# Patient Record
Sex: FEMALE | Race: WHITE | NOT HISPANIC OR LATINO | Employment: OTHER | URBAN - METROPOLITAN AREA
[De-identification: names, ages, dates, MRNs, and addresses within clinical notes are randomized per-mention and may not be internally consistent; named-entity substitution may affect disease eponyms.]

---

## 2017-05-05 ENCOUNTER — OFFICE VISIT (OUTPATIENT)
Dept: INTERNAL MEDICINE | Facility: CLINIC | Age: 82
End: 2017-05-05

## 2017-05-05 VITALS
DIASTOLIC BLOOD PRESSURE: 60 MMHG | HEART RATE: 67 BPM | OXYGEN SATURATION: 96 % | SYSTOLIC BLOOD PRESSURE: 90 MMHG | TEMPERATURE: 98 F | RESPIRATION RATE: 16 BRPM | WEIGHT: 124 LBS | BODY MASS INDEX: 22.82 KG/M2 | HEIGHT: 62 IN

## 2017-05-05 DIAGNOSIS — M85.80 OSTEOPENIA: ICD-10-CM

## 2017-05-05 DIAGNOSIS — F34.1 DYSTHYMIA: ICD-10-CM

## 2017-05-05 DIAGNOSIS — M89.9 DISORDER OF BONE: ICD-10-CM

## 2017-05-05 DIAGNOSIS — R73.02 IMPAIRED GLUCOSE TOLERANCE: ICD-10-CM

## 2017-05-05 DIAGNOSIS — I25.10 ATHEROSCLEROSIS OF NATIVE CORONARY ARTERY OF NATIVE HEART WITHOUT ANGINA PECTORIS: Primary | ICD-10-CM

## 2017-05-05 DIAGNOSIS — H35.30 MACULAR DEGENERATION: ICD-10-CM

## 2017-05-05 DIAGNOSIS — N39.41 URGE INCONTINENCE OF URINE: ICD-10-CM

## 2017-05-05 DIAGNOSIS — I10 ESSENTIAL HYPERTENSION: ICD-10-CM

## 2017-05-05 DIAGNOSIS — K21.9 GASTROESOPHAGEAL REFLUX DISEASE WITHOUT ESOPHAGITIS: ICD-10-CM

## 2017-05-05 DIAGNOSIS — K57.30 DIVERTICULOSIS OF LARGE INTESTINE WITHOUT HEMORRHAGE: ICD-10-CM

## 2017-05-05 DIAGNOSIS — E78.5 HYPERLIPIDEMIA, UNSPECIFIED HYPERLIPIDEMIA TYPE: ICD-10-CM

## 2017-05-05 PROBLEM — R73.9 HYPERGLYCEMIA: Status: ACTIVE | Noted: 2017-05-05

## 2017-05-05 PROBLEM — K57.90 DIVERTICULOSIS OF INTESTINE: Status: ACTIVE | Noted: 2017-05-05

## 2017-05-05 PROBLEM — F32.A DEPRESSION: Status: ACTIVE | Noted: 2017-05-05

## 2017-05-05 PROBLEM — J30.9 ATOPIC RHINITIS: Status: ACTIVE | Noted: 2017-05-05

## 2017-05-05 PROBLEM — R73.9 HYPERGLYCEMIA: Status: RESOLVED | Noted: 2017-05-05 | Resolved: 2017-05-05

## 2017-05-05 PROBLEM — G62.9 PERIPHERAL NERVE DISEASE: Status: ACTIVE | Noted: 2017-05-05

## 2017-05-05 PROCEDURE — 99204 OFFICE O/P NEW MOD 45 MIN: CPT | Performed by: INTERNAL MEDICINE

## 2017-05-05 RX ORDER — RANITIDINE 150 MG/1
150 TABLET ORAL 2 TIMES DAILY
COMMUNITY
End: 2017-05-05 | Stop reason: SDUPTHER

## 2017-05-05 RX ORDER — ACETAMINOPHEN 500 MG
500-1000 TABLET ORAL EVERY EVENING
COMMUNITY
End: 2019-10-02

## 2017-05-05 RX ORDER — RANITIDINE 150 MG/1
150 TABLET ORAL 2 TIMES DAILY
Qty: 180 TABLET | Refills: 1 | Status: SHIPPED | OUTPATIENT
Start: 2017-05-05 | End: 2017-11-09 | Stop reason: SDUPTHER

## 2017-05-06 LAB
25(OH)D3+25(OH)D2 SERPL-MCNC: 92.8 NG/ML (ref 30–100)
ALBUMIN SERPL-MCNC: 4 G/DL (ref 3.5–5.2)
ALBUMIN/GLOB SERPL: 1.4 G/DL
ALP SERPL-CCNC: 55 U/L (ref 39–117)
ALT SERPL-CCNC: 15 U/L (ref 1–33)
APPEARANCE UR: CLEAR
AST SERPL-CCNC: 18 U/L (ref 1–32)
BACTERIA #/AREA URNS HPF: NORMAL /HPF
BASOPHILS # BLD AUTO: 0.06 10*3/MM3 (ref 0–0.2)
BASOPHILS NFR BLD AUTO: 0.8 % (ref 0–1.5)
BILIRUB SERPL-MCNC: 0.3 MG/DL (ref 0.1–1.2)
BILIRUB UR QL STRIP: NEGATIVE
BUN SERPL-MCNC: 20 MG/DL (ref 8–23)
BUN/CREAT SERPL: 28.6 (ref 7–25)
CALCIUM SERPL-MCNC: 10 MG/DL (ref 8.2–9.6)
CASTS URNS MICRO: NORMAL
CHLORIDE SERPL-SCNC: 100 MMOL/L (ref 98–107)
CHOLEST SERPL-MCNC: 118 MG/DL (ref 0–200)
CO2 SERPL-SCNC: 32.3 MMOL/L (ref 22–29)
COLOR UR: YELLOW
CREAT SERPL-MCNC: 0.7 MG/DL (ref 0.57–1)
EOSINOPHIL # BLD AUTO: 0.2 10*3/MM3 (ref 0–0.7)
EOSINOPHIL NFR BLD AUTO: 2.6 % (ref 0.3–6.2)
EPI CELLS #/AREA URNS HPF: NORMAL /HPF
ERYTHROCYTE [DISTWIDTH] IN BLOOD BY AUTOMATED COUNT: 14.1 % (ref 11.7–13)
GLOBULIN SER CALC-MCNC: 2.8 GM/DL
GLUCOSE SERPL-MCNC: 110 MG/DL (ref 65–99)
GLUCOSE UR QL: NEGATIVE
HBA1C MFR BLD: 5.2 % (ref 4.8–5.6)
HCT VFR BLD AUTO: 35.9 % (ref 35.6–45.5)
HDLC SERPL-MCNC: 55 MG/DL (ref 40–60)
HGB BLD-MCNC: 11.7 G/DL (ref 11.9–15.5)
HGB UR QL STRIP: NEGATIVE
IMM GRANULOCYTES # BLD: 0 10*3/MM3 (ref 0–0.03)
IMM GRANULOCYTES NFR BLD: 0 % (ref 0–0.5)
KETONES UR QL STRIP: NEGATIVE
LDLC SERPL CALC-MCNC: 45 MG/DL (ref 0–100)
LEUKOCYTE ESTERASE UR QL STRIP: NEGATIVE
LYMPHOCYTES # BLD AUTO: 2.38 10*3/MM3 (ref 0.9–4.8)
LYMPHOCYTES NFR BLD AUTO: 30.4 % (ref 19.6–45.3)
MCH RBC QN AUTO: 34.2 PG (ref 26.9–32)
MCHC RBC AUTO-ENTMCNC: 32.6 G/DL (ref 32.4–36.3)
MCV RBC AUTO: 105 FL (ref 80.5–98.2)
MONOCYTES # BLD AUTO: 0.6 10*3/MM3 (ref 0.2–1.2)
MONOCYTES NFR BLD AUTO: 7.7 % (ref 5–12)
NEUTROPHILS # BLD AUTO: 4.59 10*3/MM3 (ref 1.9–8.1)
NEUTROPHILS NFR BLD AUTO: 58.5 % (ref 42.7–76)
NITRITE UR QL STRIP: NEGATIVE
NRBC BLD AUTO-RTO: 0 /100 WBC (ref 0–0)
PH UR STRIP: 7.5 [PH] (ref 5–8)
PLATELET # BLD AUTO: 196 10*3/MM3 (ref 140–500)
POTASSIUM SERPL-SCNC: 4.3 MMOL/L (ref 3.5–5.2)
PROT SERPL-MCNC: 6.8 G/DL (ref 6–8.5)
PROT UR QL STRIP: NEGATIVE
RBC # BLD AUTO: 3.42 10*6/MM3 (ref 3.9–5.2)
RBC #/AREA URNS HPF: NORMAL /HPF
SODIUM SERPL-SCNC: 143 MMOL/L (ref 136–145)
SP GR UR: 1.01 (ref 1–1.03)
T4 FREE SERPL-MCNC: 1.01 NG/DL (ref 0.93–1.7)
TRIGL SERPL-MCNC: 92 MG/DL (ref 0–150)
TSH SERPL DL<=0.005 MIU/L-ACNC: 0.88 MIU/ML (ref 0.27–4.2)
UROBILINOGEN UR STRIP-MCNC: (no result) MG/DL
VLDLC SERPL CALC-MCNC: 18.4 MG/DL (ref 5–40)
WBC # BLD AUTO: 7.83 10*3/MM3 (ref 4.5–10.7)
WBC #/AREA URNS HPF: NORMAL /HPF

## 2017-05-25 ENCOUNTER — OFFICE VISIT (OUTPATIENT)
Dept: CARDIOLOGY | Facility: CLINIC | Age: 82
End: 2017-05-25

## 2017-05-25 VITALS
OXYGEN SATURATION: 99 % | DIASTOLIC BLOOD PRESSURE: 56 MMHG | WEIGHT: 116 LBS | SYSTOLIC BLOOD PRESSURE: 120 MMHG | BODY MASS INDEX: 21.35 KG/M2 | HEIGHT: 62 IN | HEART RATE: 65 BPM

## 2017-05-25 DIAGNOSIS — Z95.5 HISTORY OF CORONARY ARTERY STENT PLACEMENT: ICD-10-CM

## 2017-05-25 DIAGNOSIS — I25.10 ATHEROSCLEROSIS OF NATIVE CORONARY ARTERY OF NATIVE HEART WITHOUT ANGINA PECTORIS: Primary | ICD-10-CM

## 2017-05-25 PROCEDURE — 99213 OFFICE O/P EST LOW 20 MIN: CPT | Performed by: PHYSICIAN ASSISTANT

## 2017-05-25 PROCEDURE — 93000 ELECTROCARDIOGRAM COMPLETE: CPT | Performed by: PHYSICIAN ASSISTANT

## 2017-05-31 ENCOUNTER — APPOINTMENT (OUTPATIENT)
Dept: BONE DENSITY | Facility: HOSPITAL | Age: 82
End: 2017-05-31
Attending: INTERNAL MEDICINE

## 2017-06-14 DIAGNOSIS — R71.8 ABNORMAL RED BLOOD CELLS: Primary | ICD-10-CM

## 2017-06-19 ENCOUNTER — RESULTS ENCOUNTER (OUTPATIENT)
Dept: INTERNAL MEDICINE | Facility: CLINIC | Age: 82
End: 2017-06-19

## 2017-06-19 DIAGNOSIS — R71.8 ABNORMAL RED BLOOD CELLS: ICD-10-CM

## 2017-06-19 LAB — VIT B12 SERPL-MCNC: 1430 PG/ML (ref 211–946)

## 2017-06-23 ENCOUNTER — HOSPITAL ENCOUNTER (OUTPATIENT)
Dept: BONE DENSITY | Facility: HOSPITAL | Age: 82
Discharge: HOME | End: 2017-06-23
Attending: INTERNAL MEDICINE | Admitting: INTERNAL MEDICINE

## 2017-06-23 DIAGNOSIS — M89.9 DISORDER OF BONE: ICD-10-CM

## 2017-06-23 DIAGNOSIS — M85.80 OSTEOPENIA: ICD-10-CM

## 2017-06-23 PROCEDURE — 77080 DXA BONE DENSITY AXIAL: CPT

## 2017-06-26 DIAGNOSIS — M54.9 BACK PAIN, UNSPECIFIED BACK LOCATION, UNSPECIFIED BACK PAIN LATERALITY, UNSPECIFIED CHRONICITY: Primary | ICD-10-CM

## 2017-06-26 DIAGNOSIS — M54.9 CHRONIC BACK PAIN, UNSPECIFIED BACK LOCATION, UNSPECIFIED BACK PAIN LATERALITY: ICD-10-CM

## 2017-06-26 DIAGNOSIS — G89.29 CHRONIC BACK PAIN, UNSPECIFIED BACK LOCATION, UNSPECIFIED BACK PAIN LATERALITY: ICD-10-CM

## 2017-07-05 ENCOUNTER — TELEPHONE (OUTPATIENT)
Dept: INTERNAL MEDICINE | Facility: CLINIC | Age: 82
End: 2017-07-05

## 2017-07-05 DIAGNOSIS — Z85.828 HISTORY OF SKIN CANCER: Primary | ICD-10-CM

## 2017-07-21 ENCOUNTER — OFFICE VISIT (OUTPATIENT)
Dept: INTERNAL MEDICINE | Facility: CLINIC | Age: 82
End: 2017-07-21

## 2017-07-21 VITALS
WEIGHT: 123 LBS | HEIGHT: 62 IN | RESPIRATION RATE: 16 BRPM | SYSTOLIC BLOOD PRESSURE: 150 MMHG | DIASTOLIC BLOOD PRESSURE: 80 MMHG | BODY MASS INDEX: 22.63 KG/M2 | OXYGEN SATURATION: 98 % | TEMPERATURE: 98.2 F | HEART RATE: 65 BPM

## 2017-07-21 DIAGNOSIS — M54.5 CHRONIC BILATERAL LOW BACK PAIN, WITH SCIATICA PRESENCE UNSPECIFIED: Primary | ICD-10-CM

## 2017-07-21 DIAGNOSIS — G89.29 CHRONIC BILATERAL LOW BACK PAIN, WITH SCIATICA PRESENCE UNSPECIFIED: Primary | ICD-10-CM

## 2017-07-21 PROBLEM — M54.50 CHRONIC BILATERAL LOW BACK PAIN: Status: ACTIVE | Noted: 2017-07-21

## 2017-07-21 PROCEDURE — 99213 OFFICE O/P EST LOW 20 MIN: CPT | Performed by: INTERNAL MEDICINE

## 2017-07-21 RX ORDER — PREDNISONE 10 MG/1
TABLET ORAL
Qty: 1 EACH | Refills: 0 | Status: SHIPPED | OUTPATIENT
Start: 2017-07-21 | End: 2017-08-09 | Stop reason: SINTOL

## 2017-07-21 NOTE — PROGRESS NOTES
Subjective   Alexa Peace is a 89 y.o. female.   5/5/2017  Wt Readings from Last 1 Encounters:   07/21/17 123 lb (55.8 kg)   She was seen in May 2017 for initial visit.    She returns for acute care visit because of back and leg pain.    She describes fairly long-standing back and leg pain.  No trauma described.  She had a bone density done in May which showed osteopenia.  She says that her back does not hurt if she lays down but anytime she is up she has problems with both back and leg pain.  She has not had any falls    History of Present Illness     The following portions of the patient's history were reviewed and updated as appropriate: allergies, current medications, past family history, past medical history, past social history, past surgical history and problem list.    Review of Systems   Constitutional: Negative.    HENT: Negative.    Eyes: Negative.    Respiratory: Negative.    Cardiovascular: Negative.    Endocrine: Negative.    Genitourinary: Negative.    Musculoskeletal: Positive for back pain.   Skin: Negative.    Neurological: Negative.    Hematological: Negative.    Psychiatric/Behavioral: Negative.        Objective   Physical Exam   Constitutional: She appears well-developed and well-nourished.   HENT:   Head: Normocephalic and atraumatic.   Cardiovascular: Normal rate and regular rhythm.  Exam reveals no gallop and no friction rub.    No murmur heard.  Pulmonary/Chest: Effort normal and breath sounds normal. No respiratory distress. She has no wheezes. She has no rales.   Abdominal: Soft. Bowel sounds are normal. She exhibits no distension and no mass. There is no tenderness.   Musculoskeletal:   Lumbosacral spine flexion about 70° anteriorly, 5° extension, 10° toward either side.  All motions are problematic But particularly flexion toward the left.  Straight leg raising is not definitely positive   Neurological: She is alert.   Gait appear stable   Skin: Skin is warm.   Psychiatric: Her behavior  is normal.   Vitals reviewed.      Assessment/Plan   Ada was seen today for back pain and leg pain.    Diagnoses and all orders for this visit:    Chronic bilateral low back pain, with sciatica presence unspecified  Comments:  Low back and bilateral leg pain, suspect lumbar spinal stenosis.  We'll schedule MRI scan and refer for orthopedic consultation,  will treat with a prednisone Dosepak to see if she is responsive to this  Orders:  -     MRI Lumbar Spine With & Without Contrast; Future  -     Ambulatory Referral to Orthopedic Surgery          Keep September follow-up as scheduled                     EMR Dragon/Transcription disclaimer:      Much of this encounter note is an electronic transcription/translation of spoken language to printed text. The electronic translation of spoken language may permit erroneous, or at times, nonsensical words or phrases to be inadvertently transcribed; Although I have reviewed the note for such errors, some may still exist.

## 2017-07-27 ENCOUNTER — TELEPHONE (OUTPATIENT)
Dept: INTERNAL MEDICINE | Facility: CLINIC | Age: 82
End: 2017-07-27

## 2017-07-27 RX ORDER — GABAPENTIN 100 MG/1
100 CAPSULE ORAL 3 TIMES DAILY
Qty: 90 CAPSULE | Refills: 3 | Status: SHIPPED | OUTPATIENT
Start: 2017-07-27 | End: 2018-01-31 | Stop reason: SDUPTHER

## 2017-07-27 NOTE — TELEPHONE ENCOUNTER
Pt is taking prednisone 10 mg for 10 days due to back pain, Pt states that she has had elevated blood pressure for a few days now. This morning around 9:30 am it was was 145/63, at 11:15 it was 172/66, and at 12:15 it was 164/60. Pt would like to know if she should discontinue the prednisone. Please advise.

## 2017-07-31 ENCOUNTER — HOSPITAL ENCOUNTER (OUTPATIENT)
Dept: MRI IMAGING | Facility: HOSPITAL | Age: 82
Discharge: HOME | End: 2017-07-31
Attending: INTERNAL MEDICINE | Admitting: INTERNAL MEDICINE

## 2017-07-31 DIAGNOSIS — M54.5 CHRONIC BILATERAL LOW BACK PAIN, WITH SCIATICA PRESENCE UNSPECIFIED: ICD-10-CM

## 2017-07-31 DIAGNOSIS — G89.29 CHRONIC BILATERAL LOW BACK PAIN, WITH SCIATICA PRESENCE UNSPECIFIED: ICD-10-CM

## 2017-07-31 PROCEDURE — 72148 MRI LUMBAR SPINE W/O DYE: CPT

## 2017-08-01 ENCOUNTER — APPOINTMENT (OUTPATIENT)
Dept: MRI IMAGING | Facility: HOSPITAL | Age: 82
End: 2017-08-01
Attending: INTERNAL MEDICINE

## 2017-08-09 ENCOUNTER — OFFICE VISIT (OUTPATIENT)
Dept: ORTHOPEDIC SURGERY | Facility: CLINIC | Age: 82
End: 2017-08-09

## 2017-08-09 VITALS — TEMPERATURE: 97.6 F | BODY MASS INDEX: 22.63 KG/M2 | WEIGHT: 123 LBS | HEIGHT: 62 IN

## 2017-08-09 DIAGNOSIS — M41.9 ACQUIRED SCOLIOSIS: ICD-10-CM

## 2017-08-09 DIAGNOSIS — G89.29 CHRONIC BILATERAL LOW BACK PAIN, WITH SCIATICA PRESENCE UNSPECIFIED: ICD-10-CM

## 2017-08-09 DIAGNOSIS — M54.50 LUMBAR SPINE PAIN: Primary | ICD-10-CM

## 2017-08-09 DIAGNOSIS — M54.5 CHRONIC BILATERAL LOW BACK PAIN, WITH SCIATICA PRESENCE UNSPECIFIED: ICD-10-CM

## 2017-08-09 DIAGNOSIS — M48.061 SPINAL STENOSIS OF LUMBAR REGION: ICD-10-CM

## 2017-08-09 PROCEDURE — 72100 X-RAY EXAM L-S SPINE 2/3 VWS: CPT | Performed by: ORTHOPAEDIC SURGERY

## 2017-08-09 PROCEDURE — 99204 OFFICE O/P NEW MOD 45 MIN: CPT | Performed by: ORTHOPAEDIC SURGERY

## 2017-08-09 NOTE — PROGRESS NOTES
New patient or new problem visit    Chief Complaint   Patient presents with   • Lumbar Spine - Establish Care       HPI: She complains of low back pain radiating to both lower extremities ongoing for years.  It severe intermittent crushing pain relieved with rest.  She's undergone chiropractic which used to help Medrol pack which helped somewhat.    PFSH: See chart- reviewed    Review of Systems   Constitutional: Negative for activity change, appetite change, chills, diaphoresis, fatigue, fever and unexpected weight change.   HENT: Positive for hearing loss and sore throat. Negative for congestion, nosebleeds, postnasal drip, tinnitus and trouble swallowing.         Loss of hair   Eyes: Positive for visual disturbance. Negative for pain.   Respiratory: Negative for apnea, cough, chest tightness, shortness of breath and wheezing.    Cardiovascular: Negative for chest pain and palpitations.   Gastrointestinal: Negative for abdominal pain, blood in stool, diarrhea, nausea and vomiting.   Genitourinary: Negative for difficulty urinating and dysuria.   Musculoskeletal: Positive for arthralgias, back pain and myalgias. Negative for joint swelling.        Stiffness   Skin: Negative for color change and rash.        Changes in moles or new growths   Neurological: Positive for dizziness. Negative for light-headedness, numbness and headaches.   Hematological: Bruises/bleeds easily.   Psychiatric/Behavioral: Negative for agitation and sleep disturbance. The patient is nervous/anxious.        PE: Constitutional: Vital signs above-noted.  Awake, alert and oriented    Psychiatric: Affect and insight do not appear grossly disturbed.    Pulmonary: Breathing is unlabored, color is good.    Skin: Warm, dry and normal turgor    Cardiac:  pedal pulses intact.  No edema.    Eyesight and hearing appear adequate for examination purposes      Musculoskeletal:  There is mild tenderness to percussion and palpation of the spine. Motion  appears undisturbed.  Posture is unremarkable to coronal and sagittal inspection.    The skin about the area is intact.  There is no palpable or visible deformity.  There is no local spasm.       Neurologic:   Reflexes are 2+ and symmetrical in the patellae and absent in the Achilles.   Motor function is undisturbed in quadriceps, EHL, and gastrocnemius      Sensation appears symmetrically intact to light touch   .  In the bilateral lower extremities there is no evidence of atrophy.   Clonus is absent..  Gait appears undisturbed.  SLR test negative      MEDICAL DECISION MAKING    XRAY: Plain film x-rays of the lumbar spine from [facility] demonstrate scoliosis with the well-balanced the L2-3 curve apex the.  No comparison views are available.  Lumbar MRI demonstrates stenosis at 3 for and 45 primarily somewhat less at to 3.  I agree with the radiologist report.    Other: n/a    Impression: Lumbar scoliosis, lumbar spinal stenosis    Plan: L epidural injections and aqua therapy.  Less hope this works.  If not I would envision an L3-4, L4-5 laminectomy and perhaps L2 to 5 fusion of the concavity with local bone graft and possible instrumentation on the concavity.

## 2017-08-14 ENCOUNTER — TELEPHONE (OUTPATIENT)
Dept: CARDIOLOGY | Facility: CLINIC | Age: 82
End: 2017-08-14

## 2017-08-17 NOTE — TELEPHONE ENCOUNTER
Per verbal from Dr. Kim, patient should hold plavix & aspirin for 7 days prior.    Patient informed.

## 2017-08-23 ENCOUNTER — ANESTHESIA EVENT (OUTPATIENT)
Dept: PAIN MEDICINE | Facility: HOSPITAL | Age: 82
End: 2017-08-23

## 2017-08-23 ENCOUNTER — ANESTHESIA (OUTPATIENT)
Dept: PAIN MEDICINE | Facility: HOSPITAL | Age: 82
End: 2017-08-23

## 2017-08-23 ENCOUNTER — HOSPITAL ENCOUNTER (OUTPATIENT)
Dept: GENERAL RADIOLOGY | Facility: HOSPITAL | Age: 82
Discharge: HOME | End: 2017-08-23

## 2017-08-23 ENCOUNTER — HOSPITAL ENCOUNTER (OUTPATIENT)
Dept: PAIN MEDICINE | Facility: HOSPITAL | Age: 82
Discharge: HOME | End: 2017-08-23
Attending: ORTHOPAEDIC SURGERY | Admitting: ORTHOPAEDIC SURGERY

## 2017-08-23 VITALS
WEIGHT: 125 LBS | HEIGHT: 62 IN | DIASTOLIC BLOOD PRESSURE: 73 MMHG | BODY MASS INDEX: 23 KG/M2 | SYSTOLIC BLOOD PRESSURE: 169 MMHG | OXYGEN SATURATION: 96 % | HEART RATE: 75 BPM | RESPIRATION RATE: 16 BRPM | TEMPERATURE: 97.8 F

## 2017-08-23 DIAGNOSIS — M54.5 CHRONIC BILATERAL LOW BACK PAIN, WITH SCIATICA PRESENCE UNSPECIFIED: ICD-10-CM

## 2017-08-23 DIAGNOSIS — R52 PAIN: ICD-10-CM

## 2017-08-23 DIAGNOSIS — M54.50 LUMBAR SPINE PAIN: ICD-10-CM

## 2017-08-23 DIAGNOSIS — G89.29 CHRONIC BILATERAL LOW BACK PAIN, WITH SCIATICA PRESENCE UNSPECIFIED: ICD-10-CM

## 2017-08-23 LAB — GLUCOSE BLDC GLUCOMTR-MCNC: 100 MG/DL (ref 70–130)

## 2017-08-23 PROCEDURE — 0 IOPAMIDOL 41 % SOLUTION: Performed by: ANESTHESIOLOGY

## 2017-08-23 PROCEDURE — 77003 FLUOROGUIDE FOR SPINE INJECT: CPT

## 2017-08-23 PROCEDURE — C1755 CATHETER, INTRASPINAL: HCPCS

## 2017-08-23 PROCEDURE — 25010000002 MIDAZOLAM PER 1 MG: Performed by: ANESTHESIOLOGY

## 2017-08-23 PROCEDURE — 25010000002 METHYLPREDNISOLONE PER 80 MG: Performed by: ANESTHESIOLOGY

## 2017-08-23 PROCEDURE — 82962 GLUCOSE BLOOD TEST: CPT

## 2017-08-23 RX ORDER — LIDOCAINE HYDROCHLORIDE 10 MG/ML
1 INJECTION, SOLUTION INFILTRATION; PERINEURAL ONCE
Status: DISCONTINUED | OUTPATIENT
Start: 2017-08-23 | End: 2017-08-24 | Stop reason: HOSPADM

## 2017-08-23 RX ORDER — METHYLPREDNISOLONE ACETATE 80 MG/ML
80 INJECTION, SUSPENSION INTRA-ARTICULAR; INTRALESIONAL; INTRAMUSCULAR; SOFT TISSUE ONCE
Status: COMPLETED | OUTPATIENT
Start: 2017-08-23 | End: 2017-08-23

## 2017-08-23 RX ORDER — SODIUM CHLORIDE 0.9 % (FLUSH) 0.9 %
1-10 SYRINGE (ML) INJECTION AS NEEDED
Status: DISCONTINUED | OUTPATIENT
Start: 2017-08-23 | End: 2017-08-24 | Stop reason: HOSPADM

## 2017-08-23 RX ORDER — MIDAZOLAM HYDROCHLORIDE 1 MG/ML
1 INJECTION INTRAMUSCULAR; INTRAVENOUS
Status: DISCONTINUED | OUTPATIENT
Start: 2017-08-23 | End: 2017-08-24 | Stop reason: HOSPADM

## 2017-08-23 RX ORDER — LIDOCAINE HYDROCHLORIDE 10 MG/ML
0.5 INJECTION, SOLUTION INFILTRATION; PERINEURAL ONCE AS NEEDED
Status: DISCONTINUED | OUTPATIENT
Start: 2017-08-23 | End: 2017-08-24 | Stop reason: HOSPADM

## 2017-08-23 RX ORDER — FENTANYL CITRATE 50 UG/ML
50 INJECTION, SOLUTION INTRAMUSCULAR; INTRAVENOUS
Status: DISCONTINUED | OUTPATIENT
Start: 2017-08-23 | End: 2017-08-24 | Stop reason: HOSPADM

## 2017-08-23 RX ADMIN — MIDAZOLAM 0.5 MG: 1 INJECTION INTRAMUSCULAR; INTRAVENOUS at 13:55

## 2017-08-23 RX ADMIN — IOPAMIDOL 1 ML: 408 INJECTION, SOLUTION INTRATHECAL at 13:58

## 2017-08-23 RX ADMIN — METHYLPREDNISOLONE ACETATE 80 MG: 80 INJECTION, SUSPENSION INTRA-ARTICULAR; INTRALESIONAL; INTRAMUSCULAR; SOFT TISSUE at 13:58

## 2017-08-23 NOTE — H&P
[facility]    History and Physical    Patient Name: Alexa Peace  :  1927  MRN:  [id number]  Date of Admission: 2017    Subjective     Patient is a 89 y.o. female presents with chief complaint of chronic, moderate low back and leg: bilateral pain.  Onset of symptoms was gradual starting unknown years ago.  Symptoms are associated/aggravated by activity. Symptoms improve with nothing.  Presents for [facility].      The following portions of the patients history were reviewed and updated as appropriate: current medications, allergies, past medical history, past surgical history, past family history, past social history and problem list                Objective     Past Medical History:   Past Medical History:   Diagnosis Date   • Arthritis    • Atrial premature complex    • CAD (coronary artery disease)    • Cancer     skin   • Colon polyp    • Depression    • GERD (gastroesophageal reflux disease)    • Heart attack    • Hyperlipidemia    • Hypertension      Past Surgical History:   Past Surgical History:   Procedure Laterality Date   • NO PAST SURGERIES       Family History:   Family History   Problem Relation Age of Onset   • Diabetes Mother    • Depression Mother    • Anxiety disorder Mother    • Heart disease Father    • Hypertension Father    • Diabetes Father    • Diabetes Brother    • No Known Problems Maternal Grandmother    • No Known Problems Maternal Grandfather    • No Known Problems Paternal Grandmother    • No Known Problems Paternal Grandfather      Social History:   Social History   Substance Use Topics   • Smoking status: Never Smoker   • Smokeless tobacco: Not on file   • Alcohol use No       Vital Signs Range for the last 24 hours  Temperature:     Temp Source:     BP: BP: ()/()    Pulse:     Respirations:     SPO2:     O2 Amount (l/min):     O2 Devices     Weight:           --------------------------------------------------------------------------------    Current Outpatient  Prescriptions   Medication Sig Dispense Refill   • acetaminophen (TYLENOL) 500 MG tablet Take 500 mg by mouth Every 6 (Six) Hours As Needed for Mild Pain (1-3).     • aspirin 81 MG chewable tablet Chew 81 mg daily.     • Calcium Carbonate-Vitamin D (CALCIUM 600+D) 600-200 MG-UNIT tablet Take 1 tablet by mouth Daily.     • clopidogrel (PLAVIX) 75 MG tablet Take 75 mg by mouth daily.     • Coenzyme Q10 (CO Q 10 PO) Take  by mouth.     • Flaxseed, Linseed, (FLAXSEED OIL) 1200 MG capsule Take 1,200 mg by mouth.     • gabapentin (NEURONTIN) 100 MG capsule Take 1 capsule by mouth 3 (Three) Times a Day. 90 capsule 3   • Menthol, Topical Analgesic, (BIOFREEZE EX) Apply  topically As Needed.     • Multiple Vitamins-Minerals (CENTRUM SILVER PO) Take 1 tablet by mouth Daily.     • pravastatin (PRAVACHOL) 40 MG tablet Take 40 mg by mouth daily.     • Probiotic Product (PROBIOTIC PO) Take 1 capsule by mouth as needed.     • raNITIdine (ZANTAC) 150 MG tablet Take 1 tablet by mouth 2 (Two) Times a Day. 180 tablet 1   • sertraline (ZOLOFT) 50 MG tablet Take 1 tablet by mouth Daily. 90 tablet 1   • valsartan (DIOVAN) 40 MG tablet Take 20 mg by mouth daily.       No current facility-administered medications for this encounter.        --------------------------------------------------------------------------------  Assessment/Plan      Plan:  1. Lumbar Epidural with fluoroscopy +/- epidurogram (if needed)  2. Physical therapy exercises at home as prescribed by physical therapy or from the pain clinic handout (given to the patient). Continuation of these exercises every day, or multiple times per week, even when the patient has good pain relief, was stressed to the patient as a preventative measure to decrease the frequency and severity of future pain episodes.  3. Continue pain medicines as already prescribed. If patient not currently taking any, it is recommended to begin Acetaminophen 1000 mg po q 8 hours. If other medicines  containing Acetaminophen are currently prescribed, maintain daily dose at 3000mg.   4. If they can tolerate NSAIDS, it is recommended to take Ibuprofen 600 mg po q 6 hours for 7 days during pain exacerbations. Alternatively, they may substitute an NSAID of their choice (e.g. Aleve)  5. Heat and ice to the affected area as tolerated for pain control. It was discussed that heating pads can cause burns.  6. Low impact exercise such as walking or water exercise was recommended to maintain overall health and aid in weight control.   7. Follow up as needed for subsequent injections.  8. Patient was counseled to abstain from tobacco products.       Anesthesia Evaluation     Patient summary reviewed and Nursing notes reviewed          Airway   Mallampati: II  TM distance: >3 FB  Dental - normal exam     Pulmonary - negative pulmonary ROS and normal exam   Cardiovascular - normal exam    (+) hypertension, past MI , CAD, hyperlipidemia    ROS comment: Off plavix x 7 days    Neuro/Psych- neuro exam normal  (+) numbness, psychiatric history,    GI/Hepatic/Renal/Endo    (+)  GERD,     Musculoskeletal (-) normal exam    (+) back pain, chronic pain,   Abdominal  - normal exam   Substance History - negative use     OB/GYN negative ob/gyn ROS         Other   (+) arthritis   history of cancer                               Diagnosis and Plan    Treatment Plan  ASA 3      Procedures: Lumbar Epidural Steroid Injection(LESI), With fluoroscopy,       Anesthetic plan and risks discussed with patient.          Diagnosis     * Lumbar spinal stenosis [M48.06]

## 2017-08-23 NOTE — ANESTHESIA PROCEDURE NOTES
PAIN Epidural block    Patient location during procedure: pain clinic  Start Time: 8/23/2017 1:50 PM  Stop Time: 8/23/2017 1:57 PM  Indication:procedure for pain  Performed By  Anesthesiologist: KENIA LEYVA  Preanesthetic Checklist  Completed: patient identified, site marked, surgical consent, pre-op evaluation, timeout performed, IV checked, risks and benefits discussed and monitors and equipment checked  Additional Notes  Lumbar stenosis  Fluoro used  Prep:  Pt Position:prone  Sterile Tech:cap, gloves, mask and sterile barrier  Prep:chlorhexidine gluconate and isopropyl alcohol  Monitoring:blood pressure monitoring, continuous pulse oximetry and EKG  Procedure:  Approach:left paramedian  Guidance: fluoroscopy  Location:lumbar  Level:4-5  Needle Type:Tuohy  Needle Gauge:20  Aspiration:negative  Medications:  Depomedrol:80  Preservative Free Saline:3mL  Isovue:3mL  Comments:B/l spread  Post Assessment:  Dressing:occlusive dressing applied  Pt Tolerance:patient tolerated the procedure well with no apparent complications  Complications:no

## 2017-08-23 NOTE — PLAN OF CARE
Problem: Pain, Chronic (Adult)  Goal: Identify Related Risk Factors and Signs and Symptoms  Outcome: Unable to achieve outcome(s) by discharge Date Met:  08/23/17

## 2017-08-23 NOTE — PLAN OF CARE
Problem: Pain, Chronic (Adult)  Goal: Acceptable Pain Control/Comfort Level  Outcome: Unable to achieve outcome(s) by discharge Date Met:  08/23/17

## 2017-08-28 ENCOUNTER — TRANSCRIBE ORDERS (OUTPATIENT)
Dept: PAIN MEDICINE | Facility: HOSPITAL | Age: 82
End: 2017-08-28

## 2017-08-28 DIAGNOSIS — M54.5 CHRONIC BILATERAL LOW BACK PAIN, WITH SCIATICA PRESENCE UNSPECIFIED: ICD-10-CM

## 2017-08-28 DIAGNOSIS — G89.29 CHRONIC BILATERAL LOW BACK PAIN, WITH SCIATICA PRESENCE UNSPECIFIED: ICD-10-CM

## 2017-08-28 DIAGNOSIS — M54.50 LUMBAR SPINE PAIN: Primary | ICD-10-CM

## 2017-09-15 ENCOUNTER — OFFICE VISIT (OUTPATIENT)
Dept: INTERNAL MEDICINE | Facility: CLINIC | Age: 82
End: 2017-09-15

## 2017-09-15 VITALS
SYSTOLIC BLOOD PRESSURE: 136 MMHG | BODY MASS INDEX: 22.63 KG/M2 | DIASTOLIC BLOOD PRESSURE: 68 MMHG | HEIGHT: 62 IN | HEART RATE: 67 BPM | TEMPERATURE: 97.8 F | WEIGHT: 123 LBS | RESPIRATION RATE: 16 BRPM | OXYGEN SATURATION: 98 %

## 2017-09-15 DIAGNOSIS — R73.02 IMPAIRED GLUCOSE TOLERANCE: ICD-10-CM

## 2017-09-15 DIAGNOSIS — N39.41 URGE INCONTINENCE OF URINE: ICD-10-CM

## 2017-09-15 DIAGNOSIS — I10 ESSENTIAL HYPERTENSION: Primary | ICD-10-CM

## 2017-09-15 DIAGNOSIS — E78.5 HYPERLIPIDEMIA, UNSPECIFIED HYPERLIPIDEMIA TYPE: ICD-10-CM

## 2017-09-15 DIAGNOSIS — G62.9 PERIPHERAL NERVE DISEASE: ICD-10-CM

## 2017-09-15 DIAGNOSIS — M54.5 CHRONIC BILATERAL LOW BACK PAIN, WITH SCIATICA PRESENCE UNSPECIFIED: ICD-10-CM

## 2017-09-15 DIAGNOSIS — I25.10 ATHEROSCLEROSIS OF NATIVE CORONARY ARTERY OF NATIVE HEART WITHOUT ANGINA PECTORIS: ICD-10-CM

## 2017-09-15 DIAGNOSIS — G89.29 CHRONIC BILATERAL LOW BACK PAIN, WITH SCIATICA PRESENCE UNSPECIFIED: ICD-10-CM

## 2017-09-15 PROCEDURE — 99214 OFFICE O/P EST MOD 30 MIN: CPT | Performed by: INTERNAL MEDICINE

## 2017-09-15 NOTE — PROGRESS NOTES
Subjective   Alexa Peace is a 89 y.o. female.   7/21/2017  Wt Readings from Last 1 Encounters:   09/15/17 123 lb (55.8 kg)   She was last seen in July for acute care visit because of back and leg pain.  She returns for follow-up of coronary artery disease, hypertension, hyperlipidemia, lumbar spinal stenosis, bladder problems  Her weight is unchanged at 123  History of Present Illness   She had MRIs scan done.Multilevel degenerative disease involving the lumbar spine  as described in detail above including dextroscoliosis, multilevel loss  of disc height, disc desiccation, broad-based disc osteophyte complexes  and posterior element of degenerative disease. There is multilevel  spinal stenosis most severe of which is at L3-L4 and then L4-L5 and then  L2-L3. There is multilevel neural foraminal compromise and lateral  recess narrowing   She is had one epidural thus far as per noted Friday.    She complains of problems with her bladder, lack of control.  This could represent a neurogenic bladder.  If it does not improve then urology evaluation should be considered, likely involving urodynamic studies.    She has coronary artery disease and is treated with aspirin and Plavix.  No recent chest pain.  I    She has hyperlipidemia treated with pravastatin 40 mg each evening.  No medication problems described.    She has depression treated with sertraline 50 mg once daily.    She has peripheral neuropathy and takes gabapentin 100 mg.    She has esophageal reflux treated with ranitidine 150 mg daily.    Review of Systems   Constitutional: Negative.    HENT: Negative.    Eyes: Negative.    Respiratory: Negative.    Cardiovascular: Negative.    Endocrine: Negative.    Genitourinary: Positive for frequency and urgency.   Musculoskeletal: Positive for back pain.   Skin: Negative.    Neurological: Negative.    Hematological: Negative.    Psychiatric/Behavioral: Negative.        Objective   Physical Exam   Constitutional: She  appears well-developed and well-nourished.   HENT:   Head: Normocephalic and atraumatic.   Cardiovascular: Normal rate and regular rhythm.  Exam reveals no gallop and no friction rub.    No murmur heard.  Pulmonary/Chest: Effort normal and breath sounds normal. No respiratory distress. She has no wheezes. She has no rales.   Abdominal: Soft. Bowel sounds are normal. She exhibits no distension and no mass. There is no tenderness.   Neurological: She is alert.   Skin: Skin is warm.   Psychiatric: Her behavior is normal.   Vitals reviewed.      Assessment/Plan   Ada was seen today for hypertension, hyperlipidemia, coronary artery disease and back pain.    Diagnoses and all orders for this visit:    Essential hypertension  Comments:  Blood pressure 136/60, satisfactory  Orders:  -     Comprehensive Metabolic Panel  -     Urinalysis With Microscopic    Atherosclerosis of native coronary artery of native heart without angina pectoris  Comments:  Continue aspirin 81 mg daily and Plavix 75 mg daily  Orders:  -     Lipid Panel    Impaired glucose tolerance    Chronic bilateral low back pain, with sciatica presence unspecified  Comments:  To have second epidural injection next week for lumbar spinal stenosis    Peripheral nerve disease  Comments:  Continue gabapentin 100 mg 3 times a day    Hyperlipidemia, unspecified hyperlipidemia type  Comments:  Continue pravastatin 40 mg daily  Orders:  -     Lipid Panel    Urge incontinence of urine  Comments:  We will check urinalysis and urine culture  Orders:  -     Urine Culture  -     Urinalysis With Microscopic        Schedule office follow-up about 6 months, return sooner if needed                       EMR Dragon/Transcription disclaimer:      Much of this encounter note is an electronic transcription/translation of spoken language to printed text. The electronic translation of spoken language may permit erroneous, or at times, nonsensical words or phrases to be inadvertently  transcribed; Although I have reviewed the note for such errors, some may still exist.

## 2017-09-17 LAB
ALBUMIN SERPL-MCNC: 3.9 G/DL (ref 3.5–5.2)
ALBUMIN/GLOB SERPL: 1.6 G/DL
ALP SERPL-CCNC: 67 U/L (ref 39–117)
ALT SERPL-CCNC: 18 U/L (ref 1–33)
APPEARANCE UR: CLEAR
AST SERPL-CCNC: 18 U/L (ref 1–32)
BACTERIA #/AREA URNS HPF: NORMAL /HPF
BACTERIA UR CULT: NORMAL
BACTERIA UR CULT: NORMAL
BILIRUB SERPL-MCNC: 0.2 MG/DL (ref 0.1–1.2)
BILIRUB UR QL STRIP: NEGATIVE
BUN SERPL-MCNC: 20 MG/DL (ref 8–23)
BUN/CREAT SERPL: 27.4 (ref 7–25)
CALCIUM SERPL-MCNC: 9.9 MG/DL (ref 8.2–9.6)
CASTS URNS MICRO: NORMAL
CHLORIDE SERPL-SCNC: 101 MMOL/L (ref 98–107)
CHOLEST SERPL-MCNC: 110 MG/DL (ref 0–200)
CO2 SERPL-SCNC: 29.6 MMOL/L (ref 22–29)
COLOR UR: YELLOW
CREAT SERPL-MCNC: 0.73 MG/DL (ref 0.57–1)
EPI CELLS #/AREA URNS HPF: NORMAL /HPF
GLOBULIN SER CALC-MCNC: 2.5 GM/DL
GLUCOSE SERPL-MCNC: 126 MG/DL (ref 65–99)
GLUCOSE UR QL: NEGATIVE
HDLC SERPL-MCNC: 39 MG/DL (ref 40–60)
HGB UR QL STRIP: NEGATIVE
KETONES UR QL STRIP: NEGATIVE
LDLC SERPL CALC-MCNC: 48 MG/DL (ref 0–100)
LEUKOCYTE ESTERASE UR QL STRIP: NEGATIVE
NITRITE UR QL STRIP: NEGATIVE
PH UR STRIP: 6 [PH] (ref 5–8)
POTASSIUM SERPL-SCNC: 4.5 MMOL/L (ref 3.5–5.2)
PROT SERPL-MCNC: 6.4 G/DL (ref 6–8.5)
PROT UR QL STRIP: NEGATIVE
RBC #/AREA URNS HPF: NORMAL /HPF
SODIUM SERPL-SCNC: 143 MMOL/L (ref 136–145)
SP GR UR: 1.01 (ref 1–1.03)
TRIGL SERPL-MCNC: 113 MG/DL (ref 0–150)
UROBILINOGEN UR STRIP-MCNC: (no result) MG/DL
VLDLC SERPL CALC-MCNC: 22.6 MG/DL (ref 5–40)
WBC #/AREA URNS HPF: NORMAL /HPF

## 2017-09-22 ENCOUNTER — APPOINTMENT (OUTPATIENT)
Dept: PAIN MEDICINE | Facility: HOSPITAL | Age: 82
End: 2017-09-22
Attending: ORTHOPAEDIC SURGERY

## 2017-09-25 ENCOUNTER — HOSPITAL ENCOUNTER (OUTPATIENT)
Dept: PAIN MEDICINE | Facility: HOSPITAL | Age: 82
Discharge: HOME | End: 2017-09-25
Attending: ORTHOPAEDIC SURGERY | Admitting: ORTHOPAEDIC SURGERY

## 2017-09-25 ENCOUNTER — TRANSCRIBE ORDERS (OUTPATIENT)
Dept: PAIN MEDICINE | Facility: HOSPITAL | Age: 82
End: 2017-09-25

## 2017-09-25 ENCOUNTER — ANESTHESIA (OUTPATIENT)
Dept: PAIN MEDICINE | Facility: HOSPITAL | Age: 82
End: 2017-09-25

## 2017-09-25 ENCOUNTER — ANESTHESIA EVENT (OUTPATIENT)
Dept: PAIN MEDICINE | Facility: HOSPITAL | Age: 82
End: 2017-09-25

## 2017-09-25 ENCOUNTER — HOSPITAL ENCOUNTER (OUTPATIENT)
Dept: GENERAL RADIOLOGY | Facility: HOSPITAL | Age: 82
Discharge: HOME | End: 2017-09-25

## 2017-09-25 VITALS
HEART RATE: 64 BPM | RESPIRATION RATE: 16 BRPM | TEMPERATURE: 97.8 F | WEIGHT: 125 LBS | OXYGEN SATURATION: 98 % | DIASTOLIC BLOOD PRESSURE: 55 MMHG | BODY MASS INDEX: 23 KG/M2 | HEIGHT: 62 IN | SYSTOLIC BLOOD PRESSURE: 148 MMHG

## 2017-09-25 DIAGNOSIS — G89.29 CHRONIC BILATERAL LOW BACK PAIN, WITH SCIATICA PRESENCE UNSPECIFIED: ICD-10-CM

## 2017-09-25 DIAGNOSIS — M54.5 CHRONIC BILATERAL LOW BACK PAIN, WITH SCIATICA PRESENCE UNSPECIFIED: ICD-10-CM

## 2017-09-25 DIAGNOSIS — R52 PAIN: ICD-10-CM

## 2017-09-25 DIAGNOSIS — M54.50 LUMBAR SPINE PAIN: Primary | ICD-10-CM

## 2017-09-25 LAB — GLUCOSE BLDC GLUCOMTR-MCNC: 89 MG/DL (ref 70–130)

## 2017-09-25 PROCEDURE — 0 IOPAMIDOL 41 % SOLUTION: Performed by: ANESTHESIOLOGY

## 2017-09-25 PROCEDURE — 25010000002 METHYLPREDNISOLONE PER 80 MG: Performed by: ANESTHESIOLOGY

## 2017-09-25 PROCEDURE — 25010000002 MIDAZOLAM PER 1 MG: Performed by: ANESTHESIOLOGY

## 2017-09-25 PROCEDURE — 82962 GLUCOSE BLOOD TEST: CPT

## 2017-09-25 PROCEDURE — C1755 CATHETER, INTRASPINAL: HCPCS

## 2017-09-25 PROCEDURE — 77003 FLUOROGUIDE FOR SPINE INJECT: CPT

## 2017-09-25 RX ORDER — LIDOCAINE HYDROCHLORIDE 10 MG/ML
1 INJECTION, SOLUTION INFILTRATION; PERINEURAL ONCE AS NEEDED
Status: DISCONTINUED | OUTPATIENT
Start: 2017-09-25 | End: 2017-09-26 | Stop reason: HOSPADM

## 2017-09-25 RX ORDER — SODIUM CHLORIDE 0.9 % (FLUSH) 0.9 %
1-10 SYRINGE (ML) INJECTION AS NEEDED
Status: DISCONTINUED | OUTPATIENT
Start: 2017-09-25 | End: 2017-09-26 | Stop reason: HOSPADM

## 2017-09-25 RX ORDER — METHYLPREDNISOLONE ACETATE 80 MG/ML
80 INJECTION, SUSPENSION INTRA-ARTICULAR; INTRALESIONAL; INTRAMUSCULAR; SOFT TISSUE ONCE
Status: COMPLETED | OUTPATIENT
Start: 2017-09-25 | End: 2017-09-25

## 2017-09-25 RX ORDER — LIDOCAINE HYDROCHLORIDE 10 MG/ML
0.5 INJECTION, SOLUTION INFILTRATION; PERINEURAL ONCE AS NEEDED
Status: CANCELLED | OUTPATIENT
Start: 2017-09-25

## 2017-09-25 RX ORDER — MIDAZOLAM HYDROCHLORIDE 1 MG/ML
1 INJECTION INTRAMUSCULAR; INTRAVENOUS AS NEEDED
Status: DISCONTINUED | OUTPATIENT
Start: 2017-09-25 | End: 2017-09-26 | Stop reason: HOSPADM

## 2017-09-25 RX ORDER — FENTANYL CITRATE 50 UG/ML
50 INJECTION, SOLUTION INTRAMUSCULAR; INTRAVENOUS AS NEEDED
Status: DISCONTINUED | OUTPATIENT
Start: 2017-09-25 | End: 2017-09-26 | Stop reason: HOSPADM

## 2017-09-25 RX ADMIN — METHYLPREDNISOLONE ACETATE 80 MG: 80 INJECTION, SUSPENSION INTRA-ARTICULAR; INTRALESIONAL; INTRAMUSCULAR; SOFT TISSUE at 09:20

## 2017-09-25 RX ADMIN — IOPAMIDOL 10 ML: 408 INJECTION, SOLUTION INTRATHECAL at 09:20

## 2017-09-25 RX ADMIN — MIDAZOLAM 1 MG: 1 INJECTION INTRAMUSCULAR; INTRAVENOUS at 09:12

## 2017-09-25 NOTE — DISCHARGE INSTRUCTIONS
Lumbar Epidural Steroid Injection Instructions  Plan includes:  1.  Lumbar epidural steroid injections, up to 3, spaced 1-2 weeks apart.  If pain control is acceptable after 1 or 2 injections, it was discussed with the patient that they may return for the subsequent injections if and when their pain returns.  The risks were discussed with the patient including failure of relief, worsening pain, Headache (post dural puncture headache), bleeding (epidural hematoma) and infection (epidural abscess or skin infection).  2.  Physical therapy exercises at home as prescribed by physical therapy or from the pain clinic handout (given to the patient).  Continuation of these exercises every day, or multiple times per week, even when the patient has good pain relief, was stressed to the patient as a preventative measure to decrease the frequency and severity of future pain episodes.  3.  Continue pain medicines as already prescribed.  If patient not currently taking any, it is recommended to begin Acetaminophen 1000 mg po q 8 hours.  If other medicines containing Acetaminophen are currently prescribed, maintain daily dose at 3000 mg.    4.  If they can tolerate NSAIDS, it is recommended to take Ibuprofen 600 mg po q 6 hours for 7 days during pain exacerbations.  Alternatively, they may substitute an NSAID of their choice (e.g. Aleve).  This may be taken at the same time as Acetaminophen.  5.  Heat and ice to the affected area as tolerated for pain control.  It was discussed that heating pads can cause burns.  6.  Daily low impact exercise such as walking or water exercise was recommended to maintain overall health and aid in weight control.   7.  Follow up as needed for subsequent injections.  8.  Patient was counseled to abstain from tobacco products.  Lumbar Epidural Steroid Injection Instructions  Plan includes:  1.  Lumbar epidural steroid injections, up to 3, spaced 1-2 weeks apart.  If pain control is acceptable after 1  or 2 injections, it was discussed with the patient that they may return for the subsequent injections if and when their pain returns.  The risks were discussed with the patient including failure of relief, worsening pain, Headache (post dural puncture headache), bleeding (epidural hematoma) and infection (epidural abscess or skin infection).  2.  Physical therapy exercises at home as prescribed by physical therapy or from the pain clinic handout (given to the patient).  Continuation of these exercises every day, or multiple times per week, even when the patient has good pain relief, was stressed to the patient as a preventative measure to decrease the frequency and severity of future pain episodes.  3.  Continue pain medicines as already prescribed.  If patient not currently taking any, it is recommended to begin Acetaminophen 1000 mg po q 8 hours.  If other medicines containing Acetaminophen are currently prescribed, maintain daily dose at 3000 mg.    4.  If they can tolerate NSAIDS, it is recommended to take Ibuprofen 600 mg po q 6 hours for 7 days during pain exacerbations.  Alternatively, they may substitute an NSAID of their choice (e.g. Aleve).  This may be taken at the same time as Acetaminophen.  5.  Heat and ice to the affected area as tolerated for pain control.  It was discussed that heating pads can cause burns.  6.  Daily low impact exercise such as walking or water exercise was recommended to maintain overall health and aid in weight control.   7.  Follow up as needed for subsequent injections.  8.  Patient was counseled to abstain from tobacco products.

## 2017-09-25 NOTE — ANESTHESIA PROCEDURE NOTES
PAIN Epidural block    Patient location during procedure: pain clinic  Start Time: 9/25/2017 9:09 AM  Stop Time: 9/25/2017 9:22 AM  Indication:procedure for pain  Performed By  Anesthesiologist: TINY SALVADOR  Preanesthetic Checklist  Completed: patient identified, site marked, surgical consent, pre-op evaluation, timeout performed, risks and benefits discussed and monitors and equipment checked  Additional Notes  Interlaminar epidural was performed under fluoroscopic guidance.    Diagnosis     * Spinal stenosis, lumbar region, without neurogenic claudication (M48.06)     * Degeneration of lumbar intervertebral disc (M51.36)  Prep:  Pt Position:prone  Sterile Tech:cap, gloves, mask and sterile barrier  Prep:chlorhexidine gluconate and isopropyl alcohol  Monitoring:blood pressure monitoring, continuous pulse oximetry and EKG  Procedure:  Approach:right paramedian  Guidance: fluoroscopy  Location:lumbar  Level:4-5  Needle Type:Tuohy  Needle Gauge:20  Aspiration:negative  Medications:  Depomedrol:80 mg  Preservative Free Saline:3mL  Isovue:2mL  Comments:Epidural spread of contrast.  Post Assessment:  Dressing:occlusive dressing applied  Pt Tolerance:patient tolerated the procedure well with no apparent complications  Complications:no

## 2017-09-25 NOTE — H&P
[facility]    History and Physical    Patient Name: Alexa Peace  :  1927  MRN:  [id number]  Date of Admission: 2017    Subjective     Patient is a 89 y.o. female presents with chief complaint of chronic, moderate low back and bilateral lower extremity pain.  Onset of symptoms was gradual starting several years ago.  Symptoms are associated/aggravated by activity. Symptoms improve with injection.  She reports approximate 25% relief following her last lumbar epidural steroid injection.  Her pain level today is 4/10.    The following portions of the patients history were reviewed and updated as appropriate: current medications, allergies, past medical history, past surgical history, past family history, past social history and problem list                Objective     Past Medical History:   Past Medical History:   Diagnosis Date   • Arthritis    • Atrial premature complex    • CAD (coronary artery disease)    • Cancer     skin   • Colon polyp    • Depression    • GERD (gastroesophageal reflux disease)    • Heart attack    • Hyperlipidemia    • Hypertension      Past Surgical History:   Past Surgical History:   Procedure Laterality Date   • EPIDURAL BLOCK     • TONSILECTOMY, ADENOIDECTOMY, BILATERAL MYRINGOTOMY AND TUBES       Family History:   Family History   Problem Relation Age of Onset   • Diabetes Mother    • Depression Mother    • Anxiety disorder Mother    • Heart disease Father    • Hypertension Father    • Diabetes Father    • Diabetes Brother    • No Known Problems Maternal Grandmother    • No Known Problems Maternal Grandfather    • No Known Problems Paternal Grandmother    • No Known Problems Paternal Grandfather      Social History:   Social History   Substance Use Topics   • Smoking status: Never Smoker   • Smokeless tobacco: None   • Alcohol use No       Vital Signs Range for the last 24 hours  Temperature: Temp:  [36.6 °C (97.8 °F)] 36.6 °C (97.8 °F)   Temp Source: Temp src: Oral  "  BP: BP: (173)/(65) 173/65   Pulse: Heart Rate:  [63] 63   Respirations: Resp:  [16] 16   SPO2: SpO2:  [97 %] 97 %   O2 Amount (l/min):     O2 Devices O2 Device: room air   Weight: Weight:  [125 lb (56.7 kg)] 125 lb (56.7 kg)     Flowsheet Rows         First Filed Value    Admission Height  62\" (157.5 cm) Documented at 09/25/2017 0831    Admission Weight  125 lb (56.7 kg) Documented at 09/25/2017 0831          --------------------------------------------------------------------------------    Current Outpatient Prescriptions   Medication Sig Dispense Refill   • acetaminophen (TYLENOL) 500 MG tablet Take 500 mg by mouth Every 6 (Six) Hours As Needed for Mild Pain (1-3).     • Calcium Carbonate-Vitamin D (CALCIUM 600+D) 600-200 MG-UNIT tablet Take 1 tablet by mouth Daily.     • Coenzyme Q10 (CO Q 10 PO) Take  by mouth.     • Flaxseed, Linseed, (FLAXSEED OIL) 1200 MG capsule Take 1,200 mg by mouth.     • gabapentin (NEURONTIN) 100 MG capsule Take 1 capsule by mouth 3 (Three) Times a Day. 90 capsule 3   • Menthol, Topical Analgesic, (BIOFREEZE EX) Apply  topically As Needed.     • Multiple Vitamins-Minerals (CENTRUM SILVER PO) Take 1 tablet by mouth Daily.     • pravastatin (PRAVACHOL) 40 MG tablet Take 40 mg by mouth daily.     • Probiotic Product (PROBIOTIC PO) Take 1 capsule by mouth as needed.     • raNITIdine (ZANTAC) 150 MG tablet Take 1 tablet by mouth 2 (Two) Times a Day. 180 tablet 1   • sertraline (ZOLOFT) 50 MG tablet Take 1 tablet by mouth Daily. 90 tablet 1   • aspirin 81 MG chewable tablet Chew 81 mg daily.     • clopidogrel (PLAVIX) 75 MG tablet Take 75 mg by mouth daily.       Current Facility-Administered Medications   Medication Dose Route Frequency Provider Last Rate Last Dose   • sodium chloride 0.9 % flush 1-10 mL  1-10 mL Intravenous ZAC Carver MD           --------------------------------------------------------------------------------  Assessment/Plan      Anesthesia Evaluation     " Patient summary reviewed and Nursing notes reviewed   NPO Solid Status: > 8 hours       Airway   Mallampati: II  TM distance: >3 FB  Neck ROM: full  no difficulty expected  Dental - normal exam     Pulmonary - negative pulmonary ROS and normal exam    breath sounds clear to auscultation  Cardiovascular - normal exam    Rhythm: regular  Rate: normal    (+) hypertension, past MI , CAD, hyperlipidemia  (-) angina, orthopnea, PND, HOPKINS      Neuro/Psych  (+) numbness, psychiatric history Depression,    GI/Hepatic/Renal/Endo    (+)  GERD,     Musculoskeletal     Abdominal     Abdomen: soft.  Bowel sounds: normal.   Substance History - negative use     OB/GYN negative ob/gyn ROS         Other   (+) arthritis   history of cancer                             Diagnosis and Plan    Treatment Plan  ASA 3   Patient has had previous injection/procedure with 25-50% improvement.   Procedures: Lumbar Epidural Steroid Injection(LESI), With fluoroscopy,       Anesthetic plan and risks discussed with patient.          Diagnosis     * Spinal stenosis, lumbar region, without neurogenic claudication [M48.06]     * Degeneration of lumbar intervertebral disc [M51.36]

## 2017-09-28 RX ORDER — PRAVASTATIN SODIUM 40 MG
40 TABLET ORAL DAILY
Qty: 90 TABLET | Refills: 1 | Status: SHIPPED | OUTPATIENT
Start: 2017-09-28 | End: 2018-04-15 | Stop reason: SDUPTHER

## 2017-10-19 ENCOUNTER — TELEPHONE (OUTPATIENT)
Dept: ORTHOPEDIC SURGERY | Facility: CLINIC | Age: 82
End: 2017-10-19

## 2017-10-26 DIAGNOSIS — M54.5 CHRONIC BILATERAL LOW BACK PAIN, WITH SCIATICA PRESENCE UNSPECIFIED: ICD-10-CM

## 2017-10-26 DIAGNOSIS — G89.29 CHRONIC BILATERAL LOW BACK PAIN, WITH SCIATICA PRESENCE UNSPECIFIED: ICD-10-CM

## 2017-10-26 DIAGNOSIS — G62.9 PERIPHERAL NERVE DISEASE: Primary | ICD-10-CM

## 2017-10-30 ENCOUNTER — TELEPHONE (OUTPATIENT)
Dept: INTERNAL MEDICINE | Facility: CLINIC | Age: 82
End: 2017-10-30

## 2017-10-30 NOTE — TELEPHONE ENCOUNTER
Dr. Childers Can you please place another referral to pain management. The patient woulkd like to be referred to [facility] at [address] [facility]

## 2017-11-08 ENCOUNTER — OFFICE VISIT (OUTPATIENT)
Dept: INTERNAL MEDICINE | Facility: CLINIC | Age: 82
End: 2017-11-08

## 2017-11-08 VITALS
HEIGHT: 62 IN | SYSTOLIC BLOOD PRESSURE: 138 MMHG | OXYGEN SATURATION: 92 % | HEART RATE: 83 BPM | WEIGHT: 122 LBS | BODY MASS INDEX: 22.45 KG/M2 | RESPIRATION RATE: 16 BRPM | TEMPERATURE: 99 F | DIASTOLIC BLOOD PRESSURE: 88 MMHG

## 2017-11-08 DIAGNOSIS — M54.41 CHRONIC BILATERAL LOW BACK PAIN WITH BILATERAL SCIATICA: Primary | ICD-10-CM

## 2017-11-08 DIAGNOSIS — R73.02 IMPAIRED GLUCOSE TOLERANCE: ICD-10-CM

## 2017-11-08 DIAGNOSIS — F34.1 DYSTHYMIA: ICD-10-CM

## 2017-11-08 DIAGNOSIS — G62.9 PERIPHERAL NERVE DISEASE: ICD-10-CM

## 2017-11-08 DIAGNOSIS — I10 ESSENTIAL HYPERTENSION: ICD-10-CM

## 2017-11-08 DIAGNOSIS — E78.5 HYPERLIPIDEMIA, UNSPECIFIED HYPERLIPIDEMIA TYPE: ICD-10-CM

## 2017-11-08 DIAGNOSIS — G89.29 CHRONIC BILATERAL LOW BACK PAIN WITH BILATERAL SCIATICA: Primary | ICD-10-CM

## 2017-11-08 DIAGNOSIS — M54.42 CHRONIC BILATERAL LOW BACK PAIN WITH BILATERAL SCIATICA: Primary | ICD-10-CM

## 2017-11-08 DIAGNOSIS — M85.80 OSTEOPENIA, UNSPECIFIED LOCATION: ICD-10-CM

## 2017-11-08 DIAGNOSIS — I25.10 ATHEROSCLEROSIS OF NATIVE CORONARY ARTERY OF NATIVE HEART WITHOUT ANGINA PECTORIS: ICD-10-CM

## 2017-11-08 PROCEDURE — 99213 OFFICE O/P EST LOW 20 MIN: CPT | Performed by: INTERNAL MEDICINE

## 2017-11-08 NOTE — PROGRESS NOTES
Subjective   Alexa Peace is a 89 y.o. female.   10/30/2017  Wt Readings from Last 1 Encounters:   11/08/17 122 lb (55.3 kg)   She was last seen in July 2017, weight 123 then, 122 now.    She returns for follow-up of chronic back pain, coronary artery disease, hypertension, hyperlipidemia    History of Present Illness   She continues to have a great deal difficulty with her back.  MRI scan showed multilevel degenerative disease with DEXA scoliosis, disc problems, spinal stenosis.  She had 2 epidural injections but did not have any benefit from those.  She says that insurance will only cover 2, not 3 injections.    She has atherosclerotic heart disease treated with aspirin 81 mg daily and Plavix 75 mg daily.  No recent chest pain.    She has hypercholesterolemia treated with pravastatin 40 mg each evening.  No problems with the medication.    She has depression treated with sertraline 50 mg daily.  Her back pain is certainly playing a role with depression.  She has bilateral lower extremity radiculopathy related to her back.    She has esophageal reflux treated with ranitidine 150 mg daily with satisfactory results.    She had laboratory studies checked in July.  The following portions of the patient's history were reviewed and updated as appropriate: allergies, current medications, past family history, past medical history, past social history, past surgical history and problem list.    Review of Systems   Constitutional: Negative.    HENT: Negative.    Eyes: Negative.    Respiratory: Negative.    Cardiovascular: Negative.    Endocrine: Negative.    Genitourinary: Negative.    Skin: Negative.    Neurological: Negative.    Hematological: Negative.    Psychiatric/Behavioral: Negative.        Objective   Physical Exam   Constitutional: She appears well-developed and well-nourished.   HENT:   Head: Normocephalic and atraumatic.   Cardiovascular: Normal rate and regular rhythm.  Exam reveals no gallop and no friction  rub.    No murmur heard.  Pulmonary/Chest: Effort normal and breath sounds normal. No respiratory distress. She has no wheezes. She has no rales.   Abdominal: Soft. Bowel sounds are normal. She exhibits no distension and no mass. There is no tenderness.   Neurological: She is alert.   Skin: Skin is warm.   Psychiatric: Her behavior is normal.   Vitals reviewed.      Assessment/Plan   Ada was seen today for back pain, coronary artery disease, hypertension, hyperlipidemia and heartburn.    Diagnoses and all orders for this visit:    Chronic bilateral low back pain with bilateral sciatica  Comments:  I recommend that she see Dr. Andre Layne again for consultation regarding possible operative intervention    Atherosclerosis of native coronary artery of native heart without angina pectoris    Essential hypertension    Impaired glucose tolerance    Peripheral nerve disease    Osteopenia, unspecified location  Comments:  Continue Caltrate D twice a day    Dysthymia  Comments:  Continue sertraline 50 mg daily    Hyperlipidemia, unspecified hyperlipidemia type  Comments:  Continue pravastatin 40 mg each evening        Keep March follow-up as scheduled                       EMR Dragon/Transcription disclaimer:      Much of this encounter note is an electronic transcription/translation of spoken language to printed text. The electronic translation of spoken language may permit erroneous, or at times, nonsensical words or phrases to be inadvertently transcribed; Although I have reviewed the note for such errors, some may still exist.

## 2017-11-10 RX ORDER — RANITIDINE 150 MG/1
TABLET ORAL
Qty: 180 TABLET | Refills: 0 | Status: SHIPPED | OUTPATIENT
Start: 2017-11-10 | End: 2017-11-29

## 2017-11-13 ENCOUNTER — TELEPHONE (OUTPATIENT)
Dept: INTERNAL MEDICINE | Facility: CLINIC | Age: 82
End: 2017-11-13

## 2017-11-13 NOTE — TELEPHONE ENCOUNTER
Patient called office stating she is still having leg pain and would like something to be called in to help with pain.

## 2017-11-14 RX ORDER — TRAMADOL HYDROCHLORIDE 50 MG/1
50 TABLET ORAL EVERY 6 HOURS PRN
Qty: 60 TABLET | Refills: 0 | Status: SHIPPED | OUTPATIENT
Start: 2017-11-14 | End: 2017-11-29

## 2017-11-21 ENCOUNTER — OFFICE VISIT (OUTPATIENT)
Dept: ORTHOPEDIC SURGERY | Facility: CLINIC | Age: 82
End: 2017-11-21

## 2017-11-21 VITALS — WEIGHT: 125 LBS | BODY MASS INDEX: 23 KG/M2 | TEMPERATURE: 98 F | HEIGHT: 62 IN

## 2017-11-21 DIAGNOSIS — M48.062 SPINAL STENOSIS OF LUMBAR REGION WITH NEUROGENIC CLAUDICATION: Primary | ICD-10-CM

## 2017-11-21 DIAGNOSIS — M41.9 ACQUIRED SCOLIOSIS: ICD-10-CM

## 2017-11-21 PROCEDURE — 99214 OFFICE O/P EST MOD 30 MIN: CPT | Performed by: ORTHOPAEDIC SURGERY

## 2017-11-21 RX ORDER — SODIUM CHLORIDE, SODIUM LACTATE, POTASSIUM CHLORIDE, CALCIUM CHLORIDE 600; 310; 30; 20 MG/100ML; MG/100ML; MG/100ML; MG/100ML
100 INJECTION, SOLUTION INTRAVENOUS CONTINUOUS
Status: CANCELLED | OUTPATIENT
Start: 2017-12-07

## 2017-11-21 RX ORDER — CEFAZOLIN SODIUM 2 G/100ML
2 INJECTION, SOLUTION INTRAVENOUS ONCE
Status: CANCELLED | OUTPATIENT
Start: 2017-12-07 | End: 2017-11-21

## 2017-11-21 NOTE — PROGRESS NOTES
She fails to improve mostly from bilateral leg pain in a clotted caving in nature.  She also has back pain.  Epidurals did not help.  She is exhausted conservative care and given this plenty of time.  She has moderate to severe stenosis at the 3 for and moderate stenosis at 45.  She has foraminal stenosis at L2-3 which is the apex of the scoliosis.  She has decent strength in the all tested groups and lower extremity bilaterally and intact sensation.  She has neck pain about which she complains some but the quick exam of the upper extremity shows no negative Idalia test and she has no clonus I don't think this is contributing to her lower extremity dilemma.  She has some urinary incontinence which may be primary but could be helped by decompression.  At this point I recommend L2-3, L3-4 and L4-5 laminectomy and fusion with local bone perhaps just along the concavity and perhaps we'll extend this up to L1.  I don't want to do a full fledged instrumentation as I think the risk would exceed the benefit.  I discussed the risks and benefits of posterior spinal fusion, including where applicable, laminectomy.  Risks include adverse anesthetic events such as death, stroke and myocardial infarction.  More specific surgical risks include infection, nonunion, hardware failure, spinal fluid leakage, nerve root injury or paralysis, visceral or vascular injury, persistent pain, deep venous thrombosis, and pulmonary embolism among others. There is a 70 to 90 % chance of success.   Alternatives have been discussed.  After careful consideration the patient wishes to proceed with surgery.  She's going to do some preoperative physical therapy and the will let Dr. Childers no what's planned to see if we need to do anything to better ready her medically.  I answered many questions in addition to the above and 25 minutes was spent in face-to-face consultation regarding the surgery and its risk.

## 2017-11-22 ENCOUNTER — APPOINTMENT (OUTPATIENT)
Dept: GENERAL RADIOLOGY | Facility: HOSPITAL | Age: 82
End: 2017-11-22

## 2017-11-22 ENCOUNTER — HOSPITAL ENCOUNTER (EMERGENCY)
Facility: HOSPITAL | Age: 82
Discharge: HOME | End: 2017-11-22
Attending: EMERGENCY MEDICINE | Admitting: EMERGENCY MEDICINE

## 2017-11-22 VITALS
HEIGHT: 62 IN | HEART RATE: 86 BPM | RESPIRATION RATE: 20 BRPM | DIASTOLIC BLOOD PRESSURE: 63 MMHG | WEIGHT: 125 LBS | TEMPERATURE: 100.1 F | OXYGEN SATURATION: 96 % | BODY MASS INDEX: 23 KG/M2 | SYSTOLIC BLOOD PRESSURE: 158 MMHG

## 2017-11-22 DIAGNOSIS — M25.512 ACUTE PAIN OF LEFT SHOULDER: Primary | ICD-10-CM

## 2017-11-22 LAB
ALBUMIN SERPL-MCNC: 3.9 G/DL (ref 3.5–5.2)
ALBUMIN/GLOB SERPL: 1.3 G/DL
ALP SERPL-CCNC: 60 U/L (ref 39–117)
ALT SERPL W P-5'-P-CCNC: 16 U/L (ref 1–33)
ANION GAP SERPL CALCULATED.3IONS-SCNC: 11.4 MMOL/L
AST SERPL-CCNC: 18 U/L (ref 1–32)
BASOPHILS # BLD AUTO: 0.04 10*3/MM3 (ref 0–0.2)
BASOPHILS NFR BLD AUTO: 0.4 % (ref 0–1.5)
BILIRUB SERPL-MCNC: 0.5 MG/DL (ref 0.1–1.2)
BUN BLD-MCNC: 18 MG/DL (ref 8–23)
BUN/CREAT SERPL: 32.1 (ref 7–25)
CALCIUM SPEC-SCNC: 9.4 MG/DL (ref 8.2–9.6)
CHLORIDE SERPL-SCNC: 99 MMOL/L (ref 98–107)
CO2 SERPL-SCNC: 28.6 MMOL/L (ref 22–29)
CREAT BLD-MCNC: 0.56 MG/DL (ref 0.57–1)
DEPRECATED RDW RBC AUTO: 51.3 FL (ref 37–54)
EOSINOPHIL # BLD AUTO: 0.04 10*3/MM3 (ref 0–0.7)
EOSINOPHIL NFR BLD AUTO: 0.4 % (ref 0.3–6.2)
ERYTHROCYTE [DISTWIDTH] IN BLOOD BY AUTOMATED COUNT: 13 % (ref 11.7–13)
GFR SERPL CREATININE-BSD FRML MDRD: 102 ML/MIN/1.73
GLOBULIN UR ELPH-MCNC: 3.1 GM/DL
GLUCOSE BLD-MCNC: 140 MG/DL (ref 65–99)
HCT VFR BLD AUTO: 37.8 % (ref 35.6–45.5)
HGB BLD-MCNC: 12.1 G/DL (ref 11.9–15.5)
IMM GRANULOCYTES # BLD: 0.03 10*3/MM3 (ref 0–0.03)
IMM GRANULOCYTES NFR BLD: 0.3 % (ref 0–0.5)
LYMPHOCYTES # BLD AUTO: 0.82 10*3/MM3 (ref 0.9–4.8)
LYMPHOCYTES NFR BLD AUTO: 8.1 % (ref 19.6–45.3)
MCH RBC QN AUTO: 34.8 PG (ref 26.9–32)
MCHC RBC AUTO-ENTMCNC: 32 G/DL (ref 32.4–36.3)
MCV RBC AUTO: 108.6 FL (ref 80.5–98.2)
MONOCYTES # BLD AUTO: 0.82 10*3/MM3 (ref 0.2–1.2)
MONOCYTES NFR BLD AUTO: 8.1 % (ref 5–12)
NEUTROPHILS # BLD AUTO: 8.38 10*3/MM3 (ref 1.9–8.1)
NEUTROPHILS NFR BLD AUTO: 82.7 % (ref 42.7–76)
NT-PROBNP SERPL-MCNC: 226.9 PG/ML (ref 0–1800)
PLAT MORPH BLD: NORMAL
PLATELET # BLD AUTO: 215 10*3/MM3 (ref 140–500)
PMV BLD AUTO: 10.6 FL (ref 6–12)
POTASSIUM BLD-SCNC: 3.9 MMOL/L (ref 3.5–5.2)
PROT SERPL-MCNC: 7 G/DL (ref 6–8.5)
RBC # BLD AUTO: 3.48 10*6/MM3 (ref 3.9–5.2)
RBC MORPH BLD: NORMAL
SODIUM BLD-SCNC: 139 MMOL/L (ref 136–145)
TROPONIN T SERPL-MCNC: <0.01 NG/ML (ref 0–0.03)
TROPONIN T SERPL-MCNC: <0.01 NG/ML (ref 0–0.03)
WBC MORPH BLD: NORMAL
WBC NRBC COR # BLD: 10.13 10*3/MM3 (ref 4.5–10.7)

## 2017-11-22 PROCEDURE — 36415 COLL VENOUS BLD VENIPUNCTURE: CPT

## 2017-11-22 PROCEDURE — 85007 BL SMEAR W/DIFF WBC COUNT: CPT | Performed by: EMERGENCY MEDICINE

## 2017-11-22 PROCEDURE — 80053 COMPREHEN METABOLIC PANEL: CPT | Performed by: EMERGENCY MEDICINE

## 2017-11-22 PROCEDURE — 83880 ASSAY OF NATRIURETIC PEPTIDE: CPT | Performed by: EMERGENCY MEDICINE

## 2017-11-22 PROCEDURE — 93010 ELECTROCARDIOGRAM REPORT: CPT | Performed by: INTERNAL MEDICINE

## 2017-11-22 PROCEDURE — 93005 ELECTROCARDIOGRAM TRACING: CPT

## 2017-11-22 PROCEDURE — 85025 COMPLETE CBC W/AUTO DIFF WBC: CPT | Performed by: EMERGENCY MEDICINE

## 2017-11-22 PROCEDURE — 71020 HC CHEST PA AND LATERAL: CPT

## 2017-11-22 PROCEDURE — 84484 ASSAY OF TROPONIN QUANT: CPT | Performed by: EMERGENCY MEDICINE

## 2017-11-22 PROCEDURE — 99285 EMERGENCY DEPT VISIT HI MDM: CPT

## 2017-11-22 RX ORDER — SODIUM CHLORIDE 0.9 % (FLUSH) 0.9 %
10 SYRINGE (ML) INJECTION AS NEEDED
Status: DISCONTINUED | OUTPATIENT
Start: 2017-11-22 | End: 2017-11-22 | Stop reason: HOSPADM

## 2017-11-22 NOTE — ED NOTES
Pt states she felt mod pain in the left arm and should this morning. Pt currently resting comfortably. No reports of pain at this time.      Mary Beth Horton RN  11/22/17 [id number]

## 2017-11-22 NOTE — ED PROVIDER NOTES
" EMERGENCY DEPARTMENT ENCOUNTER    CHIEF COMPLAINT  Chief Complaint: Shoulder Pain  History given by: Patient, Daughter  History limited by: Patient is a vague historian   Room Number: 14/14  PMD: Lizandro Childers MD      HPI:  Pt has h/o CAD for which pt has had stent placement performed in the past. Pt is currently on Plavix. Pt presents to the ED c/o left shoulder pain radiating to the left arm onset at about 08:00 AM today. Pt states that her episode of shoulder pain lasted about 1 hour and has now resolved. Pt is unsure if movement worsened her shoulder pain during onset. Pt denies dyspnea, chest pain, palpitations, diaphoresis, N/V/D, and abdominal pain. Pt states that her episode of shoulder pain today was dissimilar to her prior cardiac pain. There are no other complaints at this time.    Dr. Kim - cardiologist         Pain Location: Left shoulder  Radiation: Left arm  Quality: \"aching\"  Intensity/Severity: Moderate  Duration: Onset at about 08:00 AM today  Onset quality: Gradual  Timing: Constant   Progression: Resolved  Aggravating Factors: Unknown  Alleviating Factors: Unknown  Previous Episodes: None  Treatment before arrival: None  Associated Symptoms: None      PAST MEDICAL HISTORY  Active Ambulatory Problems     Diagnosis Date Noted   • Bad memory 04/18/2016   • Depression 05/05/2017   • Diverticulosis of intestine 05/05/2017   • Hyperlipidemia 05/05/2017   • Macular degeneration 05/05/2017   • Peripheral nerve disease 05/05/2017   • Osteopenia 05/05/2017   • Atopic rhinitis 05/05/2017   • Urge incontinence of urine 05/05/2017   • Atherosclerosis of native coronary artery 05/05/2017   • Essential hypertension 05/05/2017   • Gastroesophageal reflux disease without esophagitis 05/05/2017   • Impaired glucose tolerance 05/05/2017   • History of coronary artery stent placement 05/25/2017   • Chronic bilateral low back pain 07/21/2017   • Acquired scoliosis 11/21/2017   • Spinal stenosis of lumbar region " with neurogenic claudication 11/21/2017     Resolved Ambulatory Problems     Diagnosis Date Noted   • Foot drop 08/04/2016   • Hyperglycemia 05/05/2017     Past Medical History:   Diagnosis Date   • Arthritis    • Atrial premature complex    • CAD (coronary artery disease)    • Cancer    • Colon polyp    • Depression    • GERD (gastroesophageal reflux disease)    • Heart attack    • Hyperlipidemia    • Hypertension          PAST SURGICAL HISTORY  Past Surgical History:   Procedure Laterality Date   • EPIDURAL BLOCK     • TONSILECTOMY, ADENOIDECTOMY, BILATERAL MYRINGOTOMY AND TUBES           FAMILY HISTORY  Family History   Problem Relation Age of Onset   • Diabetes Mother    • Depression Mother    • Anxiety disorder Mother    • Heart disease Father    • Hypertension Father    • Diabetes Father    • Diabetes Brother    • No Known Problems Maternal Grandmother    • No Known Problems Maternal Grandfather    • No Known Problems Paternal Grandmother    • No Known Problems Paternal Grandfather          SOCIAL HISTORY  Social History     Social History   • Marital status:      Spouse name: N/A   • Number of children: N/A   • Years of education: N/A     Occupational History   • Not on file.     Social History Main Topics   • Smoking status: Never Smoker   • Smokeless tobacco: Never Used   • Alcohol use No   • Drug use: No   • Sexual activity: No     Other Topics Concern   • Not on file     Social History Narrative         ALLERGIES  Ciprofloxacin; Metronidazole; and Prednisone        REVIEW OF SYSTEMS  Review of Systems   Unable to perform ROS: Other (pt is a vague historian )   Constitutional: Negative for diaphoresis.   Respiratory: Negative for shortness of breath.    Cardiovascular: Negative for chest pain and palpitations.   Gastrointestinal: Negative for abdominal pain, diarrhea, nausea and vomiting.   Musculoskeletal:        Left shoulder pain radiating to the left arm - now resolved            PHYSICAL  EXAM  ED Triage Vitals   Temp Heart Rate Resp BP SpO2   11/22/17 0833 11/22/17 0833 11/22/17 0833 11/22/17 0853 11/22/17 0833   99.4 °F (37.4 °C) 110 18 140/67 99 % WNL      Temp src Heart Rate Source Patient Position BP Location FiO2 (%)   11/22/17 0833 11/22/17 0833 11/22/17 0853 11/22/17 0853 --   Tympanic Monitor Lying Left arm        Physical Exam   Constitutional: She is oriented to person, place, and time. No distress.   HENT:   Head: Normocephalic.   Mouth/Throat: Mucous membranes are normal.   Eyes: EOM are normal. Pupils are equal, round, and reactive to light.   Neck: Normal range of motion. Neck supple.   Cardiovascular: Normal rate, regular rhythm and normal heart sounds.    Pulmonary/Chest: Effort normal and breath sounds normal. No respiratory distress. She has no decreased breath sounds. She has no wheezes. She has no rhonchi. She has no rales.   Abdominal: Soft. There is no tenderness. There is no rebound and no guarding.   Musculoskeletal: Normal range of motion. She exhibits edema (minimal swelling to the left ankle).   Neurological: She is alert and oriented to person, place, and time. She has normal sensation.   Skin: Skin is warm and dry.   Psychiatric: Mood and affect normal.   Nursing note and vitals reviewed.          LAB RESULTS  Recent Results (from the past 24 hour(s))   Comprehensive Metabolic Panel    Collection Time: 11/22/17  9:53 AM   Result Value Ref Range    Glucose 140 (H) 65 - 99 mg/dL    BUN 18 8 - 23 mg/dL    Creatinine 0.56 (L) 0.57 - 1.00 mg/dL    Sodium 139 136 - 145 mmol/L    Potassium 3.9 3.5 - 5.2 mmol/L    Chloride 99 98 - 107 mmol/L    CO2 28.6 22.0 - 29.0 mmol/L    Calcium 9.4 8.2 - 9.6 mg/dL    Total Protein 7.0 6.0 - 8.5 g/dL    Albumin 3.90 3.50 - 5.20 g/dL    ALT (SGPT) 16 1 - 33 U/L    AST (SGOT) 18 1 - 32 U/L    Alkaline Phosphatase 60 39 - 117 U/L    Total Bilirubin 0.5 0.1 - 1.2 mg/dL    eGFR Non African Amer 102 >60 mL/min/1.73    Globulin 3.1 gm/dL    A/G  Ratio 1.3 g/dL    BUN/Creatinine Ratio 32.1 (H) 7.0 - 25.0    Anion Gap 11.4 mmol/L   Troponin    Collection Time: 11/22/17  9:53 AM   Result Value Ref Range    Troponin T <0.010 0.000 - 0.030 ng/mL   BNP    Collection Time: 11/22/17  9:53 AM   Result Value Ref Range    proBNP 226.9 0.0 - 1800.0 pg/mL   CBC Auto Differential    Collection Time: 11/22/17  9:53 AM   Result Value Ref Range    WBC 10.13 4.50 - 10.70 10*3/mm3    RBC 3.48 (L) 3.90 - 5.20 10*6/mm3    Hemoglobin 12.1 11.9 - 15.5 g/dL    Hematocrit 37.8 35.6 - 45.5 %    .6 (H) 80.5 - 98.2 fL    MCH 34.8 (H) 26.9 - 32.0 pg    MCHC 32.0 (L) 32.4 - 36.3 g/dL    RDW 13.0 11.7 - 13.0 %    RDW-SD 51.3 37.0 - 54.0 fl    MPV 10.6 6.0 - 12.0 fL    Platelets 215 140 - 500 10*3/mm3    Neutrophil % 82.7 (H) 42.7 - 76.0 %    Lymphocyte % 8.1 (L) 19.6 - 45.3 %    Monocyte % 8.1 5.0 - 12.0 %    Eosinophil % 0.4 0.3 - 6.2 %    Basophil % 0.4 0.0 - 1.5 %    Immature Grans % 0.3 0.0 - 0.5 %    Neutrophils, Absolute 8.38 (H) 1.90 - 8.10 10*3/mm3    Lymphocytes, Absolute 0.82 (L) 0.90 - 4.80 10*3/mm3    Monocytes, Absolute 0.82 0.20 - 1.20 10*3/mm3    Eosinophils, Absolute 0.04 0.00 - 0.70 10*3/mm3    Basophils, Absolute 0.04 0.00 - 0.20 10*3/mm3    Immature Grans, Absolute 0.03 0.00 - 0.03 10*3/mm3   Scan Slide    Collection Time: 11/22/17  9:53 AM   Result Value Ref Range    RBC Morphology Normal Normal    WBC Morphology Normal Normal    Platelet Morphology Normal Normal   Troponin    Collection Time: 11/22/17 12:17 PM   Result Value Ref Range    Troponin T <0.010 0.000 - 0.030 ng/mL       Ordered the above labs and reviewed the results.        RADIOLOGY  XR Chest 2 View   Preliminary Result   Borderline cardiomegaly.    Heart size is at the upper limits of normal. Lungs appear free  of acute infiltrates. There is some aortic calcification. Bones and soft  tissues are otherwise unremarkable.    Interpreted by radiologist. Independently viewed by me.                     Ordered the above noted radiological studies. Reviewed by me in PACS.       PROCEDURES  Procedures        EKG:           EKG time: 08:40 AM  Rhythm/Rate: NSR rate 96  P waves and ME: 1st degree AV block  QRS, axis: LVH   ST and T waves: Normal ST-T waves     Interpreted Contemporaneously by me, independently viewed  Unchanged compared to prior 07/15/16          PROGRESS AND CONSULTS  ED Course   Comment By Time   1:02 PM  Patient with left shoulder pain.  No chest pain.  States this pain is different than prior heart pain.  Has serial troponins that are normal.  Discussed with Dr. Kim that saw patient in the ED and we will send home.  Follow up with him.  She understands to return for worsening symptoms. Rome Mott MD 11/22 2474     9:22 AM:  Ordered blood work and CXR for further evaluation.    10:45 AM:   Placed call to the cardiologist to discuss further course of care.     10:48 AM:  Rechecked pt. Pt is resting comfortably and appears in no acute distress. Informed pt that her initial troponin is negative. Will repeat troponin and discuss further course of care with the cardiologist. Pt agrees with plan.     10:53 AM:  Repeat troponin ordered.     10:57 AM:  Discussed the case with Dr. Kim, cardiologist. He is in the ER to evaluate pt. He agrees with plan for repeating pt's troponin. He states that if pt's serial troponins are negative, pt can f/u in their office.     1:01 PM:  Rechecked pt. Informed pt and family that pt's serial troponins are negative. Pt was advised to f/u with the cardiologist in the office. RTER warnings given. Pt agrees with plan for discharge.            MEDICAL DECISION MAKING      MDM  Number of Diagnoses or Management Options     Amount and/or Complexity of Data Reviewed  Clinical lab tests: ordered and reviewed (Serial troponins are negative.)  Tests in the radiology section of CPT®: reviewed and ordered (CXR: Heart size is at the upper limits of normal. Lungs appear free  of acute infiltrates. There is some aortic calcification. Bones and soft tissues are otherwise unremarkable.)  Tests in the medicine section of CPT®: ordered and reviewed (EKG interpreted.   )  Decide to obtain previous medical records or to obtain history from someone other than the patient: yes  Review and summarize past medical records: yes (Pt has h/o coronary artery disease for which pt is followed by Dr. Kim, cardiologist, and has had cardiac stent placement.  )  Discuss the patient with other providers: yes (Case d/w Dr. Kim, cardiologist. )    Patient Progress  Patient progress: stable             DIAGNOSIS  Final diagnoses:   Acute pain of left shoulder         DISPOSITION  Pt discharged.      DISCHARGE    Patient discharged in stable condition.    Reviewed implications of results, diagnosis, meds, responsibility to follow up, warning signs and symptoms of possible worsening, potential complications and reasons to return to ER.    Patient/Family voiced understanding of above instructions.    Discussed plan for discharge, as there is no emergent indication for admission.  Pt/family is agreeable and understands need for follow up and repeat testing.  Pt is aware that discharge does not mean that nothing is wrong but it indicates no emergency is present that requires admission and they must continue care with follow-up as given below or physician of their choice.     FOLLOW-UP  Go Kim MD  [address] [address]  [phone]    Schedule an appointment as soon as possible for a visit           Medication List      Stop          acetaminophen 500 MG tablet   Commonly known as:  TYLENOL       CO Q 10 PO                       Latest Documented Vital Signs:  As of 1:13 PM  BP- 144/61 HR- 84 Temp- 99.4 °F (37.4 °C) (Tympanic) O2 sat- 95%      --  Documentation assistance provided by trace Smith for Dr. Pantera MD.  Information recorded by the trace was done at my direction and  has been verified and validated by me.                Miranda Smith  11/22/17 1319       Rome Mtot MD  11/22/17 7640

## 2017-11-29 ENCOUNTER — APPOINTMENT (OUTPATIENT)
Dept: PREADMISSION TESTING | Facility: HOSPITAL | Age: 82
End: 2017-11-29

## 2017-11-29 VITALS
HEART RATE: 69 BPM | SYSTOLIC BLOOD PRESSURE: 164 MMHG | HEIGHT: 63 IN | RESPIRATION RATE: 16 BRPM | DIASTOLIC BLOOD PRESSURE: 71 MMHG | WEIGHT: 122 LBS | TEMPERATURE: 98.7 F | BODY MASS INDEX: 21.62 KG/M2 | OXYGEN SATURATION: 99 %

## 2017-11-29 DIAGNOSIS — M41.9 ACQUIRED SCOLIOSIS: ICD-10-CM

## 2017-11-29 DIAGNOSIS — M48.062 SPINAL STENOSIS OF LUMBAR REGION WITH NEUROGENIC CLAUDICATION: ICD-10-CM

## 2017-11-29 LAB
ABO GROUP BLD: NORMAL
ANION GAP SERPL CALCULATED.3IONS-SCNC: 9 MMOL/L
BLD GP AB SCN SERPL QL: NEGATIVE
BUN BLD-MCNC: 23 MG/DL (ref 8–23)
BUN/CREAT SERPL: 35.9 (ref 7–25)
CALCIUM SPEC-SCNC: 9.3 MG/DL (ref 8.2–9.6)
CHLORIDE SERPL-SCNC: 104 MMOL/L (ref 98–107)
CO2 SERPL-SCNC: 28 MMOL/L (ref 22–29)
CREAT BLD-MCNC: 0.64 MG/DL (ref 0.57–1)
DEPRECATED RDW RBC AUTO: 51.5 FL (ref 37–54)
ERYTHROCYTE [DISTWIDTH] IN BLOOD BY AUTOMATED COUNT: 13 % (ref 11.7–13)
GFR SERPL CREATININE-BSD FRML MDRD: 87 ML/MIN/1.73
GLUCOSE BLD-MCNC: 106 MG/DL (ref 65–99)
HCT VFR BLD AUTO: 34.6 % (ref 35.6–45.5)
HGB BLD-MCNC: 10.9 G/DL (ref 11.9–15.5)
MCH RBC QN AUTO: 34.5 PG (ref 26.9–32)
MCHC RBC AUTO-ENTMCNC: 31.5 G/DL (ref 32.4–36.3)
MCV RBC AUTO: 109.5 FL (ref 80.5–98.2)
PLATELET # BLD AUTO: 238 10*3/MM3 (ref 140–500)
PMV BLD AUTO: 10.2 FL (ref 6–12)
POTASSIUM BLD-SCNC: 4.1 MMOL/L (ref 3.5–5.2)
RBC # BLD AUTO: 3.16 10*6/MM3 (ref 3.9–5.2)
RH BLD: POSITIVE
SODIUM BLD-SCNC: 141 MMOL/L (ref 136–145)
WBC NRBC COR # BLD: 5.08 10*3/MM3 (ref 4.5–10.7)

## 2017-11-29 PROCEDURE — 86900 BLOOD TYPING SEROLOGIC ABO: CPT | Performed by: ORTHOPAEDIC SURGERY

## 2017-11-29 PROCEDURE — 85027 COMPLETE CBC AUTOMATED: CPT | Performed by: ORTHOPAEDIC SURGERY

## 2017-11-29 PROCEDURE — 86901 BLOOD TYPING SEROLOGIC RH(D): CPT | Performed by: ORTHOPAEDIC SURGERY

## 2017-11-29 PROCEDURE — 86850 RBC ANTIBODY SCREEN: CPT | Performed by: ORTHOPAEDIC SURGERY

## 2017-11-29 PROCEDURE — 80048 BASIC METABOLIC PNL TOTAL CA: CPT | Performed by: ORTHOPAEDIC SURGERY

## 2017-11-29 PROCEDURE — 36415 COLL VENOUS BLD VENIPUNCTURE: CPT

## 2017-11-29 RX ORDER — RANITIDINE 150 MG/1
150 TABLET ORAL 2 TIMES DAILY
COMMUNITY
End: 2018-03-19 | Stop reason: SDUPTHER

## 2017-12-04 ENCOUNTER — TELEPHONE (OUTPATIENT)
Dept: ORTHOPEDIC SURGERY | Facility: CLINIC | Age: 82
End: 2017-12-04
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
## 2017-12-05 ENCOUNTER — TELEPHONE (OUTPATIENT)
Dept: CARDIOLOGY | Facility: CLINIC | Age: 82
End: 2017-12-05

## 2017-12-05 NOTE — TELEPHONE ENCOUNTER
Patient is scheduled for spinal fusion surgery on Thursday with Dr. Andre Layne and is needing clearance.  She is on Aspirin & Plavix.    Please advise.  Thanks

## 2017-12-06 ENCOUNTER — OFFICE VISIT (OUTPATIENT)
Dept: INTERNAL MEDICINE | Facility: CLINIC | Age: 82
End: 2017-12-06

## 2017-12-06 VITALS
HEIGHT: 63 IN | DIASTOLIC BLOOD PRESSURE: 78 MMHG | BODY MASS INDEX: 21.09 KG/M2 | TEMPERATURE: 98.2 F | RESPIRATION RATE: 16 BRPM | OXYGEN SATURATION: 95 % | WEIGHT: 119 LBS | SYSTOLIC BLOOD PRESSURE: 110 MMHG | HEART RATE: 67 BPM

## 2017-12-06 DIAGNOSIS — M48.062 SPINAL STENOSIS OF LUMBAR REGION WITH NEUROGENIC CLAUDICATION: ICD-10-CM

## 2017-12-06 DIAGNOSIS — I25.10 ATHEROSCLEROSIS OF NATIVE CORONARY ARTERY OF NATIVE HEART WITHOUT ANGINA PECTORIS: Primary | ICD-10-CM

## 2017-12-06 DIAGNOSIS — K21.9 GASTROESOPHAGEAL REFLUX DISEASE WITHOUT ESOPHAGITIS: ICD-10-CM

## 2017-12-06 PROCEDURE — 99213 OFFICE O/P EST LOW 20 MIN: CPT | Performed by: INTERNAL MEDICINE

## 2017-12-06 NOTE — PROGRESS NOTES
Subjective   Alexa Peace is a 89 y.o. female.   11/9/2017  Wt Readings from Last 1 Encounters:   12/06/17 54 kg (119 lb)    She was last seen November 8, weight 122 then, 119 now.    She returns for a preoperative examination before back surgery    History of Present Illness   She has been followed by Dr. Andre Ndiaye because of back problems.  She has multilevel degenerative disease with dextroscoliosis, disc problems in lumbar spinal stenosis.  She did not benefit from epidural injections.  She is going to have surgery on the lumbar spinal stenosis tomorrow.    She has history of atherosclerotic heart disease treated with aspirin 81 mg daily and Plavix 75 mg daily.  She is not had any chest pain for a long time.    She has hyperlipidemia treated with pravastatin 40 mg each evening.  No problems with medication are described.    She has chronic depression treated with sertraline 50 mg daily.    She has esophageal reflux treated with ranitidine 150 mg daily, working well for her.    She had preoperative labs done last week.  Her hemoglobin was 10.9 g.  BMP was normal, blood type O positive,  The following portions of the patient's history were reviewed and updated as appropriate: allergies, current medications, past family history, past medical history, past social history, past surgical history and problem list.    Review of Systems   Constitutional: Negative.    HENT: Negative.    Eyes: Negative.    Respiratory: Negative.    Cardiovascular: Negative.    Endocrine: Negative.    Genitourinary: Negative.    Musculoskeletal: Positive for back pain.   Skin: Negative.    Neurological: Negative.    Hematological: Negative.    Psychiatric/Behavioral: Negative.        Objective   Physical Exam   Constitutional: She appears well-developed and well-nourished.   HENT:   Head: Normocephalic and atraumatic.   Cardiovascular: Normal rate and regular rhythm.  Exam reveals no gallop and no friction rub.    No murmur  heard.  Pulmonary/Chest: Effort normal and breath sounds normal. No respiratory distress. She has no wheezes. She has no rales.   Abdominal: Soft. Bowel sounds are normal. She exhibits no distension and no mass. There is no tenderness.   Neurological: She is alert.   Skin: Skin is warm.   Psychiatric: Her behavior is normal.   Vitals reviewed.      Assessment/Plan   Ada was seen today for back pain, coronary artery disease, hyperlipidemia and heartburn.    Diagnoses and all orders for this visit:    Atherosclerosis of native coronary artery of native heart without angina pectoris  Comments:  Clinically stable, no recent chest pain, holding aspirin and Plavix in anticipation of surgery, resume after surgery    Gastroesophageal reflux disease without esophagitis  Comments:  Continue ranitidine 150 mg each morning    Spinal stenosis of lumbar region with neurogenic claudication  Comments:  Patient appears to be an acceptable risk for lumbar spinal stenosis surgery      Keep March follow-up appointment, return sooner if needed                         EMR Dragon/Transcription disclaimer:      Much of this encounter note is an electronic transcription/translation of spoken language to printed text. The electronic translation of spoken language may permit erroneous, or at times, nonsensical words or phrases to be inadvertently transcribed; Although I have reviewed the note for such errors, some may still exist.

## 2017-12-07 ENCOUNTER — ANESTHESIA EVENT (OUTPATIENT)
Dept: PERIOP | Facility: HOSPITAL | Age: 82
End: 2017-12-07

## 2017-12-07 ENCOUNTER — ANESTHESIA (OUTPATIENT)
Dept: PERIOP | Facility: HOSPITAL | Age: 82
End: 2017-12-07

## 2017-12-07 ENCOUNTER — HOSPITAL ENCOUNTER (INPATIENT)
Facility: HOSPITAL | Age: 82
LOS: 4 days | Discharge: NURSING, REHAB OR LONG-TERM CARE | End: 2017-12-11
Attending: ORTHOPAEDIC SURGERY | Admitting: ORTHOPAEDIC SURGERY

## 2017-12-07 ENCOUNTER — APPOINTMENT (OUTPATIENT)
Dept: GENERAL RADIOLOGY | Facility: HOSPITAL | Age: 82
End: 2017-12-07

## 2017-12-07 DIAGNOSIS — M41.9 ACQUIRED SCOLIOSIS: ICD-10-CM

## 2017-12-07 DIAGNOSIS — Z74.09 IMPAIRED FUNCTIONAL MOBILITY AND ACTIVITY TOLERANCE: Primary | ICD-10-CM

## 2017-12-07 DIAGNOSIS — M48.062 SPINAL STENOSIS OF LUMBAR REGION WITH NEUROGENIC CLAUDICATION: ICD-10-CM

## 2017-12-07 LAB
HCT VFR BLD AUTO: 32.3 % (ref 35.6–45.5)
HGB BLD-MCNC: 10.2 G/DL (ref 11.9–15.5)

## 2017-12-07 PROCEDURE — 25010000002 NEOSTIGMINE PER 0.5 MG: Performed by: NURSE ANESTHETIST, CERTIFIED REGISTERED

## 2017-12-07 PROCEDURE — 01NB0ZZ RELEASE LUMBAR NERVE, OPEN APPROACH: ICD-10-PCS | Performed by: ORTHOPAEDIC SURGERY

## 2017-12-07 PROCEDURE — 85018 HEMOGLOBIN: CPT | Performed by: ORTHOPAEDIC SURGERY

## 2017-12-07 PROCEDURE — 63047 LAM FACETEC & FORAMOT LUMBAR: CPT | Performed by: ORTHOPAEDIC SURGERY

## 2017-12-07 PROCEDURE — 22612 ARTHRD PST TQ 1NTRSPC LUMBAR: CPT | Performed by: ORTHOPAEDIC SURGERY

## 2017-12-07 PROCEDURE — 25010000002 FENTANYL CITRATE (PF) 100 MCG/2ML SOLUTION: Performed by: NURSE ANESTHETIST, CERTIFIED REGISTERED

## 2017-12-07 PROCEDURE — 07DR3ZZ EXTRACTION OF ILIAC BONE MARROW, PERCUTANEOUS APPROACH: ICD-10-PCS | Performed by: ORTHOPAEDIC SURGERY

## 2017-12-07 PROCEDURE — 22614 ARTHRD PST TQ 1NTRSPC EA ADD: CPT | Performed by: ORTHOPAEDIC SURGERY

## 2017-12-07 PROCEDURE — 38220 DX BONE MARROW ASPIRATIONS: CPT | Performed by: ORTHOPAEDIC SURGERY

## 2017-12-07 PROCEDURE — 25010000003 CEFAZOLIN IN DEXTROSE 2-4 GM/100ML-% SOLUTION: Performed by: ORTHOPAEDIC SURGERY

## 2017-12-07 PROCEDURE — 25010000002 ONDANSETRON PER 1 MG: Performed by: NURSE ANESTHETIST, CERTIFIED REGISTERED

## 2017-12-07 PROCEDURE — 63048 LAM FACETEC &FORAMOT EA ADDL: CPT | Performed by: ORTHOPAEDIC SURGERY

## 2017-12-07 PROCEDURE — C1713 ANCHOR/SCREW BN/BN,TIS/BN: HCPCS | Performed by: ORTHOPAEDIC SURGERY

## 2017-12-07 PROCEDURE — 25010000002 PHENYLEPHRINE PER 1 ML: Performed by: NURSE ANESTHETIST, CERTIFIED REGISTERED

## 2017-12-07 PROCEDURE — 25010000002 MIDAZOLAM PER 1 MG: Performed by: ANESTHESIOLOGY

## 2017-12-07 PROCEDURE — 25010000003 CEFAZOLIN PER 500 MG: Performed by: ORTHOPAEDIC SURGERY

## 2017-12-07 PROCEDURE — 85014 HEMATOCRIT: CPT | Performed by: ORTHOPAEDIC SURGERY

## 2017-12-07 PROCEDURE — 25010000002 PROPOFOL 10 MG/ML EMULSION: Performed by: NURSE ANESTHETIST, CERTIFIED REGISTERED

## 2017-12-07 PROCEDURE — 25810000003 POTASSIUM CHLORIDE PER 2 MEQ: Performed by: ORTHOPAEDIC SURGERY

## 2017-12-07 PROCEDURE — 76000 FLUOROSCOPY <1 HR PHYS/QHP: CPT

## 2017-12-07 PROCEDURE — S0260 H&P FOR SURGERY: HCPCS | Performed by: ORTHOPAEDIC SURGERY

## 2017-12-07 PROCEDURE — 25010000002 HEPARIN (PORCINE) PER 1000 UNITS: Performed by: ORTHOPAEDIC SURGERY

## 2017-12-07 PROCEDURE — 0SG1071 FUSION OF 2 OR MORE LUMBAR VERTEBRAL JOINTS WITH AUTOLOGOUS TISSUE SUBSTITUTE, POSTERIOR APPROACH, POSTERIOR COLUMN, OPEN APPROACH: ICD-10-PCS | Performed by: ORTHOPAEDIC SURGERY

## 2017-12-07 PROCEDURE — 72020 X-RAY EXAM OF SPINE 1 VIEW: CPT

## 2017-12-07 DEVICE — STRIP 7800310 MASTERGRAFT STRIP 10CM
Type: IMPLANTABLE DEVICE | Status: FUNCTIONAL
Brand: MASTERGRAFT® STRIP

## 2017-12-07 DEVICE — SEALANT FIBRIN TISSEEL FZ 4ML: Type: IMPLANTABLE DEVICE | Site: SPINE LUMBAR | Status: FUNCTIONAL

## 2017-12-07 RX ORDER — ONDANSETRON 2 MG/ML
4 INJECTION INTRAMUSCULAR; INTRAVENOUS EVERY 6 HOURS PRN
Status: DISCONTINUED | OUTPATIENT
Start: 2017-12-07 | End: 2017-12-11 | Stop reason: HOSPADM

## 2017-12-07 RX ORDER — ASPIRIN 81 MG/1
85 TABLET, CHEWABLE ORAL DAILY
Status: DISCONTINUED | OUTPATIENT
Start: 2017-12-07 | End: 2017-12-11 | Stop reason: HOSPADM

## 2017-12-07 RX ORDER — HYDROMORPHONE HCL IN 0.9% NACL 10 MG/50ML
PATIENT CONTROLLED ANALGESIA SYRINGE INTRAVENOUS CONTINUOUS
Status: DISCONTINUED | OUTPATIENT
Start: 2017-12-07 | End: 2017-12-11

## 2017-12-07 RX ORDER — HYDROCODONE BITARTRATE AND ACETAMINOPHEN 7.5; 325 MG/1; MG/1
1 TABLET ORAL ONCE AS NEEDED
Status: DISCONTINUED | OUTPATIENT
Start: 2017-12-07 | End: 2017-12-07 | Stop reason: HOSPADM

## 2017-12-07 RX ORDER — TEMAZEPAM 15 MG/1
15 CAPSULE ORAL NIGHTLY PRN
Status: DISCONTINUED | OUTPATIENT
Start: 2017-12-07 | End: 2017-12-11 | Stop reason: HOSPADM

## 2017-12-07 RX ORDER — ROCURONIUM BROMIDE 10 MG/ML
INJECTION, SOLUTION INTRAVENOUS AS NEEDED
Status: DISCONTINUED | OUTPATIENT
Start: 2017-12-07 | End: 2017-12-07 | Stop reason: SURG

## 2017-12-07 RX ORDER — SODIUM CHLORIDE, SODIUM LACTATE, POTASSIUM CHLORIDE, CALCIUM CHLORIDE 600; 310; 30; 20 MG/100ML; MG/100ML; MG/100ML; MG/100ML
100 INJECTION, SOLUTION INTRAVENOUS CONTINUOUS
Status: DISCONTINUED | OUTPATIENT
Start: 2017-12-07 | End: 2017-12-11

## 2017-12-07 RX ORDER — HYDRALAZINE HYDROCHLORIDE 20 MG/ML
5 INJECTION INTRAMUSCULAR; INTRAVENOUS
Status: DISCONTINUED | OUTPATIENT
Start: 2017-12-07 | End: 2017-12-07 | Stop reason: HOSPADM

## 2017-12-07 RX ORDER — SODIUM CHLORIDE, SODIUM LACTATE, POTASSIUM CHLORIDE, CALCIUM CHLORIDE 600; 310; 30; 20 MG/100ML; MG/100ML; MG/100ML; MG/100ML
9 INJECTION, SOLUTION INTRAVENOUS CONTINUOUS
Status: DISCONTINUED | OUTPATIENT
Start: 2017-12-07 | End: 2017-12-11

## 2017-12-07 RX ORDER — ONDANSETRON 4 MG/1
4 TABLET, ORALLY DISINTEGRATING ORAL EVERY 6 HOURS PRN
Status: DISCONTINUED | OUTPATIENT
Start: 2017-12-07 | End: 2017-12-11 | Stop reason: HOSPADM

## 2017-12-07 RX ORDER — FAMOTIDINE 20 MG/1
20 TABLET, FILM COATED ORAL DAILY
Status: DISCONTINUED | OUTPATIENT
Start: 2017-12-07 | End: 2017-12-11 | Stop reason: HOSPADM

## 2017-12-07 RX ORDER — EPHEDRINE SULFATE 50 MG/ML
5 INJECTION, SOLUTION INTRAVENOUS ONCE AS NEEDED
Status: DISCONTINUED | OUTPATIENT
Start: 2017-12-07 | End: 2017-12-07 | Stop reason: HOSPADM

## 2017-12-07 RX ORDER — CEFAZOLIN SODIUM 2 G/100ML
2 INJECTION, SOLUTION INTRAVENOUS EVERY 8 HOURS
Status: COMPLETED | OUTPATIENT
Start: 2017-12-07 | End: 2017-12-08

## 2017-12-07 RX ORDER — FAMOTIDINE 10 MG/ML
20 INJECTION, SOLUTION INTRAVENOUS ONCE
Status: DISCONTINUED | OUTPATIENT
Start: 2017-12-07 | End: 2017-12-07 | Stop reason: HOSPADM

## 2017-12-07 RX ORDER — NALOXONE HCL 0.4 MG/ML
0.2 VIAL (ML) INJECTION AS NEEDED
Status: DISCONTINUED | OUTPATIENT
Start: 2017-12-07 | End: 2017-12-07 | Stop reason: HOSPADM

## 2017-12-07 RX ORDER — CEFAZOLIN SODIUM 2 G/100ML
2 INJECTION, SOLUTION INTRAVENOUS ONCE
Status: DISCONTINUED | OUTPATIENT
Start: 2017-12-07 | End: 2017-12-07 | Stop reason: HOSPADM

## 2017-12-07 RX ORDER — FENTANYL CITRATE 50 UG/ML
50 INJECTION, SOLUTION INTRAMUSCULAR; INTRAVENOUS
Status: DISCONTINUED | OUTPATIENT
Start: 2017-12-07 | End: 2017-12-07 | Stop reason: HOSPADM

## 2017-12-07 RX ORDER — MIDAZOLAM HYDROCHLORIDE 1 MG/ML
2 INJECTION INTRAMUSCULAR; INTRAVENOUS
Status: DISCONTINUED | OUTPATIENT
Start: 2017-12-07 | End: 2017-12-07 | Stop reason: HOSPADM

## 2017-12-07 RX ORDER — LABETALOL HYDROCHLORIDE 5 MG/ML
5 INJECTION, SOLUTION INTRAVENOUS
Status: DISCONTINUED | OUTPATIENT
Start: 2017-12-07 | End: 2017-12-07 | Stop reason: HOSPADM

## 2017-12-07 RX ORDER — SODIUM CHLORIDE 0.9 % (FLUSH) 0.9 %
1-10 SYRINGE (ML) INJECTION AS NEEDED
Status: DISCONTINUED | OUTPATIENT
Start: 2017-12-07 | End: 2017-12-07 | Stop reason: HOSPADM

## 2017-12-07 RX ORDER — OXYCODONE HYDROCHLORIDE AND ACETAMINOPHEN 5; 325 MG/1; MG/1
2 TABLET ORAL EVERY 4 HOURS PRN
Status: DISCONTINUED | OUTPATIENT
Start: 2017-12-07 | End: 2017-12-08

## 2017-12-07 RX ORDER — FLUMAZENIL 0.1 MG/ML
0.2 INJECTION INTRAVENOUS AS NEEDED
Status: DISCONTINUED | OUTPATIENT
Start: 2017-12-07 | End: 2017-12-07 | Stop reason: HOSPADM

## 2017-12-07 RX ORDER — CYCLOBENZAPRINE HCL 10 MG
10 TABLET ORAL 3 TIMES DAILY PRN
Status: DISCONTINUED | OUTPATIENT
Start: 2017-12-07 | End: 2017-12-11 | Stop reason: HOSPADM

## 2017-12-07 RX ORDER — GABAPENTIN 100 MG/1
100 CAPSULE ORAL 3 TIMES DAILY
Status: DISCONTINUED | OUTPATIENT
Start: 2017-12-07 | End: 2017-12-11 | Stop reason: HOSPADM

## 2017-12-07 RX ORDER — FENTANYL CITRATE 50 UG/ML
INJECTION, SOLUTION INTRAMUSCULAR; INTRAVENOUS AS NEEDED
Status: DISCONTINUED | OUTPATIENT
Start: 2017-12-07 | End: 2017-12-07 | Stop reason: SURG

## 2017-12-07 RX ORDER — ONDANSETRON 2 MG/ML
4 INJECTION INTRAMUSCULAR; INTRAVENOUS ONCE AS NEEDED
Status: DISCONTINUED | OUTPATIENT
Start: 2017-12-07 | End: 2017-12-07 | Stop reason: HOSPADM

## 2017-12-07 RX ORDER — SODIUM CHLORIDE 0.9 % (FLUSH) 0.9 %
1-10 SYRINGE (ML) INJECTION AS NEEDED
Status: DISCONTINUED | OUTPATIENT
Start: 2017-12-07 | End: 2017-12-11 | Stop reason: HOSPADM

## 2017-12-07 RX ORDER — SENNA AND DOCUSATE SODIUM 50; 8.6 MG/1; MG/1
1 TABLET, FILM COATED ORAL 2 TIMES DAILY
Status: DISCONTINUED | OUTPATIENT
Start: 2017-12-07 | End: 2017-12-11

## 2017-12-07 RX ORDER — PROMETHAZINE HYDROCHLORIDE 25 MG/1
25 TABLET ORAL ONCE AS NEEDED
Status: DISCONTINUED | OUTPATIENT
Start: 2017-12-07 | End: 2017-12-07 | Stop reason: HOSPADM

## 2017-12-07 RX ORDER — GLYCOPYRROLATE 0.2 MG/ML
INJECTION INTRAMUSCULAR; INTRAVENOUS AS NEEDED
Status: DISCONTINUED | OUTPATIENT
Start: 2017-12-07 | End: 2017-12-07 | Stop reason: SURG

## 2017-12-07 RX ORDER — SODIUM CHLORIDE AND POTASSIUM CHLORIDE 150; 450 MG/100ML; MG/100ML
100 INJECTION, SOLUTION INTRAVENOUS CONTINUOUS
Status: DISCONTINUED | OUTPATIENT
Start: 2017-12-07 | End: 2017-12-08

## 2017-12-07 RX ORDER — PROMETHAZINE HYDROCHLORIDE 25 MG/ML
12.5 INJECTION, SOLUTION INTRAMUSCULAR; INTRAVENOUS ONCE AS NEEDED
Status: DISCONTINUED | OUTPATIENT
Start: 2017-12-07 | End: 2017-12-07 | Stop reason: HOSPADM

## 2017-12-07 RX ORDER — PROMETHAZINE HYDROCHLORIDE 25 MG/1
12.5 TABLET ORAL ONCE AS NEEDED
Status: DISCONTINUED | OUTPATIENT
Start: 2017-12-07 | End: 2017-12-07 | Stop reason: HOSPADM

## 2017-12-07 RX ORDER — DIPHENHYDRAMINE HYDROCHLORIDE 50 MG/ML
12.5 INJECTION INTRAMUSCULAR; INTRAVENOUS
Status: DISCONTINUED | OUTPATIENT
Start: 2017-12-07 | End: 2017-12-07 | Stop reason: HOSPADM

## 2017-12-07 RX ORDER — HYDROMORPHONE HYDROCHLORIDE 1 MG/ML
0.5 INJECTION, SOLUTION INTRAMUSCULAR; INTRAVENOUS; SUBCUTANEOUS
Status: DISCONTINUED | OUTPATIENT
Start: 2017-12-07 | End: 2017-12-07 | Stop reason: HOSPADM

## 2017-12-07 RX ORDER — PROPOFOL 10 MG/ML
VIAL (ML) INTRAVENOUS AS NEEDED
Status: DISCONTINUED | OUTPATIENT
Start: 2017-12-07 | End: 2017-12-07 | Stop reason: SURG

## 2017-12-07 RX ORDER — ONDANSETRON 2 MG/ML
INJECTION INTRAMUSCULAR; INTRAVENOUS AS NEEDED
Status: DISCONTINUED | OUTPATIENT
Start: 2017-12-07 | End: 2017-12-07 | Stop reason: SURG

## 2017-12-07 RX ORDER — EPHEDRINE SULFATE 50 MG/ML
INJECTION, SOLUTION INTRAVENOUS AS NEEDED
Status: DISCONTINUED | OUTPATIENT
Start: 2017-12-07 | End: 2017-12-07 | Stop reason: SURG

## 2017-12-07 RX ORDER — MIDAZOLAM HYDROCHLORIDE 1 MG/ML
1 INJECTION INTRAMUSCULAR; INTRAVENOUS
Status: DISCONTINUED | OUTPATIENT
Start: 2017-12-07 | End: 2017-12-07 | Stop reason: HOSPADM

## 2017-12-07 RX ORDER — HYDROMORPHONE HCL IN 0.9% NACL 10 MG/50ML
PATIENT CONTROLLED ANALGESIA SYRINGE INTRAVENOUS
Status: COMPLETED
Start: 2017-12-07 | End: 2017-12-07

## 2017-12-07 RX ORDER — NALOXONE HCL 0.4 MG/ML
0.1 VIAL (ML) INJECTION
Status: DISCONTINUED | OUTPATIENT
Start: 2017-12-07 | End: 2017-12-11 | Stop reason: HOSPADM

## 2017-12-07 RX ORDER — LIDOCAINE HYDROCHLORIDE 20 MG/ML
INJECTION, SOLUTION INFILTRATION; PERINEURAL AS NEEDED
Status: DISCONTINUED | OUTPATIENT
Start: 2017-12-07 | End: 2017-12-07 | Stop reason: SURG

## 2017-12-07 RX ORDER — OXYCODONE AND ACETAMINOPHEN 7.5; 325 MG/1; MG/1
1 TABLET ORAL ONCE AS NEEDED
Status: DISCONTINUED | OUTPATIENT
Start: 2017-12-07 | End: 2017-12-07 | Stop reason: HOSPADM

## 2017-12-07 RX ORDER — ONDANSETRON 4 MG/1
4 TABLET, FILM COATED ORAL EVERY 6 HOURS PRN
Status: DISCONTINUED | OUTPATIENT
Start: 2017-12-07 | End: 2017-12-11 | Stop reason: HOSPADM

## 2017-12-07 RX ORDER — PROMETHAZINE HYDROCHLORIDE 25 MG/1
25 SUPPOSITORY RECTAL ONCE AS NEEDED
Status: DISCONTINUED | OUTPATIENT
Start: 2017-12-07 | End: 2017-12-07 | Stop reason: HOSPADM

## 2017-12-07 RX ORDER — ATORVASTATIN CALCIUM 10 MG/1
10 TABLET, FILM COATED ORAL DAILY
Status: DISCONTINUED | OUTPATIENT
Start: 2017-12-07 | End: 2017-12-11 | Stop reason: HOSPADM

## 2017-12-07 RX ORDER — BISACODYL 10 MG
10 SUPPOSITORY, RECTAL RECTAL DAILY PRN
Status: DISCONTINUED | OUTPATIENT
Start: 2017-12-07 | End: 2017-12-11 | Stop reason: HOSPADM

## 2017-12-07 RX ADMIN — ROCURONIUM BROMIDE 40 MG: 10 INJECTION INTRAVENOUS at 14:21

## 2017-12-07 RX ADMIN — FENTANYL CITRATE 50 MCG: 50 INJECTION INTRAMUSCULAR; INTRAVENOUS at 14:21

## 2017-12-07 RX ADMIN — FENTANYL CITRATE 50 MCG: 50 INJECTION INTRAMUSCULAR; INTRAVENOUS at 14:46

## 2017-12-07 RX ADMIN — ONDANSETRON 4 MG: 2 INJECTION INTRAMUSCULAR; INTRAVENOUS at 15:45

## 2017-12-07 RX ADMIN — FENTANYL CITRATE 50 MCG: 50 INJECTION INTRAMUSCULAR; INTRAVENOUS at 16:49

## 2017-12-07 RX ADMIN — NEOSTIGMINE METHYLSULFATE 2 MG: 1 INJECTION INTRAMUSCULAR; INTRAVENOUS; SUBCUTANEOUS at 15:45

## 2017-12-07 RX ADMIN — DOCUSATE SODIUM -SENNOSIDES 1 TABLET: 50; 8.6 TABLET, COATED ORAL at 20:31

## 2017-12-07 RX ADMIN — GLYCOPYRROLATE 0.4 MG: 0.2 INJECTION INTRAMUSCULAR; INTRAVENOUS at 15:45

## 2017-12-07 RX ADMIN — Medication: at 16:34

## 2017-12-07 RX ADMIN — ATORVASTATIN CALCIUM 10 MG: 10 TABLET, FILM COATED ORAL at 20:29

## 2017-12-07 RX ADMIN — PHENYLEPHRINE HYDROCHLORIDE 100 MCG: 10 INJECTION INTRAVENOUS at 15:15

## 2017-12-07 RX ADMIN — SODIUM CHLORIDE, POTASSIUM CHLORIDE, SODIUM LACTATE AND CALCIUM CHLORIDE: 600; 310; 30; 20 INJECTION, SOLUTION INTRAVENOUS at 15:30

## 2017-12-07 RX ADMIN — SERTRALINE 50 MG: 50 TABLET, FILM COATED ORAL at 20:30

## 2017-12-07 RX ADMIN — FENTANYL CITRATE 25 MCG: 50 INJECTION INTRAMUSCULAR; INTRAVENOUS at 16:29

## 2017-12-07 RX ADMIN — CEFAZOLIN SODIUM 2 G: 2 INJECTION, SOLUTION INTRAVENOUS at 22:15

## 2017-12-07 RX ADMIN — FENTANYL CITRATE 50 MCG: 50 INJECTION INTRAMUSCULAR; INTRAVENOUS at 18:10

## 2017-12-07 RX ADMIN — HYDROMORPHONE HYDROCHLORIDE 0.5 MG: 10 INJECTION INTRAMUSCULAR; INTRAVENOUS; SUBCUTANEOUS at 17:58

## 2017-12-07 RX ADMIN — LIDOCAINE HYDROCHLORIDE 60 MG: 20 INJECTION, SOLUTION INFILTRATION; PERINEURAL at 14:21

## 2017-12-07 RX ADMIN — EPHEDRINE SULFATE 10 MG: 50 INJECTION INTRAMUSCULAR; INTRAVENOUS; SUBCUTANEOUS at 14:58

## 2017-12-07 RX ADMIN — PROPOFOL 120 MG: 10 INJECTION, EMULSION INTRAVENOUS at 14:21

## 2017-12-07 RX ADMIN — FENTANYL CITRATE 25 MCG: 50 INJECTION INTRAMUSCULAR; INTRAVENOUS at 16:39

## 2017-12-07 RX ADMIN — EPHEDRINE SULFATE 10 MG: 50 INJECTION INTRAMUSCULAR; INTRAVENOUS; SUBCUTANEOUS at 15:30

## 2017-12-07 RX ADMIN — PHENYLEPHRINE HYDROCHLORIDE 100 MCG: 10 INJECTION INTRAVENOUS at 15:30

## 2017-12-07 RX ADMIN — FENTANYL CITRATE 50 MCG: 50 INJECTION INTRAMUSCULAR; INTRAVENOUS at 18:20

## 2017-12-07 RX ADMIN — HYDROMORPHONE HYDROCHLORIDE 0.5 MG: 10 INJECTION INTRAMUSCULAR; INTRAVENOUS; SUBCUTANEOUS at 16:55

## 2017-12-07 RX ADMIN — GABAPENTIN 100 MG: 100 CAPSULE ORAL at 22:16

## 2017-12-07 RX ADMIN — SODIUM CHLORIDE, POTASSIUM CHLORIDE, SODIUM LACTATE AND CALCIUM CHLORIDE 9 ML/HR: 600; 310; 30; 20 INJECTION, SOLUTION INTRAVENOUS at 10:54

## 2017-12-07 RX ADMIN — Medication 1 MG: at 10:54

## 2017-12-07 RX ADMIN — POTASSIUM CHLORIDE AND SODIUM CHLORIDE 100 ML/HR: 450; 150 INJECTION, SOLUTION INTRAVENOUS at 22:15

## 2017-12-07 RX ADMIN — FAMOTIDINE 20 MG: 20 TABLET, FILM COATED ORAL at 20:31

## 2017-12-07 RX ADMIN — EPHEDRINE SULFATE 10 MG: 50 INJECTION INTRAMUSCULAR; INTRAVENOUS; SUBCUTANEOUS at 15:15

## 2017-12-07 NOTE — H&P
Chief Complaint   Patient presents with   • Lumbar Spine - Establish Care         HPI: She complains of low back pain radiating to both lower extremities ongoing for years.  It severe intermittent crushing pain relieved with rest.  She's undergone chiropractic which used to help Medrol pack which helped somewhat.     PFSH: See chart- reviewed     Review of Systems   Constitutional: Negative for activity change, appetite change, chills, diaphoresis, fatigue, fever and unexpected weight change.   HENT: Positive for hearing loss and sore throat. Negative for congestion, nosebleeds, postnasal drip, tinnitus and trouble swallowing.         Loss of hair   Eyes: Positive for visual disturbance. Negative for pain.   Respiratory: Negative for apnea, cough, chest tightness, shortness of breath and wheezing.    Cardiovascular: Negative for chest pain and palpitations.   Gastrointestinal: Negative for abdominal pain, blood in stool, diarrhea, nausea and vomiting.   Genitourinary: Negative for difficulty urinating and dysuria.   Musculoskeletal: Positive for arthralgias, back pain and myalgias. Negative for joint swelling.        Stiffness   Skin: Negative for color change and rash.        Changes in moles or new growths   Neurological: Positive for dizziness. Negative for light-headedness, numbness and headaches.   Hematological: Bruises/bleeds easily.   Psychiatric/Behavioral: Negative for agitation and sleep disturbance. The patient is nervous/anxious.          PE: Constitutional: Vital signs above-noted.  Awake, alert and oriented     Psychiatric: Affect and insight do not appear grossly disturbed.     Pulmonary: Breathing is unlabored, color is good.     Skin: Warm, dry and normal turgor     Cardiac:  pedal pulses intact.  No edema.     Eyesight and hearing appear adequate for examination purposes        Musculoskeletal:  There is mild tenderness to percussion and palpation of the spine. Motion appears undisturbed.   Posture is unremarkable to coronal and sagittal inspection.    The skin about the area is intact.  There is no palpable or visible deformity.  There is no local spasm.       Neurologic:   Reflexes are 2+ and symmetrical in the patellae and absent in the Achilles.   Motor function is undisturbed in quadriceps, EHL, and gastrocnemius      Sensation appears symmetrically intact to light touch   .  In the bilateral lower extremities there is no evidence of atrophy.   Clonus is absent..  Gait appears undisturbed.  SLR test negative        MEDICAL DECISION MAKING     XRAY: Plain film x-rays of the lumbar spine from [facility] demonstrate scoliosis with the well-balanced the L2-3 curve apex the.  No comparison views are available.  Lumbar MRI demonstrates stenosis at 3 for and 45 primarily somewhat less at to 3.  I agree with the radiologist report.     Other: n/a     Impression: Lumbar scoliosis, lumbar spinal stenosis     Plan:  She fails to improve mostly from bilateral leg pain in a clotted caving in nature.  She also has back pain.  Epidurals did not help.  She is exhausted conservative care and given this plenty of time.  She has moderate to severe stenosis at the 3 for and moderate stenosis at 45.  She has foraminal stenosis at L2-3 which is the apex of the scoliosis.  She has decent strength in the all tested groups and lower extremity bilaterally and intact sensation.  She has neck pain about which she complains some but the quick exam of the upper extremity shows no negative Idalia test and she has no clonus I don't think this is contributing to her lower extremity dilemma.  She has some urinary incontinence which may be primary but could be helped by decompression.  At this point I recommend L2-3, L3-4 and L4-5 laminectomy and fusion with local bone perhaps just along the concavity and perhaps we'll extend this up to L1.  I don't want to do a full fledged instrumentation as I think the risk would exceed the  benefit.  I discussed the risks and benefits of posterior spinal fusion, including where applicable, laminectomy.  Risks include adverse anesthetic events such as death, stroke and myocardial infarction.  More specific surgical risks include infection, nonunion, hardware failure, spinal fluid leakage, nerve root injury or paralysis, visceral or vascular injury, persistent pain, deep venous thrombosis, and pulmonary embolism among others. There is a 70 to 90 % chance of success.   Alternatives have been discussed.  After careful consideration the patient wishes to proceed with surgery.

## 2017-12-07 NOTE — PLAN OF CARE
Problem: Patient Care Overview (Adult)  Goal: Plan of Care Review  Outcome: Ongoing (interventions implemented as appropriate)    12/07/17 2467   Coping/Psychosocial Response Interventions   Plan Of Care Reviewed With patient   Patient Care Overview   Progress progress toward functional goals is gradual   Outcome Evaluation   Outcome Summary/Follow up Plan pt admitted from PACU. alert and oriented x4, forgetful at times. oral and PCA pain medication. condon cath. dressing c/d/it

## 2017-12-07 NOTE — ANESTHESIA PREPROCEDURE EVALUATION
Anesthesia Evaluation     Patient summary reviewed and Nursing notes reviewed   NPO Solid Status: > 8 hours  NPO Liquid Status: > 8 hours     Airway   Mallampati: II  Dental          Pulmonary - negative pulmonary ROS and normal exam    breath sounds clear to auscultation  Cardiovascular - normal exam    ECG reviewed    (+) hypertension, past MI  >12 months, CAD, cardiac stents more than 12 months ago       Neuro/Psych  (+) numbness, psychiatric history Depression,    GI/Hepatic/Renal/Endo    (+)  GERD,     Musculoskeletal (-) negative ROS    Abdominal    Substance History - negative use     OB/GYN          Other - negative ROS                                       Anesthesia Plan    ASA 3     general     intravenous induction   Anesthetic plan and risks discussed with patient.

## 2017-12-07 NOTE — PLAN OF CARE
Problem: Patient Care Overview (Adult)  Goal: Plan of Care Review  Outcome: Ongoing (interventions implemented as appropriate)    12/07/17 1027   Coping/Psychosocial Response Interventions   Plan Of Care Reviewed With patient   Patient Care Overview   Progress no change       Goal: Adult Individualization and Mutuality  Outcome: Ongoing (interventions implemented as appropriate)    12/07/17 1027   Individualization   Patient Specific Preferences Call pt Ada   Patient Specific Goals No infection after surgery.   Patient Specific Interventions Teach good hand hygiene.       Goal: Discharge Needs Assessment  Outcome: Ongoing (interventions implemented as appropriate)    12/07/17 1027   Discharge Needs Assessment   Concerns To Be Addressed denies needs/concerns at this time   Current Health   Anticipated Changes Related to Illness none   Self-Care   Equipment Currently Used at Home none         Problem: Perioperative Period (Adult)  Goal: Signs and Symptoms of Listed Potential Problems Will be Absent or Manageable (Perioperative Period)  Outcome: Ongoing (interventions implemented as appropriate)    12/07/17 1027   Perioperative Period   Problems Present (Perioperative Period) none

## 2017-12-07 NOTE — ANESTHESIA PROCEDURE NOTES
Airway  Urgency: elective    Date/Time: 12/7/2017 2:23 PM  Airway not difficult    General Information and Staff    Patient location during procedure: OR  Anesthesiologist: DONTRELL BULL  CRNA: ARCHIE WALTON    Indications and Patient Condition  Indications for airway management: airway protection    Preoxygenated: yes  MILS maintained throughout  Mask difficulty assessment: 1 - vent by mask    Final Airway Details  Final airway type: endotracheal airway      Successful airway: ETT and reinforced tube  Cuffed: yes   Successful intubation technique: direct laryngoscopy  Facilitating devices/methods: anterior pressure/BURP  Endotracheal tube insertion site: oral  Blade: J Carlos  Blade size: #3  ETT size: 7.0 mm  Cormack-Lehane Classification: grade IIb - view of arytenoids or posterior of glottis only  Placement verified by: chest auscultation and capnometry   Measured from: teeth  ETT to teeth (cm): 21  Number of attempts at approach: 1

## 2017-12-07 NOTE — OP NOTE
Operative note    Pre-op diagnosis: L2-3, L3-4, L4-5 spinal stenosis, L4-5 spondylolisthesis, lumbar scoliosis    Postoperative diagnosis: Same    Procedure: L2-3, L3-4, L4-5 laminectomy medial facetectomy foraminotomy and posterior lateral fusion with local bone graft with local bone graft, right iliac marrow aspiration, and Mastergraft bone graft substitute.    Surgeon: Andre Layne Jr, M.D.    Asst.: Paola Llanos    EBL:        Anesthetic: Gen.    Condition: Satisfactory      Description of procedure: Patient was anesthetized and positioned prone.  Bony prominences were well padded.  The back was prepped and draped in sterile fashion.  Incision was made down to lumbodorsal fascia.  The muscle was stripped subperiosteally to the tips of transverse processes on the left side from L2 to L5..  Curettes and rongeurs removed adherent soft tissue.  Packs were placed for hemostasis.  The graft was decorticated.  .  Biplanar image intensification showed appropriate anatomic level.  I performed a laminectomy for decompression.   The interspinous ligament was excised at L2-3, L3-4 and L4-5.  The upper lamina at each level was removed out to within 8-10 mm of the pars intra-articularis.  A small portion of the cephalad aspect of the caudal lamina was excised with a Kerrison rongeur.  Medial facetectomies were performed bilaterally using Kerrison rongeur and osteotomes.  A high-speed cullen was used as well.  Foraminotomies were accomplished with a foraminotomy rongeur.   The disc was determined to be not impinging and stable and was not excised.  This process was repeated at the adjacent levels in identical fashion including laminectomy from L2 to to L5.  Upon completion of the decompression I could pass a ball probe through the affected foramina, and easily retract  the nerve roots  toward the midline.  Bleeding was controlled with bipolar cautery,and Gelfoam with thrombin and/ or FloSeal hemostatic matrix.  On the right  side just above the takeoff of the fourth nerve root there was a small round dural tear, but not arachnoid which measured 2 mm in diameter and showed no evidence of the CSF leakage or significant  arachnoid bulging.  History remained hermetic throughout the procedure and the defect A underneath the right lateral edge of the lamina.  A complete L4 5 laminectomy on the right would've been required to expose and repair this, and as there was no apparent leakage I elected to leave this alone.  I used Tisseel to cover the defect and then placed a small fat graft.  Additional Tisseel layers ×2 were placed on top of this and that it appeared quite secure.  The wound was gently irrigated before wound closure.    Bone marrow was obtained from the right iliac crest.  The marrow was applied to the master graft sponges.  Laminectomy bone, and bone from decortication of the graft bed was cleaned at the back table throughout the case and available for bone graft.  The morcellized bone was placed in the decorticated lateral gutter bilaterally.  Master graft sponges were then placed.  Hemostasis was assured.  The wound was then closed with running and interrupted #1 Vicryl for the dorsal fascia, 2-0 Vicryl subcutaneously, then 4-0 Monocryl and Dermabond for the skin.  A sterile dressing was applied.  The patient is about to be recovered.

## 2017-12-07 NOTE — ANESTHESIA POSTPROCEDURE EVALUATION
Patient: Alexa BROWNING Traeger    Procedure Summary     Date Anesthesia Start Anesthesia Stop Room / Location    12/07/17 1417 1611  [facility] / [facility]       Procedure Diagnosis Surgeon Provider    L2-3, L3-4, L4-5 laminectomy and fusion with local bone graft (N/A Spine Lumbar) Acquired scoliosis; Spinal stenosis of lumbar region with neurogenic claudication  (Acquired scoliosis [M41.9]; Spinal stenosis of lumbar region with neurogenic claudication [M48.062]) MD Sanjay Stout MD          Anesthesia Type: general  Last vitals  BP   158/68 (12/07/17 1720)   Temp   36.8 °C (98.3 °F) (12/07/17 1609)   Pulse   95 (12/07/17 1720)   Resp   16 (12/07/17 1720)     SpO2   99 % (12/07/17 1720)     Post Anesthesia Care and Evaluation    Patient location during evaluation: PACU  Anesthetic complications: No anesthetic complications

## 2017-12-08 PROBLEM — M48.062 SPINAL STENOSIS OF LUMBAR REGION WITH NEUROGENIC CLAUDICATION: Status: ACTIVE | Noted: 2017-11-21

## 2017-12-08 PROBLEM — M41.9 ACQUIRED SCOLIOSIS: Status: ACTIVE | Noted: 2017-11-21

## 2017-12-08 LAB
HCT VFR BLD AUTO: 31.2 % (ref 35.6–45.5)
HGB BLD-MCNC: 9.8 G/DL (ref 11.9–15.5)

## 2017-12-08 PROCEDURE — 94799 UNLISTED PULMONARY SVC/PX: CPT

## 2017-12-08 PROCEDURE — 99024 POSTOP FOLLOW-UP VISIT: CPT | Performed by: ORTHOPAEDIC SURGERY

## 2017-12-08 PROCEDURE — 85014 HEMATOCRIT: CPT | Performed by: ORTHOPAEDIC SURGERY

## 2017-12-08 PROCEDURE — 25010000002 ONDANSETRON PER 1 MG: Performed by: ORTHOPAEDIC SURGERY

## 2017-12-08 PROCEDURE — 25010000003 CEFAZOLIN IN DEXTROSE 2-4 GM/100ML-% SOLUTION: Performed by: ORTHOPAEDIC SURGERY

## 2017-12-08 PROCEDURE — 97110 THERAPEUTIC EXERCISES: CPT

## 2017-12-08 PROCEDURE — 85018 HEMOGLOBIN: CPT | Performed by: ORTHOPAEDIC SURGERY

## 2017-12-08 PROCEDURE — 97162 PT EVAL MOD COMPLEX 30 MIN: CPT

## 2017-12-08 RX ORDER — OXYCODONE HYDROCHLORIDE AND ACETAMINOPHEN 5; 325 MG/1; MG/1
1 TABLET ORAL EVERY 4 HOURS PRN
Status: DISCONTINUED | OUTPATIENT
Start: 2017-12-08 | End: 2017-12-09

## 2017-12-08 RX ORDER — ACETAMINOPHEN 325 MG/1
650 TABLET ORAL EVERY 6 HOURS PRN
Status: DISCONTINUED | OUTPATIENT
Start: 2017-12-08 | End: 2017-12-11 | Stop reason: HOSPADM

## 2017-12-08 RX ORDER — ACETAMINOPHEN 325 MG/1
650 TABLET ORAL 3 TIMES DAILY
Status: DISCONTINUED | OUTPATIENT
Start: 2017-12-08 | End: 2017-12-11 | Stop reason: HOSPADM

## 2017-12-08 RX ADMIN — SERTRALINE 50 MG: 50 TABLET, FILM COATED ORAL at 10:18

## 2017-12-08 RX ADMIN — ACETAMINOPHEN 650 MG: 325 TABLET ORAL at 14:51

## 2017-12-08 RX ADMIN — GABAPENTIN 100 MG: 100 CAPSULE ORAL at 14:51

## 2017-12-08 RX ADMIN — DOCUSATE SODIUM -SENNOSIDES 1 TABLET: 50; 8.6 TABLET, COATED ORAL at 10:19

## 2017-12-08 RX ADMIN — CEFAZOLIN SODIUM 2 G: 2 INJECTION, SOLUTION INTRAVENOUS at 05:50

## 2017-12-08 RX ADMIN — GABAPENTIN 100 MG: 100 CAPSULE ORAL at 21:32

## 2017-12-08 RX ADMIN — OXYCODONE HYDROCHLORIDE AND ACETAMINOPHEN 1 TABLET: 5; 325 TABLET ORAL at 10:24

## 2017-12-08 RX ADMIN — ONDANSETRON 4 MG: 2 INJECTION INTRAMUSCULAR; INTRAVENOUS at 21:58

## 2017-12-08 RX ADMIN — FAMOTIDINE 20 MG: 20 TABLET, FILM COATED ORAL at 10:18

## 2017-12-08 RX ADMIN — ACETAMINOPHEN 650 MG: 325 TABLET ORAL at 21:33

## 2017-12-08 RX ADMIN — OXYCODONE HYDROCHLORIDE AND ACETAMINOPHEN 2 TABLET: 5; 325 TABLET ORAL at 02:54

## 2017-12-08 NOTE — PLAN OF CARE
Problem: Patient Care Overview (Adult)  Goal: Plan of Care Review  Outcome: Ongoing (interventions implemented as appropriate)    12/08/17 1553   Coping/Psychosocial Response Interventions   Plan Of Care Reviewed With patient   Patient Care Overview   Progress progress towards functional goals is fair   Outcome Evaluation   Outcome Summary/Follow up Plan Pt still confused at times, worsening after taking oral pain medication. Awaiting PT evaluation. Avoid giving pt 2 Percocets at the time as increases confusion.          Problem: Fall Risk (Adult)  Goal: Absence of Falls  Outcome: Ongoing (interventions implemented as appropriate)    12/08/17 1553   Fall Risk (Adult)   Absence of Falls making progress toward outcome. Awaiting therapy to work with PT. Continue falls precautions.        12/08/17 1553   Fall Risk (Adult)   Absence of Falls making progress toward outcome             Problem: Pain, Acute (Adult)  Goal: Acceptable Pain Control/Comfort Level  Outcome: Ongoing (interventions implemented as appropriate)    12/08/17 1553   Pain, Acute (Adult)   Acceptable Pain Control/Comfort Level making progress toward outcome. Pt continue using PCA some. Took 1 percocet around noon and was somehow confused afterwards. Trying tylenol and neurontin as schedule.        12/08/17 1553   Pain, Acute (Adult)   Acceptable Pain Control/Comfort Level making progress toward outcome

## 2017-12-08 NOTE — DISCHARGE PLACEMENT REQUEST
"Lilly Mane (89 y.o. Female)     Date of Birth Social Security Number Address Home Phone MRN    04/13/1927  [address] [id number] [phone] [id number]    Orthodox Marital Status          None        Admission Date Admission Type Admitting Provider Attending Provider Department, Room/Bed    12/7/17 Elective Andre Layne MD Werner, Joseph G, MD [address], P583/1    Discharge Date Discharge Disposition Discharge Destination                      Attending Provider: Andre Layne MD     Allergies:  Ciprofloxacin, Metronidazole, Prednisone    Isolation:  None   Infection:  None   Code Status:  FULL    Ht:  165.1 cm (65\")   Wt:  55.1 kg (121 lb 6.4 oz)    Admission Cmt:  None   Principal Problem:  None                Active Insurance as of 12/7/2017     Primary Coverage     Payor Plan Insurance Group Employer/Plan Group    HUMANA MEDICARE REPLACEMENT HUMANA MEDICARE REPL [id number]     Payor Plan Address Payor Plan Phone Number Effective From Effective To    [address] [address] [phone] 1/1/2017     [place] [address]       Subscriber Name Subscriber Birth Date Member ID       LILLY MANE 4/13/1927 [id number]                 Emergency Contacts      (Rel.) Home Phone Work Phone Mobile Phone    Sheridan Gregory (Daughter) [phone] -- [phone]    Corine Baird (Daughter) -- -- [phone]              "

## 2017-12-08 NOTE — PROGRESS NOTES
Discharge Planning Assessment  [facility]     Patient Name: Alexa Peace  MRN: [id number]  Today's Date: 12/8/2017    Admit Date: 12/7/2017          Discharge Needs Assessment       12/08/17 1027    Living Environment    Lives With alone    Living Arrangements house    Home Accessibility stairs to enter home;bed and bath on same level    Stair Railings at Home inside, present at both sides    Type of Financial/Environmental Concern none    Living Environment    Quality Of Family Relationships supportive            Discharge Plan       12/08/17 1031    Case Management/Social Work Plan    Plan [facility] pending insurance precert    Additional Comments Pt's IMM signed 12/7.  Spoke with pt and daughter Corine Baird [phone] conserning DCP/needs. Pt confirmed facessheet and pharmacy information as correct.    Pt's daughter stated that pt has pre registered at [facility].  Call placed to [facility] who confirmed pt has pre registered at [facility] and is approved pending insurance precert.  [place] referral placed in [facility].   Pt plans to transport by [facility] upon D/C.   Transfer packet started and placed in [facility] office.          Discharge Placement     Facility/Agency Request Status Selected? Address Phone Number Fax Number    [facility] - Request Sent     [address] [facility] [address] [phone]        Expected Discharge Date and Time     Expected Discharge Date Expected Discharge Time    Dec 11, 2017               Demographic Summary       12/08/17 1026    Referral Information    Admission Type inpatient    Arrived From home or self-care    Referral Source admission list;physician    Reason For Consult discharge planning            Functional Status       12/08/17 1026    Functional Status Current    Ambulation 2-->assistive person    Transferring 2-->assistive person    Toileting 2-->assistive person    Bathing  2-->assistive person    Dressing 2-->assistive person    Communication 0-->understands/communicates without difficulty    Swallowing (if score 2 or more for any item, consult Rehab Services) 0-->swallows foods/liquids without difficulty    Functional Status Prior    Ambulation 1-->assistive equipment    Transferring 0-->independent    Toileting 0-->independent    Bathing 0-->independent    Dressing 0-->independent    Eating 0-->independent    Communication 0-->understands/communicates without difficulty    Swallowing 0-->swallows foods/liquids without difficulty    IADL    Medications independent    Meal Preparation independent    Housekeeping independent    Laundry independent    Shopping independent    Oral Care independent            Psychosocial     None            Abuse/Neglect     None            Legal     None            Substance Abuse     None            Patient Forms     None          CARLOTA Salazar

## 2017-12-08 NOTE — PLAN OF CARE
Problem: Patient Care Overview (Adult)  Goal: Plan of Care Review  Outcome: Ongoing (interventions implemented as appropriate)    12/08/17 1644   Coping/Psychosocial Response Interventions   Plan Of Care Reviewed With patient   Outcome Evaluation   Outcome Summary/Follow up Plan pt having a difficult time today with pain, lethargy, confusion, dizziness, nausea and vomiting. She presents with impaired mobility after back surgery. She will benefit from PT to address, rec SNF at d/c         Problem: Inpatient Physical Therapy  Goal: Bed Mobility Goal LTG- PT  Outcome: Ongoing (interventions implemented as appropriate)    12/08/17 1644   Bed Mobility PT LTG   Bed Mobility PT LTG, Date Established 12/08/17   Bed Mobility PT LTG, Time to Achieve 1 wk   Bed Mobility PT LTG, Activity Type supine to sit/sit to supine   Bed Mobility PT LTG, [place] Level minimum assist (75% patient effort)       Goal: Transfer Training Goal 1 LTG- PT  Outcome: Ongoing (interventions implemented as appropriate)    12/08/17 1644   Transfer Training PT LTG   Transfer Training PT LTG, Date Established 12/08/17   Transfer Training PT LTG, Time to Achieve 1 wk   Transfer Training PT LTG, Activity Type sit to stand/stand to sit   Transfer Training PT LTG, [place] Level minimum assist (75% patient effort)   Transfer Training PT LTG, Assist Device walker, rolling       Goal: Gait Training Goal LTG- PT  Outcome: Ongoing (interventions implemented as appropriate)    12/08/17 1644   Gait Training PT LTG   Gait Training Goal PT LTG, Date Established 12/08/17   Gait Training Goal PT LTG, Time to Achieve 1 wk   Gait Training Goal PT LTG, [place] Level minimum assist (75% patient effort)   Gait Training Goal PT LTG, Assist Device walker, rolling   Gait Training Goal PT LTG, Distance to Achieve 40 ft

## 2017-12-08 NOTE — PLAN OF CARE
Problem: Patient Care Overview (Adult)  Goal: Plan of Care Review  Outcome: Ongoing (interventions implemented as appropriate)    12/07/17 1856 12/07/17 2000 12/08/17 0430   Coping/Psychosocial Response Interventions   Plan Of Care Reviewed With --  patient;family --    Patient Care Overview   Progress progress toward functional goals is gradual --  --    Outcome Evaluation   Outcome Summary/Follow up Plan --  --  Pt VSS, WNL, a and o x 4, periods of vague forgetfulness, easily rousable but sleepy. PCA providing excellent pain control. Family at bedside and is attentive to patient. condon patent,. will monitor throughout shift. LUDMILA, INEZN       Goal: Adult Individualization and Mutuality  Outcome: Ongoing (interventions implemented as appropriate)    12/07/17 1027   Individualization   Patient Specific Preferences Call pt Ada   Patient Specific Goals No infection after surgery.   Patient Specific Interventions Teach good hand hygiene.

## 2017-12-08 NOTE — PROGRESS NOTES
Discharge Planning Assessment  [facility]     Patient Name: Alexa Peace  MRN: [id number]  Today's Date: 12/8/2017    Admit Date: 12/7/2017          Discharge Needs Assessment       12/08/17 1027    Living Environment    Lives With alone    Living Arrangements house    Home Accessibility stairs to enter home;bed and bath on same level    Stair Railings at Home inside, present at both sides    Type of Financial/Environmental Concern none    Living Environment    Quality Of Family Relationships supportive            Discharge Plan       12/08/17 1031    Case Management/Social Work Plan    Plan [facility] pending insurance precert    Additional Comments Pt's IMM signed 12/7.  Spoke with pt and daughter Corine Baird [phone] conserning DCP/needs. Pt confirmed facessheet and pharmacy information as correct.    Pt's daughter stated that pt has pre registered at [facility].  Call placed to [facility] who confirmed pt has pre registered at [facility] and is approved pending insurance precert.  [facility] referral placed in [facility].   Pt plans to transport by [facility] upon D/C.   Transfer packet started and placed in [facility] office.          Discharge Placement     Facility/Agency Request Status Selected? Address Phone Number Fax Number    [facility] Pending - Request Sent     [address] [address] [phone]        Expected Discharge Date and Time     Expected Discharge Date Expected Discharge Time    Dec 11, 2017               Demographic Summary       12/08/17 1026    Referral Information    Admission Type inpatient    Arrived From home or self-care    Referral Source admission list;physician    Reason For Consult discharge planning            Functional Status       12/08/17 1026    Functional Status Current    Ambulation 2-->assistive person    Transferring 2-->assistive person    Toileting 2-->assistive person    Bathing  2-->assistive person    Dressing 2-->assistive person    Communication 0-->understands/communicates without difficulty    Swallowing (if score 2 or more for any item, consult Rehab Services) 0-->swallows foods/liquids without difficulty    Functional Status Prior    Ambulation 1-->assistive equipment    Transferring 0-->independent    Toileting 0-->independent    Bathing 0-->independent    Dressing 0-->independent    Eating 0-->independent    Communication 0-->understands/communicates without difficulty    Swallowing 0-->swallows foods/liquids without difficulty    IADL    Medications independent    Meal Preparation independent    Housekeeping independent    Laundry independent    Shopping independent    Oral Care independent            Psychosocial     None            Abuse/Neglect     None            Legal     None            Substance Abuse     None            Patient Forms     None          CARLOTA Salazar

## 2017-12-08 NOTE — CONSULTS
Inpatient Consult to Hospitalist  Consult performed by: KHAI XIE  Consult ordered by: FRIDA LAYNE  Reason for consult: HTN, CAD, GERD          Patient Care Team:  Lizandro Childers MD as PCP - General (Internal Medicine)    Chief complaint: back pain    Subjective     History of Present Illness    Pleasant 89 yo with history of HTN, CAD, GERD. She has history of very remote CAD s/p stenting in 2007. No real issues since. She has been on ASA and plavix since. No current chest pain nor any before admission.    She has had ongoing lower back pain for years. Multiple conservatives measures were tried without success. She underwent lumbar laminectomy and fusion with Dr. Layne yesterday. The procedure went well without complications. Post op pain has been moderate. The pain meds have made her a little confused per her family. This also been the case with pain medications in the past.    Review of Systems   Constitutional: Positive for appetite change. Negative for fever.   HENT: Negative.    Eyes: Negative.    Respiratory: Negative.    Cardiovascular: Negative for chest pain, palpitations and leg swelling.   Gastrointestinal: Negative.    Endocrine: Negative.    Genitourinary: Negative for dysuria and frequency.        Chronic urge incontinence   Musculoskeletal: Positive for arthralgias and back pain.   Skin: Negative.    Allergic/Immunologic: Negative.    Neurological: Negative.    Hematological: Negative for adenopathy. Bruises/bleeds easily.   Psychiatric/Behavioral: Positive for confusion. Negative for agitation.        Past Medical History:   Diagnosis Date   • Anticoagulated     PLAVIX   • Arthritis    • Atrial premature complex    • CAD (coronary artery disease)    • Cancer     skin   • Colon polyp    • Depression    • GERD (gastroesophageal reflux disease)    • Heart attack    • Hyperlipidemia    • Hypertension    • Macular degeneration    • Pre-diabetes    • Spinal stenosis    ,   Past Surgical  History:   Procedure Laterality Date   • CATARACT EXTRACTION     • CORONARY ANGIOPLASTY WITH STENT PLACEMENT     • EPIDURAL BLOCK     • TONSILECTOMY, ADENOIDECTOMY, BILATERAL MYRINGOTOMY AND TUBES     ,   Family History   Problem Relation Age of Onset   • Diabetes Mother    • Depression Mother    • Anxiety disorder Mother    • Heart disease Father    • Hypertension Father    • Diabetes Father    • Diabetes Brother    • No Known Problems Maternal Grandmother    • No Known Problems Maternal Grandfather    • No Known Problems Paternal Grandmother    • No Known Problems Paternal Grandfather    • Malig Hyperthermia Neg Hx    ,   Social History   Substance Use Topics   • Smoking status: Never Smoker   • Smokeless tobacco: Never Used   • Alcohol use No   ,   Prescriptions Prior to Admission   Medication Sig Dispense Refill Last Dose   • aspirin 81 MG chewable tablet Chew 85 mg Daily.   Past Week at Unknown time   • Calcium Carbonate-Vitamin D (CALCIUM 600+D) 600-200 MG-UNIT tablet Take 1 tablet by mouth Daily.   Past Week at Unknown time   • clopidogrel (PLAVIX) 75 MG tablet Take 75 mg by mouth daily.   Past Week at Unknown time   • Flaxseed, Linseed, (FLAXSEED OIL) 1200 MG capsule Take 1,200 mg by mouth 2 (Two) Times a Day.   12/7/2017 at 0715   • gabapentin (NEURONTIN) 100 MG capsule Take 1 capsule by mouth 3 (Three) Times a Day. 90 capsule 3 12/7/2017 at 0715   • Menthol, Topical Analgesic, (BIOFREEZE EX) Apply  topically As Needed.   12/7/2017 at 0700   • Multiple Vitamins-Minerals (CENTRUM SILVER PO) Take 1 tablet by mouth Daily.   Past Week at Unknown time   • pravastatin (PRAVACHOL) 40 MG tablet Take 1 tablet by mouth Daily. 90 tablet 1 12/6/2017 at 2100   • raNITIdine (ZANTAC) 150 MG tablet Take 150 mg by mouth 2 (Two) Times a Day.   12/7/2017 at 0715   • sertraline (ZOLOFT) 50 MG tablet Take 1 tablet by mouth Daily. 90 tablet 1 12/7/2017 at 0715   • acetaminophen (TYLENOL) 500 MG tablet Take 500 mg by mouth Every  6 (Six) Hours As Needed for Mild Pain (1-3).   Unknown at Unknown time   , Scheduled Meds:      acetaminophen 650 mg Oral TID   aspirin 81 mg Oral Daily   atorvastatin 10 mg Oral Daily   famotidine 20 mg Oral Daily   gabapentin 100 mg Oral TID   sennosides-docusate sodium 1 tablet Oral BID   sertraline 50 mg Oral Daily   , Continuous Infusions:      HYDROmorphone HCl-NaCl     lactated ringers 100 mL/hr    lactated ringers 9 mL/hr Last Rate: 9 mL/hr (12/07/17 1054)   sodium chloride 0.45 % with KCl 20 mEq 100 mL/hr Last Rate: 100 mL/hr (12/07/17 2215)   , PRN Meds:  acetaminophen  •  bisacodyl  •  cyclobenzaprine  •  naloxone  •  ondansetron **OR** ondansetron ODT **OR** ondansetron  •  oxyCODONE-acetaminophen  •  polyethylene glycol  •  sodium chloride  •  temazepam and Allergies:  Ciprofloxacin; Metronidazole; and Prednisone    Objective      Vital Signs  Temp:  [97.4 °F (36.3 °C)-98.4 °F (36.9 °C)] 98.4 °F (36.9 °C)  Heart Rate:  [] 86  Resp:  [14-18] 18  BP: (132-169)/(53-74) 152/56    Physical Exam   Constitutional: She appears well-developed and well-nourished. No distress.   elderly   HENT:   Head: Normocephalic and atraumatic.   Mouth/Throat: Oropharynx is clear and moist.   Eyes: EOM are normal. Pupils are equal, round, and reactive to light. No scleral icterus.   Neck: Normal range of motion. Neck supple.   Cardiovascular: Normal rate, regular rhythm and normal heart sounds.    No murmur heard.  Pulmonary/Chest: Effort normal and breath sounds normal.   Decreased bs at bases   Abdominal: Soft. Bowel sounds are normal. She exhibits no distension. There is no tenderness.   Genitourinary:   Genitourinary Comments: +condon catheter   Musculoskeletal: She exhibits edema.   Neurological: She is alert. No cranial nerve deficit.   A little drowisy and confused   Skin: Skin is warm and dry. No rash noted.   Psychiatric: She is slowed. She is inattentive.   Nursing note and vitals reviewed.      Results Review:     I reviewed the patient's new clinical results.     Hemoglobin & Hematocrit, Blood [[id number]] (Abnormal) Collected: 12/08/17 0452     Lab Status: Final result Specimen: Blood Updated: 12/08/17 0545      Hemoglobin 9.8 (L) g/dL       Hematocrit 31.2 (L) %      Hemoglobin & Hematocrit, Blood [[id number]] (Abnormal) Collected: 12/07/17 1741     Lab Status: Final result Specimen: Blood Updated: 12/07/17 1827      Hemoglobin 10.2 (L) g/dL       Hematocrit 32.3 (L) %      Basic Metabolic Panel [[id number]] (Abnormal) Collected: 11/29/17 1518     Lab Status: Final result Specimen: Blood Updated: 11/29/17 1636      Glucose 106 (H) mg/dL       BUN 23 mg/dL       Creatinine 0.64 mg/dL       Sodium 141 mmol/L       Potassium 4.1 mmol/L       Chloride 104 mmol/L       CO2 28.0 mmol/L       Calcium 9.3 mg/dL       eGFR Non African Amer 87 mL/min/1.73       BUN/Creatinine Ratio 35.9 (H)      Anion Gap 9.0 mmol/L        XR Spine Lumbar 1 View [[id number]] Not Reviewed       Order Status: Completed Collected: 12/07/17 1917      Updated: 12/07/17 1921     Narrative:       XR SPINE LUMBAR 1 VW-     INDICATIONS: Lumbar laminectomy     TECHNIQUE: FLUOROSCOPIC ASSISTANCE IN THE OPERATING ROOM.     FINDINGS:     1 intraoperative fluoroscopic spot view was obtained and recorded the  PACS for review, revealing marker at the level of L4/5. Please see  operative report for full details.     Fluoroscopy time: 1 second        Impression:          As described.     This report was finalized on 12/7/2017 7:18 PM by Dr. Darren Brar MD.        FL C Arm During Surgery [[id number]] Review Not Required     Order Status: Completed Resulted: 12/07/17 1626      Updated: 12/07/17 1626     Narrative:       This procedure was auto-finalized with no dictation required.     XR Chest 2 View [[id number]] Not Reviewed     Order Status: Completed Collected: 11/22/17 1017      Updated: 11/24/17 0858     Narrative:       PA AND LATERAL CHEST 11/22/2017       HISTORY: Chest pain.     FINDINGS: Heart size is at the upper limits of normal. Lungs appear free  of acute infiltrates. There is some aortic calcification. Bones and soft  tissues are otherwise unremarkable.        Impression:       Borderline cardiomegaly.           Assessment/Plan     Principal Problem:    Spinal stenosis of lumbar region with neurogenic claudication  Active Problems:    Atherosclerosis of native coronary artery    Essential hypertension    Gastroesophageal reflux disease without esophagitis    Acquired scoliosis    -Continue post op care. Pain conrol- will add scheduled tylenol, decrease oxycodone from 2 pills to 1 pill with some confusion. Incentive spirometry, PT.  -On asa and plavix as outpatient for CAD but these are held obviously for surgery. I would not think she would need to be on dual antiplatelets anyway with her stenting greater than 10 years ago. ASA restarted by Dr. Layne, I would say to hold plavix until follow up with Dr. Kim as outpatient.  -Continue pepcid  -Monitor BP without medications currently    Assessment & Plan    I discussed the patients findings and my recommendations with patient, family and nursing staff     Reviewed previous records    Thank you for the consult on this pleasant lady, will continue to follow her with you while she is here in the hospital.     Vimal Moe MD  12/08/17  11:48 AM

## 2017-12-08 NOTE — THERAPY EVALUATION
Acute Care - Physical Therapy Initial Evaluation  [facility]     Patient Name: lAexa Peace  : 1927  MRN: [id number]  Today's Date: 2017   Onset of Illness/Injury or Date of Surgery Date: 17  Date of Referral to PT: 17  Referring Physician: Roverto      Admit Date: 2017     Visit Dx:    ICD-10-CM ICD-9-CM   1. Impaired functional mobility and activity tolerance Z74.09 V49.89   2. Spinal stenosis of lumbar region with neurogenic claudication M48.062 724.03   3. Acquired scoliosis M41.9 737.30     Patient Active Problem List   Diagnosis   • Bad memory   • Depression   • Diverticulosis of intestine   • Hyperlipidemia   • Macular degeneration   • Peripheral nerve disease   • Osteopenia   • Atopic rhinitis   • Urge incontinence of urine   • Atherosclerosis of native coronary artery   • Essential hypertension   • Gastroesophageal reflux disease without esophagitis   • Impaired glucose tolerance   • History of coronary artery stent placement   • Chronic bilateral low back pain   • Acquired scoliosis   • Spinal stenosis of lumbar region with neurogenic claudication     Past Medical History:   Diagnosis Date   • Anticoagulated     PLAVIX   • Arthritis    • Atrial premature complex    • CAD (coronary artery disease)    • Cancer     skin   • Colon polyp    • Depression    • GERD (gastroesophageal reflux disease)    • Heart attack    • Hyperlipidemia    • Hypertension    • Macular degeneration    • Pre-diabetes    • Spinal stenosis      Past Surgical History:   Procedure Laterality Date   • CATARACT EXTRACTION     • CORONARY ANGIOPLASTY WITH STENT PLACEMENT     • EPIDURAL BLOCK     • LUMBAR DISCECTOMY FUSION INSTRUMENTATION N/A 2017    Procedure: L2-3, L3-4, L4-5 laminectomy and fusion with local bone graft;  Surgeon: Andre Layne MD;  Location: [facility] OR;  Service:    • TONSILECTOMY, ADENOIDECTOMY, BILATERAL MYRINGOTOMY AND TUBES            PT ASSESSMENT (last 72 hours)      PT  Evaluation       12/08/17 1626 12/08/17 1027    Rehab Evaluation    Document Type evaluation  -PC     Subjective Information complains of;weakness;fatigue;dizziness;nausea/vomiting  -PC     Patient Effort, Rehab Treatment fair  -PC     Patient Effort, Rehab Treatment Comment pt is lethargic, difficult to keep eyes open, even when sitting edge of bed, slow to follow commands  -PC     Symptoms Noted During/After Treatment dizziness   N/V  -PC     Symptoms Noted Comment after pt positioned in bed, she vomited  -PC     General Information    Patient Profile Review yes  -PC     Onset of Illness/Injury or Date of Surgery Date 12/07/17  -PC     Referring Physician Roverto  -PC     General Observations pt is in bed, very sleepy, mildly confused  -PC     Pertinent History Of Current Problem L2-5 laminectomy/fusion  -PC     Precautions/Limitations brace on when up;fall precautions;spinal precautions  -PC     Prior Level of Function independent:;all household mobility  -PC     Equipment Currently Used at Home walker, rolling   needed rwx the past few weeks due to back pain  -PC     Plans/Goals Discussed With patient  -PC     Benefits Reviewed patient:;family:  -PC     Barriers to Rehab cognitive status  -PC     Living Environment    Lives With  alone  -MS    Living Arrangements  house  -MS    Home Accessibility  stairs to enter home;bed and bath on same level  -MS    Stair Railings at Home  inside, present at both sides  -MS    Type of Financial/Environmental Concern  none  -MS    Clinical Impression    Date of Referral to PT 12/08/17  -PC     Criteria for Skilled Therapeutic Interventions Met yes;treatment indicated  -PC     Impairments Found (describe specific impairments) gait, locomotion, and balance  -PC     Rehab Potential fair, will monitor progress closely  -PC     Pain Assessment    Pain Assessment 0-10  -PC     Pain Score 7  -PC     Pain Location Back  -PC     Pain Frequency Intermittent  -PC     Pain Intervention(s)  Medication (See MAR);Repositioned  -PC     Cognitive Assessment/Intervention    Current Cognitive/Communication Assessment impaired  -PC     Orientation Status oriented to;person;place  -PC     Follows Commands/Answers Questions able to follow single-step instructions;needs cueing;needs increased time;needs repetition;50% of the time  -PC     Personal Safety moderate impairment;decreased insight to deficits  -PC     Personal Safety Interventions fall prevention program maintained;gait belt  -PC     ROM (Range of Motion)    General ROM no range of motion deficits identified  -PC     MMT (Manual Muscle Testing)    General MMT Assessment Detail grossly assessed 3+/5  -PC     Bed Mobility, Assessment/Treatment    Bed Mobility, Roll Left, [place] moderate assist (50% patient effort);2 person assist required  -PC     Bed Mobility, Roll Right, [place] moderate assist (50% patient effort);2 person assist required  -PC     Bed Mob, Sidelying to Sit, [place] moderate assist (50% patient effort);2 person assist required  -PC     Bed Mob, Sit to Sidelying, [place] moderate assist (50% patient effort);2 person assist required  -PC     Transfer Assessment/Treatment    Transfers, Sit-Stand [place] moderate assist (50% patient effort);2 person assist required  -PC     Transfers, Stand-Sit [place] moderate assist (50% patient effort);2 person assist required  -PC     Gait Assessment/Treatment    Gait, [place] Level moderate assist (50% patient effort);2 person assist required  -PC     Gait, Assistive Device rolling walker  -PC     Gait, Distance (Feet) 2  -PC     Gait, Comment shuffling steps forward/backward, upon return to supine, head of bed elevated and pt began to vomit  -PC     Positioning and Restraints    Pre-Treatment Position in bed  -PC     Post Treatment Position bed  -PC     In Bed supine;call light within reach;encouraged to call for assist;exit alarm on;notified nsg  -PC        12/08/17 0845 12/07/17 2000    Muscle Tone Assessment    Muscle Tone Assessment Bilateral Lower Extremities  -AK Bilateral Lower Extremities  -LUDMILA    Bilateral Lower Extremities Muscle Tone Assessment mildly decreased tone  -AK mildly decreased tone  -LUDMILA      12/07/17 1031 12/07/17 1027    General Information    Equipment Currently Used at Home none  -MP none  -MP    Living Environment    Lives With alone  -MP     Living Arrangements house  -MP     Home Accessibility stairs to enter home;bed and bath on same level  -MP     Number of Stairs to Enter Home 3  -MP     Stair Railings at Home inside, present at both sides  -MP     Type of Financial/Environmental Concern none  -MP     Transportation Available car  -MP       User Key  (r) = Recorded By, (t) = Taken By, (c) = Cosigned By    Initials Name Provider Type    EKTA Powell, PT Physical Therapist    JHOANA Dubose, RN Registered Nurse    HEMA Harrington, RN Registered Nurse    MS Mima Golden, MSW     LUDMILA Mast, RN Registered Nurse          Physical Therapy Education     Title: PT OT SLP Therapies (Done)     Topic: Physical Therapy (Done)     Point: Mobility training (Done)    Learning Progress Summary    Learner Readiness Method Response Comment Documented by Status   Patient Acceptance IMANI HERRERA DU, NR   12/08/17 1643 Done               Point: Home exercise program (Done)    Learning Progress Summary    Learner Readiness Method Response Comment Documented by Status   Patient Acceptance IMANI HERRERA DU, NR   12/08/17 1643 Done               Point: Body mechanics (Done)    Learning Progress Summary    Learner Readiness Method Response Comment Documented by Status   Patient Acceptance IMANI HERRERA DU, NR   12/08/17 1643 Done               Point: Precautions (Done)    Learning Progress Summary    Learner Readiness Method Response Comment Documented by Status   Patient Acceptance IMANI HERRERA DUNR   12/08/17 1643 Done                       User Key     Initials Effective Dates Name Provider Type Discipline    PC 12/01/15 -  Holly Powell, PT Physical Therapist PT                PT Recommendation and Plan  Anticipated Equipment Needs At Discharge: front wheeled walker  Anticipated Discharge Disposition: skilled nursing facility  Planned Therapy Interventions: gait training, bed mobility training, strengthening, transfer training  PT Frequency: 2 times/day  Plan of Care Review  Plan Of Care Reviewed With: patient  Outcome Summary/Follow up Plan: pt having a difficult time today with pain, lethargy, confusion, dizziness, nausea and vomiting.  She presents with impaired mobility after back surgery.  She will benefit from PT to address, rec SNF at d/c          IP PT Goals       12/08/17 1644          Bed Mobility PT LTG    Bed Mobility PT LTG, Date Established 12/08/17  -PC      Bed Mobility PT LTG, Time to Achieve 1 wk  -PC      Bed Mobility PT LTG, Activity Type supine to sit/sit to supine  -PC      Bed Mobility PT LTG, Ashley Level minimum assist (75% patient effort)  -PC      Transfer Training PT LTG    Transfer Training PT LTG, Date Established 12/08/17  -PC      Transfer Training PT LTG, Time to Achieve 1 wk  -PC      Transfer Training PT LTG, Activity Type sit to stand/stand to sit  -PC      Transfer Training PT LTG, Ashley Level minimum assist (75% patient effort)  -PC      Transfer Training PT LTG, Assist Device walker, rolling  -PC      Gait Training PT LTG    Gait Training Goal PT LTG, Date Established 12/08/17  -PC      Gait Training Goal PT LTG, Time to Achieve 1 wk  -PC      Gait Training Goal PT LTG, Ashley Level minimum assist (75% patient effort)  -PC      Gait Training Goal PT LTG, Assist Device walker, rolling  -PC      Gait Training Goal PT LTG, Distance to Achieve 40 ft  -PC        User Key  (r) = Recorded By, (t) = Taken By, (c) = Cosigned By    Initials Name Provider Type    PC Holly Powell, PT Physical Therapist                 Outcome Measures       12/08/17 1600          How much help from another person do you currently need...    Turning from your back to your side while in flat bed without using bedrails? 2  -PC      Moving from lying on back to sitting on the side of a flat bed without bedrails? 2  -PC      Moving to and from a bed to a chair (including a wheelchair)? 2  -PC      Standing up from a chair using your arms (e.g., wheelchair, bedside chair)? 2  -PC      Climbing 3-5 steps with a railing? 1  -PC      To walk in hospital room? 1  -PC      AM-PAC 6 Clicks Score 10  -PC      Functional Assessment    Outcome Measure Options AM-PAC 6 Clicks Basic Mobility (PT)  -PC        User Key  (r) = Recorded By, (t) = Taken By, (c) = Cosigned By    Initials Name Provider Type    PC Holly Powell PT Physical Therapist           Time Calculation:         PT Charges       12/08/17 1646          Time Calculation    Start Time 1600  -PC      Stop Time 1625  -PC      Time Calculation (min) 25 min  -PC      PT Received On 12/08/17  -PC      PT - Next Appointment 12/09/17  -PC      PT Goal Re-Cert Due Date 12/15/17  -PC        User Key  (r) = Recorded By, (t) = Taken By, (c) = Cosigned By    Initials Name Provider Type    PC Holly Powell PT Physical Therapist          Therapy Charges for Today     Code Description Service Date Service Provider Modifiers Qty    50659145989 HC PT EVAL MOD COMPLEXITY 2 12/8/2017 Holly Powell, PT GP 1    76062921659 HC PT THER PROC EA 15 MIN 12/8/2017 Holly Powell PT GP 2    77549305840 HC PT THER SUPP EA 15 MIN 12/8/2017 Holly Powell, PT GP 1          PT G-Codes  Outcome Measure Options: AM-PAC 6 Clicks Basic Mobility (PT)      Holly Powell PT  12/8/2017

## 2017-12-08 NOTE — PROGRESS NOTES
Postop day 1: AVSS awake alert oriented.  Sleepy no headache expected back pain and leg pain better.  Moves the legs.  Hemoglobin 9.1.  I'll be better able to assess her when she is more awake.  She'll be here for the weekend and expect rehabilitation.

## 2017-12-09 LAB
HCT VFR BLD AUTO: 30.8 % (ref 35.6–45.5)
HGB BLD-MCNC: 9.7 G/DL (ref 11.9–15.5)

## 2017-12-09 PROCEDURE — 97110 THERAPEUTIC EXERCISES: CPT

## 2017-12-09 PROCEDURE — 85014 HEMATOCRIT: CPT | Performed by: ORTHOPAEDIC SURGERY

## 2017-12-09 PROCEDURE — 85018 HEMOGLOBIN: CPT | Performed by: ORTHOPAEDIC SURGERY

## 2017-12-09 PROCEDURE — 99024 POSTOP FOLLOW-UP VISIT: CPT | Performed by: ORTHOPAEDIC SURGERY

## 2017-12-09 RX ORDER — HYDROCODONE BITARTRATE AND ACETAMINOPHEN 5; 325 MG/1; MG/1
1 TABLET ORAL EVERY 4 HOURS PRN
Status: DISCONTINUED | OUTPATIENT
Start: 2017-12-09 | End: 2017-12-11 | Stop reason: HOSPADM

## 2017-12-09 RX ADMIN — SODIUM CHLORIDE, POTASSIUM CHLORIDE, SODIUM LACTATE AND CALCIUM CHLORIDE 100 ML/HR: 600; 310; 30; 20 INJECTION, SOLUTION INTRAVENOUS at 16:48

## 2017-12-09 RX ADMIN — DOCUSATE SODIUM -SENNOSIDES 1 TABLET: 50; 8.6 TABLET, COATED ORAL at 08:26

## 2017-12-09 RX ADMIN — HYDROCODONE BITARTRATE AND ACETAMINOPHEN 1 TABLET: 5; 325 TABLET ORAL at 21:20

## 2017-12-09 RX ADMIN — GABAPENTIN 100 MG: 100 CAPSULE ORAL at 16:43

## 2017-12-09 RX ADMIN — ACETAMINOPHEN 650 MG: 325 TABLET ORAL at 16:43

## 2017-12-09 RX ADMIN — SODIUM CHLORIDE, POTASSIUM CHLORIDE, SODIUM LACTATE AND CALCIUM CHLORIDE 100 ML/HR: 600; 310; 30; 20 INJECTION, SOLUTION INTRAVENOUS at 03:44

## 2017-12-09 RX ADMIN — FAMOTIDINE 20 MG: 20 TABLET, FILM COATED ORAL at 08:26

## 2017-12-09 RX ADMIN — SERTRALINE 50 MG: 50 TABLET, FILM COATED ORAL at 08:26

## 2017-12-09 RX ADMIN — ATORVASTATIN CALCIUM 10 MG: 10 TABLET, FILM COATED ORAL at 08:26

## 2017-12-09 RX ADMIN — ACETAMINOPHEN 650 MG: 325 TABLET ORAL at 03:44

## 2017-12-09 RX ADMIN — HYDROCODONE BITARTRATE AND ACETAMINOPHEN 0.5 TABLET: 5; 325 TABLET ORAL at 12:27

## 2017-12-09 RX ADMIN — HYDROCODONE BITARTRATE AND ACETAMINOPHEN 1 TABLET: 5; 325 TABLET ORAL at 04:00

## 2017-12-09 RX ADMIN — GABAPENTIN 100 MG: 100 CAPSULE ORAL at 08:26

## 2017-12-09 RX ADMIN — ASPIRIN 81 MG: 81 TABLET, CHEWABLE ORAL at 08:26

## 2017-12-09 RX ADMIN — GABAPENTIN 100 MG: 100 CAPSULE ORAL at 21:20

## 2017-12-09 RX ADMIN — ACETAMINOPHEN 650 MG: 325 TABLET ORAL at 08:26

## 2017-12-09 RX ADMIN — DOCUSATE SODIUM -SENNOSIDES 1 TABLET: 50; 8.6 TABLET, COATED ORAL at 16:43

## 2017-12-09 NOTE — PROGRESS NOTES
Postop day 2:101.4 yesterday afebrile today VSS.  No new complaints but did require replacement of Figueroa catheter.  Moves legs well preop leg pain is better.  Plan is rehabilitation tomorrow.  We will leave catheter a couple of days.

## 2017-12-09 NOTE — PLAN OF CARE
Problem: Patient Care Overview (Adult)  Goal: Plan of Care Review    12/09/17 0541   Coping/Psychosocial Response Interventions   Plan Of Care Reviewed With patient;family   Patient Care Overview   Progress progress towards functional goals is fair   Outcome Evaluation   Outcome Summary/Follow up Plan still confused. family requesting no narcs. tyl q6. temp elevated         Problem: Pain, Acute (Adult)  Goal: Identify Related Risk Factors and Signs and Symptoms  Outcome: Ongoing (interventions implemented as appropriate)

## 2017-12-09 NOTE — PLAN OF CARE
Problem: Patient Care Overview (Adult)  Goal: Plan of Care Review  Outcome: Ongoing (interventions implemented as appropriate)    12/09/17 1433   Coping/Psychosocial Response Interventions   Plan Of Care Reviewed With patient;family   Patient Care Overview   Progress improving   Outcome Evaluation   Outcome Summary/Follow up Plan Pt progressing well with therapy, appears more alert and oriented at this time. Pt able tolerate sitting EOB, standing and taking several steps to BSC and then to chair. Pt requires frequent cues for upight posture and to manage walker. Pt still with a lot of weakness, but able to take several more steps today. Will progress as tolerated.

## 2017-12-09 NOTE — PROGRESS NOTES
Name: Alexa Peace ADMIT: 2017   : 1927  PCP: Lizandro Childers MD    MRN: [id number] LOS: 2 days   AGE/SEX: 89 y.o. female  ROOM: [facility]   Subjective   Subjective  Cc- back pain  Back pain fair  Better than yesterday  Improved with tylenol  Less confusion  Did have urinary retention yesterday  Improved today  Fever yesterday  No cough, no dysuria, no soa    ROS  +f/c  No n/v  No cp/palp  No soa/cough  Objective   Vital Signs  Temp:  [99.6 °F (37.6 °C)-101.4 °F (38.6 °C)] 99.8 °F (37.7 °C)  Heart Rate:  [] 96  Resp:  [16-20] 20  BP: (121-164)/(45-66) 121/45  SpO2:  [96 %-100 %] 97 %  on  Flow (L/min):  [3] 3;   O2 Device: room air  Body mass index is 20.2 kg/(m^2).    Physical Exam   Constitutional: She appears well-developed and well-nourished.   elderly   HENT:   Head: Normocephalic and atraumatic.   Eyes: Conjunctivae are normal. No scleral icterus.   Neck: Neck supple. No tracheal deviation present.   Cardiovascular: Normal rate and regular rhythm.    No murmur heard.  Pulmonary/Chest: Effort normal and breath sounds normal.   Decreased bs at bases   Abdominal: Soft. There is no tenderness. There is no guarding.   Genitourinary:   Genitourinary Comments: deferred   Neurological: She is alert. She exhibits normal muscle tone.   Skin: Skin is warm and dry.   Psychiatric: She has a normal mood and affect. Her behavior is normal.       Results Review:       I reviewed the patient's new clinical results.    XR Spine Lumbar 1 View [[id number]] Not Reviewed        Order Status: Completed Collected: 17        Updated: 17       Narrative:         XR SPINE LUMBAR 1 VW-     INDICATIONS: Lumbar laminectomy     TECHNIQUE: FLUOROSCOPIC ASSISTANCE IN THE OPERATING ROOM.     FINDINGS:     1 intraoperative fluoroscopic spot view was obtained and recorded the  PACS for review, revealing marker at the level of L4/5. Please see  operative report for full details.     Fluoroscopy time: 1  second          Impression:            As described.     This report was finalized on 12/7/2017 7:18 PM by Dr. Darren Brar MD.          FL C Arm During Surgery [[id number]] Review Not Required       Order Status: Completed Resulted: 12/07/17 1626        Updated: 12/07/17 1626       Narrative:         This procedure was auto-finalized with no dictation required.       XR Chest 2 View [[id number]] Not Reviewed       Order Status: Completed Collected: 11/22/17 1017        Updated: 11/24/17 0858       Narrative:         PA AND LATERAL CHEST 11/22/2017      HISTORY: Chest pain.     FINDINGS: Heart size is at the upper limits of normal. Lungs appear free  of acute infiltrates. There is some aortic calcification. Bones and soft  tissues are otherwise unremarkable.          Impression:         Borderline cardiomegaly.            Results from last 7 days  Lab Units 12/09/17  0522 12/08/17  0452 12/07/17  1741   HEMOGLOBIN g/dL 9.7* 9.8* 10.2*     Estimated Creatinine Clearance: 40.7 mL/min (by C-G formula based on Cr of 0.64).        acetaminophen 650 mg Oral TID   aspirin 81 mg Oral Daily   atorvastatin 10 mg Oral Daily   famotidine 20 mg Oral Daily   gabapentin 100 mg Oral TID   sennosides-docusate sodium 1 tablet Oral BID   sertraline 50 mg Oral Daily       HYDROmorphone HCl-NaCl     lactated ringers 100 mL/hr Last Rate: 100 mL/hr (12/09/17 1648)   lactated ringers 9 mL/hr Last Rate: Stopped (12/09/17 1646)   Diet Regular      Assessment/Plan   Active Hospital Problems (** Indicates Principal Problem)    Diagnosis Date Noted   • **Spinal stenosis of lumbar region with neurogenic claudication [M48.062] 11/21/2017   • Acquired scoliosis [M41.9] 11/21/2017   • Atherosclerosis of native coronary artery [I25.10] 05/05/2017   • Essential hypertension [I10] 05/05/2017   • Gastroesophageal reflux disease without esophagitis [K21.9] 05/05/2017      Resolved Hospital Problems    Diagnosis Date Noted Date Resolved   No  resolved problems to display.       -Continue post op care. Pain conrol-  scheduled tylenol as patient would like to avoid narcotics. Incentive spirometry, PT.    -Fever- yesterday. Denies dysuria, no cough or soa. Suspect atelectasis, continue IS.    -Urinary retention- needed catheter. Thought plan was to keep it but daughter said taken out this afternoon. Monitor PVR. Check BMP in AM    -On asa and plavix as outpatient for CAD but these are held obviously for surgery. I would not think she would need to be on dual antiplatelets anyway with her stenting greater than 10 years ago. ASA restarted by Dr. Layne, I would say to hold plavix until follow up with Dr. Kim as outpatient.    -Continue pepcid    -Monitor BP without medications currently    D/W family  Reviewed previous records    Vimal Moe MD  [facility]  12/09/17  6:01 PM

## 2017-12-09 NOTE — THERAPY TREATMENT NOTE
Acute Care - Physical Therapy Treatment Note  [facility]     Patient Name: Alexa Peace  : 1927  MRN: [id number]  Today's Date: 2017  Onset of Illness/Injury or Date of Surgery Date: 17  Date of Referral to PT: 17  Referring Physician: Roverto    Admit Date: 2017    Visit Dx:    ICD-10-CM ICD-9-CM   1. Impaired functional mobility and activity tolerance Z74.09 V49.89   2. Spinal stenosis of lumbar region with neurogenic claudication M48.062 724.03   3. Acquired scoliosis M41.9 737.30     Patient Active Problem List   Diagnosis   • Bad memory   • Depression   • Diverticulosis of intestine   • Hyperlipidemia   • Macular degeneration   • Peripheral nerve disease   • Osteopenia   • Atopic rhinitis   • Urge incontinence of urine   • Atherosclerosis of native coronary artery   • Essential hypertension   • Gastroesophageal reflux disease without esophagitis   • Impaired glucose tolerance   • History of coronary artery stent placement   • Chronic bilateral low back pain   • Acquired scoliosis   • Spinal stenosis of lumbar region with neurogenic claudication               Adult Rehabilitation Note       17 1400          Rehab Assessment/Intervention    Discipline physical therapist  -EJ      Document Type therapy note (daily note)  -EJ      Subjective Information agree to therapy;complains of;weakness;fatigue  -EJ      Patient Effort, Rehab Treatment adequate  -EJ      Precautions/Limitations brace on when up;fall precautions  -EJ      Recorded by [EJ] Dinora Rhodes, PT      Pain Assessment    Pain Assessment 0-10  -EJ      Pain Score 4  -EJ      Pain Location Back  -EJ      Recorded by [EJ] Dinora Rhodes, PT      Cognitive Assessment/Intervention    Current Cognitive/Communication Assessment functional  -EJ      Recorded by [EJ] Dinora Rhodes, PT      Bed Mobility, Assessment/Treatment    Bed Mob, Supine to Sit, [place] verbal cues required;nonverbal cues required  (demo/gesture);moderate assist (50% patient effort)  -EJ      Bed Mob, Sit to Supine, [place] not tested  -EJ      Recorded by [EJ] Dinora Rhodes PT      Transfer Assessment/Treatment    Transfers, Sit-Stand [place] verbal cues required;nonverbal cues required (demo/gesture);moderate assist (50% patient effort);2 person assist required  -EJ      Transfers, Stand-Sit [place] verbal cues required;nonverbal cues required (demo/gesture);moderate assist (50% patient effort);2 person assist required  -EJ      Transfers, Sit-Stand-Sit, Assist Device rolling walker  -EJ      Transfer, Safety Issues step length decreased;sequencing ability decreased;balance decreased during turns  -EJ      Transfer, Impairments strength decreased;impaired balance  -EJ      Recorded by [EJ] Dinora Rhodes PT      Gait Assessment/Treatment    Gait, [place] Level verbal cues required;nonverbal cues required (demo/gesture);moderate assist (50% patient effort);2 person assist required  -EJ      Gait, Assistive Device rolling walker  -EJ      Gait, Distance (Feet) 6  -EJ      Gait, Gait Deviations fito decreased;forward flexed posture;step length decreased;leans to the right  -EJ      Gait, Safety Issues step length decreased;sequencing ability decreased;balance decreased during turns;weight-shifting ability decreased  -EJ      Gait, Impairments impaired balance;strength decreased  -EJ      Gait, Comment assist for walker management  -EJ      Recorded by [EJ] Dinora Rhodes PT      Positioning and Restraints    Pre-Treatment Position in bed  -EJ      Post Treatment Position chair  -EJ      In Chair notified nsg;sitting;call light within reach;encouraged to call for assist;with family/caregiver  -EJ      Recorded by [EJ] Dinora Rhodes PT        User Key  (r) = Recorded By, (t) = Taken By, (c) = Cosigned By    Initials Name Effective Dates    EJ Dinora Rhodes PT 04/21/17 -                 IP PT Goals        12/08/17 1644          Bed Mobility PT LTG    Bed Mobility PT LTG, Date Established 12/08/17  -PC      Bed Mobility PT LTG, Time to Achieve 1 wk  -PC      Bed Mobility PT LTG, Activity Type supine to sit/sit to supine  -PC      Bed Mobility PT LTG, [place] Level minimum assist (75% patient effort)  -PC      Transfer Training PT LTG    Transfer Training PT LTG, Date Established 12/08/17  -PC      Transfer Training PT LTG, Time to Achieve 1 wk  -PC      Transfer Training PT LTG, Activity Type sit to stand/stand to sit  -PC      Transfer Training PT LTG, [place] Level minimum assist (75% patient effort)  -PC      Transfer Training PT LTG, Assist Device walker, rolling  -PC      Gait Training PT LTG    Gait Training Goal PT LTG, Date Established 12/08/17  -PC      Gait Training Goal PT LTG, Time to Achieve 1 wk  -PC      Gait Training Goal PT LTG, [place] Level minimum assist (75% patient effort)  -PC      Gait Training Goal PT LTG, Assist Device walker, rolling  -PC      Gait Training Goal PT LTG, Distance to Achieve 40 ft  -PC        User Key  (r) = Recorded By, (t) = Taken By, (c) = Cosigned By    Initials Name Provider Type    EKTA Powell, PT Physical Therapist          Physical Therapy Education     Title: PT OT SLP Therapies (Done)     Topic: Physical Therapy (Done)     Point: Mobility training (Done)    Learning Progress Summary    Learner Readiness Method Response Comment Documented by Status   Patient Acceptance IMANI HERRERA,NR  EJ 12/09/17 1433 Done    Acceptance IMANI HERRERA DU,NR   12/08/17 1643 Done   Family Acceptance IMANI HERRERA,NR  EJ 12/09/17 1433 Done               Point: Home exercise program (Done)    Learning Progress Summary    Learner Readiness Method Response Comment Documented by Status   Patient Acceptance IMANI HERRERA,NR  EJ 12/09/17 1433 Done    Acceptance IMANI HERRERA DU,NR  PC 12/08/17 1643 Done   Family Acceptance IMANI HERRERA,NR  EJ 12/09/17 1433 Done               Point: Body mechanics (Done)     Learning Progress Summary    Learner Readiness Method Response Comment Documented by Status   Patient Acceptance E,D VU,NR  EJ 12/09/17 1433 Done    Acceptance E,D VU,DU,NR   12/08/17 1643 Done   Family Acceptance E,D VU,NR  EJ 12/09/17 1433 Done               Point: Precautions (Done)    Learning Progress Summary    Learner Readiness Method Response Comment Documented by Status   Patient Acceptance E,D VU,NR  EJ 12/09/17 1433 Done    Acceptance E,D VU,DU,NR   12/08/17 1643 Done   Family Acceptance E,D VU,NR  EJ 12/09/17 1433 Done                      User Key     Initials Effective Dates Name Provider Type Discipline     12/01/15 -  Holly Powell, PT Physical Therapist PT     04/21/17 -  Dinora Rhodes, PT Physical Therapist PT                    PT Recommendation and Plan  Anticipated Equipment Needs At Discharge: front wheeled walker  Anticipated Discharge Disposition: skilled nursing facility  Planned Therapy Interventions: gait training, bed mobility training, strengthening, transfer training  PT Frequency: 2 times/day  Plan of Care Review  Plan Of Care Reviewed With: patient, family  Progress: improving  Outcome Summary/Follow up Plan: Pt progressing well with therapy, appears more alert and oriented at this time. Pt able tolerate sitting EOB, standing and taking several steps to BSC and then to chair.  Pt requires frequent cues for upight posture and to manage walker.  Pt still with a lot of weakness, but able to take several more steps today. Will progress as tolerated.          Outcome Measures       12/09/17 1400 12/08/17 1600       How much help from another person do you currently need...    Turning from your back to your side while in flat bed without using bedrails? 2  -EJ 2  -PC     Moving from lying on back to sitting on the side of a flat bed without bedrails? 2  -EJ 2  -PC     Moving to and from a bed to a chair (including a wheelchair)? 2  -EJ 2  -PC     Standing up from a chair using  your arms (e.g., wheelchair, bedside chair)? 2  -EJ 2  -PC     Climbing 3-5 steps with a railing? 1  -EJ 1  -PC     To walk in hospital room? 2  -EJ 1  -PC     AM-PAC 6 Clicks Score 11  -EJ 10  -PC     Functional Assessment    Outcome Measure Options AM-PAC 6 Clicks Basic Mobility (PT)  -EJ AM-PAC 6 Clicks Basic Mobility (PT)  -PC       User Key  (r) = Recorded By, (t) = Taken By, (c) = Cosigned By    Initials Name Provider Type    PC Holly Powell, PT Physical Therapist    EJ Dinora Rhodes, PT Physical Therapist           Time Calculation:         PT Charges       12/09/17 1435          Time Calculation    Start Time 1400  -EJ      Stop Time 1425  -EJ      Time Calculation (min) 25 min  -EJ      PT Received On 12/09/17  -EJ      PT - Next Appointment 12/10/17  -EJ        User Key  (r) = Recorded By, (t) = Taken By, (c) = Cosigned By    Initials Name Provider Type     Dinora Rhodes, PT Physical Therapist          Therapy Charges for Today     Code Description Service Date Service Provider Modifiers Qty    48055879052 HC PT THER PROC EA 15 MIN 12/9/2017 Dinora Rhodes, PT GP 2    78802719760 HC PT THER SUPP EA 15 MIN 12/9/2017 Dinora Rhodes, PT GP 1          PT G-Codes  Outcome Measure Options: AM-PAC 6 Clicks Basic Mobility (PT)    Dinora Rhodes, PT  12/9/2017

## 2017-12-10 LAB
ANION GAP SERPL CALCULATED.3IONS-SCNC: 8.8 MMOL/L
BUN BLD-MCNC: 9 MG/DL (ref 8–23)
BUN/CREAT SERPL: 20 (ref 7–25)
CALCIUM SPEC-SCNC: 8.6 MG/DL (ref 8.2–9.6)
CHLORIDE SERPL-SCNC: 104 MMOL/L (ref 98–107)
CO2 SERPL-SCNC: 28.2 MMOL/L (ref 22–29)
CREAT BLD-MCNC: 0.45 MG/DL (ref 0.57–1)
GFR SERPL CREATININE-BSD FRML MDRD: 131 ML/MIN/1.73
GLUCOSE BLD-MCNC: 112 MG/DL (ref 65–99)
POTASSIUM BLD-SCNC: 3.8 MMOL/L (ref 3.5–5.2)
SODIUM BLD-SCNC: 141 MMOL/L (ref 136–145)

## 2017-12-10 PROCEDURE — 80048 BASIC METABOLIC PNL TOTAL CA: CPT | Performed by: INTERNAL MEDICINE

## 2017-12-10 PROCEDURE — 94799 UNLISTED PULMONARY SVC/PX: CPT

## 2017-12-10 PROCEDURE — 99024 POSTOP FOLLOW-UP VISIT: CPT | Performed by: ORTHOPAEDIC SURGERY

## 2017-12-10 RX ORDER — HYDROCODONE BITARTRATE AND ACETAMINOPHEN 5; 325 MG/1; MG/1
1 TABLET ORAL EVERY 4 HOURS PRN
Qty: 45 TABLET | Refills: 0 | Status: SHIPPED | OUTPATIENT
Start: 2017-12-10 | End: 2018-05-30

## 2017-12-10 RX ADMIN — HYDROCODONE BITARTRATE AND ACETAMINOPHEN 1 TABLET: 5; 325 TABLET ORAL at 14:28

## 2017-12-10 RX ADMIN — DOCUSATE SODIUM -SENNOSIDES 1 TABLET: 50; 8.6 TABLET, COATED ORAL at 08:45

## 2017-12-10 RX ADMIN — ACETAMINOPHEN 650 MG: 325 TABLET ORAL at 21:09

## 2017-12-10 RX ADMIN — DOCUSATE SODIUM -SENNOSIDES 1 TABLET: 50; 8.6 TABLET, COATED ORAL at 18:21

## 2017-12-10 RX ADMIN — HYDROCODONE BITARTRATE AND ACETAMINOPHEN 1 TABLET: 5; 325 TABLET ORAL at 01:11

## 2017-12-10 RX ADMIN — ACETAMINOPHEN 650 MG: 325 TABLET ORAL at 01:11

## 2017-12-10 RX ADMIN — GABAPENTIN 100 MG: 100 CAPSULE ORAL at 08:45

## 2017-12-10 RX ADMIN — ACETAMINOPHEN 650 MG: 325 TABLET ORAL at 08:45

## 2017-12-10 RX ADMIN — HYDROCODONE BITARTRATE AND ACETAMINOPHEN 0.5 TABLET: 5; 325 TABLET ORAL at 10:11

## 2017-12-10 RX ADMIN — HYDROCODONE BITARTRATE AND ACETAMINOPHEN 0.25 TABLET: 5; 325 TABLET ORAL at 06:10

## 2017-12-10 RX ADMIN — ATORVASTATIN CALCIUM 10 MG: 10 TABLET, FILM COATED ORAL at 08:45

## 2017-12-10 RX ADMIN — GABAPENTIN 100 MG: 100 CAPSULE ORAL at 18:21

## 2017-12-10 RX ADMIN — HYDROCODONE BITARTRATE AND ACETAMINOPHEN 0.5 TABLET: 5; 325 TABLET ORAL at 18:21

## 2017-12-10 RX ADMIN — ASPIRIN 81 MG: 81 TABLET, CHEWABLE ORAL at 08:45

## 2017-12-10 RX ADMIN — GABAPENTIN 100 MG: 100 CAPSULE ORAL at 21:09

## 2017-12-10 RX ADMIN — FAMOTIDINE 20 MG: 20 TABLET, FILM COATED ORAL at 08:45

## 2017-12-10 RX ADMIN — SERTRALINE 50 MG: 50 TABLET, FILM COATED ORAL at 08:45

## 2017-12-10 NOTE — PLAN OF CARE
Problem: Patient Care Overview (Adult)  Goal: Plan of Care Review  Outcome: Ongoing (interventions implemented as appropriate)    12/10/17 0677   Coping/Psychosocial Response Interventions   Plan Of Care Reviewed With patient;family   Patient Care Overview   Progress improving   Outcome Evaluation   Outcome Summary/Follow up Plan pain controlled. repositioned frequently. A&O         Problem: Pain, Acute (Adult)  Goal: Identify Related Risk Factors and Signs and Symptoms  Outcome: Ongoing (interventions implemented as appropriate)

## 2017-12-10 NOTE — PROGRESS NOTES
Postop day 3: AVSS awake alert oriented leg pain markedly better much less confusion today.  The wound is doing well.  She is ready for rehabilitation and may be transferred today.

## 2017-12-10 NOTE — DISCHARGE SUMMARY
Orthopedic Discharge Summary      Patient: Alexa Peace  YOB: 1927  Medical Record Number: [id number]    Attending Physician: Andre Layne MD  Consulting Physician(s):     Date of Admission: 12/7/2017  9:01 AM  Date of Discharge:     Discharge Diagnosis: L2-3, L3-4, L4-5 laminectomy and fusion with local bone graft,   Acute Blood Loss Anemia      Presenting Problem/History of Present Illness: Acquired scoliosis [M41.9]  Spinal stenosis of lumbar region with neurogenic claudication [M48.062]  Spinal stenosis of lumbar region with neurogenic claudication [M48.062]        Allergies:   Allergies   Allergen Reactions   • Ciprofloxacin      UNKNOWN   • Metronidazole      UNKWOWN   • Prednisone Other (See Comments)     Elevated blood pressure       Discharge Medications   Alexa Peace   Home Medication Instructions KIAN:[id number]    Printed on:12/10/17 0915   Medication Information                      acetaminophen (TYLENOL) 500 MG tablet  Take 500 mg by mouth Every 6 (Six) Hours As Needed for Mild Pain (1-3).             aspirin 81 MG chewable tablet  Chew 85 mg Daily.             Calcium Carbonate-Vitamin D (CALCIUM 600+D) 600-200 MG-UNIT tablet  Take 1 tablet by mouth Daily.             clopidogrel (PLAVIX) 75 MG tablet  Take 75 mg by mouth daily.             Flaxseed, Linseed, (FLAXSEED OIL) 1200 MG capsule  Take 1,200 mg by mouth 2 (Two) Times a Day.             gabapentin (NEURONTIN) 100 MG capsule  Take 1 capsule by mouth 3 (Three) Times a Day.             HYDROcodone-acetaminophen (NORCO) 5-325 MG per tablet  Take 1 tablet by mouth Every 4 (Four) Hours As Needed for Moderate Pain  (She may take 1/2-1 tablet every 4 hours as needed for pain).             Menthol, Topical Analgesic, (BIOFREEZE EX)  Apply  topically As Needed.             Multiple Vitamins-Minerals (CENTRUM SILVER PO)  Take 1 tablet by mouth Daily.             pravastatin (PRAVACHOL) 40 MG tablet  Take 1 tablet by mouth  Daily.             raNITIdine (ZANTAC) 150 MG tablet  Take 150 mg by mouth 2 (Two) Times a Day.             sertraline (ZOLOFT) 50 MG tablet  Take 1 tablet by mouth Daily.                   Past Medical History:   Diagnosis Date   • Anticoagulated     PLAVIX   • Arthritis    • Atrial premature complex    • CAD (coronary artery disease)    • Cancer     skin   • Colon polyp    • Depression    • GERD (gastroesophageal reflux disease)    • Heart attack    • Hyperlipidemia    • Hypertension    • Macular degeneration    • Pre-diabetes    • Spinal stenosis         Past Surgical History:   Procedure Laterality Date   • CATARACT EXTRACTION     • CORONARY ANGIOPLASTY WITH STENT PLACEMENT     • EPIDURAL BLOCK     • LUMBAR DISCECTOMY FUSION INSTRUMENTATION N/A 12/7/2017    Procedure: L2-3, L3-4, L4-5 laminectomy and fusion with local bone graft;  Surgeon: Andre Layne MD;  Location: [facility];  Service:    • TONSILECTOMY, ADENOIDECTOMY, BILATERAL MYRINGOTOMY AND TUBES          Social History     Occupational History   • Not on file.     Social History Main Topics   • Smoking status: Never Smoker   • Smokeless tobacco: Never Used   • Alcohol use No   • Drug use: No   • Sexual activity: Defer    Social History     Social History Narrative        Family History   Problem Relation Age of Onset   • Diabetes Mother    • Depression Mother    • Anxiety disorder Mother    • Heart disease Father    • Hypertension Father    • Diabetes Father    • Diabetes Brother    • No Known Problems Maternal Grandmother    • No Known Problems Maternal Grandfather    • No Known Problems Paternal Grandmother    • No Known Problems Paternal Grandfather    • Malig Hyperthermia Neg Hx          Physical Exam: 89 y.o. female  General Appearance:    Alert, cooperative, in no acute distress                    Vitals:    12/09/17 1828 12/09/17 2034 12/10/17 0457 12/10/17 0845   BP: 130/52 151/53 171/70 148/50   BP Location: Right arm Right arm Left arm  Left arm   Patient Position: Lying Lying Lying Lying   Pulse: 94 90 56 93   Resp:   18 18   Temp: 99 °F (37.2 °C) 99.9 °F (37.7 °C) 99.1 °F (37.3 °C)    TempSrc: Oral Oral Oral Oral   SpO2: 96% 95% 99% 97%   Weight:       Height:            DIAGNOSTIC TESTS:   Admission on 12/07/2017   Component Date Value Ref Range Status   • Hemoglobin 12/07/2017 10.2* 11.9 - 15.5 g/dL Final   • Hematocrit 12/07/2017 32.3* 35.6 - 45.5 % Final   • Hemoglobin 12/08/2017 9.8* 11.9 - 15.5 g/dL Final   • Hematocrit 12/08/2017 31.2* 35.6 - 45.5 % Final   • Hemoglobin 12/09/2017 9.7* 11.9 - 15.5 g/dL Final   • Hematocrit 12/09/2017 30.8* 35.6 - 45.5 % Final   • Glucose 12/10/2017 112* 65 - 99 mg/dL Final   • BUN 12/10/2017 9  8 - 23 mg/dL Final   • Creatinine 12/10/2017 0.45* 0.57 - 1.00 mg/dL Final   • Sodium 12/10/2017 141  136 - 145 mmol/L Final   • Potassium 12/10/2017 3.8  3.5 - 5.2 mmol/L Final   • Chloride 12/10/2017 104  98 - 107 mmol/L Final   • CO2 12/10/2017 28.2  22.0 - 29.0 mmol/L Final   • Calcium 12/10/2017 8.6  8.2 - 9.6 mg/dL Final   • eGFR Non African Amer 12/10/2017 131  >60 mL/min/1.73 Final   • BUN/Creatinine Ratio 12/10/2017 20.0  7.0 - 25.0 Final   • Anion Gap 12/10/2017 8.8  mmol/L Final       Xr Chest 2 View    Result Date: 11/24/2017  Narrative: PA AND LATERAL CHEST 11/22/2017  HISTORY: Chest pain.  FINDINGS: Heart size is at the upper limits of normal. Lungs appear free of acute infiltrates. There is some aortic calcification. Bones and soft tissues are otherwise unremarkable.      Impression: Borderline cardiomegaly.  This report was finalized on 11/24/2017 8:54 AM by Dr. Jonas Jones MD.      Fl C Arm During Surgery    Result Date: 12/7/2017  Narrative: This procedure was auto-finalized with no dictation required.    Xr Spine Lumbar 1 View    Result Date: 12/7/2017  Narrative: XR SPINE LUMBAR 1 VW-  INDICATIONS: Lumbar laminectomy  TECHNIQUE: FLUOROSCOPIC ASSISTANCE IN THE OPERATING ROOM.  FINDINGS:  1  intraoperative fluoroscopic spot view was obtained and recorded the PACS for review, revealing marker at the level of L4/5. Please see operative report for full details.  Fluoroscopy time: 1 second      Impression:  As described.  This report was finalized on 12/7/2017 7:18 PM by Dr. Darren Brar MD.        Hospital Course:  89 y.o. female admitted to [facility] to services of Andre Layne MD with Acquired scoliosis [M41.9]  Spinal stenosis of lumbar region with neurogenic claudication [M48.062]  Spinal stenosis of lumbar region with neurogenic claudication [M48.062] on 12/7/2017 and underwent L2-3, L3-4, L4-5 laminectomy and fusion with local bone graft  Per Andre Layne MD. Antibiotic and VTE prophylaxis were per SCIP protocols.  The patient was admitted to the floor where IV and/or oral pain medications were administered for postoperative pain.  At discharge the incisional pain was tolerable and preop neurologic function was intact.  The dressing was dry and the wound was clean.    Condition on Discharge:  Stable    Discharge Instructions: . Patient may weight bear as tolerated unless otherwise specified. Continue GÓMEZ hose daily (for two weeks) and ice regularly. Patient also instructed on incentive spirometer during hospitalization and encouraged to continue to use at home regularly. Patient may shower on POD #3 if and when all wound drainage has stopped.  Where applicable, the brace should be worn when up and about.  It need not be worn to the bathroom and certainly not in the shower.  A detailed list of instructions specific to the operation was given to the patient at the time of discharge.  She should take either plain Tylenol or 1/2-1 hydrocodone with Tylenol every 4 hours as needed for pain.  She may resume her Plavix today.    Follow up Instructions:  Follow up in the office with Dr. Andre Layne Jr. in 2-3 weeks - patient to call the office at [phone] to schedule. Prescriptions  were given for pain medication.    Follow-up Appointments  Future Appointments  Date Time Provider Department Center   3/23/2018 2:40 PM MD BRITNEY Adam [id number] None   6/7/2018 2:00 PM MD BRITNEY Minaya [id number] None         Discharge Disposition Plan:today to rehab    Date: 12/10/2017    Andre Layne MD

## 2017-12-10 NOTE — PROGRESS NOTES
Name: Alexa Peace ADMIT: 2017   : 1927  PCP: Lizandro Childers MD    MRN: [id number] LOS: 3 days   AGE/SEX: 89 y.o. female  ROOM: [facility]   Subjective   Subjective  Cc- back pain    Back pain fair  Improving slowly since surgery  Improved with tylenol  Less confusion  Urinating well without dysuria  No additional fever  No cough, no dysuria, no soa    ROS  -f/c  No n/v  No cp/palp  No soa/cough  Objective   Vital Signs  Temp:  [99 °F (37.2 °C)-99.9 °F (37.7 °C)] 99.1 °F (37.3 °C)  Heart Rate:  [56-96] 93  Resp:  [18-20] 18  BP: (121-171)/(45-70) 148/50  SpO2:  [95 %-99 %] 97 %  on  Flow (L/min):  [2] 2;   O2 Device: room air  Body mass index is 20.2 kg/(m^2).    Physical Exam   Constitutional: She appears well-developed and well-nourished.   elderly   HENT:   Head: Normocephalic and atraumatic.   Eyes: Conjunctivae are normal. No scleral icterus.   Neck: Neck supple. No tracheal deviation present.   Cardiovascular: Normal rate and regular rhythm.    Murmur heard.  A couple extra beats occasionally likely PVCs   Pulmonary/Chest: Effort normal and breath sounds normal.   Decreased bs at bases   Abdominal: Soft. There is no tenderness. There is no guarding.   Genitourinary:   Genitourinary Comments: deferred   Neurological: She is alert. She exhibits normal muscle tone.   Skin: Skin is warm and dry.   Psychiatric: She has a normal mood and affect. Her behavior is normal.       Results Review:       I reviewed the patient's new clinical results.    XR Spine Lumbar 1 View [[id number]] Not Reviewed        Order Status: Completed Collected: 17        Updated: 17       Narrative:         XR SPINE LUMBAR 1 VW-     INDICATIONS: Lumbar laminectomy     TECHNIQUE: FLUOROSCOPIC ASSISTANCE IN THE OPERATING ROOM.     FINDINGS:     1 intraoperative fluoroscopic spot view was obtained and recorded the  PACS for review, revealing marker at the level of L4/5. Please see  operative report for full  details.     Fluoroscopy time: 1 second          Impression:            As described.     This report was finalized on 12/7/2017 7:18 PM by Dr. Darren Brar MD.          FL C Arm During Surgery [[id number]] Review Not Required       Order Status: Completed Resulted: 12/07/17 1626        Updated: 12/07/17 1626       Narrative:         This procedure was auto-finalized with no dictation required.       XR Chest 2 View [[id number]] Not Reviewed       Order Status: Completed Collected: 11/22/17 1017        Updated: 11/24/17 0858       Narrative:         PA AND LATERAL CHEST 11/22/2017      HISTORY: Chest pain.     FINDINGS: Heart size is at the upper limits of normal. Lungs appear free  of acute infiltrates. There is some aortic calcification. Bones and soft  tissues are otherwise unremarkable.          Impression:         Borderline cardiomegaly.            Results from last 7 days  Lab Units 12/09/17  0522 12/08/17  0452 12/07/17  1741   HEMOGLOBIN g/dL 9.7* 9.8* 10.2*       Results from last 7 days  Lab Units 12/10/17  0503   SODIUM mmol/L 141   POTASSIUM mmol/L 3.8   CHLORIDE mmol/L 104   CO2 mmol/L 28.2   BUN mg/dL 9   CREATININE mg/dL 0.45*   GLUCOSE mg/dL 112*   Estimated Creatinine Clearance: 40.7 mL/min (by C-G formula based on Cr of 0.45).    Results from last 7 days  Lab Units 12/10/17  0503   CALCIUM mg/dL 8.6         acetaminophen 650 mg Oral TID   aspirin 81 mg Oral Daily   atorvastatin 10 mg Oral Daily   famotidine 20 mg Oral Daily   gabapentin 100 mg Oral TID   sennosides-docusate sodium 1 tablet Oral BID   sertraline 50 mg Oral Daily       HYDROmorphone HCl-NaCl     lactated ringers 100 mL/hr Last Rate: 100 mL/hr (12/09/17 1648)   lactated ringers 9 mL/hr Last Rate: Stopped (12/09/17 1646)   Diet Regular      Assessment/Plan   Active Hospital Problems (** Indicates Principal Problem)    Diagnosis Date Noted   • **Spinal stenosis of lumbar region with neurogenic claudication [M48.062] 11/21/2017   •  Acquired scoliosis [M41.9] 11/21/2017   • Atherosclerosis of native coronary artery [I25.10] 05/05/2017   • Essential hypertension [I10] 05/05/2017   • Gastroesophageal reflux disease without esophagitis [K21.9] 05/05/2017      Resolved Hospital Problems    Diagnosis Date Noted Date Resolved   No resolved problems to display.       -Continue post op care. Pain conrol-  scheduled tylenol as patient would like to avoid narcotics. Incentive spirometry, PT.    -Fever- 2 days ago, no recurrence. Denies dysuria, no cough or soa. Suspect atelectasis, continue IS.    -Urinary retention- improved and voiding better now.    -On asa and plavix as outpatient for CAD but these are held obviously for surgery. I would not think she would need to be on dual antiplatelets anyway with her stenting greater than 10 years ago. ASA restarted by Dr. Layne, I would say to hold plavix until follow up with Dr. Kim as outpatient.    -Continue pepcid    -Monitor BP without medications currently    D/W family  Reviewed previous records    Medically stable for rehab. Sounds like dc today unless precert not obtained yet    Vimal Moe MD  [facility]  12/10/17  10:34 AM

## 2017-12-11 VITALS
HEIGHT: 65 IN | RESPIRATION RATE: 16 BRPM | HEART RATE: 92 BPM | OXYGEN SATURATION: 95 % | SYSTOLIC BLOOD PRESSURE: 156 MMHG | DIASTOLIC BLOOD PRESSURE: 55 MMHG | TEMPERATURE: 99.2 F | BODY MASS INDEX: 20.23 KG/M2 | WEIGHT: 121.4 LBS

## 2017-12-11 PROBLEM — K59.01 SLOW TRANSIT CONSTIPATION: Status: ACTIVE | Noted: 2017-12-11

## 2017-12-11 PROBLEM — I49.1 PAC (PREMATURE ATRIAL CONTRACTION): Status: ACTIVE | Noted: 2017-12-11

## 2017-12-11 PROCEDURE — 93005 ELECTROCARDIOGRAM TRACING: CPT | Performed by: INTERNAL MEDICINE

## 2017-12-11 PROCEDURE — 93010 ELECTROCARDIOGRAM REPORT: CPT | Performed by: INTERNAL MEDICINE

## 2017-12-11 PROCEDURE — 99024 POSTOP FOLLOW-UP VISIT: CPT | Performed by: ORTHOPAEDIC SURGERY

## 2017-12-11 PROCEDURE — 97110 THERAPEUTIC EXERCISES: CPT

## 2017-12-11 RX ORDER — SENNA AND DOCUSATE SODIUM 50; 8.6 MG/1; MG/1
2 TABLET, FILM COATED ORAL 2 TIMES DAILY
Status: DISCONTINUED | OUTPATIENT
Start: 2017-12-11 | End: 2017-12-11 | Stop reason: HOSPADM

## 2017-12-11 RX ORDER — BISACODYL 10 MG
10 SUPPOSITORY, RECTAL RECTAL ONCE
Status: COMPLETED | OUTPATIENT
Start: 2017-12-11 | End: 2017-12-11

## 2017-12-11 RX ORDER — SENNA AND DOCUSATE SODIUM 50; 8.6 MG/1; MG/1
2 TABLET, FILM COATED ORAL 2 TIMES DAILY
Start: 2017-12-11 | End: 2018-07-06

## 2017-12-11 RX ADMIN — HYDROCODONE BITARTRATE AND ACETAMINOPHEN 1 TABLET: 5; 325 TABLET ORAL at 06:47

## 2017-12-11 RX ADMIN — ATORVASTATIN CALCIUM 10 MG: 10 TABLET, FILM COATED ORAL at 09:33

## 2017-12-11 RX ADMIN — SERTRALINE 50 MG: 50 TABLET, FILM COATED ORAL at 09:32

## 2017-12-11 RX ADMIN — GABAPENTIN 100 MG: 100 CAPSULE ORAL at 09:38

## 2017-12-11 RX ADMIN — METOPROLOL TARTRATE 12.5 MG: 25 TABLET ORAL at 09:38

## 2017-12-11 RX ADMIN — ACETAMINOPHEN 650 MG: 325 TABLET ORAL at 09:38

## 2017-12-11 RX ADMIN — ASPIRIN 81 MG: 81 TABLET, CHEWABLE ORAL at 09:32

## 2017-12-11 RX ADMIN — DOCUSATE SODIUM -SENNOSIDES 2 TABLET: 50; 8.6 TABLET, COATED ORAL at 09:32

## 2017-12-11 RX ADMIN — POLYETHYLENE GLYCOL 3350 17 G: 17 POWDER, FOR SOLUTION ORAL at 09:32

## 2017-12-11 RX ADMIN — BISACODYL 10 MG: 10 SUPPOSITORY RECTAL at 09:32

## 2017-12-11 RX ADMIN — FAMOTIDINE 20 MG: 20 TABLET, FILM COATED ORAL at 09:32

## 2017-12-11 NOTE — PROGRESS NOTES
[facility]    Physicians Statement of Medical Necessity for Ambulance Transportation    It is medically necessary for:    Patient Name: Alexa Peace    Insurance Information:      To be transported by ambulance:    From (if nursing facility, specify level of care: skilled, long term, etc): [facility]    To (specify level of care if nursing facility): [facility]    Date of Service: 12/11/17    For dialysis patients state date dialysis began: n/a    Diagnosis: s/p L2-3, L3-4, L4-5 laminectomy and fusion with local bone graft, Acute Blood Loss Anemia    Past Medical/Surgical History:  Past Medical History:   Diagnosis Date   • Anticoagulated     PLAVIX   • Arthritis    • Atrial premature complex    • CAD (coronary artery disease)    • Cancer     skin   • Colon polyp    • Depression    • GERD (gastroesophageal reflux disease)    • Heart attack    • Hyperlipidemia    • Hypertension    • Macular degeneration    • Pre-diabetes    • Spinal stenosis       Past Surgical History:   Procedure Laterality Date   • CATARACT EXTRACTION     • CORONARY ANGIOPLASTY WITH STENT PLACEMENT     • EPIDURAL BLOCK     • LUMBAR DISCECTOMY FUSION INSTRUMENTATION N/A 12/7/2017    Procedure: L2-3, L3-4, L4-5 laminectomy and fusion with local bone graft;  Surgeon: Andre Layne MD;  Location: [facility];  Service:    • TONSILECTOMY, ADENOIDECTOMY, BILATERAL MYRINGOTOMY AND TUBES          Current Objective Medical Evidence(including physical exam finding to support reason for limitations):    Immobilization syndrome  Back brace    Other: brace on when up;fall precautions;spinal precautions.  Contact guard assist of 2    Physician Signature:           (RN,NP,PA,CAN, Discharge Planner) Date/Time: 12/11/17 @ 1602     Printed Name:    ______Tru Gonzalez RN/MALINA_______________________    [facility]   Phone: [phone] Phone: [phone] Phone: [phone]   Fax: [phone] Fax: [phone] Fax:  [phone]

## 2017-12-11 NOTE — THERAPY TREATMENT NOTE
Acute Care - Physical Therapy Treatment Note  [facility]     Patient Name: Alexa Peace  : 1927  MRN: [id number]  Today's Date: 2017  Onset of Illness/Injury or Date of Surgery Date: 17  Date of Referral to PT: 17  Referring Physician: Roverto    Admit Date: 2017    Visit Dx:    ICD-10-CM ICD-9-CM   1. Impaired functional mobility and activity tolerance Z74.09 V49.89   2. Spinal stenosis of lumbar region with neurogenic claudication M48.062 724.03   3. Acquired scoliosis M41.9 737.30     Patient Active Problem List   Diagnosis   • Bad memory   • Depression   • Diverticulosis of intestine   • Hyperlipidemia   • Macular degeneration   • Peripheral nerve disease   • Osteopenia   • Atopic rhinitis   • Urge incontinence of urine   • Atherosclerosis of native coronary artery   • Essential hypertension   • Gastroesophageal reflux disease without esophagitis   • Impaired glucose tolerance   • History of coronary artery stent placement   • Chronic bilateral low back pain   • Acquired scoliosis   • Spinal stenosis of lumbar region with neurogenic claudication   • PAC (premature atrial contraction)   • Slow transit constipation               Adult Rehabilitation Note       17 1000 17 1400       Rehab Assessment/Intervention    Discipline physical therapy assistant  -RW physical therapist  -EJ     Document Type therapy note (daily note)  -RW therapy note (daily note)  -EJ     Subjective Information agree to therapy  -RW agree to therapy;complains of;weakness;fatigue  -EJ     Patient Effort, Rehab Treatment good  -RW adequate  -EJ     Precautions/Limitations brace on when up;fall precautions;spinal precautions  -RW brace on when up;fall precautions  -EJ     Recorded by [RW] Xiomara Johnson PTA [EJ] Dinora Rhodes, PT     Pain Assessment    Pain Assessment 0-10  -RW 0-10  -EJ     Pain Score 5  -RW 4  -EJ     Pain Type Acute pain;Surgical pain  -RW      Pain Location Back  -RW Back   -EJ     Pain Intervention(s) Medication (See MAR);Repositioned;Ambulation/increased activity  -RW      Response to Interventions tolerated  -RW      Recorded by [RW] Xiomara Johnson PTA [EJ] Dinora Rhodes, PT     Cognitive Assessment/Intervention    Current Cognitive/Communication Assessment functional  -RW functional  -EJ     Orientation Status oriented x 4  -RW      Follows Commands/Answers Questions 100% of the time;needs cueing  -RW      Personal Safety mild impairment  -RW      Personal Safety Interventions fall prevention program maintained;gait belt;nonskid shoes/slippers when out of bed  -RW      Recorded by [RW] Xiomara Johnson PTA [EJ] Dinora Rhodes, PT     Bed Mobility, Assessment/Treatment    Bed Mobility, Assistive Device bed rails;head of bed elevated  -RW      Bed Mobility, Roll Right, [place] minimum assist (75% patient effort);verbal cues required;nonverbal cues required (demo/gesture)  -RW      Bed Mob, Supine to Sit, [place]  verbal cues required;nonverbal cues required (demo/gesture);moderate assist (50% patient effort)  -EJ     Bed Mob, Sit to Supine, [place]  not tested  -EJ     Bed Mob, Sidelying to Sit, [place] minimum assist (75% patient effort);moderate assist (50% patient effort);verbal cues required;nonverbal cues required (demo/gesture)  -RW      Bed Mob, Sit to Sidelying, [place] not tested   Pt up in chair  -RW      Bed Mobility, Impairments strength decreased;pain  -RW      Bed Mobility, Comment Cues for log roll  -RW      Recorded by [RW] Xiomara Johnson PTA [EJ] Dinora Rhodes, PT     Transfer Assessment/Treatment    Transfers, Sit-Stand [place] contact guard assist;minimum assist (75% patient effort);2 person assist required;verbal cues required;nonverbal cues required (demo/gesture)  -RW verbal cues required;nonverbal cues required (demo/gesture);moderate assist (50% patient effort);2 person assist required  -EJ     Transfers, Stand-Sit  [place] contact guard assist;2 person assist required;verbal cues required  -RW verbal cues required;nonverbal cues required (demo/gesture);moderate assist (50% patient effort);2 person assist required  -EJ     Transfers, Sit-Stand-Sit, Assist Device rolling walker  -RW rolling walker  -EJ     Transfer, Safety Issues  step length decreased;sequencing ability decreased;balance decreased during turns  -EJ     Transfer, Impairments strength decreased;impaired balance;pain  -RW strength decreased;impaired balance  -EJ     Recorded by [RW] Xiomara Johnson PTA [EJ] Dinora Rhodes, PT     Gait Assessment/Treatment    Gait, [place] Level contact guard assist;2 person assist required;verbal cues required  -RW verbal cues required;nonverbal cues required (demo/gesture);moderate assist (50% patient effort);2 person assist required  -EJ     Gait, Assistive Device rolling walker  -RW rolling walker  -EJ     Gait, Distance (Feet) 60  -RW 6  -EJ     Gait, Gait Pattern Analysis swing-through gait  -RW      Gait, Gait Deviations forward flexed posture;bilateral:;fito decreased;decreased heel strike;step length decreased;stride length decreased  -RW fito decreased;forward flexed posture;step length decreased;leans to the right  -EJ     Gait, Safety Issues step length decreased  -RW step length decreased;sequencing ability decreased;balance decreased during turns;weight-shifting ability decreased  -EJ     Gait, Impairments strength decreased;impaired balance;pain  -RW impaired balance;strength decreased  -EJ     Gait, Comment Cues required to correct forward flexed posture  -RW assist for walker management  -EJ     Recorded by [RW] Xiomara Johnson, DONIS [EJ] Dinora Rhodes, PT     Positioning and Restraints    Pre-Treatment Position in bed  -RW in bed  -EJ     Post Treatment Position chair  -RW chair  -EJ     In Chair sitting;call light within reach;encouraged to call for assist;exit alarm on  -RW notified  nsg;sitting;call light within reach;encouraged to call for assist;with family/caregiver  -EJ     Recorded by [RW] Xiomara Johnson, PTA [EJ] Dinora Rhodes, PT       User Key  (r) = Recorded By, (t) = Taken By, (c) = Cosigned By    Initials Name Effective Dates    EJ Dinora Rhodes, PT 04/21/17 -     RW Xiomara Johnson, PTA 04/06/16 -                 IP PT Goals       12/08/17 1644          Bed Mobility PT LTG    Bed Mobility PT LTG, Date Established 12/08/17  -PC      Bed Mobility PT LTG, Time to Achieve 1 wk  -PC      Bed Mobility PT LTG, Activity Type supine to sit/sit to supine  -PC      Bed Mobility PT LTG, [place] minimum assist (75% patient effort)  -PC      Transfer Training PT LTG    Transfer Training PT LTG, Date Established 12/08/17  -PC      Transfer Training PT LTG, Time to Achieve 1 wk  -PC      Transfer Training PT LTG, Activity Type sit to stand/stand to sit  -PC      Transfer Training PT LTG, [place] minimum assist (75% patient effort)  -PC      Transfer Training PT LTG, Assist Device walker, rolling  -PC      Gait Training PT LTG    Gait Training Goal PT LTG, Date Established 12/08/17  -PC      Gait Training Goal PT LTG, Time to Achieve 1 wk  -PC      Gait Training Goal PT LTG, [place] minimum assist (75% patient effort)  -PC      Gait Training Goal PT LTG, Assist Device walker, rolling  -PC      Gait Training Goal PT LTG, Distance to Achieve 40 ft  -PC        User Key  (r) = Recorded By, (t) = Taken By, (c) = Cosigned By    Initials Name Provider Type    PC Holly Powell PT Physical Therapist          Physical Therapy Education     Title: PT OT SLP Therapies (Done)     Topic: Physical Therapy (Done)     Point: Mobility training (Done)    Learning Progress Summary    Learner Readiness Method Response Comment Documented by Status   Patient Acceptance E,IMANI ZIMMER,NR  RW 12/11/17 1052 Done    Acceptance IMANI HERRERA NR EJ 12/09/17 1433 Done    Acceptance IMANI HERRERA DU,NR   PC 12/08/17 1643 Done   Family Acceptance E,D VU,NR  EJ 12/09/17 1433 Done               Point: Home exercise program (Done)    Learning Progress Summary    Learner Readiness Method Response Comment Documented by Status   Patient Acceptance E,D VU,NR  EJ 12/09/17 1433 Done    Acceptance E,D VU,DU,NR  PC 12/08/17 1643 Done   Family Acceptance E,D VU,NR  EJ 12/09/17 1433 Done               Point: Body mechanics (Done)    Learning Progress Summary    Learner Readiness Method Response Comment Documented by Status   Patient Acceptance E,TB,D VU,NR  RW 12/11/17 1052 Done    Acceptance E,D VU,NR  EJ 12/09/17 1433 Done    Acceptance E,D VU,DU,NR  PC 12/08/17 1643 Done   Family Acceptance E,D VU,NR  EJ 12/09/17 1433 Done               Point: Precautions (Done)    Learning Progress Summary    Learner Readiness Method Response Comment Documented by Status   Patient Acceptance E,TB,D VU,NR  RW 12/11/17 1052 Done    Acceptance E,D VU,NR  EJ 12/09/17 1433 Done    Acceptance E,D VU,DU,NR   12/08/17 1643 Done   Family Acceptance E,D VU,NR  EJ 12/09/17 1433 Done                      User Key     Initials Effective Dates Name Provider Type Discipline     12/01/15 -  Holly Powell, PT Physical Therapist PT     04/21/17 -  Dinora Rhodes, PT Physical Therapist PT     04/06/16 -  Xiomara Johnson, PTA Physical Therapy Assistant PT                    PT Recommendation and Plan  Anticipated Equipment Needs At Discharge: front wheeled walker  Anticipated Discharge Disposition: skilled nursing facility  Planned Therapy Interventions: gait training, bed mobility training, strengthening, transfer training  PT Frequency: daily  Plan of Care Review  Plan Of Care Reviewed With: patient  Outcome Summary/Follow up Plan: Pt required less assist for all mobility and was able to increase ambulation distance. Progressing well w/ PT. Possible d/c to rehab today          Outcome Measures       12/11/17 1000 12/09/17 1400 12/08/17 1600    How  much help from another person do you currently need...    Turning from your back to your side while in flat bed without using bedrails? 3  -RW 2  -EJ 2  -PC    Moving from lying on back to sitting on the side of a flat bed without bedrails? 3  -RW 2  -EJ 2  -PC    Moving to and from a bed to a chair (including a wheelchair)? 3  -RW 2  -EJ 2  -PC    Standing up from a chair using your arms (e.g., wheelchair, bedside chair)? 3  -RW 2  -EJ 2  -PC    Climbing 3-5 steps with a railing? 1  -RW 1  -EJ 1  -PC    To walk in hospital room? 3  -RW 2  -EJ 1  -PC    AM-PAC 6 Clicks Score 16  -RW 11  -EJ 10  -PC    Functional Assessment    Outcome Measure Options AM-PAC 6 Clicks Basic Mobility (PT)  -RW AM-PAC 6 Clicks Basic Mobility (PT)  -EJ AM-PAC 6 Clicks Basic Mobility (PT)  -PC      User Key  (r) = Recorded By, (t) = Taken By, (c) = Cosigned By    Initials Name Provider Type    PC Holly Powell, PT Physical Therapist    EJ Dinora Rhodes, PT Physical Therapist    RW Xiomara Johnson PTA Physical Therapy Assistant           Time Calculation:         PT Charges       12/11/17 1051          Time Calculation    Start Time 1015  -RW      Stop Time 1030  -RW      Time Calculation (min) 15 min  -RW      PT Received On 12/11/17  -      PT - Next Appointment 12/12/17  -        User Key  (r) = Recorded By, (t) = Taken By, (c) = Cosigned By    Initials Name Provider Type     Xiomara Johnson PTA Physical Therapy Assistant          Therapy Charges for Today     Code Description Service Date Service Provider Modifiers Qty    03419919731 HC PT THER PROC EA 15 MIN 12/11/2017 Xiomara Johnson PTA GP 1    34990562252 HC PT THER SUPP EA 15 MIN 12/11/2017 Xiomara Johnson PTA GP 1          PT G-Codes  Outcome Measure Options: AM-PAC 6 Clicks Basic Mobility (PT)    Xiomara Johnson PTA  12/11/2017

## 2017-12-11 NOTE — PROGRESS NOTES
Continued Stay Note  [facility]     Patient Name: Alexa Peace  MRN: [id number]  Today's Date: 12/11/2017    Admit Date: 12/7/2017          Discharge Plan       12/11/17 1051    Case Management/Social Work Plan    Plan [facility]    Patient/Family In Agreement With Plan yes    Additional Comments Per Gi, insurance precert was obtained but bed is not available at this time.  RN updated, patient will need BM prior to her d/c today.      Final Note    Final Note skilled bed at [facility]                Expected Discharge Date and Time     Expected Discharge Date Expected Discharge Time    Dec 11, 2017             Tru Gonzalez RN

## 2017-12-11 NOTE — PLAN OF CARE
Problem: Patient Care Overview (Adult)  Goal: Plan of Care Review  Outcome: Ongoing (interventions implemented as appropriate)    12/11/17 0545   Coping/Psychosocial Response Interventions   Plan Of Care Reviewed With patient   Patient Care Overview   Progress improving       Goal: Adult Individualization and Mutuality  Outcome: Ongoing (interventions implemented as appropriate)  Goal: Discharge Needs Assessment  Outcome: Ongoing (interventions implemented as appropriate)    Problem: Perioperative Period (Adult)  Goal: Signs and Symptoms of Listed Potential Problems Will be Absent or Manageable (Perioperative Period)  Outcome: Ongoing (interventions implemented as appropriate)    Problem: Fall Risk (Adult)  Goal: Identify Related Risk Factors and Signs and Symptoms  Outcome: Ongoing (interventions implemented as appropriate)  Goal: Absence of Falls  Outcome: Ongoing (interventions implemented as appropriate)    Problem: Pain, Acute (Adult)  Goal: Identify Related Risk Factors and Signs and Symptoms  Outcome: Ongoing (interventions implemented as appropriate)  Goal: Acceptable Pain Control/Comfort Level  Outcome: Ongoing (interventions implemented as appropriate)

## 2017-12-11 NOTE — PROGRESS NOTES
Case Management Discharge Note    [facility] Met w/ patient & daughter Corine Martinez in room.  Updated and dgt requested ambulance; disclaimer provided.  Arranged for ambulance at 2pm.    Discharge Placement     Facility/Agency Request Status Selected? Address Phone Number Fax Number    [facility]    [facility] [address] [facility] [address] [phone]        [facility]: [facility] ([facility])    Discharge Codes: 03  Discharged/transferred to skilled nursing facility (SNF) with Medicare certification in anticipation of skilled care

## 2017-12-11 NOTE — DISCHARGE SUMMARY
Orthopedic Discharge Summary  Addendum    Patient: Alexa Peace  YOB: 1927  Medical Record Number: [id number]    Attending Physician: Andre Layne MD  Consulting Physician(s):   Vimal ybarra MD    Date of Admission: 12/7/2017  9:01 AM  Date of Discharge: 12/11/17    Discharge Diagnosis: L2-3, L3-4, L4-5 laminectomy and fusion with local bone graft,   Acute Blood Loss Anemia      Presenting Problem/History of Present Illness: Acquired scoliosis [M41.9]  Spinal stenosis of lumbar region with neurogenic claudication [M48.062]  Spinal stenosis of lumbar region with neurogenic claudication [M48.062]        Allergies:   Allergies   Allergen Reactions   • Ciprofloxacin      UNKNOWN   • Metronidazole      UNKWOWN   • Prednisone Other (See Comments)     Elevated blood pressure       Discharge Medications   Alexa Peace   Home Medication Instructions KIAN:[id number]    Printed on:12/11/17 0849   Medication Information                      acetaminophen (TYLENOL) 500 MG tablet  Take 500 mg by mouth Every 6 (Six) Hours As Needed for Mild Pain (1-3).             aspirin 81 MG chewable tablet  Chew 85 mg Daily.             Calcium Carbonate-Vitamin D (CALCIUM 600+D) 600-200 MG-UNIT tablet  Take 1 tablet by mouth Daily.             Flaxseed, Linseed, (FLAXSEED OIL) 1200 MG capsule  Take 1,200 mg by mouth 2 (Two) Times a Day.             gabapentin (NEURONTIN) 100 MG capsule  Take 1 capsule by mouth 3 (Three) Times a Day.             HYDROcodone-acetaminophen (NORCO) 5-325 MG per tablet  Take 1 tablet by mouth Every 4 (Four) Hours As Needed for Moderate Pain  (She may take 1/2-1 tablet every 4 hours as needed for pain).             Menthol, Topical Analgesic, (BIOFREEZE EX)  Apply  topically As Needed.             metoprolol tartrate (LOPRESSOR) 25 MG tablet  Take 0.5 tablets by mouth Every 12 (Twelve) Hours.             Multiple Vitamins-Minerals (CENTRUM SILVER PO)  Take 1 tablet by mouth Daily.              polyethylene glycol (MIRALAX) pack packet  Take 17 g by mouth Daily.             pravastatin (PRAVACHOL) 40 MG tablet  Take 1 tablet by mouth Daily.             raNITIdine (ZANTAC) 150 MG tablet  Take 150 mg by mouth 2 (Two) Times a Day.             sennosides-docusate sodium (SENOKOT-S) 8.6-50 MG tablet  Take 2 tablets by mouth 2 (Two) Times a Day.             sertraline (ZOLOFT) 50 MG tablet  Take 1 tablet by mouth Daily.                   Past Medical History:   Diagnosis Date   • Anticoagulated     PLAVIX   • Arthritis    • Atrial premature complex    • CAD (coronary artery disease)    • Cancer     skin   • Colon polyp    • Depression    • GERD (gastroesophageal reflux disease)    • Heart attack    • Hyperlipidemia    • Hypertension    • Macular degeneration    • Pre-diabetes    • Spinal stenosis         Past Surgical History:   Procedure Laterality Date   • CATARACT EXTRACTION     • CORONARY ANGIOPLASTY WITH STENT PLACEMENT     • EPIDURAL BLOCK     • LUMBAR DISCECTOMY FUSION INSTRUMENTATION N/A 12/7/2017    Procedure: L2-3, L3-4, L4-5 laminectomy and fusion with local bone graft;  Surgeon: Andre Layne MD;  Location: [facility];  Service:    • TONSILECTOMY, ADENOIDECTOMY, BILATERAL MYRINGOTOMY AND TUBES          Social History     Occupational History   • Not on file.     Social History Main Topics   • Smoking status: Never Smoker   • Smokeless tobacco: Never Used   • Alcohol use No   • Drug use: No   • Sexual activity: Defer    Social History     Social History Narrative        Family History   Problem Relation Age of Onset   • Diabetes Mother    • Depression Mother    • Anxiety disorder Mother    • Heart disease Father    • Hypertension Father    • Diabetes Father    • Diabetes Brother    • No Known Problems Maternal Grandmother    • No Known Problems Maternal Grandfather    • No Known Problems Paternal Grandmother    • No Known Problems Paternal Grandfather    • Malig Hyperthermia Neg Hx           Physical Exam: 89 y.o. female  General Appearance:    Alert, cooperative, in no acute distress                    Vitals:    12/10/17 0845 12/10/17 1420 12/10/17 2107 12/11/17 0508   BP: 148/50 140/53 151/55 137/82   BP Location: Left arm Left arm Right arm Right arm   Patient Position: Lying Lying Lying Lying   Pulse: 93 89 90 97   Resp: 18 16 18 18   Temp:  98.8 °F (37.1 °C) 100.1 °F (37.8 °C)  Comment: Rena was told about it 98.1 °F (36.7 °C)   TempSrc: Oral Oral Oral Oral   SpO2: 97% 94% 97% 92%   Weight:       Height:            DIAGNOSTIC TESTS:   Admission on 12/07/2017   Component Date Value Ref Range Status   • Hemoglobin 12/07/2017 10.2* 11.9 - 15.5 g/dL Final   • Hematocrit 12/07/2017 32.3* 35.6 - 45.5 % Final   • Hemoglobin 12/08/2017 9.8* 11.9 - 15.5 g/dL Final   • Hematocrit 12/08/2017 31.2* 35.6 - 45.5 % Final   • Hemoglobin 12/09/2017 9.7* 11.9 - 15.5 g/dL Final   • Hematocrit 12/09/2017 30.8* 35.6 - 45.5 % Final   • Glucose 12/10/2017 112* 65 - 99 mg/dL Final   • BUN 12/10/2017 9  8 - 23 mg/dL Final   • Creatinine 12/10/2017 0.45* 0.57 - 1.00 mg/dL Final   • Sodium 12/10/2017 141  136 - 145 mmol/L Final   • Potassium 12/10/2017 3.8  3.5 - 5.2 mmol/L Final   • Chloride 12/10/2017 104  98 - 107 mmol/L Final   • CO2 12/10/2017 28.2  22.0 - 29.0 mmol/L Final   • Calcium 12/10/2017 8.6  8.2 - 9.6 mg/dL Final   • eGFR Non African Amer 12/10/2017 131  >60 mL/min/1.73 Final   • BUN/Creatinine Ratio 12/10/2017 20.0  7.0 - 25.0 Final   • Anion Gap 12/10/2017 8.8  mmol/L Final       Xr Chest 2 View    Result Date: 11/24/2017  Narrative: PA AND LATERAL CHEST 11/22/2017  HISTORY: Chest pain.  FINDINGS: Heart size is at the upper limits of normal. Lungs appear free of acute infiltrates. There is some aortic calcification. Bones and soft tissues are otherwise unremarkable.      Impression: Borderline cardiomegaly.  This report was finalized on 11/24/2017 8:54 AM by Dr. Jonas Jones MD.      Fl C Arm  During Surgery    Result Date: 12/7/2017  Narrative: This procedure was auto-finalized with no dictation required.    Xr Spine Lumbar 1 View    Result Date: 12/7/2017  Narrative: XR SPINE LUMBAR 1 VW-  INDICATIONS: Lumbar laminectomy  TECHNIQUE: FLUOROSCOPIC ASSISTANCE IN THE OPERATING ROOM.  FINDINGS:  1 intraoperative fluoroscopic spot view was obtained and recorded the PACS for review, revealing marker at the level of L4/5. Please see operative report for full details.  Fluoroscopy time: 1 second      Impression:  As described.  This report was finalized on 12/7/2017 7:18 PM by Dr. Darren Brar MD.        Hospital Course:  89 y.o. female admitted to [facility] to services of Andre Layne MD with Acquired scoliosis [M41.9]  Spinal stenosis of lumbar region with neurogenic claudication [M48.062]  Spinal stenosis of lumbar region with neurogenic claudication [M48.062] on 12/7/2017 and underwent L2-3, L3-4, L4-5 laminectomy and fusion with local bone graft  Per Andre Layne MD. Antibiotic and VTE prophylaxis were per SCIP protocols.  The patient was admitted to the floor where IV and/or oral pain medications were administered for postoperative pain.  At discharge the incisional pain was tolerable and preop neurologic function was intact.  The dressing was dry and the wound was clean.    Condition on Discharge:  Stable    Discharge Instructions: . Patient may weight bear as tolerated unless otherwise specified. Continue GÓMEZ hose daily (for two weeks) and ice regularly. Patient also instructed on incentive spirometer during hospitalization and encouraged to continue to use at home regularly. Patient may shower on POD #3 if and when all wound drainage has stopped.  Where applicable, the brace should be worn when up and about.  It need not be worn to the bathroom and certainly not in the shower.  A detailed list of instructions specific to the operation was given to the patient at the time of  discharge.    Follow up Instructions:  Follow up in the office with Dr. Andre Layne Jr. in 2-3 weeks - patient to call the office at [phone] to schedule. Prescriptions were given for pain medication.    Follow-up Appointments  Future Appointments  Date Time Provider Department Center   3/23/2018 2:40 PM MD BRITNEY [id number] None   6/7/2018 2:00 PM MD BRITNEY Minaya [id number] None     Additional Instructions for the Follow-ups that You Need to Schedule     Discharge Follow-up with Specialty: Cardiology; 3 Weeks    As directed    Specialty:  Cardiology   Follow Up:  3 Weeks                 Discharge Disposition Plan:today to rehab    Date: 12/11/2017    JUNI Garcia MD

## 2017-12-11 NOTE — PLAN OF CARE
Problem: Patient Care Overview (Adult)  Goal: Plan of Care Review  Outcome: Ongoing (interventions implemented as appropriate)    12/11/17 1052   Coping/Psychosocial Response Interventions   Plan Of Care Reviewed With patient   Outcome Evaluation   Outcome Summary/Follow up Plan Pt required less assist for all mobility and was able to increase ambulation distance. Progressing well w/ PT. Possible d/c to rehab today

## 2017-12-11 NOTE — PROGRESS NOTES
Name: Alexa Peace ADMIT: 2017   : 1927  PCP: Lizandro Childers MD    MRN: [id number] LOS: 4 days   AGE/SEX: 89 y.o. female  ROOM: [facility]   Subjective   Subjective  Cc- back pain    Back pain fair  Improving slowly since surgery    Less confusion  Urinating well without dysuria  No additional fever    +constipation    ROS  -f/c  No n/v  No cp/palp  No soa/cough  Objective   Vital Signs  Temp:  [98.1 °F (36.7 °C)-100.1 °F (37.8 °C)] 98.1 °F (36.7 °C)  Heart Rate:  [89-97] 97  Resp:  [16-18] 18  BP: (137-151)/(50-82) 137/82  SpO2:  [92 %-97 %] 92 %  on   ;   O2 Device: room air  Body mass index is 20.2 kg/(m^2).    Physical Exam   Constitutional: She appears well-developed and well-nourished.   elderly   HENT:   Head: Normocephalic and atraumatic.   Eyes: Conjunctivae are normal. No scleral icterus.   Neck: Neck supple. No tracheal deviation present.   Cardiovascular: Normal rate.    Murmur heard.  A couple extra beats occasionally    Pulmonary/Chest: Effort normal and breath sounds normal.   Decreased bs at bases   Abdominal: Soft. There is no tenderness. There is no guarding.   Genitourinary:   Genitourinary Comments: deferred   Neurological: She is alert. She exhibits normal muscle tone.   Skin: Skin is warm and dry.   Psychiatric: She has a normal mood and affect. Her behavior is normal.       Results Review:       I reviewed the patient's new clinical results.    XR Spine Lumbar 1 View [[id number]] Not Reviewed        Order Status: Completed Collected: 17        Updated: 17       Narrative:         XR SPINE LUMBAR 1 VW-     INDICATIONS: Lumbar laminectomy     TECHNIQUE: FLUOROSCOPIC ASSISTANCE IN THE OPERATING ROOM.     FINDINGS:     1 intraoperative fluoroscopic spot view was obtained and recorded the  PACS for review, revealing marker at the level of L4/5. Please see  operative report for full details.     Fluoroscopy time: 1 second          Impression:            As  described.     This report was finalized on 12/7/2017 7:18 PM by Dr. Darren Brar MD.          FL C Arm During Surgery [[id number]] Review Not Required       Order Status: Completed Resulted: 12/07/17 1626        Updated: 12/07/17 1626       Narrative:         This procedure was auto-finalized with no dictation required.       XR Chest 2 View [[id number]] Not Reviewed       Order Status: Completed Collected: 11/22/17 1017        Updated: 11/24/17 0858       Narrative:         PA AND LATERAL CHEST 11/22/2017      HISTORY: Chest pain.     FINDINGS: Heart size is at the upper limits of normal. Lungs appear free  of acute infiltrates. There is some aortic calcification. Bones and soft  tissues are otherwise unremarkable.          Impression:         Borderline cardiomegaly.            Results from last 7 days  Lab Units 12/09/17  0522 12/08/17  0452 12/07/17  1741   HEMOGLOBIN g/dL 9.7* 9.8* 10.2*       Results from last 7 days  Lab Units 12/10/17  0503   SODIUM mmol/L 141   POTASSIUM mmol/L 3.8   CHLORIDE mmol/L 104   CO2 mmol/L 28.2   BUN mg/dL 9   CREATININE mg/dL 0.45*   GLUCOSE mg/dL 112*   Estimated Creatinine Clearance: 40.7 mL/min (by C-G formula based on Cr of 0.45).    Results from last 7 days  Lab Units 12/10/17  0503   CALCIUM mg/dL 8.6         acetaminophen 650 mg Oral TID   aspirin 81 mg Oral Daily   atorvastatin 10 mg Oral Daily   bisacodyl 10 mg Rectal Once   famotidine 20 mg Oral Daily   gabapentin 100 mg Oral TID   metoprolol tartrate 12.5 mg Oral Q12H   polyethylene glycol 17 g Oral Daily   sennosides-docusate sodium 2 tablet Oral BID   sertraline 50 mg Oral Daily      Diet Regular      Assessment/Plan   Active Hospital Problems (** Indicates Principal Problem)    Diagnosis Date Noted   • **Spinal stenosis of lumbar region with neurogenic claudication [M48.062] 11/21/2017   • PAC (premature atrial contraction) [I49.1] 12/11/2017   • Slow transit constipation [K59.01] 12/11/2017   • Acquired  scoliosis [M41.9] 11/21/2017   • Atherosclerosis of native coronary artery [I25.10] 05/05/2017   • Essential hypertension [I10] 05/05/2017   • Gastroesophageal reflux disease without esophagitis [K21.9] 05/05/2017      Resolved Hospital Problems    Diagnosis Date Noted Date Resolved   No resolved problems to display.       -Continue post op care. Pain conrol-  scheduled tylenol as patient would like to avoid narcotics. Incentive spirometry, PT.    -Slightly irregular. Checked EKG which showed just PACs. Can start low dose BB as HR a little up. No evidence of Afib so no need to significantly change treatment plan, can follow up with Dr. Kim as outpatient    -Increase stool agents for constipation.    -Fever- 3 days ago, no recurrence. Denies dysuria, no cough or soa. Suspect atelectasis, continue IS.    -Urinary retention- improved and voiding better now.    -On asa and plavix as outpatient for CAD but these are held obviously for surgery. I would not think she would need to be on dual antiplatelets anyway with her stenting greater than 10 years ago. ASA restarted by Dr. Layne, I would say to hold plavix until follow up with Dr. Kim as outpatient as she has increased risk of bleeding post op on plavix    -Continue pepcid      D/W family  Reviewed previous records    Medically stable for rehab.    Vimal Moe MD  [facility]  12/11/17  8:38 AM

## 2017-12-22 ENCOUNTER — TELEPHONE (OUTPATIENT)
Dept: ORTHOPEDIC SURGERY | Facility: CLINIC | Age: 82
End: 2017-12-22

## 2017-12-22 ENCOUNTER — OFFICE VISIT (OUTPATIENT)
Dept: ORTHOPEDIC SURGERY | Facility: CLINIC | Age: 82
End: 2017-12-22

## 2017-12-22 VITALS — BODY MASS INDEX: 20.02 KG/M2 | TEMPERATURE: 97.8 F | WEIGHT: 113 LBS | HEIGHT: 63 IN

## 2017-12-22 DIAGNOSIS — Z98.1 S/P LUMBAR FUSION: Primary | ICD-10-CM

## 2017-12-22 PROCEDURE — 72100 X-RAY EXAM L-S SPINE 2/3 VWS: CPT | Performed by: ORTHOPAEDIC SURGERY

## 2017-12-22 PROCEDURE — 99024 POSTOP FOLLOW-UP VISIT: CPT | Performed by: ORTHOPAEDIC SURGERY

## 2017-12-22 NOTE — PROGRESS NOTES
Postoperatively the patient expresses normal incisional pain.  There is little or no radiating pain.  Good strength on exam.  The wound is intact.  2 view x-rays of the lumbar spine obtained to evaluate  fusion bone show good position thereof and are unchanged from intraoperative images.  Instructions given.  We'll need lumbar x-rays on return visit.

## 2017-12-28 ENCOUNTER — TELEPHONE (OUTPATIENT)
Dept: ORTHOPEDIC SURGERY | Facility: CLINIC | Age: 82
End: 2017-12-28

## 2018-01-03 NOTE — TELEPHONE ENCOUNTER
Call return to home health.  I left a message stating that the patient is to wear her back brace anytime she is out of bed except with bathroom privileges.  She is to avoid lifting anything heavier than 5 pounds, twisting at the waist, and any forward bending.  Of left it open for them to call if they have any other questions or concerns per VANIA SHORE

## 2018-01-03 NOTE — TELEPHONE ENCOUNTER
Have contacted the patient.  Explained to her that insurance will not allow for her to be fitted with the bone growth stimulator until she is out of rehabilitation.  Home from the rehabilitation Center for about a week however her policy also requires a referral from her PCP which the bone growth stimulator company has been trying to get obtained.  I have spoken with the wrap through [employer] and Will have them fit her with her stimulator as soon as they get the referral from her PCP

## 2018-01-05 ENCOUNTER — OFFICE VISIT (OUTPATIENT)
Dept: INTERNAL MEDICINE | Facility: CLINIC | Age: 83
End: 2018-01-05

## 2018-01-05 VITALS
DIASTOLIC BLOOD PRESSURE: 80 MMHG | SYSTOLIC BLOOD PRESSURE: 140 MMHG | RESPIRATION RATE: 16 BRPM | WEIGHT: 118 LBS | TEMPERATURE: 98.9 F | OXYGEN SATURATION: 97 % | BODY MASS INDEX: 20.91 KG/M2 | HEIGHT: 63 IN | HEART RATE: 92 BPM

## 2018-01-05 DIAGNOSIS — G89.29 CHRONIC BILATERAL LOW BACK PAIN WITH BILATERAL SCIATICA: Primary | ICD-10-CM

## 2018-01-05 DIAGNOSIS — M85.80 OSTEOPENIA, UNSPECIFIED LOCATION: ICD-10-CM

## 2018-01-05 DIAGNOSIS — M54.41 CHRONIC BILATERAL LOW BACK PAIN WITH BILATERAL SCIATICA: Primary | ICD-10-CM

## 2018-01-05 DIAGNOSIS — M54.42 CHRONIC BILATERAL LOW BACK PAIN WITH BILATERAL SCIATICA: Primary | ICD-10-CM

## 2018-01-05 DIAGNOSIS — I10 ESSENTIAL HYPERTENSION: ICD-10-CM

## 2018-01-05 DIAGNOSIS — E78.5 HYPERLIPIDEMIA, UNSPECIFIED HYPERLIPIDEMIA TYPE: ICD-10-CM

## 2018-01-05 DIAGNOSIS — I25.10 ATHEROSCLEROSIS OF NATIVE CORONARY ARTERY OF NATIVE HEART WITHOUT ANGINA PECTORIS: ICD-10-CM

## 2018-01-05 PROCEDURE — 99213 OFFICE O/P EST LOW 20 MIN: CPT | Performed by: INTERNAL MEDICINE

## 2018-01-05 NOTE — PROGRESS NOTES
Subjective   Alexa Peace is a 89 y.o. female.   12/6/2017  Wt Readings from Last 1 Encounters:   01/05/18 53.5 kg (118 lb)   She returns for follow-up after lumbar spine surgery and follow-up of atherosclerotic heart disease hypertension hyperlipidemia    History of Present Illness   She had surgery for lumbar spinal stenosis.  She had surgery on December 7 and was discharged on December 11 to go to [facility] for rehabilitation.  She had surgery by Dr. Ndiaye and is done well.  She has been back at home.  She has some postoperative pain, less radicular type pain than before.  She is been released to walk.  She says that she had some mental confusion with narcotic pain medicines and has just been taking Tylenol for pain relief.    She has history of atherosclerotic heart disease and continues on aspirin 81 mg daily and Plavix 75 mg daily.    She has hyper lipidemia treated with pravastatin 40 mg each evening.    She has soft reflux treated with ranitidine 150 mg daily.  The following portions of the patient's history were reviewed and updated as appropriate: allergies, current medications, past family history, past medical history, past social history, past surgical history and problem list.    Review of Systems   Constitutional: Negative.    HENT: Negative.    Eyes: Negative.    Respiratory: Negative.    Cardiovascular: Negative.    Endocrine: Negative.    Genitourinary: Negative.    Musculoskeletal: Positive for back pain.   Skin: Negative.    Neurological: Negative.    Hematological: Negative.    Psychiatric/Behavioral: Negative.        Objective   Physical Exam   Constitutional: She appears well-developed and well-nourished.   HENT:   Head: Normocephalic and atraumatic.   Cardiovascular: Normal rate and regular rhythm.  Exam reveals no gallop and no friction rub.    No murmur heard.  Pulmonary/Chest: Effort normal and breath sounds normal. No respiratory distress. She has no wheezes. She has no rales.    Abdominal: Soft. Bowel sounds are normal. She exhibits no distension and no mass. There is no tenderness.   Neurological: She is alert.   Skin: Skin is warm.   Psychiatric: Her behavior is normal.   Vitals reviewed.      Assessment/Plan   Ada was seen today for back pain, coronary artery disease and hypertension.    Diagnoses and all orders for this visit:    Chronic bilateral low back pain with bilateral sciatica  Comments:  She had surgery for lumbar spinal stenosis on December 7, and  continues to make good progress    Atherosclerosis of native coronary artery of native heart without angina pectoris  Comments:  Continue aspirin 81 mg daily    Essential hypertension  Comments:  Continue metoprolol tartrate 25 mg twice a day  Orders:  -     Urinalysis With Microscopic - Urine, Clean Catch    Hyperlipidemia, unspecified hyperlipidemia type  Comments:  Continue pravastatin 40 mg each evening    Osteopenia, unspecified location  Comments:  May resume calcium and vitamin D        Keep March 23 follow-up                       EMR Dragon/Transcription disclaimer:      Much of this encounter note is an electronic transcription/translation of spoken language to printed text. The electronic translation of spoken language may permit erroneous, or at times, nonsensical words or phrases to be inadvertently transcribed; Although I have reviewed the note for such errors, some may still exist.

## 2018-01-09 ENCOUNTER — TELEPHONE (OUTPATIENT)
Dept: INTERNAL MEDICINE | Facility: CLINIC | Age: 83
End: 2018-01-09

## 2018-01-11 ENCOUNTER — TELEPHONE (OUTPATIENT)
Dept: INTERNAL MEDICINE | Facility: CLINIC | Age: 83
End: 2018-01-11

## 2018-01-11 NOTE — TELEPHONE ENCOUNTER
Jeff at [facility] called me and said that the pt told him that we have not contacted her back after several messages regarding her bone stimulator. I spoke with the patient yesterday afternoon letting her know that she spoke to Pily and was given all the information about the insurance approval on this device. Pt is still saying we are not contacting her about this. Pt told me she would contact Dr Ndiaye and the company that is supposed to be issuing this device. I spoke with Jeff at [facility] and he said he would check with her again but he said that it is concerning that she doesn't think we are calling her back when we have spoken to her multiple times.

## 2018-01-11 NOTE — TELEPHONE ENCOUNTER
A bone stimulator is an item that needs to be handled by the prescribing physician, her orthopedist

## 2018-01-18 ENCOUNTER — TELEPHONE (OUTPATIENT)
Dept: ORTHOPEDIC SURGERY | Facility: CLINIC | Age: 83
End: 2018-01-18

## 2018-01-26 ENCOUNTER — TELEPHONE (OUTPATIENT)
Dept: ORTHOPEDIC SURGERY | Facility: CLINIC | Age: 83
End: 2018-01-26

## 2018-01-26 NOTE — TELEPHONE ENCOUNTER
Call returned to the patient.  Was unable to reach her and left a message on her answering machine to contact me at the office

## 2018-01-26 NOTE — TELEPHONE ENCOUNTER
Call return to the patient.  Been using her bone growth stimulator however she is now developing increasing back and right leg pain.  The pain is bad enough that it does her to have difficulty with walking.  He is scheduled to see VANIA SHORE on February 6 and will have the office move her appointment up sooner

## 2018-01-31 ENCOUNTER — DOCUMENTATION (OUTPATIENT)
Dept: ORTHOPEDIC SURGERY | Facility: CLINIC | Age: 83
End: 2018-01-31

## 2018-01-31 ENCOUNTER — OFFICE VISIT (OUTPATIENT)
Dept: ORTHOPEDIC SURGERY | Facility: CLINIC | Age: 83
End: 2018-01-31

## 2018-01-31 DIAGNOSIS — Z98.1 S/P LAMINECTOMY WITH SPINAL FUSION: Primary | ICD-10-CM

## 2018-01-31 PROCEDURE — 72100 X-RAY EXAM L-S SPINE 2/3 VWS: CPT | Performed by: ORTHOPAEDIC SURGERY

## 2018-01-31 PROCEDURE — 99213 OFFICE O/P EST LOW 20 MIN: CPT | Performed by: ORTHOPAEDIC SURGERY

## 2018-01-31 RX ORDER — TRAMADOL HYDROCHLORIDE 50 MG/1
TABLET ORAL
Refills: 0 | COMMUNITY
Start: 2018-01-19 | End: 2018-02-02 | Stop reason: SDUPTHER

## 2018-01-31 RX ORDER — GABAPENTIN 100 MG/1
CAPSULE ORAL
Qty: 140 CAPSULE | Refills: 0 | Status: SHIPPED | OUTPATIENT
Start: 2018-01-31 | End: 2018-03-07 | Stop reason: SDUPTHER

## 2018-01-31 RX ORDER — METHYLPREDNISOLONE 4 MG/1
TABLET ORAL
Qty: 21 TABLET | Refills: 0 | Status: SHIPPED | OUTPATIENT
Start: 2018-01-31 | End: 2018-05-30

## 2018-01-31 NOTE — PROGRESS NOTES
2 months out and having some right thigh pain.  I did an L2 to 5 decompression and uninstrumented fusion.  She complains of persistent pain in the right thigh.  This is the side on the convex city not the concavity.  She had complete relief at one time.  There is intermittent periods of complete relief.  He also notes occasional urinary urgency.  I suppose she could have central stenosis at 1 to above but I suspect there is mechanical compression at L2-3 which could still improve with the fusion mature.  Two-view x-rays of the lumbar spine today demonstrate no real change in position of graft or posture compared to prior films.  We need to give this more time I'll see her back in 2 months.  Meantime we'll increase her gabapentin to 1 in the morning 1 in the afternoon in 3 100 mg pills at night

## 2018-02-01 ENCOUNTER — TELEPHONE (OUTPATIENT)
Dept: ORTHOPEDIC SURGERY | Facility: CLINIC | Age: 83
End: 2018-02-01

## 2018-02-05 RX ORDER — TRAMADOL HYDROCHLORIDE 50 MG/1
TABLET ORAL
Qty: 40 TABLET | Refills: 0 | OUTPATIENT
Start: 2018-02-05 | End: 2018-02-17 | Stop reason: SDUPTHER

## 2018-02-19 RX ORDER — TRAMADOL HYDROCHLORIDE 50 MG/1
TABLET ORAL
Qty: 40 TABLET | Refills: 0 | OUTPATIENT
Start: 2018-02-19 | End: 2018-03-02 | Stop reason: SDUPTHER

## 2018-02-22 ENCOUNTER — TELEPHONE (OUTPATIENT)
Dept: ORTHOPEDIC SURGERY | Facility: CLINIC | Age: 83
End: 2018-02-22

## 2018-02-22 DIAGNOSIS — Z98.1 S/P LUMBAR SPINAL FUSION: Primary | ICD-10-CM

## 2018-02-22 NOTE — TELEPHONE ENCOUNTER
Patient called.  She still continues to have leg pain which is no better with the increase in her Neurontin.  She is having some mild low back pain but is complaining more of pain in her anterior thigh that radiates slightly past her knee.  Patient is not doing much walking dates that the pain is worse in the morning.  And that she try to work on walking twice a day to increase her muscle strength and stamina.  Will discuss with VANIA SHORE to see if the patient is okay to do some formal physical therapy.  Would suggest that she give it more time however if her symptoms do not improve she needs to under the office sooner than her scheduled appointment in April

## 2018-03-02 ENCOUNTER — HOSPITAL ENCOUNTER (OUTPATIENT)
Dept: PHYSICAL THERAPY | Facility: HOSPITAL | Age: 83
Setting detail: THERAPIES SERIES
Discharge: HOME | End: 2018-03-02
Attending: ORTHOPAEDIC SURGERY

## 2018-03-02 DIAGNOSIS — M54.5 LOW BACK PAIN, UNSPECIFIED BACK PAIN LATERALITY, UNSPECIFIED CHRONICITY, WITH SCIATICA PRESENCE UNSPECIFIED: ICD-10-CM

## 2018-03-02 DIAGNOSIS — Z98.1 S/P SPINAL FUSION: Primary | ICD-10-CM

## 2018-03-02 DIAGNOSIS — R53.1 GENERAL WEAKNESS: ICD-10-CM

## 2018-03-02 PROCEDURE — G8978 MOBILITY CURRENT STATUS: HCPCS

## 2018-03-02 PROCEDURE — 97110 THERAPEUTIC EXERCISES: CPT

## 2018-03-02 PROCEDURE — 97161 PT EVAL LOW COMPLEX 20 MIN: CPT

## 2018-03-02 PROCEDURE — G8979 MOBILITY GOAL STATUS: HCPCS

## 2018-03-02 NOTE — THERAPY TREATMENT NOTE
Outpatient Physical Therapy Ortho Initial Evaluation  [facility]     Patient Name: Alexa Peace  : 1927  MRN: [id number]  Today's Date: 3/2/2018      Visit Date: 2018    Patient Active Problem List   Diagnosis   • Bad memory   • Depression   • Diverticulosis of intestine   • Hyperlipidemia   • Macular degeneration   • Peripheral nerve disease   • Osteopenia   • Atopic rhinitis   • Urge incontinence of urine   • Atherosclerosis of native coronary artery   • Essential hypertension   • Gastroesophageal reflux disease without esophagitis   • Impaired glucose tolerance   • History of coronary artery stent placement   • Chronic bilateral low back pain   • Acquired scoliosis   • Spinal stenosis of lumbar region with neurogenic claudication   • PAC (premature atrial contraction)   • Slow transit constipation        Past Medical History:   Diagnosis Date   • Anticoagulated     PLAVIX   • Arthritis    • Atrial premature complex    • CAD (coronary artery disease)    • Cancer     skin   • Colon polyp    • Depression    • GERD (gastroesophageal reflux disease)    • Heart attack    • Hyperlipidemia    • Hypertension    • Macular degeneration    • Pre-diabetes    • Spinal stenosis         Past Surgical History:   Procedure Laterality Date   • CATARACT EXTRACTION     • CORONARY ANGIOPLASTY WITH STENT PLACEMENT     • EPIDURAL BLOCK     • LUMBAR DISCECTOMY FUSION INSTRUMENTATION N/A 2017    Procedure: L2-3, L3-4, L4-5 laminectomy and fusion with local bone graft;  Surgeon: Andre Layne MD;  Location: [facility];  Service:    • TONSILECTOMY, ADENOIDECTOMY, BILATERAL MYRINGOTOMY AND TUBES         Visit Dx:     ICD-10-CM ICD-9-CM   1. S/P spinal fusion Z98.1 V45.4   2. Low back pain, unspecified back pain laterality, unspecified chronicity, with sciatica presence unspecified M54.5 724.2   3. General weakness R53.1 780.79             Patient History       18 1400          History    Chief  Complaint Pain  -LP      Type of Pain Back pain  -LP      Date Current Problem(s) Began 12/07/17  -LP      Brief Description of Current Complaint Pt underwent spinal fusion of L2-5 in Dec. Prior to surgery she was having progressively worsening pain across LB and into R. leg.  She reports little improvement since surgery. Immediately after surgery she went to a rehab center for 7 days and then received 3 sessons of home therapy.  She states pain is the worst in AM.  She sleeps well, but does take pain pills regularly, as prescribed incuding before bed.  Pt.' granddaughter lives with her, but is gone most of the day.  She does not drive.  -LP      Pain     Pain Location Back  -LP      Pain at Present 7  -LP      Pain at Best 1  -LP      Pain at Worst 7  -LP      Pain Frequency Constant/continuous  -LP      Pain Description Aching;Cramping;Sore;Tightness  -LP      What Performance Factors Make the Current Problem(s) WORSE? too much activity  -LP      What Performance Factors Make the Current Problem(s) BETTER? cold pack  -LP        User Key  (r) = Recorded By, (t) = Taken By, (c) = Cosigned By    Initials Name Provider Type    LP Karis Lopez, LIANNA Physical Therapist                PT Ortho       03/02/18 1400    Subjective Pain    Able to rate subjective pain? yes  -LP    Pre-Treatment Pain Level 7  -LP    Posture/Observations    Posture/Observations Comments moderate thoracic kyphosis, and possible curvature of thoraco-lumbar spine.  Iliac crests are uneven.  Pt stands flexed at the waist and leans over even more with use of rolling walker.  Sits with a slumpedpposture as well.  -LP    DTR- Lower Quarter Clearing    Patellar tendon (L2-4) Left:;2- Normal response;Right:;1- Minimal response  -LP    Achilles tendon (S1-2) Bilateral:;1- Minimal response  -LP    Sensory Screen for Light Touch- Lower Quarter Clearing    L2 (anterior mid thigh) Right:;Intact  -LP    L3 (distal anterior thigh) Bilateral:;Intact  -LP     L4 (medial lower leg/foot) Bilateral:;Intact  -LP    L5 (lateral lower leg/great toe) Bilateral:;Intact  -LP    S1 (bottom of foot) Bilateral:;Intact  -LP    Myotomal Screen- Lower Quarter Clearing    Hip flexion (L2) Bilateral:;3+ (Fair +)  -LP    Knee extension (L3) Bilateral:;3 (Fair)  -LP    Ankle DF (L4) Bilateral:;3+ (Fair +)  -LP    Great toe extension (L5) Bilateral:;3+ (Fair +)  -LP    Ankle PF (S1) Bilateral:;3+ (Fair +)  -LP    Knee flexion (S2) Bilateral:;3 (Fair)  -LP    Lumbar ROM Screen- Lower Quarter Clearing    Lumbar Flexion Impaired   50%  -LP    Lumbar Extension Impaired   25%  -LP    Balance Skills Training    SLS bialteral 7-10 sec with light hand hold  -LP    Transfers    Transfers, Bed-Chair Reinholds independent   pt able to t-cuong sit-supine on plinth with no assit.  -LP    Transfers, Sit-Stand Reinholds upper extremity support;independent  -LP    Gait Assessment/Treatment    Gait, Assistive Device rolling walker   stys bent over and very slow speed. small step lenght  -LP      User Key  (r) = Recorded By, (t) = Taken By, (c) = Cosigned By    Initials Name Provider Type    LP Karis Lopez, PT Physical Therapist                      Therapy Education  Education Details: emphasis on postuer  Given: HEP, Symptoms/condition management, Posture/body mechanics, Fall prevention and home safety  Program: New  How Provided: Verbal, Demonstration, Written  Provided to: Patient  Level of Understanding: Teach back education performed           PT OP Goals       03/02/18 1400       PT Short Term Goals    STG Date to Achieve 03/23/18  -LP     STG 1 Independnet with HEP  -LP     STG 1 Progress New  -LP     STG 2 Pt reports decreased pain level < 4/10  -LP     STG 2 Progress New  -LP     STG 3 pt demsonstrates improved ararenesss od posture  -LP     STG 3 Progress New  -LP     Long Term Goals    LTG Date to Achieve 04/13/18  -LP     LTG 1 pt with increased LE strength with MMT to 3+-4/5  -LP      LTG 1 Progress New  -LP     LTG 2 Pt able to tolerate 10+ min on stationary bike ( iether at home or in clinic)  -LP     LTG 2 Progress New  -LP     LTG 3 imprved oswestry score by 3 points  -LP     LTG 3 Progress New  -LP     LTG 4 Pt walkign every day ( not including around the house)  -LP     LTG 4 Progress New  -LP     Time Calculation    PT Goal Re-Cert Due Date 05/02/18  -LP       User Key  (r) = Recorded By, (t) = Taken By, (c) = Cosigned By    Initials Name Provider Type    LP Karis Lopez, PT Physical Therapist                PT Assessment/Plan       03/02/18 1440       PT Assessment    Impairments Balance;Endurance;Gait;Muscle strength;Pain;Range of motion;Posture;Poor body mechanics  -LP     Assessment Comments 89 y.o. female presents almost 12 weeks s/p lumbar fusion.  She continues to have pain in lower back and R LE, however not as bad as before surgery.  She is walking with a rolling walker with very forward posture. Pt presents with overall weakness, LE's> UE's and low endurance for acitivity.  She would benefit from skileed physical therapy at  this time to help improve overall strnenght and endurance and help decreas pain im order for her to continue to live independently.  -LP     Please refer to paper survey for additional self-reported information Yes  -LP     Rehab Potential Good  -LP     Patient/caregiver participated in establishment of treatment plan and goals Yes  -LP     Patient would benefit from skilled therapy intervention Yes  -LP     PT Plan    PT Frequency 2x/week  -LP     Predicted Duration of Therapy Intervention (days/wks) 4-6 weeks  -LP     Planned CPT's? PT EVAL LOW COMPLEXITY: 91092  -LP     Physical Therapy Interventions (Optional Details) aquatics exercise;gait training;home exercise program;strengthening;stretching;modalities  -LP     PT Plan Comments develop and progress exercise program for strengthening, balnce and endurancde as tolerated per pt.  -LP       User Key   (r) = Recorded By, (t) = Taken By, (c) = Cosigned By    Initials Name Provider Type    LP Karis Lopez PT Physical Therapist                  Exercises       03/02/18 1400          Subjective Pain    Able to rate subjective pain? yes  -LP      Pre-Treatment Pain Level 7  -LP      Exercise 1    Exercise Name 1 see summary sheet  -LP        User Key  (r) = Recorded By, (t) = Taken By, (c) = Cosigned By    Initials Name Provider Type    LP Karis Lopez PT Physical Therapist                        Outcome Measure Options: Modifed Owestry  5 Times Sit to Stand  5 Times Sit to Stand (seconds): 33.4 seconds  5 Times Sit to Stand Comments: has to use hands to push from chair.  Modified Oswestry  Modified Oswestry Score/Comments: 52%      Time Calculation:   Start Time: 1230  Stop Time: 1315  Time Calculation (min): 45 min     Therapy Charges for Today     Code Description Service Date Service Provider Modifiers Qty    21681769450 HC PT MOBILITY CURRENT 3/2/2018 Karis Lopez, PT GP, CK 1    93953315327 HC PT MOBILITY PROJECTED 3/2/2018 Karis Lopez, PT GP, CL 1    18100454773 HC PT EVAL LOW COMPLEXITY 2 3/2/2018 Karis Lopez, PT GP, KX 1    30480912142 HC PT THER PROC EA 15 MIN 3/2/2018 Karis Lopez, PT GP, KX 1          PT G-Codes  PT Professional Judgement Used?: Yes  Outcome Measure Options: Modifed Owestry  Score: 52%  Functional Limitation: Mobility: Walking and moving around  Mobility: Walking and Moving Around Current Status (): At least 40 percent but less than 60 percent impaired, limited or restricted  Mobility: Walking and Moving Around Goal Status (): At least 60 percent but less than 80 percent impaired, limited or restricted         Karis Lopez PT  3/2/2018

## 2018-03-05 RX ORDER — TRAMADOL HYDROCHLORIDE 50 MG/1
TABLET ORAL
Qty: 40 TABLET | Refills: 0 | OUTPATIENT
Start: 2018-03-05 | End: 2018-03-16 | Stop reason: SDUPTHER

## 2018-03-06 ENCOUNTER — HOSPITAL ENCOUNTER (OUTPATIENT)
Dept: PHYSICAL THERAPY | Facility: HOSPITAL | Age: 83
Setting detail: THERAPIES SERIES
Discharge: HOME | End: 2018-03-06

## 2018-03-06 DIAGNOSIS — Z98.1 S/P SPINAL FUSION: Primary | ICD-10-CM

## 2018-03-06 DIAGNOSIS — R53.1 GENERAL WEAKNESS: ICD-10-CM

## 2018-03-06 DIAGNOSIS — M54.5 LOW BACK PAIN, UNSPECIFIED BACK PAIN LATERALITY, UNSPECIFIED CHRONICITY, WITH SCIATICA PRESENCE UNSPECIFIED: ICD-10-CM

## 2018-03-06 PROCEDURE — 97110 THERAPEUTIC EXERCISES: CPT | Performed by: PHYSICAL THERAPIST

## 2018-03-06 NOTE — THERAPY TREATMENT NOTE
Outpatient Physical Therapy Ortho Treatment Note  [facility]     Patient Name: Alexa Peace  : 1927  MRN: [id number]  Today's Date: 3/6/2018      Visit Date: 2018    Visit Dx:    ICD-10-CM ICD-9-CM   1. S/P spinal fusion Z98.1 V45.4   2. Low back pain, unspecified back pain laterality, unspecified chronicity, with sciatica presence unspecified M54.5 724.2   3. General weakness R53.1 780.79       Patient Active Problem List   Diagnosis   • Bad memory   • Depression   • Diverticulosis of intestine   • Hyperlipidemia   • Macular degeneration   • Peripheral nerve disease   • Osteopenia   • Atopic rhinitis   • Urge incontinence of urine   • Atherosclerosis of native coronary artery   • Essential hypertension   • Gastroesophageal reflux disease without esophagitis   • Impaired glucose tolerance   • History of coronary artery stent placement   • Chronic bilateral low back pain   • Acquired scoliosis   • Spinal stenosis of lumbar region with neurogenic claudication   • PAC (premature atrial contraction)   • Slow transit constipation        Past Medical History:   Diagnosis Date   • Anticoagulated     PLAVIX   • Arthritis    • Atrial premature complex    • CAD (coronary artery disease)    • Cancer     skin   • Colon polyp    • Depression    • GERD (gastroesophageal reflux disease)    • Heart attack    • Hyperlipidemia    • Hypertension    • Macular degeneration    • Pre-diabetes    • Spinal stenosis         Past Surgical History:   Procedure Laterality Date   • CATARACT EXTRACTION     • CORONARY ANGIOPLASTY WITH STENT PLACEMENT     • EPIDURAL BLOCK     • LUMBAR DISCECTOMY FUSION INSTRUMENTATION N/A 2017    Procedure: L2-3, L3-4, L4-5 laminectomy and fusion with local bone graft;  Surgeon: Andre Layne MD;  Location: [facility];  Service:    • TONSILECTOMY, ADENOIDECTOMY, BILATERAL MYRINGOTOMY AND TUBES                               PT Assessment/Plan       18 1612       PT Assessment     Assessment Comments Patient with no immediate adverse response to initial evaluation. Receptive to HEP training. Verbal cueing to correct mechanics.  -GR     PT Plan    PT Plan Comments Continue POC. Add ankle weights.  -GR       User Key  (r) = Recorded By, (t) = Taken By, (c) = Cosigned By    Initials Name Provider Type    GR Aris Dickey, PT Physical Therapist                    Exercises       03/06/18 1500          Subjective Comments    Subjective Comments Having a bit of a rough day.  Left RW in car and just held on to son's arm.  -GR      Subjective Pain    Able to rate subjective pain? yes  -GR      Pre-Treatment Pain Level 6  -GR      Post-Treatment Pain Level 4  -GR      Exercise 1    Exercise Name 1 Nustep L2  -GR      Time (Minutes) 1 5  -GR      Exercise 2    Exercise Name 2 Scap retraction seated  -GR      Cueing 2 Verbal  -GR      Sets 2 1  -GR      Reps 2 10  -GR      Exercise 3    Exercise Name 3 H/L PPT  -GR      Cueing 3 Verbal  -GR      Sets 3 2  -GR      Reps 3 10  -GR      Time (Seconds) 3 5  -GR      Exercise 4    Exercise Name 4 SKTC alt  -GR      Cueing 4 Verbal  -GR      Sets 4 1  -GR      Reps 4 10  -GR      Time (Seconds) 4 5  -GR      Exercise 5    Exercise Name 5 H/L clam  -GR      Cueing 5 Verbal  -GR      Sets 5 2  -GR      Reps 5 10  -GR      Additional Comments RTB  -GR      Exercise 6    Exercise Name 6 H/L hip adduction squeeze  -GR      Cueing 6 Verbal  -GR      Sets 6 2  -GR      Reps 6 10  -GR      Additional Comments pink ball  -GR      Exercise 7    Exercise Name 7 H/S stretch with belt  -GR      Cueing 7 Demo  -GR      Sets 7 1  -GR      Reps 7 3  -GR      Time (Seconds) 7 20  -GR      Exercise 8    Exercise Name 8 Calf raise  -GR      Cueing 8 Demo  -GR      Sets 8 2  -GR      Reps 8 10  -GR      Exercise 9    Exercise Name 9 Hip abduction standing  -GR      Cueing 9 Demo  -GR      Sets 9 2  -GR      Reps 9 10  -GR      Exercise 10    Exercise Name 10 H/S curl  standing  -GR      Cueing 10 Demo  -GR      Sets 10 2  -GR      Reps 10 10  -GR        User Key  (r) = Recorded By, (t) = Taken By, (c) = Cosigned By    Initials Name Provider Type    GR Aris Dickey PT Physical Therapist                               PT OP Goals       03/06/18 1500       PT Short Term Goals    STG Date to Achieve 03/23/18  -GR     STG 1 Independent with HEP  -GR     STG 1 Progress Ongoing  -GR     STG 2 Pt reports decreased pain level < 4/10  -GR     STG 2 Progress Ongoing  -GR     STG 3 pt demonstrates improved awarenesss of posture  -GR     STG 3 Progress Ongoing  -GR     Long Term Goals    LTG Date to Achieve 04/13/18  -GR     LTG 1 pt with increased LE strength with MMT to 3+-4/5  -GR     LTG 1 Progress Ongoing  -GR     LTG 2 Pt able to tolerate 10+ min on stationary bike ( iether at home or in clinic)  -GR     LTG 2 Progress Ongoing  -GR     LTG 3 improved oswestry score by 3 points  -GR     LTG 3 Progress Ongoing  -GR     LTG 4 Pt walking every day ( not including around the house)  -GR     LTG 4 Progress Ongoing  -GR       User Key  (r) = Recorded By, (t) = Taken By, (c) = Cosigned By    Initials Name Provider Type    GR Aris Dickey PT Physical Therapist          Therapy Education  Education Details: reviewed HEP and body mechanics  Given: HEP  Program: Reinforced  How Provided: Verbal  Provided to: Patient  Level of Understanding: Teach back education performed              Time Calculation:   Start Time: 1530  Stop Time: 1615  Time Calculation (min): 45 min    Therapy Charges for Today     Code Description Service Date Service Provider Modifiers Qty    20580514999 HC PT THER PROC EA 15 MIN 3/6/2018 Aris Dickey PT GP 3                    Aris Dickey PT  3/6/2018

## 2018-03-07 RX ORDER — GABAPENTIN 100 MG/1
CAPSULE ORAL
Qty: 420 CAPSULE | Refills: 0 | OUTPATIENT
Start: 2018-03-07 | End: 2018-04-25 | Stop reason: SDUPTHER

## 2018-03-09 ENCOUNTER — APPOINTMENT (OUTPATIENT)
Dept: PHYSICAL THERAPY | Facility: HOSPITAL | Age: 83
End: 2018-03-09

## 2018-03-13 ENCOUNTER — HOSPITAL ENCOUNTER (OUTPATIENT)
Dept: PHYSICAL THERAPY | Facility: HOSPITAL | Age: 83
Setting detail: THERAPIES SERIES
Discharge: HOME | End: 2018-03-13

## 2018-03-13 DIAGNOSIS — R53.1 GENERAL WEAKNESS: ICD-10-CM

## 2018-03-13 DIAGNOSIS — M54.5 LOW BACK PAIN, UNSPECIFIED BACK PAIN LATERALITY, UNSPECIFIED CHRONICITY, WITH SCIATICA PRESENCE UNSPECIFIED: ICD-10-CM

## 2018-03-13 DIAGNOSIS — Z98.1 S/P SPINAL FUSION: Primary | ICD-10-CM

## 2018-03-13 PROCEDURE — 97110 THERAPEUTIC EXERCISES: CPT

## 2018-03-13 NOTE — THERAPY TREATMENT NOTE
Outpatient Physical Therapy Ortho Treatment Note  [facility]     Patient Name: Alexa Peace  : 1927  MRN: [id number]  Today's Date: 3/13/2018      Visit Date: 2018    Visit Dx:    ICD-10-CM ICD-9-CM   1. S/P spinal fusion Z98.1 V45.4   2. Low back pain, unspecified back pain laterality, unspecified chronicity, with sciatica presence unspecified M54.5 724.2   3. General weakness R53.1 780.79       Patient Active Problem List   Diagnosis   • Bad memory   • Depression   • Diverticulosis of intestine   • Hyperlipidemia   • Macular degeneration   • Peripheral nerve disease   • Osteopenia   • Atopic rhinitis   • Urge incontinence of urine   • Atherosclerosis of native coronary artery   • Essential hypertension   • Gastroesophageal reflux disease without esophagitis   • Impaired glucose tolerance   • History of coronary artery stent placement   • Chronic bilateral low back pain   • Acquired scoliosis   • Spinal stenosis of lumbar region with neurogenic claudication   • PAC (premature atrial contraction)   • Slow transit constipation        Past Medical History:   Diagnosis Date   • Anticoagulated     PLAVIX   • Arthritis    • Atrial premature complex    • CAD (coronary artery disease)    • Cancer     skin   • Colon polyp    • Depression    • GERD (gastroesophageal reflux disease)    • Heart attack    • Hyperlipidemia    • Hypertension    • Macular degeneration    • Pre-diabetes    • Spinal stenosis         Past Surgical History:   Procedure Laterality Date   • CATARACT EXTRACTION     • CORONARY ANGIOPLASTY WITH STENT PLACEMENT     • EPIDURAL BLOCK     • LUMBAR DISCECTOMY FUSION INSTRUMENTATION N/A 2017    Procedure: L2-3, L3-4, L4-5 laminectomy and fusion with local bone graft;  Surgeon: Andre Layne MD;  Location: [facility];  Service:    • TONSILECTOMY, ADENOIDECTOMY, BILATERAL MYRINGOTOMY AND TUBES                               PT Assessment/Plan     Row Name 18 1618          PT  Assessment    Assessment Comments Pt demonstrating forward flexed posture with rollator entering clinic. She is wearing brace which she reports helps pain. Seems to be large on patient with noted space. Continued hip/core strengthening with good tolerance. Added wall wash for some extension in standing.   -LS        PT Plan    PT Plan Comments Continue core/hip strenghtening, postural improvement.   -LS       User Key  (r) = Recorded By, (t) = Taken By, (c) = Cosigned By    Initials Name Provider Type    YAMILKA Ghosh, PT Physical Therapist                    Exercises     Row Name 03/13/18 1600             Subjective Comments    Subjective Comments I am doing ok. This morning I was doing fine but then after my shower I was sore again.   -LS         Subjective Pain    Able to rate subjective pain? yes  -LS      Pre-Treatment Pain Level 5  -LS         Exercise 1    Exercise Name 1 Nustep L2  -LS      Time 1 5  -LS         Exercise 2    Exercise Name 2 Scap retraction seated  -LS      Cueing 2 Verbal  -LS      Reps 2 10  -LS      Additional Comments RTB  -LS      Sets 2 1  -LS      Reps 2 10  -LS         Exercise 3    Exercise Name 3 H/L PPT  -LS      Cueing 3 Verbal  -LS      Reps 3 15  -LS      Time 3 5  -LS      Sets 3 2  -LS      Reps 3 10  -LS      Time (Seconds) 3 5  -LS         Exercise 4    Exercise Name 4 SKTC alt  -LS      Cueing 4 Verbal  -LS      Reps 4 3  -LS      Sets 4 1  -LS      Reps 4 10  -LS      Time (Seconds) 4 20  -LS         Exercise 5    Exercise Name 5 H/L clam  -LS      Cueing 5 Verbal  -LS      Reps 5 15  -LS      Sets 5 2  -LS      Reps 5 10  -LS         Exercise 6    Exercise Name 6 H/L hip adduction squeeze  -LS      Reps 6 15  -LS      Additional Comments pink ball  -LS      Cueing 6 Verbal  -LS      Sets 6 2  -LS      Reps 6 10  -LS         Exercise 7    Exercise Name 7 H/S stretch with belt  -LS      Reps 7 3  -LS      Cueing 7 Demo  -LS      Sets 7 1  -LS      Reps 7 3  -LS       Time (Seconds) 7 20  -LS         Exercise 8    Exercise Name 8 Calf raise  -LS      Reps 8 20  -LS      Cueing 8 Demo  -LS      Sets 8 2  -LS      Reps 8 10  -LS         Exercise 9    Exercise Name 9 Hip abduction standing  -LS      Reps 9 10  -LS      Additional Comments 2#  -LS      Cueing 9 Demo  -LS      Sets 9 2  -LS      Reps 9 10  -LS         Exercise 10    Exercise Name 10 H/S curl standing  -LS      Reps 10 10  -LS      Additional Comments 2#  -LS      Cueing 10 Demo  -LS      Sets 10 2  -LS      Reps 10 10  -LS         Exercise 11    Exercise Name 11 seated LAQ  -LS      Reps 11 10  -LS      Additional Comments no weight  -LS         Exercise 12    Exercise Name 12 B shoulder extension  -LS      Reps 12 12  -LS      Additional Comments RTB  -LS        User Key  (r) = Recorded By, (t) = Taken By, (c) = Cosigned By    Initials Name Provider Type    LS Darlene Ghosh, PT Physical Therapist                               PT OP Goals     Row Name 03/13/18 1600          PT Short Term Goals    STG Date to Achieve 03/23/18  -LS     STG 1 Independent with HEP  -LS     STG 1 Progress Progressing  -LS     STG 1 Progress Comments Pt reports good compliance with HEP.  -LS     STG 2 Pt reports decreased pain level < 4/10  -LS     STG 2 Progress Ongoing  -LS     STG 2 Progress Comments 5/10 today  -LS     STG 3 pt demonstrates improved awarenesss of posture  -LS     STG 3 Progress Ongoing  -LS     STG 3 Progress Comments Cuing required with ambulation into clinic.  -LS        Long Term Goals    LTG Date to Achieve 04/13/18  -LS     LTG 1 pt with increased LE strength with MMT to 3+-4/5  -LS     LTG 1 Progress Ongoing  -LS     LTG 2 Pt able to tolerate 10+ min on stationary bike ( iether at home or in clinic)  -LS     LTG 2 Progress Ongoing  -LS     LTG 3 improved oswestry score by 3 points  -LS     LTG 3 Progress Ongoing  -LS     LTG 4 Pt walking every day ( not including around the house)  -LS     LTG 4 Progress Ongoing   -LS       User Key  (r) = Recorded By, (t) = Taken By, (c) = Cosigned By    Initials Name Provider Type    LS Darlene Ghosh PT Physical Therapist          Therapy Education  Education Details: encouraged improved posture with rollator entering and leaving clinic  Given: Symptoms/condition management, Posture/body mechanics              Time Calculation:   Start Time: 1600  Stop Time: 1645  Time Calculation (min): 45 min    Therapy Charges for Today     Code Description Service Date Service Provider Modifiers Qty    80837457507  PT THER PROC EA 15 MIN 3/13/2018 Darlene Ghosh PT GP 3                    Darlene Ghosh PT  3/13/2018

## 2018-03-16 ENCOUNTER — HOSPITAL ENCOUNTER (OUTPATIENT)
Dept: PHYSICAL THERAPY | Facility: HOSPITAL | Age: 83
Setting detail: THERAPIES SERIES
Discharge: HOME | End: 2018-03-16

## 2018-03-16 DIAGNOSIS — R53.1 GENERAL WEAKNESS: ICD-10-CM

## 2018-03-16 DIAGNOSIS — M54.5 LOW BACK PAIN, UNSPECIFIED BACK PAIN LATERALITY, UNSPECIFIED CHRONICITY, WITH SCIATICA PRESENCE UNSPECIFIED: ICD-10-CM

## 2018-03-16 DIAGNOSIS — Z98.1 S/P SPINAL FUSION: Primary | ICD-10-CM

## 2018-03-16 PROCEDURE — 97110 THERAPEUTIC EXERCISES: CPT | Performed by: PHYSICAL THERAPIST

## 2018-03-16 NOTE — THERAPY TREATMENT NOTE
Outpatient Physical Therapy Ortho Treatment Note  [facility]     Patient Name: Alexa Peace  : 1927  MRN: [id number]  Today's Date: 3/16/2018      Visit Date: 2018    Visit Dx:    ICD-10-CM ICD-9-CM   1. S/P spinal fusion Z98.1 V45.4   2. Low back pain, unspecified back pain laterality, unspecified chronicity, with sciatica presence unspecified M54.5 724.2   3. General weakness R53.1 780.79       Patient Active Problem List   Diagnosis   • Bad memory   • Depression   • Diverticulosis of intestine   • Hyperlipidemia   • Macular degeneration   • Peripheral nerve disease   • Osteopenia   • Atopic rhinitis   • Urge incontinence of urine   • Atherosclerosis of native coronary artery   • Essential hypertension   • Gastroesophageal reflux disease without esophagitis   • Impaired glucose tolerance   • History of coronary artery stent placement   • Chronic bilateral low back pain   • Acquired scoliosis   • Spinal stenosis of lumbar region with neurogenic claudication   • PAC (premature atrial contraction)   • Slow transit constipation        Past Medical History:   Diagnosis Date   • Anticoagulated     PLAVIX   • Arthritis    • Atrial premature complex    • CAD (coronary artery disease)    • Cancer     skin   • Colon polyp    • Depression    • GERD (gastroesophageal reflux disease)    • Heart attack    • Hyperlipidemia    • Hypertension    • Macular degeneration    • Pre-diabetes    • Spinal stenosis         Past Surgical History:   Procedure Laterality Date   • CATARACT EXTRACTION     • CORONARY ANGIOPLASTY WITH STENT PLACEMENT     • EPIDURAL BLOCK     • LUMBAR DISCECTOMY FUSION INSTRUMENTATION N/A 2017    Procedure: L2-3, L3-4, L4-5 laminectomy and fusion with local bone graft;  Surgeon: Andre Layne MD;  Location: [facility];  Service:    • TONSILECTOMY, ADENOIDECTOMY, BILATERAL MYRINGOTOMY AND TUBES                               PT Assessment/Plan     Row Name 18 1522          PT  Assessment    Assessment Comments Patient with increase pain/flare-up today. Reduced resistance exercise and trialed moist heat and end of session to reset pain cycle. OK to resume as tolerated next week.  -GR        PT Plan    PT Plan Comments Continue functional strength training, modalities as indicated.  -GR       User Key  (r) = Recorded By, (t) = Taken By, (c) = Cosigned By    Initials Name Provider Type    DAVID Dickey, PT Physical Therapist                Modalities     Row Name 03/16/18 1400             Moist Heat    MH Applied Yes  -GR      Location lumbar spine - patient 90/90 legs over traction stool  -GR      Rx Minutes 10 mins  -GR      MH S/P Rx Yes  -GR        User Key  (r) = Recorded By, (t) = Taken By, (c) = Cosigned By    Initials Name Provider Type    DAVID Dickey, PT Physical Therapist                Exercises     Row Name 03/16/18 1400             Subjective Comments    Subjective Comments I feel like I'm going backwards a little bit. Definetly more sore after last session than I have been.  -GR         Subjective Pain    Able to rate subjective pain? yes  -GR      Pre-Treatment Pain Level 6  -GR      Post-Treatment Pain Level 6  -GR         Exercise 1    Exercise Name 1 Nustep L3 UE/LE  -GR      Time 1 5  -GR         Exercise 3    Exercise Name 3 H/L PPT  -GR      Cueing 3 Verbal  -GR      Sets 3 2  -GR      Reps 3 10  -GR      Time 3 5 seconds  -GR         Exercise 4    Cueing 4 Verbal  -GR      Sets 4 1  -GR      Reps 4 3  -GR         Exercise 5    Exercise Name 5 H/L clam  -GR      Cueing 5 Verbal  -GR      Sets 5 2  -GR      Reps 5 10  -GR      Additional Comments RTB  -GR         Exercise 6    Exercise Name 6 H/L hip adduction squeeze  -GR      Cueing 6 Verbal  -GR      Sets 6 2  -GR      Reps 6 10  -GR      Additional Comments pink ball  -GR         Exercise 7    Exercise Name 7 H/S stretch with belt  -GR      Cueing 7 Verbal;Demo  -GR      Sets 7 1  -GR      Reps 7 3  -GR       Time 7 20 seconds  -GR         Exercise 13    Exercise Name 13 LTR  -GR      Cueing 13 Verbal  -GR      Sets 13 2  -GR      Reps 13 10  -GR      Additional Comments slow and controlled  -GR        User Key  (r) = Recorded By, (t) = Taken By, (c) = Cosigned By    Initials Name Provider Type    DAVID Dickey PT Physical Therapist                               PT OP Goals     Row Name 03/16/18 1500          PT Short Term Goals    STG Date to Achieve 03/23/18  -GR     STG 1 Independent with HEP  -GR     STG 1 Progress Progressing  -GR     STG 2 Pt reports decreased pain level < 4/10  -GR     STG 2 Progress Ongoing  -GR     STG 3 pt demonstrates improved awarenesss of posture  -GR     STG 3 Progress Ongoing  -GR        Long Term Goals    LTG Date to Achieve 04/13/18  -GR     LTG 1 pt with increased LE strength with MMT to 3+-4/5  -GR     LTG 1 Progress Ongoing  -GR     LTG 2 Pt able to tolerate 10+ min on stationary bike ( iether at home or in clinic)  -GR     LTG 2 Progress Ongoing  -GR     LTG 3 improved oswestry score by 3 points  -GR     LTG 3 Progress Ongoing  -GR     LTG 4 Pt walking every day ( not including around the house)  -GR     LTG 4 Progress Ongoing  -GR       User Key  (r) = Recorded By, (t) = Taken By, (c) = Cosigned By    Initials Name Provider Type    DAVID Dickey PT Physical Therapist          Therapy Education  Education Details: reset pain cycle  Given: Symptoms/condition management  Program: Reinforced  How Provided: Verbal  Provided to: Patient  Level of Understanding: Teach back education performed, Verbalized              Time Calculation:   Start Time: 1445  Stop Time: 1530  Time Calculation (min): 45 min    Therapy Charges for Today     Code Description Service Date Service Provider Modifiers Qty    24662277140 HC PT HOT OR COLD PACK TREAT MCARE 3/16/2018 Aris Dickey, PT GP 1    26472574938 HC PT THER PROC EA 15 MIN 3/16/2018 Aris Dickey PT GP 2                     Aris Dickey, PT  3/16/2018

## 2018-03-19 RX ORDER — TRAMADOL HYDROCHLORIDE 50 MG/1
TABLET ORAL
Qty: 50 TABLET | Refills: 0 | OUTPATIENT
Start: 2018-03-19 | End: 2018-04-11 | Stop reason: SDUPTHER

## 2018-03-19 RX ORDER — RANITIDINE 150 MG/1
TABLET ORAL
Qty: 180 TABLET | Refills: 0 | Status: SHIPPED | OUTPATIENT
Start: 2018-03-19 | End: 2018-06-16 | Stop reason: SDUPTHER

## 2018-03-20 ENCOUNTER — HOSPITAL ENCOUNTER (OUTPATIENT)
Dept: PHYSICAL THERAPY | Facility: HOSPITAL | Age: 83
Setting detail: THERAPIES SERIES
Discharge: HOME | End: 2018-03-20

## 2018-03-20 DIAGNOSIS — M54.5 LOW BACK PAIN, UNSPECIFIED BACK PAIN LATERALITY, UNSPECIFIED CHRONICITY, WITH SCIATICA PRESENCE UNSPECIFIED: ICD-10-CM

## 2018-03-20 DIAGNOSIS — Z98.1 S/P SPINAL FUSION: Primary | ICD-10-CM

## 2018-03-20 DIAGNOSIS — R53.1 GENERAL WEAKNESS: ICD-10-CM

## 2018-03-20 PROCEDURE — 97110 THERAPEUTIC EXERCISES: CPT

## 2018-03-20 NOTE — THERAPY TREATMENT NOTE
Outpatient Physical Therapy Ortho Treatment Note  [facility]     Patient Name: Alexa Peace  : 1927  MRN: [id number]  Today's Date: 3/20/2018      Visit Date: 2018    Visit Dx:    ICD-10-CM ICD-9-CM   1. S/P spinal fusion Z98.1 V45.4   2. Low back pain, unspecified back pain laterality, unspecified chronicity, with sciatica presence unspecified M54.5 724.2   3. General weakness R53.1 780.79       Patient Active Problem List   Diagnosis   • Bad memory   • Depression   • Diverticulosis of intestine   • Hyperlipidemia   • Macular degeneration   • Peripheral nerve disease   • Osteopenia   • Atopic rhinitis   • Urge incontinence of urine   • Atherosclerosis of native coronary artery   • Essential hypertension   • Gastroesophageal reflux disease without esophagitis   • Impaired glucose tolerance   • History of coronary artery stent placement   • Chronic bilateral low back pain   • Acquired scoliosis   • Spinal stenosis of lumbar region with neurogenic claudication   • PAC (premature atrial contraction)   • Slow transit constipation        Past Medical History:   Diagnosis Date   • Anticoagulated     PLAVIX   • Arthritis    • Atrial premature complex    • CAD (coronary artery disease)    • Cancer     skin   • Colon polyp    • Depression    • GERD (gastroesophageal reflux disease)    • Heart attack    • Hyperlipidemia    • Hypertension    • Macular degeneration    • Pre-diabetes    • Spinal stenosis         Past Surgical History:   Procedure Laterality Date   • CATARACT EXTRACTION     • CORONARY ANGIOPLASTY WITH STENT PLACEMENT     • EPIDURAL BLOCK     • LUMBAR DISCECTOMY FUSION INSTRUMENTATION N/A 2017    Procedure: L2-3, L3-4, L4-5 laminectomy and fusion with local bone graft;  Surgeon: Andre Layne MD;  Location: [facility];  Service:    • TONSILECTOMY, ADENOIDECTOMY, BILATERAL MYRINGOTOMY AND TUBES                               PT Assessment/Plan     Row Name 18 5168          PT  Assessment    Assessment Comments Pt continues to report increased soreness over last few days. Improved tightness of brace as pt was wearing loosely today as well as instructed pt to maintain closer position to rollator with ambulation as she tends to adopt FF posture entering and leaving clinic. Continued heat for pain control in deompression position and encouraged pt to perform at home after outings.   -LS        PT Plan    PT Plan Comments Continue core/hip strengthening, LE flexibility.  -LS       User Key  (r) = Recorded By, (t) = Taken By, (c) = Cosigned By    Initials Name Provider Type    YAMILKA Ghosh PT Physical Therapist                Modalities     Row Name 03/20/18 1600             Moist Heat    MH Applied Yes  -LS      Location lumbar spine in decompression position  -LS      Rx Minutes 15 mins  -LS      MH S/P Rx Yes  -LS        User Key  (r) = Recorded By, (t) = Taken By, (c) = Cosigned By    Initials Name Provider Type    YAMILKA Ghosh PT Physical Therapist                Exercises     Row Name 03/20/18 1600             Subjective Comments    Subjective Comments I am still pretty sore. I think this may just be the way it goes with this surgery.  -LS         Subjective Pain    Able to rate subjective pain? yes  -LS      Pre-Treatment Pain Level 6  -LS         Exercise 1    Exercise Name 1 Nustep L3 UE/LE  -LS      Time 1 5  -LS         Exercise 3    Exercise Name 3 H/L PPT  -LS      Cueing 3 Verbal  -LS      Sets 3 2  -LS      Reps 3 10  -LS      Time 3 5 seconds  -LS         Exercise 4    Exercise Name 4 SKTC alt  -LS      Cueing 4 Verbal  -LS      Reps 4 3  -LS      Time 4 20  -LS         Exercise 5    Exercise Name 5 H/L clam  -LS      Cueing 5 Verbal  -LS      Sets 5 2  -LS      Reps 5 10  -LS      Additional Comments RTB  -LS         Exercise 6    Exercise Name 6 H/L hip adduction squeeze  -LS      Cueing 6 Verbal  -LS      Sets 6 2  -LS      Reps 6 10  -LS      Additional Comments  pink ball  -LS         Exercise 7    Exercise Name 7 H/S stretch with belt  -LS      Cueing 7 Verbal;Demo  -LS      Sets 7 1  -LS      Reps 7 3  -LS      Time 7 20 seconds  -LS         Exercise 11    Exercise Name 11 seated LAQ  -LS      Reps 11 10  -LS      Additional Comments no weight   -LS         Exercise 13    Exercise Name 13 LTR  -LS      Cueing 13 Verbal  -LS      Sets 13 2  -LS      Reps 13 10  -LS         Exercise 14    Exercise Name 14 seated row  -LS      Reps 14 10  -LS      Additional Comments RTB  -LS        User Key  (r) = Recorded By, (t) = Taken By, (c) = Cosigned By    Initials Name Provider Type    YAMILKA Ghosh, PT Physical Therapist                               PT OP Goals     Row Name 03/20/18 1700          PT Short Term Goals    STG Date to Achieve 03/23/18  -LS     STG 1 Independent with HEP  -LS     STG 1 Progress Met  -LS     STG 1 Progress Comments Pt doing well with compliance, progressed today.  -LS     STG 2 Pt reports decreased pain level < 4/10  -LS     STG 2 Progress Ongoing  -LS     STG 2 Progress Comments 6/10 today  -LS     STG 3 pt demonstrates improved awarenesss of posture  -LS     STG 3 Progress Ongoing  -LS     STG 3 Progress Comments Pt verbalizes awareness but difficulty self- monitoring.  -LS        Long Term Goals    LTG Date to Achieve 04/13/18  -LS     LTG 1 pt with increased LE strength with MMT to 3+-4/5  -LS     LTG 1 Progress Ongoing  -LS     LTG 2 Pt able to tolerate 10+ min on stationary bike ( iether at home or in clinic)  -LS     LTG 2 Progress Ongoing  -LS     LTG 3 improved oswestry score by 3 points  -LS     LTG 3 Progress Ongoing  -LS     LTG 4 Pt walking every day ( not including around the house)  -LS     LTG 4 Progress Ongoing  -LS       User Key  (r) = Recorded By, (t) = Taken By, (c) = Cosigned By    Initials Name Provider Type    YAMILKA Ghosh, PT Physical Therapist          Therapy Education  Education Details: added LAQ, hip  adduction  Given: HEP  Program: Progressed  How Provided: Demonstration, Written, Verbal  Provided to: Patient  Level of Understanding: Teach back education performed, Verbalized, Demonstrated              Time Calculation:   Start Time: 1605  Stop Time: 1655  Time Calculation (min): 50 min    Therapy Charges for Today     Code Description Service Date Service Provider Modifiers Qty    51119866825 HC PT THER PROC EA 15 MIN 3/20/2018 Darlene Ghosh, PT GP 3    [id number]  PT HOT OR COLD PACK TREAT MCARE 3/20/2018 Darlene Ghosh, PT GP 1                    Darlene Ghosh, PT  3/20/2018

## 2018-03-23 ENCOUNTER — HOSPITAL ENCOUNTER (OUTPATIENT)
Dept: PHYSICAL THERAPY | Facility: HOSPITAL | Age: 83
Setting detail: THERAPIES SERIES
Discharge: HOME | End: 2018-03-23

## 2018-03-23 DIAGNOSIS — M54.5 LOW BACK PAIN, UNSPECIFIED BACK PAIN LATERALITY, UNSPECIFIED CHRONICITY, WITH SCIATICA PRESENCE UNSPECIFIED: ICD-10-CM

## 2018-03-23 DIAGNOSIS — Z98.1 S/P SPINAL FUSION: Primary | ICD-10-CM

## 2018-03-23 DIAGNOSIS — R53.1 GENERAL WEAKNESS: ICD-10-CM

## 2018-03-23 PROCEDURE — 97110 THERAPEUTIC EXERCISES: CPT | Performed by: PHYSICAL THERAPIST

## 2018-03-23 NOTE — THERAPY TREATMENT NOTE
Outpatient Physical Therapy Ortho Treatment Note  [facility]     Patient Name: Alexa Peace  : 1927  MRN: [id number]  Today's Date: 3/23/2018      Visit Date: 2018    Visit Dx:    ICD-10-CM ICD-9-CM   1. S/P spinal fusion Z98.1 V45.4   2. Low back pain, unspecified back pain laterality, unspecified chronicity, with sciatica presence unspecified M54.5 724.2   3. General weakness R53.1 780.79       Patient Active Problem List   Diagnosis   • Bad memory   • Depression   • Diverticulosis of intestine   • Hyperlipidemia   • Macular degeneration   • Peripheral nerve disease   • Osteopenia   • Atopic rhinitis   • Urge incontinence of urine   • Atherosclerosis of native coronary artery   • Essential hypertension   • Gastroesophageal reflux disease without esophagitis   • Impaired glucose tolerance   • History of coronary artery stent placement   • Chronic bilateral low back pain   • Acquired scoliosis   • Spinal stenosis of lumbar region with neurogenic claudication   • PAC (premature atrial contraction)   • Slow transit constipation        Past Medical History:   Diagnosis Date   • Anticoagulated     PLAVIX   • Arthritis    • Atrial premature complex    • CAD (coronary artery disease)    • Cancer     skin   • Colon polyp    • Depression    • GERD (gastroesophageal reflux disease)    • Heart attack    • Hyperlipidemia    • Hypertension    • Macular degeneration    • Pre-diabetes    • Spinal stenosis         Past Surgical History:   Procedure Laterality Date   • CATARACT EXTRACTION     • CORONARY ANGIOPLASTY WITH STENT PLACEMENT     • EPIDURAL BLOCK     • LUMBAR DISCECTOMY FUSION INSTRUMENTATION N/A 2017    Procedure: L2-3, L3-4, L4-5 laminectomy and fusion with local bone graft;  Surgeon: Andre Layne MD;  Location: [facility];  Service:    • TONSILECTOMY, ADENOIDECTOMY, BILATERAL MYRINGOTOMY AND TUBES                               PT Assessment/Plan     Row Name 18 7930          PT  Assessment    Assessment Comments Patient with better controlled pain today. Able to add more quad strengthening and reinforced safe controlled transitional movements.  -GR        PT Plan    PT Plan Comments Continue POC as tolerated.  -GR       User Key  (r) = Recorded By, (t) = Taken By, (c) = Cosigned By    Initials Name Provider Type    DAVID Tamela RAYO Noble, PT Physical Therapist                Modalities     Row Name 03/23/18 1600             Moist Heat    MH Applied Yes  -GR      Location lumbar spine in decompression position  -GR      Rx Minutes 10 mins  -GR      MH S/P Rx Yes  -GR        User Key  (r) = Recorded By, (t) = Taken By, (c) = Cosigned By    Initials Name Provider Type    DAVID Aris RAYO Noble, PT Physical Therapist                Exercises     Row Name 03/23/18 1600             Subjective Comments    Subjective Comments Pain is better; was at a 1/10 yesterday and a 3/10 today. Went shopping earlier and her and her dtr both had to take  nap in the car following.  -GR         Subjective Pain    Able to rate subjective pain? yes  -GR      Pre-Treatment Pain Level 3  -GR         Exercise 1    Exercise Name 1 Nustep L4 UE/LE  -GR      Time 1 5 minutes  -GR         Exercise 3    Exercise Name 3 H/L PPT  -GR      Cueing 3 Verbal  -GR      Sets 3 2  -GR      Reps 3 10  -GR      Time 3 5 seconds  -GR         Exercise 4    Exercise Name 4 SKTC alt  -GR      Cueing 4 Verbal  -GR      Reps 4 3  -GR      Time 4 20  -GR         Exercise 5    Exercise Name 5 H/L clam  -GR      Cueing 5 Verbal  -GR      Sets 5 2  -GR      Reps 5 10  -GR      Additional Comments GTB  -GR         Exercise 6    Exercise Name 6 H/L hip adduction squeeze  -GR      Cueing 6 Verbal  -GR      Sets 6 2  -GR      Reps 6 10  -GR      Additional Comments pink ball  -GR         Exercise 7    Exercise Name 7 H/S stretch with belt  -GR      Cueing 7 Verbal;Demo  -GR      Sets 7 1  -GR      Reps 7 3  -GR      Time 7 20 seconds  -GR          Exercise 8    Exercise Name 8 DKTC with swiss ball  -GR      Cueing 8 Demo  -GR      Sets 8 2  -GR      Reps 8 10  -GR         Exercise 9    Exercise Name 9 SAQ  -GR      Cueing 9 Demo  -GR      Sets 9 2  -GR      Reps 9 10  -GR      Additional Comments slow/controlled  -GR         Exercise 11    Exercise Name 11 seated LAQ  -GR      Cueing 11 Demo  -GR      Sets 11 2  -GR      Reps 11 10  -GR      Additional Comments slow/controlled  -GR         Exercise 13    Exercise Name 13 LTR  -GR      Cueing 13 Verbal  -GR      Sets 13 2  -GR      Reps 13 10  -GR      Additional Comments with swiss ball  -GR        User Key  (r) = Recorded By, (t) = Taken By, (c) = Cosigned By    Initials Name Provider Type    GR Aris Dickey PT Physical Therapist                               PT OP Goals     Row Name 03/23/18 1600          PT Short Term Goals    STG Date to Achieve 03/23/18  -GR     STG 1 Independent with HEP  -GR     STG 1 Progress Met  -GR     STG 2 Pt reports decreased pain level < 4/10  -GR     STG 2 Progress Met  -GR     STG 2 Progress Comments 1-3  -GR     STG 3 pt demonstrates improved awarenesss of posture  -GR     STG 3 Progress Ongoing  -GR     STG 3 Progress Comments varies  -GR        Long Term Goals    LTG Date to Achieve 04/13/18  -GR     LTG 1 pt with increased LE strength with MMT to 3+-4/5  -GR     LTG 1 Progress Ongoing  -GR     LTG 2 Pt able to tolerate 10+ min on stationary bike ( iether at home or in clinic)  -GR     LTG 2 Progress Ongoing  -GR     LTG 3 improved oswestry score by 3 points  -GR     LTG 3 Progress Ongoing  -GR     LTG 4 Pt walking every day ( not including around the house)  -GR     LTG 4 Progress Ongoing  -GR       User Key  (r) = Recorded By, (t) = Taken By, (c) = Cosigned By    Initials Name Provider Type    DAVID Dickey PT Physical Therapist          Therapy Education  Education Details: slow controlled motion  Given: Posture/body mechanics  Program: Reinforced  How  Provided: Verbal  Provided to: Patient  Level of Understanding: Teach back education performed              Time Calculation:   Start Time: 1605  Stop Time: 1655  Time Calculation (min): 50 min    Therapy Charges for Today     Code Description Service Date Service Provider Modifiers Qty    87543695437 HC PT HOT OR COLD PACK TREAT MCARE 3/23/2018 Aris Dickey, PT GP 1    51531714144 HC PT THER PROC EA 15 MIN 3/23/2018 Aris Dickey, PT GP 3                    Aris Dickey PT  3/23/2018

## 2018-03-27 ENCOUNTER — HOSPITAL ENCOUNTER (OUTPATIENT)
Dept: PHYSICAL THERAPY | Facility: HOSPITAL | Age: 83
Setting detail: THERAPIES SERIES
Discharge: HOME | End: 2018-03-27

## 2018-03-27 DIAGNOSIS — M54.5 LOW BACK PAIN, UNSPECIFIED BACK PAIN LATERALITY, UNSPECIFIED CHRONICITY, WITH SCIATICA PRESENCE UNSPECIFIED: ICD-10-CM

## 2018-03-27 DIAGNOSIS — Z98.1 S/P SPINAL FUSION: Primary | ICD-10-CM

## 2018-03-27 DIAGNOSIS — R53.1 GENERAL WEAKNESS: ICD-10-CM

## 2018-03-27 PROCEDURE — 97110 THERAPEUTIC EXERCISES: CPT

## 2018-03-27 NOTE — THERAPY TREATMENT NOTE
Outpatient Physical Therapy Ortho Treatment Note  [facility]     Patient Name: Alexa Peace  : 1927  MRN: [id number]  Today's Date: 3/27/2018      Visit Date: 2018    Visit Dx:    ICD-10-CM ICD-9-CM   1. S/P spinal fusion Z98.1 V45.4   2. Low back pain, unspecified back pain laterality, unspecified chronicity, with sciatica presence unspecified M54.5 724.2   3. General weakness R53.1 780.79       Patient Active Problem List   Diagnosis   • Bad memory   • Depression   • Diverticulosis of intestine   • Hyperlipidemia   • Macular degeneration   • Peripheral nerve disease   • Osteopenia   • Atopic rhinitis   • Urge incontinence of urine   • Atherosclerosis of native coronary artery   • Essential hypertension   • Gastroesophageal reflux disease without esophagitis   • Impaired glucose tolerance   • History of coronary artery stent placement   • Chronic bilateral low back pain   • Acquired scoliosis   • Spinal stenosis of lumbar region with neurogenic claudication   • PAC (premature atrial contraction)   • Slow transit constipation        Past Medical History:   Diagnosis Date   • Anticoagulated     PLAVIX   • Arthritis    • Atrial premature complex    • CAD (coronary artery disease)    • Cancer     skin   • Colon polyp    • Depression    • GERD (gastroesophageal reflux disease)    • Heart attack    • Hyperlipidemia    • Hypertension    • Macular degeneration    • Pre-diabetes    • Spinal stenosis         Past Surgical History:   Procedure Laterality Date   • CATARACT EXTRACTION     • CORONARY ANGIOPLASTY WITH STENT PLACEMENT     • EPIDURAL BLOCK     • LUMBAR DISCECTOMY FUSION INSTRUMENTATION N/A 2017    Procedure: L2-3, L3-4, L4-5 laminectomy and fusion with local bone graft;  Surgeon: Andre Layne MD;  Location: [facility];  Service:    • TONSILECTOMY, ADENOIDECTOMY, BILATERAL MYRINGOTOMY AND TUBES                               PT Assessment/Plan     Row Name 18 1621          PT  Assessment    Assessment Comments Pt with soreness again today. Pain appears to fluctuate regardless of activity level at home. Pt brought quad cane today but reports she does not like to use it and noted uneven height of feet on cane. Pt and PT agreed rollator is more appropriate AD to use at this time due to dec strength and endurance.   -LS        PT Plan    PT Plan Comments Continue core/hip strengthening. Progress to standing exercise as tolerated.   -LS       User Key  (r) = Recorded By, (t) = Taken By, (c) = Cosigned By    Initials Name Provider Type    YAMILKA Ghosh PT Physical Therapist                Modalities     Row Name 03/27/18 1600             Moist Heat    MH Applied Yes  -LS      Location lumbar spine in decompression position  -LS      Rx Minutes 10 mins  -LS      MH S/P Rx Yes  -LS        User Key  (r) = Recorded By, (t) = Taken By, (c) = Cosigned By    Initials Name Provider Type    YAMILKA Ghosh PT Physical Therapist                Exercises     Row Name 03/27/18 1600             Subjective Comments    Subjective Comments I am sore today. I couldn't stand up straight this morning.  -LS         Subjective Pain    Able to rate subjective pain? yes  -LS      Pre-Treatment Pain Level 5  -LS         Exercise 1    Exercise Name 1 Nustep L4 UE/LE  -LS      Time 1 5 minutes  -LS         Exercise 3    Exercise Name 3 H/L PPT  -LS      Cueing 3 Verbal  -LS      Sets 3 2  -LS      Reps 3 10  -LS      Time 3 5 seconds  -LS         Exercise 4    Exercise Name 4 SKTC alt  -LS      Cueing 4 Verbal  -LS      Reps 4 3  -LS      Time 4 20  -LS         Exercise 5    Exercise Name 5 H/L clam  -LS      Cueing 5 Verbal  -LS      Sets 5 2  -LS      Reps 5 10  -LS      Additional Comments GTB  -LS         Exercise 6    Exercise Name 6 H/L hip adduction squeeze  -LS      Cueing 6 Verbal  -LS      Sets 6 2  -LS      Reps 6 10  -LS      Additional Comments pink ball  -LS         Exercise 7    Exercise Name 7 H/S  stretch with belt  -LS      Cueing 7 Verbal;Demo  -LS      Sets 7 1  -LS      Reps 7 3  -LS      Time 7 20 seconds  -LS         Exercise 8    Exercise Name 8 DKTC with swiss ball  -LS      Cueing 8 Demo  -LS      Sets 8 2  -LS      Reps 8 10  -LS         Exercise 9    Exercise Name 9 SAQ  -LS      Cueing 9 Demo  -LS      Sets 9 2  -LS      Reps 9 10  -LS      Additional Comments slow/controlled  -LS         Exercise 11    Exercise Name 11 seated LAQ  -LS      Cueing 11 Demo  -LS      Sets 11 2  -LS      Reps 11 10  -LS      Additional Comments slow/controlled  -LS         Exercise 13    Exercise Name 13 LTR  -LS      Cueing 13 Verbal  -LS      Sets 13 2  -LS      Reps 13 10  -LS      Additional Comments with swiss ball  -LS         Exercise 14    Exercise Name 14 Seated row  -LS      Sets 14 2  -LS      Reps 14 10  -LS      Additional Comments RTB  -LS         Exercise 15    Exercise Name 15 wall wash  -LS      Reps 15 10  -LS      Additional Comments at ballet bar  -LS        User Key  (r) = Recorded By, (t) = Taken By, (c) = Cosigned By    Initials Name Provider Type    LS Darlene Ghosh, PT Physical Therapist                               PT OP Goals     Row Name 03/27/18 1600          PT Short Term Goals    STG Date to Achieve 03/23/18  -LS     STG 1 Independent with HEP  -LS     STG 1 Progress Met  -LS     STG 2 Pt reports decreased pain level < 4/10  -LS     STG 2 Progress Met  -LS     STG 3 pt demonstrates improved awarenesss of posture  -LS     STG 3 Progress Ongoing  -LS        Long Term Goals    LTG Date to Achieve 04/13/18  -LS     LTG 1 pt with increased LE strength with MMT to 3+-4/5  -LS     LTG 1 Progress Ongoing  -LS     LTG 2 Pt able to tolerate 10+ min on stationary bike ( iether at home or in clinic)  -LS     LTG 2 Progress Ongoing  -LS     LTG 3 improved oswestry score by 3 points  -LS     LTG 3 Progress Ongoing  -LS     LTG 4 Pt walking every day ( not including around the house)  -LS     LTG 4  Progress Ongoing  -       User Key  (r) = Recorded By, (t) = Taken By, (c) = Cosigned By    Initials Name Provider Type    LS Darlene Ghosh PT Physical Therapist                         Time Calculation:   Start Time: 1600  Stop Time: 1645  Time Calculation (min): 45 min    Therapy Charges for Today     Code Description Service Date Service Provider Modifiers Qty    81354400406  PT THER PROC EA 15 MIN 3/27/2018 Darlene Ghosh, PT GP 3                    Darlene Ghosh PT  3/27/2018

## 2018-03-30 ENCOUNTER — HOSPITAL ENCOUNTER (OUTPATIENT)
Dept: PHYSICAL THERAPY | Facility: HOSPITAL | Age: 83
Setting detail: THERAPIES SERIES
Discharge: HOME | End: 2018-03-30

## 2018-03-30 ENCOUNTER — TELEPHONE (OUTPATIENT)
Dept: ORTHOPEDIC SURGERY | Facility: CLINIC | Age: 83
End: 2018-03-30

## 2018-03-30 DIAGNOSIS — Z98.1 S/P SPINAL FUSION: Primary | ICD-10-CM

## 2018-03-30 DIAGNOSIS — M54.5 LOW BACK PAIN, UNSPECIFIED BACK PAIN LATERALITY, UNSPECIFIED CHRONICITY, WITH SCIATICA PRESENCE UNSPECIFIED: ICD-10-CM

## 2018-03-30 DIAGNOSIS — R53.1 GENERAL WEAKNESS: ICD-10-CM

## 2018-03-30 PROCEDURE — 97116 GAIT TRAINING THERAPY: CPT | Performed by: PHYSICAL THERAPIST

## 2018-03-30 PROCEDURE — G8979 MOBILITY GOAL STATUS: HCPCS | Performed by: PHYSICAL THERAPIST

## 2018-03-30 PROCEDURE — G8980 MOBILITY D/C STATUS: HCPCS | Performed by: PHYSICAL THERAPIST

## 2018-03-30 PROCEDURE — 97110 THERAPEUTIC EXERCISES: CPT | Performed by: PHYSICAL THERAPIST

## 2018-03-30 PROCEDURE — G8978 MOBILITY CURRENT STATUS: HCPCS | Performed by: PHYSICAL THERAPIST

## 2018-03-30 NOTE — THERAPY DISCHARGE NOTE
Outpatient Physical Therapy Ortho Treatment Note/Discharge Summary  [facility]     Patient Name: Alexa Peace  : 1927  MRN: [id number]  Today's Date: 3/30/2018      Visit Date: 2018    Visit Dx:    ICD-10-CM ICD-9-CM   1. S/P spinal fusion Z98.1 V45.4   2. Low back pain, unspecified back pain laterality, unspecified chronicity, with sciatica presence unspecified M54.5 724.2   3. General weakness R53.1 780.79       Patient Active Problem List   Diagnosis   • Bad memory   • Depression   • Diverticulosis of intestine   • Hyperlipidemia   • Macular degeneration   • Peripheral nerve disease   • Osteopenia   • Atopic rhinitis   • Urge incontinence of urine   • Atherosclerosis of native coronary artery   • Essential hypertension   • Gastroesophageal reflux disease without esophagitis   • Impaired glucose tolerance   • History of coronary artery stent placement   • Chronic bilateral low back pain   • Acquired scoliosis   • Spinal stenosis of lumbar region with neurogenic claudication   • PAC (premature atrial contraction)   • Slow transit constipation        Past Medical History:   Diagnosis Date   • Anticoagulated     PLAVIX   • Arthritis    • Atrial premature complex    • CAD (coronary artery disease)    • Cancer     skin   • Colon polyp    • Depression    • GERD (gastroesophageal reflux disease)    • Heart attack    • Hyperlipidemia    • Hypertension    • Macular degeneration    • Pre-diabetes    • Spinal stenosis         Past Surgical History:   Procedure Laterality Date   • CATARACT EXTRACTION     • CORONARY ANGIOPLASTY WITH STENT PLACEMENT     • EPIDURAL BLOCK     • LUMBAR DISCECTOMY FUSION INSTRUMENTATION N/A 2017    Procedure: L2-3, L3-4, L4-5 laminectomy and fusion with local bone graft;  Surgeon: Andre Layne MD;  Location: [facility] OR;  Service:    • TONSILECTOMY, ADENOIDECTOMY, BILATERAL MYRINGOTOMY AND TUBES                               PT Assessment/Plan     Row Name  03/30/18 1543          PT Assessment    Assessment Comments Ms. Peace attended 8 post-operative visits of skilled PT.  At this time she has made functional gains in strength and is independent with a progressive HEP.  She no longer requires skilled PT assist and is appropriate to d/c verbalizing agreement with plan. Thank you for this referral.  -GR        PT Plan    PT Plan Comments d/c to I HEP  -GR       User Key  (r) = Recorded By, (t) = Taken By, (c) = Cosigned By    Initials Name Provider Type    GR Aris Dickey, PT Physical Therapist                    Exercises     Row Name 03/30/18 1500             Subjective Comments    Subjective Comments No preference vs continuing of discontinuing PT.  Sees MD April 4.    -GR         Subjective Pain    Able to rate subjective pain? yes  -GR         Exercise 1    Exercise Name 1 Nustep L4 UE/LE  -GR      Time 1 5 minutes  -GR         Exercise 3    Exercise Name 3 H/L PPT  -GR      Cueing 3 Verbal  -GR      Sets 3 2  -GR      Reps 3 10  -GR      Time 3 5 seconds  -GR         Exercise 4    Exercise Name 4 SKTC alt  -GR      Cueing 4 Verbal  -GR      Reps 4 3  -GR      Time 4 20  -GR         Exercise 5    Exercise Name 5 H/L clam  -GR      Cueing 5 Verbal  -GR      Sets 5 2  -GR      Reps 5 10  -GR      Additional Comments GTB  -GR         Exercise 6    Exercise Name 6 H/L hip adduction squeeze  -GR      Cueing 6 Verbal  -GR      Sets 6 2  -GR      Reps 6 10  -GR      Additional Comments pink ball  -GR         Exercise 7    Exercise Name 7 H/S stretch with belt  -GR      Cueing 7 Verbal;Demo  -GR      Sets 7 1  -GR      Reps 7 3  -GR      Time 7 20 seconds  -GR         Exercise 8    Exercise Name 8 DKTC with swiss ball  -GR      Cueing 8 Demo  -GR      Sets 8 2  -GR      Reps 8 10  -GR         Exercise 9    Exercise Name 9 SAQ  -GR      Cueing 9 Demo  -GR      Sets 9 2  -GR      Reps 9 10  -GR         Exercise 11    Exercise Name 11 seated LAQ  -GR      Cueing 11 Demo   -GR      Sets 11 2  -GR      Reps 11 10  -GR         Exercise 13    Exercise Name 13 LTR  -GR      Cueing 13 Verbal  -GR      Sets 13 2  -GR      Reps 13 10  -GR         Exercise 14    Exercise Name 14 Seated forward flexion  -GR      Sets 14 1  -GR      Reps 14 10  -GR         Exercise 15    Exercise Name 15 Gait training- 1 [facility] clinic arm-in-arm, 50 ft finger tip assist  -GR        User Key  (r) = Recorded By, (t) = Taken By, (c) = Cosigned By    Initials Name Provider Type    DAVID Dickey PT Physical Therapist                               PT OP Goals     Row Name 03/30/18 1400          PT Short Term Goals    STG Date to Achieve 03/23/18  -GR     STG 1 Independent with HEP  -GR     STG 1 Progress Met  -GR     STG 2 Pt reports decreased pain level < 4/10  -GR     STG 2 Progress Met  -GR     STG 3 pt demonstrates improved awarenesss of posture  -GR     STG 3 Progress Met  -GR     STG 3 Progress Comments voices correcting posture with AD independently  -GR        Long Term Goals    LTG Date to Achieve 04/13/18  -GR     LTG 1 pt with increased LE strength with MMT to 3+-4/5  -GR     LTG 1 Progress Met  -GR     LTG 2 Pt able to tolerate 10+ min on stationary bike ( either at home or in clinic)  -GR     LTG 2 Progress Goal Revised  -GR     LTG 2 Progress Comments d/c'd  -GR     LTG 3 improved oswestry score by 3 points  -GR     LTG 3 Progress Not Met  -GR     LTG 4 Pt walking every day ( not including around the house)  -GR     LTG 4 Progress Partially Met  -GR     LTG 4 Progress Comments weather/envinroment dependent  -GR       User Key  (r) = Recorded By, (t) = Taken By, (c) = Cosigned By    Initials Name Provider Type    DAVID Dickey PT Physical Therapist          Therapy Education  Education Details: d/c plan  Given: HEP  Program: Reinforced  How Provided: Verbal  Provided to: Patient  Level of Understanding: Teach back education performed    Outcome Measure Options: Modifed Manuelestry  Modified  Oswestry  Modified Oswestry Score/Comments: 52%      Time Calculation:   Start Time: 1455  Stop Time: 1542  Time Calculation (min): 47 min    Therapy Charges for Today     Code Description Service Date Service Provider Modifiers Qty    00582529547 HC PT MOBILITY CURRENT 3/30/2018 Aris Dickey, PT GP, CK 1    99398659290 HC PT MOBILITY PROJECTED 3/30/2018 Aris Dickey, PT GP, CL 1    48507150585 HC PT MOBILITY DISCHARGE 3/30/2018 Aris Dickey, PT GP, CK 1    81510902574 HC PT THER PROC EA 15 MIN 3/30/2018 Aris Dickey, PT GP 2    78348245143 HC GAIT TRAINING EA 15 MIN 3/30/2018 Aris Dickey, PT GP 1          PT G-Codes  Outcome Measure Options: John Jones  Score: 52%  Functional Limitation: Mobility: Walking and moving around  Mobility: Walking and Moving Around Current Status (): At least 40 percent but less than 60 percent impaired, limited or restricted  Mobility: Walking and Moving Around Goal Status (): At least 60 percent but less than 80 percent impaired, limited or restricted  Mobility: Walking and Moving Around Discharge Status (): At least 40 percent but less than 60 percent impaired, limited or restricted     OP PT Discharge Summary  Date of Discharge: 03/30/18  Reason for Discharge: Maximum functional potential achieved  Outcomes Achieved: Patient able to partially acheive established goals  Discharge Destination: Home with home program  Discharge Instructions/Additional Comments: d/c to I DAVID Dickey PT  3/30/2018

## 2018-03-30 NOTE — TELEPHONE ENCOUNTER
Call return to the patient.  She left a message on my voice mail reporting that the tramadol was causing her to have weird dreams at night.  I did try to call her back however I was unable to reach her and did leave a message on her answering machine instructing her to stop the tramadol and would recommend that she only take Tylenol for pain.  Left it open for her to call if she has any other questions or concerns per VANIA SHORE

## 2018-04-02 ENCOUNTER — TELEPHONE (OUTPATIENT)
Dept: ORTHOPEDIC SURGERY | Facility: CLINIC | Age: 83
End: 2018-04-02

## 2018-04-02 NOTE — TELEPHONE ENCOUNTER
Patient called today she still having nightmares but not as bad since she discontinue the tramadol.  My concern is that her nightmares also be caused from her gabapentin.  However I have advised the patient that she cannot stop the gabapentin cold turkey.  Scheduled to see VANIA SHORE on Friday for follow-up.  She continues to have back and leg pain not getting any relief with the Tylenol.  Admits that she did take a tramadol this afternoon because the pain got so bad.  Will check with VANIA SHORE and then get back with the patient

## 2018-04-06 ENCOUNTER — OFFICE VISIT (OUTPATIENT)
Dept: ORTHOPEDIC SURGERY | Facility: CLINIC | Age: 83
End: 2018-04-06

## 2018-04-06 DIAGNOSIS — M79.651 RIGHT THIGH PAIN: Primary | ICD-10-CM

## 2018-04-06 DIAGNOSIS — M48.062 SPINAL STENOSIS OF LUMBAR REGION WITH NEUROGENIC CLAUDICATION: ICD-10-CM

## 2018-04-06 PROCEDURE — 99213 OFFICE O/P EST LOW 20 MIN: CPT | Performed by: ORTHOPAEDIC SURGERY

## 2018-04-06 PROCEDURE — 73502 X-RAY EXAM HIP UNI 2-3 VIEWS: CPT | Performed by: ORTHOPAEDIC SURGERY

## 2018-04-06 NOTE — PROGRESS NOTES
4 months out and seeming to do better at this point.  Her daughter notes the same.  Still with some right thigh pain that she says is worse after recent fall.  She does have some mild tenderness which I have not previously appreciated in the proximal aspect of the right thigh hip motions nontender Stinchfield is negative.  Still I hate to miss fracture so we obtained x-rays of the right femur which demonstrates no evidence of fracture   .  I plan follow-up visit in 2 weeks with lumbar spine x-rays.  She may wean from the brace.

## 2018-04-12 RX ORDER — TRAMADOL HYDROCHLORIDE 50 MG/1
TABLET ORAL
Qty: 50 TABLET | Refills: 0 | OUTPATIENT
Start: 2018-04-12 | End: 2018-06-01 | Stop reason: SDUPTHER

## 2018-04-16 RX ORDER — PRAVASTATIN SODIUM 40 MG
40 TABLET ORAL DAILY
Qty: 90 TABLET | Refills: 1 | Status: SHIPPED | OUTPATIENT
Start: 2018-04-16 | End: 2018-10-22 | Stop reason: SDUPTHER

## 2018-04-18 ENCOUNTER — TELEPHONE (OUTPATIENT)
Dept: ORTHOPEDIC SURGERY | Facility: CLINIC | Age: 83
End: 2018-04-18

## 2018-04-18 NOTE — TELEPHONE ENCOUNTER
Call return to the patient.  I was not able to reach her but it did leave a message on her answering machine

## 2018-04-25 RX ORDER — GABAPENTIN 100 MG/1
CAPSULE ORAL
Qty: 420 CAPSULE | Refills: 0 | OUTPATIENT
Start: 2018-04-25 | End: 2018-07-27 | Stop reason: SDUPTHER

## 2018-04-25 NOTE — TELEPHONE ENCOUNTER
Per [facility], Hold for ROSA (this does say it was approved but we all checked and the RX never printed and it is still a pending RX)

## 2018-05-02 ENCOUNTER — TELEPHONE (OUTPATIENT)
Dept: ORTHOPEDIC SURGERY | Facility: CLINIC | Age: 83
End: 2018-05-02

## 2018-05-10 ENCOUNTER — TELEPHONE (OUTPATIENT)
Dept: ORTHOPEDIC SURGERY | Facility: CLINIC | Age: 83
End: 2018-05-10

## 2018-05-10 NOTE — TELEPHONE ENCOUNTER
Call return to the patient.  She is very frustrated that she is continuing to have this hip and back pain.  It only her appointment with VANIA SHORE has to be rescheduled due to him being out of the office.  The appointment has been rescheduled for the end of the month.  She would like to be seen sooner or would like for him to make a recommendation as to whether or not she can go to a chiropractor.  Explained to her that his concern was that she could possibly have a stress fracture when she was seen at the time of her last visit and that he would preferred x-ray her before sending her to a chiropractor.  Discussed with VANIA SHORE and have them get back with the patient

## 2018-05-11 ENCOUNTER — TELEPHONE (OUTPATIENT)
Dept: ORTHOPEDIC SURGERY | Facility: CLINIC | Age: 83
End: 2018-05-11

## 2018-05-11 NOTE — TELEPHONE ENCOUNTER
Call return to the patient.  I was unable to reach her but it did leave a message on her answering machine that VANIA SHORE has okayed for her to proceed with seeing a chiropractor

## 2018-05-30 ENCOUNTER — OFFICE VISIT (OUTPATIENT)
Dept: ORTHOPEDIC SURGERY | Facility: CLINIC | Age: 83
End: 2018-05-30

## 2018-05-30 VITALS — HEIGHT: 61 IN | BODY MASS INDEX: 21.34 KG/M2 | WEIGHT: 113 LBS | TEMPERATURE: 98.5 F

## 2018-05-30 DIAGNOSIS — Z98.1 S/P LAMINECTOMY WITH SPINAL FUSION: Primary | ICD-10-CM

## 2018-05-30 DIAGNOSIS — M48.061 SPINAL STENOSIS OF LUMBAR REGION, UNSPECIFIED WHETHER NEUROGENIC CLAUDICATION PRESENT: ICD-10-CM

## 2018-05-30 PROCEDURE — 72100 X-RAY EXAM L-S SPINE 2/3 VWS: CPT | Performed by: ORTHOPAEDIC SURGERY

## 2018-05-30 PROCEDURE — 99213 OFFICE O/P EST LOW 20 MIN: CPT | Performed by: ORTHOPAEDIC SURGERY

## 2018-05-30 NOTE — PROGRESS NOTES
She has back and right leg pain not much better than before surgery but she is 4 months out and probably healed.  Good strength on exam at least in the lower extremities.  Two-view x-rays show good position of graft and probable healing good posture unchanged from prior scoliotic films obtained preop.  There is some further healing of the bone.  I think she can do chiropractic the within normal limits precaution exercise for 89-year-old and can go to pain management to consider stronger medications I'll give her tramadol but I told her I couldn't bony stronger than that I'll see her as needed

## 2018-06-04 ENCOUNTER — TELEPHONE (OUTPATIENT)
Dept: ORTHOPEDIC SURGERY | Facility: CLINIC | Age: 83
End: 2018-06-04

## 2018-06-04 RX ORDER — TRAMADOL HYDROCHLORIDE 50 MG/1
TABLET ORAL
Qty: 50 TABLET | Refills: 0 | Status: SHIPPED | OUTPATIENT
Start: 2018-06-04 | End: 2018-07-06

## 2018-06-04 NOTE — TELEPHONE ENCOUNTER
Called pharmacy on file and called in:    Tramadol 520 mg tablet  Sig: take 1 tabby mouth every 8 hours PRN   Qty: 50 w/ No refills

## 2018-06-07 ENCOUNTER — OFFICE VISIT (OUTPATIENT)
Dept: CARDIOLOGY | Facility: CLINIC | Age: 83
End: 2018-06-07

## 2018-06-07 VITALS
HEART RATE: 71 BPM | HEIGHT: 63 IN | DIASTOLIC BLOOD PRESSURE: 64 MMHG | BODY MASS INDEX: 18.96 KG/M2 | WEIGHT: 107 LBS | SYSTOLIC BLOOD PRESSURE: 130 MMHG

## 2018-06-07 DIAGNOSIS — I25.10 CORONARY ARTERY DISEASE INVOLVING NATIVE CORONARY ARTERY OF NATIVE HEART WITHOUT ANGINA PECTORIS: Primary | ICD-10-CM

## 2018-06-07 PROCEDURE — 93000 ELECTROCARDIOGRAM COMPLETE: CPT | Performed by: INTERNAL MEDICINE

## 2018-06-07 PROCEDURE — 99213 OFFICE O/P EST LOW 20 MIN: CPT | Performed by: INTERNAL MEDICINE

## 2018-06-07 NOTE — PROGRESS NOTES
Subjective:     Encounter Date:06/07/2018      Patient ID: Alexa Peace is a 89 y.o. female.    Chief Complaint: CAD    History of Present Illness    Dear Dr. Childers:    I had the pleasure of seeing the patient in cardiac follow-up today.  As you well know, she is a mimi, 89-year-old woman with history of coronary artery disease status post stenting of her left anterior descending.     She comes in for her yearly follow-up.  Since I have last seen her, she reports doing great.  She got over back surgery but still has chronic pain.  She is taking Tylenol quite often.  I calculated her daily dose and it seems to be over the limit.  She denies any complaints of angina.        Review of Systems   All other systems reviewed and are negative.        ECG 12 Lead  Date/Time: 6/7/2018 2:18 PM  Performed by: ADENIKE HART  Authorized by: ADENIKE HART   Comparison: compared with previous ECG   Similar to previous ECG  Rhythm: sinus rhythm  BPM: 71  Other findings: LVH               Objective:     Physical Exam   Constitutional: She is oriented to person, place, and time. She appears well-developed and well-nourished.   HENT:   Head: Normocephalic and atraumatic.   Neck: Normal range of motion. Neck supple.   Cardiovascular: Normal rate, regular rhythm and normal heart sounds.    Pulmonary/Chest: Effort normal and breath sounds normal.   Abdominal: Soft. Bowel sounds are normal.   Musculoskeletal: Normal range of motion.   Neurological: She is alert and oriented to person, place, and time.   Skin: Skin is warm and dry.   Psychiatric: She has a normal mood and affect. Her behavior is normal. Thought content normal.   Vitals reviewed.      Lab Review:       Assessment:          Diagnosis Plan   1. Coronary artery disease involving native coronary artery of native heart without angina pectoris            Plan:       It was a pleasure to see your patient in cardiac follow-up today.  She is doing well from the standpoint of her  coronary artery disease.  She has no angina.  She got through back surgery without difficulty.    I am concerned that she is taking too much acetaminophen.  I have asked her to try to cut this down significantly.  She will see me again in one year or sooner if symptoms warrant.    Coronary Artery Disease  Assessment  • The patient has no angina    Plan  • Lifestyle modifications discussed include adhering to a heart healthy diet, avoidance of tobacco products, maintenance of a healthy weight, medication compliance, regular exercise and regular monitoring of cholesterol and blood pressure    Subjective - Objective  • There has been a previous stent procedure using MANASA  • Current antiplatelet therapy includes aspirin 81 mg

## 2018-06-18 RX ORDER — RANITIDINE 150 MG/1
TABLET ORAL
Qty: 180 TABLET | Refills: 0 | Status: SHIPPED | OUTPATIENT
Start: 2018-06-18 | End: 2018-09-16 | Stop reason: SDUPTHER

## 2018-06-25 DIAGNOSIS — H35.3132 NONEXUDATIVE AGE-RELATED MACULAR DEGENERATION, BILATERAL, INTERMEDIATE DRY STAGE: Primary | ICD-10-CM

## 2018-07-06 ENCOUNTER — OFFICE VISIT (OUTPATIENT)
Dept: INTERNAL MEDICINE | Facility: CLINIC | Age: 83
End: 2018-07-06

## 2018-07-06 VITALS
WEIGHT: 118.4 LBS | HEIGHT: 63 IN | DIASTOLIC BLOOD PRESSURE: 64 MMHG | HEART RATE: 82 BPM | SYSTOLIC BLOOD PRESSURE: 140 MMHG | OXYGEN SATURATION: 98 % | TEMPERATURE: 98.1 F | RESPIRATION RATE: 16 BRPM | BODY MASS INDEX: 20.98 KG/M2

## 2018-07-06 DIAGNOSIS — K59.01 SLOW TRANSIT CONSTIPATION: ICD-10-CM

## 2018-07-06 DIAGNOSIS — M54.41 CHRONIC BILATERAL LOW BACK PAIN WITH RIGHT-SIDED SCIATICA: Primary | ICD-10-CM

## 2018-07-06 DIAGNOSIS — I10 ESSENTIAL HYPERTENSION: ICD-10-CM

## 2018-07-06 DIAGNOSIS — K21.9 GASTROESOPHAGEAL REFLUX DISEASE WITHOUT ESOPHAGITIS: ICD-10-CM

## 2018-07-06 DIAGNOSIS — G89.29 CHRONIC BILATERAL LOW BACK PAIN WITH RIGHT-SIDED SCIATICA: Primary | ICD-10-CM

## 2018-07-06 DIAGNOSIS — E78.5 HYPERLIPIDEMIA, UNSPECIFIED HYPERLIPIDEMIA TYPE: ICD-10-CM

## 2018-07-06 DIAGNOSIS — R73.02 IMPAIRED GLUCOSE TOLERANCE: ICD-10-CM

## 2018-07-06 DIAGNOSIS — I25.10 ATHEROSCLEROSIS OF NATIVE CORONARY ARTERY OF NATIVE HEART WITHOUT ANGINA PECTORIS: ICD-10-CM

## 2018-07-06 PROCEDURE — 99214 OFFICE O/P EST MOD 30 MIN: CPT | Performed by: INTERNAL MEDICINE

## 2018-07-06 RX ORDER — DULOXETIN HYDROCHLORIDE 30 MG/1
30 CAPSULE, DELAYED RELEASE ORAL DAILY
Qty: 90 CAPSULE | Refills: 1 | Status: SHIPPED | OUTPATIENT
Start: 2018-07-06 | End: 2018-09-21 | Stop reason: DRUGHIGH

## 2018-07-06 RX ORDER — HYDROCODONE BITARTRATE AND ACETAMINOPHEN 5; 325 MG/1; MG/1
TABLET ORAL
Refills: 0 | COMMUNITY
Start: 2018-06-25 | End: 2018-09-21 | Stop reason: DRUGHIGH

## 2018-07-06 NOTE — PROGRESS NOTES
Subjective   Alexa Peace is a 89 y.o. female.   6/16/2018  Wt Readings from Last 1 Encounters:   07/06/18 53.7 kg (118 lb 6.4 oz)   She was last seen in January 2018 neck, weight 118 then, unchanged.    She returns for follow-up of atherosclerotic heart disease, hypertension, hyperlipidemia, chronic back pain    History of Present Illness   She had surgery for lumbar spinal stenosis in early December 2017 neck, decompression and fusion L2 through L5 by Dr. Ndiaye.  She still complains of having quite a bit of pain in her low back and right leg.  She was treated by the surgeon with tramadol and gabapentin and then referred for pain management.  They have recently changed from tramadol to hydrocodone for pain relief.  She does have some constipation related to that.  They have requested that I consider the use of duloxetine.    She has atherosclerotic heart disease and had a stent to the LAD artery in the past.  She continues on aspirin 81 mg daily.  No chest pain or shortness of air.  She was seen by her cardiologist, Dr. Go Kim, last month.  He does not not make any changes in medication.  Her EKG was stable.    She has history of hypertension, and she was treated with metoprolol tartrate 25 mg, one half twice a day, but that was discontinued along the way, possibly rehabilitation.  The cardiologist did not resume the medication.    She has hypercholesterolemia treated with pravastatin 40 mg each evening.  No problems with medication described.    She has history of esophageal reflux treated with ranitidine 150 mg daily.  No dysphagia described.    She uses a walker for ambulation.  No falls are described.  She does not do a lot for THE house but is actively walking in her home.  The following portions of the patient's history were reviewed and updated as appropriate: allergies, current medications, past family history, past medical history, past social history, past surgical history and problem list.    Review  of Systems   Constitutional: Negative.    HENT: Negative.    Eyes: Negative.    Respiratory: Negative.    Cardiovascular: Negative.    Endocrine: Negative.    Genitourinary: Negative.    Musculoskeletal: Positive for back pain.   Skin: Negative.    Neurological: Negative.    Hematological: Negative.    Psychiatric/Behavioral: Negative.        Objective   Physical Exam   Constitutional: She appears well-developed and well-nourished.   HENT:   Head: Normocephalic and atraumatic.   Cardiovascular: Normal rate and regular rhythm.  Exam reveals no gallop and no friction rub.    Murmur heard.  Blood pressure 130/60, left arm sitting    Grade 2/6 systolic murmur at the apex   Pulmonary/Chest: Effort normal and breath sounds normal. No respiratory distress. She has no wheezes. She has no rales.   Abdominal: Soft. Bowel sounds are normal. She exhibits no distension and no mass. There is no tenderness.   Neurological: She is alert.   Skin: Skin is warm.   Psychiatric: Her behavior is normal.   Vitals reviewed.      Assessment/Plan   Ada was seen today for back pain, coronary artery disease, hyperlipidemia and heartburn.    Diagnoses and all orders for this visit:    Chronic bilateral low back pain with right-sided sciatica  Comments:  History of lumbar decompression L2 through 5 with fusion in December 2017 neck, now followed by pain management, we'll change from sertraline to duloxetine  Orders:  -     CBC & Differential    Atherosclerosis of native coronary artery of native heart without angina pectoris  Comments:  History of stent to LAD artery, followed by Dr. Go Kim for cardiology, continue aspirin 81 mg daily    Essential hypertension  Comments:  Blood pressure 130/60, satisfactory, we'll continue to follow  Orders:  -     Comprehensive Metabolic Panel    Hyperlipidemia, unspecified hyperlipidemia type  Comments:  Continue pravastatin 40 g each evening, we will check chemistries and lipid studies  Orders:  -      Lipid Panel    Gastroesophageal reflux disease without esophagitis  Comments:  Continue ranitidine 150 mg before breakfast    Constipation  Comments:  Recommend MiraLAX one tablespoonful mixed with 6 ounces of water daily    Impaired glucose tolerance  Comments:  We will check hemoglobin A1c level  Orders:  -     Hemoglobin A1c    Other orders  -     DULoxetine (CYMBALTA) 30 MG capsule; Take 1 capsule by mouth Daily.        Schedule office follow-up about 2 months, return sooner if needed                       EMR Dragon/Transcription disclaimer:      Much of this encounter note is an electronic transcription/translation of spoken language to printed text. The electronic translation of spoken language may permit erroneous, or at times, nonsensical words or phrases to be inadvertently transcribed; Although I have reviewed the note for such errors, some may still exist.

## 2018-07-07 LAB
ALBUMIN SERPL-MCNC: 4.7 G/DL (ref 3.5–5.2)
ALBUMIN/GLOB SERPL: 2.1 G/DL
ALP SERPL-CCNC: 80 U/L (ref 39–117)
ALT SERPL-CCNC: 17 U/L (ref 1–33)
AST SERPL-CCNC: 17 U/L (ref 1–32)
BASOPHILS # BLD AUTO: 0.06 10*3/MM3 (ref 0–0.2)
BASOPHILS NFR BLD AUTO: 0.9 % (ref 0–1.5)
BILIRUB SERPL-MCNC: <0.2 MG/DL (ref 0.1–1.2)
BUN SERPL-MCNC: 24 MG/DL (ref 8–23)
BUN/CREAT SERPL: 34.3 (ref 7–25)
CALCIUM SERPL-MCNC: 9.5 MG/DL (ref 8.2–9.6)
CHLORIDE SERPL-SCNC: 100 MMOL/L (ref 98–107)
CHOLEST SERPL-MCNC: 117 MG/DL (ref 0–200)
CO2 SERPL-SCNC: 30.2 MMOL/L (ref 22–29)
CREAT SERPL-MCNC: 0.7 MG/DL (ref 0.57–1)
DIFFERENTIAL COMMENT: NORMAL
EOSINOPHIL # BLD AUTO: 0.23 10*3/MM3 (ref 0–0.7)
EOSINOPHIL NFR BLD AUTO: 3.3 % (ref 0.3–6.2)
ERYTHROCYTE [DISTWIDTH] IN BLOOD BY AUTOMATED COUNT: 13.2 % (ref 11.7–13)
GLOBULIN SER CALC-MCNC: 2.2 GM/DL
GLUCOSE SERPL-MCNC: 188 MG/DL (ref 65–99)
HBA1C MFR BLD: 5.2 % (ref 4.8–5.6)
HCT VFR BLD AUTO: 38.3 % (ref 35.6–45.5)
HDLC SERPL-MCNC: 52 MG/DL (ref 40–60)
HGB BLD-MCNC: 12.2 G/DL (ref 11.9–15.5)
IMM GRANULOCYTES # BLD: 0.01 10*3/MM3 (ref 0–0.03)
IMM GRANULOCYTES NFR BLD: 0.1 % (ref 0–0.5)
LDLC SERPL CALC-MCNC: 31 MG/DL (ref 0–100)
LYMPHOCYTES # BLD AUTO: 2.37 10*3/MM3 (ref 0.9–4.8)
LYMPHOCYTES NFR BLD AUTO: 34.1 % (ref 19.6–45.3)
MCH RBC QN AUTO: 34.5 PG (ref 26.9–32)
MCHC RBC AUTO-ENTMCNC: 31.9 G/DL (ref 32.4–36.3)
MCV RBC AUTO: 108.2 FL (ref 80.5–98.2)
MONOCYTES # BLD AUTO: 0.49 10*3/MM3 (ref 0.2–1.2)
MONOCYTES NFR BLD AUTO: 7.1 % (ref 5–12)
NEUTROPHILS # BLD AUTO: 3.8 10*3/MM3 (ref 1.9–8.1)
NEUTROPHILS NFR BLD AUTO: 54.6 % (ref 42.7–76)
PLATELET # BLD AUTO: 253 10*3/MM3 (ref 140–500)
PLATELET BLD QL SMEAR: NORMAL
POTASSIUM SERPL-SCNC: 4.1 MMOL/L (ref 3.5–5.2)
PROT SERPL-MCNC: 6.9 G/DL (ref 6–8.5)
RBC # BLD AUTO: 3.54 10*6/MM3 (ref 3.9–5.2)
RBC MORPH BLD: NORMAL
SODIUM SERPL-SCNC: 140 MMOL/L (ref 136–145)
TRIGL SERPL-MCNC: 172 MG/DL (ref 0–150)
VLDLC SERPL CALC-MCNC: 34.4 MG/DL (ref 5–40)
WBC # BLD AUTO: 6.95 10*3/MM3 (ref 4.5–10.7)

## 2018-07-30 RX ORDER — GABAPENTIN 100 MG/1
CAPSULE ORAL
Qty: 420 CAPSULE | Refills: 0 | Status: SHIPPED | OUTPATIENT
Start: 2018-07-30 | End: 2018-10-18 | Stop reason: SDUPTHER

## 2018-09-17 RX ORDER — RANITIDINE 150 MG/1
TABLET ORAL
Qty: 180 TABLET | Refills: 0 | Status: SHIPPED | OUTPATIENT
Start: 2018-09-17 | End: 2018-09-21 | Stop reason: ALTCHOICE

## 2018-09-21 ENCOUNTER — OFFICE VISIT (OUTPATIENT)
Dept: INTERNAL MEDICINE | Facility: CLINIC | Age: 83
End: 2018-09-21

## 2018-09-21 VITALS
DIASTOLIC BLOOD PRESSURE: 62 MMHG | BODY MASS INDEX: 21.44 KG/M2 | HEIGHT: 63 IN | OXYGEN SATURATION: 96 % | HEART RATE: 82 BPM | TEMPERATURE: 98.5 F | SYSTOLIC BLOOD PRESSURE: 134 MMHG | WEIGHT: 121 LBS | RESPIRATION RATE: 16 BRPM

## 2018-09-21 DIAGNOSIS — I25.10 ATHEROSCLEROSIS OF NATIVE CORONARY ARTERY OF NATIVE HEART WITHOUT ANGINA PECTORIS: ICD-10-CM

## 2018-09-21 DIAGNOSIS — M48.062 SPINAL STENOSIS OF LUMBAR REGION WITH NEUROGENIC CLAUDICATION: ICD-10-CM

## 2018-09-21 DIAGNOSIS — E78.5 HYPERLIPIDEMIA, UNSPECIFIED HYPERLIPIDEMIA TYPE: ICD-10-CM

## 2018-09-21 DIAGNOSIS — K21.9 GASTROESOPHAGEAL REFLUX DISEASE WITHOUT ESOPHAGITIS: Primary | ICD-10-CM

## 2018-09-21 PROCEDURE — 99214 OFFICE O/P EST MOD 30 MIN: CPT | Performed by: INTERNAL MEDICINE

## 2018-09-21 RX ORDER — DULOXETIN HYDROCHLORIDE 60 MG/1
60 CAPSULE, DELAYED RELEASE ORAL DAILY
Qty: 90 CAPSULE | Refills: 1 | Status: SHIPPED | OUTPATIENT
Start: 2018-09-21 | End: 2019-03-29 | Stop reason: SDUPTHER

## 2018-09-21 RX ORDER — OMEPRAZOLE 20 MG/1
CAPSULE, DELAYED RELEASE ORAL
Qty: 90 CAPSULE | Refills: 1 | Status: SHIPPED | OUTPATIENT
Start: 2018-09-21 | End: 2019-01-11

## 2018-09-21 RX ORDER — HYDROCODONE BITARTRATE AND ACETAMINOPHEN 7.5; 325 MG/1; MG/1
1 TABLET ORAL 3 TIMES DAILY
COMMUNITY
Start: 2018-08-21 | End: 2019-04-09 | Stop reason: HOSPADM

## 2018-09-21 NOTE — PROGRESS NOTES
Subjective   Alexa Peace is a 89 y.o. female.   9/16/2018  Wt Readings from Last 1 Encounters:   09/21/18 54.9 kg (121 lb)   She was last seen in July 2018.  She returns for follow-up of chronic back pain, atherosclerotic heart disease, hyperlipidemia, gastroesophageal reflux    History of Present Illness   She has chronic low back pain.  She had surgery for lumbar spinal stenosis in December 2017.  She had surgical follow-up and most recently in May.  Her x-ray showed good healing but she continues to complain of pain in her low back and right lower extremity.  She has been going to pain management.  At their request, we started duloxetine 30 mg a daily in July.  She seems to have tolerated the medication well but has not had a lot of difference.  We can increase to 60 mg.  She is on hydrocodone for pain management and they have tried some ablation procedures.  There is still working through her management.    She has atherosclerotic heart disease and had stent to LAD artery in the past.  That she was seen by Dr. lyon in June.  She continues on aspirin 81 mg daily.  Her EKG showed normal sinus rhythm at 71 per minute, left atrial hypertrophy.    She has history of hypertension previously treated with metoprolol tartrate 25 mg, one half twice a day but that was discontinued along the way and not resumed.  She does not describe anginal pain.    She has esophageal reflux and is had some nocturnal reflux symptoms despite the use of ranitidine 150 mg daily.    She has hyperlipidemia which is treated with pravastatin 40 mg each evening.  No medication problems described.  The following portions of the patient's history were reviewed and updated as appropriate: allergies, current medications, past family history, past medical history, past social history, past surgical history and problem list.    Review of Systems   Constitutional: Negative.    HENT: Negative.    Eyes: Negative.    Respiratory: Negative.     Cardiovascular: Positive for chest pain.   Endocrine: Negative.    Genitourinary: Negative.    Musculoskeletal: Positive for back pain.   Skin: Negative.    Neurological: Negative.    Hematological: Negative.    Psychiatric/Behavioral: Negative.        Objective   Physical Exam   Constitutional: She appears well-developed and well-nourished.   HENT:   Head: Normocephalic and atraumatic.   Cardiovascular: Normal rate and regular rhythm.  Exam reveals no gallop and no friction rub.    No murmur heard.  Pulmonary/Chest: Effort normal and breath sounds normal. No respiratory distress. She has no wheezes. She has no rales.   Abdominal: Soft. Bowel sounds are normal. She exhibits no distension and no mass. There is no tenderness.   Neurological: She is alert.   Skin: Skin is warm.   Psychiatric: Her behavior is normal.   Vitals reviewed.      Assessment/Plan   Ada was seen today for back pain, coronary artery disease, hyperlipidemia and heartburn.    Diagnoses and all orders for this visit:    Gastroesophageal reflux disease without esophagitis  Comments:  We will change from ranitidine to omeprazole 20 mg each morning before breakfast    Spinal stenosis of lumbar region with neurogenic claudication  Comments:  Increase duloxetine 60 mg daily, followed by pain management    Atherosclerosis of native coronary artery of native heart without angina pectoris  Comments:  Continue aspirin 81 mg daily, followed by Dr. Go Kim for cardiology    Hyperlipidemia, unspecified hyperlipidemia type  Comments:  Continue pravastatin 40 mg each evening    Other orders  -     DULoxetine (CYMBALTA) 60 MG capsule; Take 1 capsule by mouth Daily.  -     omeprazole (priLOSEC) 20 MG capsule; 1 by mouth daily before breakfast                               EMR Dragon/Transcription disclaimer:      Much of this encounter note is an electronic transcription/translation of spoken language to printed text. The electronic translation of spoken  language may permit erroneous, or at times, nonsensical words or phrases to be inadvertently transcribed; Although I have reviewed the note for such errors, some may still exist.

## 2018-10-18 RX ORDER — GABAPENTIN 100 MG/1
CAPSULE ORAL
Qty: 420 CAPSULE | Refills: 0 | Status: SHIPPED | OUTPATIENT
Start: 2018-10-18 | End: 2019-01-11

## 2018-10-22 RX ORDER — PRAVASTATIN SODIUM 40 MG
40 TABLET ORAL DAILY
Qty: 90 TABLET | Refills: 1 | Status: SHIPPED | OUTPATIENT
Start: 2018-10-22 | End: 2019-01-11

## 2018-12-10 ENCOUNTER — OFFICE VISIT (OUTPATIENT)
Dept: INTERNAL MEDICINE | Facility: CLINIC | Age: 83
End: 2018-12-10

## 2018-12-10 VITALS
HEIGHT: 63 IN | SYSTOLIC BLOOD PRESSURE: 148 MMHG | HEART RATE: 83 BPM | DIASTOLIC BLOOD PRESSURE: 88 MMHG | OXYGEN SATURATION: 96 % | WEIGHT: 119.4 LBS | BODY MASS INDEX: 21.16 KG/M2 | TEMPERATURE: 97.1 F | RESPIRATION RATE: 16 BRPM

## 2018-12-10 DIAGNOSIS — K21.9 GASTROESOPHAGEAL REFLUX DISEASE WITHOUT ESOPHAGITIS: ICD-10-CM

## 2018-12-10 DIAGNOSIS — M54.41 CHRONIC BILATERAL LOW BACK PAIN WITH RIGHT-SIDED SCIATICA: ICD-10-CM

## 2018-12-10 DIAGNOSIS — G89.29 CHRONIC BILATERAL LOW BACK PAIN WITH RIGHT-SIDED SCIATICA: ICD-10-CM

## 2018-12-10 DIAGNOSIS — E78.5 HYPERLIPIDEMIA, UNSPECIFIED HYPERLIPIDEMIA TYPE: ICD-10-CM

## 2018-12-10 DIAGNOSIS — N39.41 URGE INCONTINENCE OF URINE: ICD-10-CM

## 2018-12-10 DIAGNOSIS — I10 ESSENTIAL HYPERTENSION: ICD-10-CM

## 2018-12-10 DIAGNOSIS — I25.10 ATHEROSCLEROSIS OF NATIVE CORONARY ARTERY OF NATIVE HEART WITHOUT ANGINA PECTORIS: Primary | ICD-10-CM

## 2018-12-10 DIAGNOSIS — R73.02 IMPAIRED GLUCOSE TOLERANCE: ICD-10-CM

## 2018-12-10 PROCEDURE — 99213 OFFICE O/P EST LOW 20 MIN: CPT | Performed by: INTERNAL MEDICINE

## 2018-12-14 LAB
ALBUMIN SERPL-MCNC: 4.2 G/DL (ref 3.5–5.2)
ALBUMIN/GLOB SERPL: 1.9 G/DL
ALP SERPL-CCNC: 66 U/L (ref 39–117)
ALT SERPL-CCNC: 15 U/L (ref 1–33)
APPEARANCE UR: CLEAR
AST SERPL-CCNC: 17 U/L (ref 1–32)
BACTERIA #/AREA URNS HPF: ABNORMAL /HPF
BACTERIA UR CULT: NO GROWTH
BACTERIA UR CULT: NORMAL
BILIRUB SERPL-MCNC: 0.2 MG/DL (ref 0.1–1.2)
BILIRUB UR QL STRIP: NEGATIVE
BUN SERPL-MCNC: 25 MG/DL (ref 8–23)
BUN/CREAT SERPL: 44.6 (ref 7–25)
CALCIUM SERPL-MCNC: 9.3 MG/DL (ref 8.2–9.6)
CASTS URNS MICRO: ABNORMAL
CHLORIDE SERPL-SCNC: 101 MMOL/L (ref 98–107)
CHOLEST SERPL-MCNC: 101 MG/DL (ref 0–200)
CO2 SERPL-SCNC: 29.8 MMOL/L (ref 22–29)
COLOR UR: (no result)
CREAT SERPL-MCNC: 0.56 MG/DL (ref 0.57–1)
CRYSTALS URNS MICRO: ABNORMAL
EPI CELLS #/AREA URNS HPF: ABNORMAL /HPF
GLOBULIN SER CALC-MCNC: 2.2 GM/DL
GLUCOSE SERPL-MCNC: 105 MG/DL (ref 65–99)
GLUCOSE UR QL: NEGATIVE
HDLC SERPL-MCNC: 49 MG/DL (ref 40–60)
HGB UR QL STRIP: NEGATIVE
KETONES UR QL STRIP: (no result)
LDLC SERPL CALC-MCNC: 39 MG/DL (ref 0–100)
LEUKOCYTE ESTERASE UR QL STRIP: (no result)
NITRITE UR QL STRIP: NEGATIVE
PH UR STRIP: <=5 [PH] (ref 5–8)
POTASSIUM SERPL-SCNC: 4.3 MMOL/L (ref 3.5–5.2)
PROT SERPL-MCNC: 6.4 G/DL (ref 6–8.5)
PROT UR QL STRIP: (no result)
RBC #/AREA URNS HPF: ABNORMAL /HPF
SODIUM SERPL-SCNC: 140 MMOL/L (ref 136–145)
SP GR UR: (no result) (ref 1–1.03)
TRIGL SERPL-MCNC: 66 MG/DL (ref 0–150)
UROBILINOGEN UR STRIP-MCNC: (no result) MG/DL
VLDLC SERPL CALC-MCNC: 13.2 MG/DL (ref 5–40)
WBC #/AREA URNS HPF: ABNORMAL /HPF

## 2019-01-11 ENCOUNTER — HOSPITAL ENCOUNTER (INPATIENT)
Facility: HOSPITAL | Age: 84
LOS: 3 days | Discharge: HOME | End: 2019-01-14
Attending: EMERGENCY MEDICINE | Admitting: INTERNAL MEDICINE

## 2019-01-11 ENCOUNTER — APPOINTMENT (OUTPATIENT)
Dept: GENERAL RADIOLOGY | Facility: HOSPITAL | Age: 84
End: 2019-01-11

## 2019-01-11 DIAGNOSIS — I48.91 NEW ONSET ATRIAL FIBRILLATION (HCC): Primary | ICD-10-CM

## 2019-01-11 LAB
ALBUMIN SERPL-MCNC: 3.9 G/DL (ref 3.5–5.2)
ALBUMIN/GLOB SERPL: 1.4 G/DL
ALP SERPL-CCNC: 55 U/L (ref 39–117)
ALT SERPL W P-5'-P-CCNC: 14 U/L (ref 1–33)
ANION GAP SERPL CALCULATED.3IONS-SCNC: 8.2 MMOL/L
AST SERPL-CCNC: 17 U/L (ref 1–32)
BASOPHILS # BLD AUTO: 0.11 10*3/MM3 (ref 0–0.2)
BASOPHILS NFR BLD AUTO: 1.7 % (ref 0–1.5)
BILIRUB SERPL-MCNC: 0.5 MG/DL (ref 0.1–1.2)
BUN BLD-MCNC: 18 MG/DL (ref 8–23)
BUN/CREAT SERPL: 30 (ref 7–25)
CALCIUM SPEC-SCNC: 9.8 MG/DL (ref 8.2–9.6)
CHLORIDE SERPL-SCNC: 102 MMOL/L (ref 98–107)
CO2 SERPL-SCNC: 29.8 MMOL/L (ref 22–29)
CREAT BLD-MCNC: 0.6 MG/DL (ref 0.57–1)
DEPRECATED RDW RBC AUTO: 57.4 FL (ref 37–54)
EOSINOPHIL # BLD AUTO: 0.23 10*3/MM3 (ref 0–0.7)
EOSINOPHIL NFR BLD AUTO: 3.5 % (ref 0.3–6.2)
ERYTHROCYTE [DISTWIDTH] IN BLOOD BY AUTOMATED COUNT: 14.7 % (ref 11.7–13)
GFR SERPL CREATININE-BSD FRML MDRD: 94 ML/MIN/1.73
GLOBULIN UR ELPH-MCNC: 2.7 GM/DL
GLUCOSE BLD-MCNC: 123 MG/DL (ref 65–99)
HCT VFR BLD AUTO: 37.2 % (ref 35.6–45.5)
HGB BLD-MCNC: 12.1 G/DL (ref 11.9–15.5)
IMM GRANULOCYTES # BLD AUTO: 0.01 10*3/MM3 (ref 0–0.03)
IMM GRANULOCYTES NFR BLD AUTO: 0.2 % (ref 0–0.5)
INR PPP: 1.08 (ref 0.9–1.1)
LYMPHOCYTES # BLD AUTO: 1.49 10*3/MM3 (ref 0.9–4.8)
LYMPHOCYTES NFR BLD AUTO: 22.8 % (ref 19.6–45.3)
MAGNESIUM SERPL-MCNC: 2.1 MG/DL (ref 1.7–2.3)
MCH RBC QN AUTO: 35 PG (ref 26.9–32)
MCHC RBC AUTO-ENTMCNC: 32.5 G/DL (ref 32.4–36.3)
MCV RBC AUTO: 107.5 FL (ref 80.5–98.2)
MONOCYTES # BLD AUTO: 0.62 10*3/MM3 (ref 0.2–1.2)
MONOCYTES NFR BLD AUTO: 9.5 % (ref 5–12)
NEUTROPHILS # BLD AUTO: 4.08 10*3/MM3 (ref 1.9–8.1)
NEUTROPHILS NFR BLD AUTO: 62.5 % (ref 42.7–76)
NT-PROBNP SERPL-MCNC: 1430 PG/ML (ref 0–1800)
PLATELET # BLD AUTO: 273 10*3/MM3 (ref 140–500)
PMV BLD AUTO: 10.5 FL (ref 6–12)
POTASSIUM BLD-SCNC: 4.4 MMOL/L (ref 3.5–5.2)
PROT SERPL-MCNC: 6.6 G/DL (ref 6–8.5)
PROTHROMBIN TIME: 13.8 SECONDS (ref 11.7–14.2)
RBC # BLD AUTO: 3.46 10*6/MM3 (ref 3.9–5.2)
SODIUM BLD-SCNC: 140 MMOL/L (ref 136–145)
T4 FREE SERPL-MCNC: 1.16 NG/DL (ref 0.93–1.7)
TROPONIN T SERPL-MCNC: <0.01 NG/ML (ref 0–0.03)
TROPONIN T SERPL-MCNC: <0.01 NG/ML (ref 0–0.03)
TSH SERPL DL<=0.05 MIU/L-ACNC: 1.05 MIU/ML (ref 0.27–4.2)
WBC NRBC COR # BLD: 6.53 10*3/MM3 (ref 4.5–10.7)

## 2019-01-11 PROCEDURE — 93005 ELECTROCARDIOGRAM TRACING: CPT | Performed by: INTERNAL MEDICINE

## 2019-01-11 PROCEDURE — 71045 X-RAY EXAM CHEST 1 VIEW: CPT

## 2019-01-11 PROCEDURE — 99285 EMERGENCY DEPT VISIT HI MDM: CPT

## 2019-01-11 PROCEDURE — 85610 PROTHROMBIN TIME: CPT | Performed by: EMERGENCY MEDICINE

## 2019-01-11 PROCEDURE — 80053 COMPREHEN METABOLIC PANEL: CPT | Performed by: EMERGENCY MEDICINE

## 2019-01-11 PROCEDURE — 93005 ELECTROCARDIOGRAM TRACING: CPT

## 2019-01-11 PROCEDURE — 93010 ELECTROCARDIOGRAM REPORT: CPT | Performed by: INTERNAL MEDICINE

## 2019-01-11 PROCEDURE — 85025 COMPLETE CBC W/AUTO DIFF WBC: CPT | Performed by: EMERGENCY MEDICINE

## 2019-01-11 PROCEDURE — 84484 ASSAY OF TROPONIN QUANT: CPT | Performed by: EMERGENCY MEDICINE

## 2019-01-11 PROCEDURE — 84443 ASSAY THYROID STIM HORMONE: CPT | Performed by: EMERGENCY MEDICINE

## 2019-01-11 PROCEDURE — 84439 ASSAY OF FREE THYROXINE: CPT | Performed by: EMERGENCY MEDICINE

## 2019-01-11 PROCEDURE — 83735 ASSAY OF MAGNESIUM: CPT | Performed by: EMERGENCY MEDICINE

## 2019-01-11 PROCEDURE — 99223 1ST HOSP IP/OBS HIGH 75: CPT | Performed by: INTERNAL MEDICINE

## 2019-01-11 PROCEDURE — 83880 ASSAY OF NATRIURETIC PEPTIDE: CPT | Performed by: EMERGENCY MEDICINE

## 2019-01-11 PROCEDURE — 93005 ELECTROCARDIOGRAM TRACING: CPT | Performed by: EMERGENCY MEDICINE

## 2019-01-11 RX ORDER — ASCORBIC ACID 500 MG
500 TABLET ORAL
COMMUNITY
End: 2019-03-01

## 2019-01-11 RX ORDER — PANTOPRAZOLE SODIUM 40 MG/1
40 TABLET, DELAYED RELEASE ORAL EVERY MORNING
Status: DISCONTINUED | OUTPATIENT
Start: 2019-01-12 | End: 2019-01-14 | Stop reason: HOSPADM

## 2019-01-11 RX ORDER — SODIUM CHLORIDE 0.9 % (FLUSH) 0.9 %
3 SYRINGE (ML) INJECTION EVERY 12 HOURS SCHEDULED
Status: DISCONTINUED | OUTPATIENT
Start: 2019-01-11 | End: 2019-01-14 | Stop reason: HOSPADM

## 2019-01-11 RX ORDER — MULTIPLE VITAMINS W/ MINERALS TAB 9MG-400MCG
1 TAB ORAL DAILY
Status: DISCONTINUED | OUTPATIENT
Start: 2019-01-11 | End: 2019-01-14 | Stop reason: HOSPADM

## 2019-01-11 RX ORDER — PRAVASTATIN SODIUM 40 MG
40 TABLET ORAL DAILY
COMMUNITY
End: 2019-06-06 | Stop reason: SDUPTHER

## 2019-01-11 RX ORDER — GABAPENTIN 100 MG/1
100 CAPSULE ORAL
Status: DISCONTINUED | OUTPATIENT
Start: 2019-01-12 | End: 2019-01-14 | Stop reason: HOSPADM

## 2019-01-11 RX ORDER — PHENOL 1.4 %
600 AEROSOL, SPRAY (ML) MUCOUS MEMBRANE 2 TIMES DAILY
COMMUNITY
End: 2019-03-01

## 2019-01-11 RX ORDER — GABAPENTIN 100 MG/1
200 CAPSULE ORAL
COMMUNITY
End: 2019-03-01 | Stop reason: SDUPTHER

## 2019-01-11 RX ORDER — ATORVASTATIN CALCIUM 10 MG/1
10 TABLET, FILM COATED ORAL DAILY
Status: DISCONTINUED | OUTPATIENT
Start: 2019-01-11 | End: 2019-01-14 | Stop reason: HOSPADM

## 2019-01-11 RX ORDER — ASCORBIC ACID 500 MG
500 TABLET ORAL
Status: DISCONTINUED | OUTPATIENT
Start: 2019-01-12 | End: 2019-01-14 | Stop reason: HOSPADM

## 2019-01-11 RX ORDER — ASPIRIN 81 MG/1
81 TABLET, CHEWABLE ORAL DAILY
COMMUNITY
End: 2019-10-02

## 2019-01-11 RX ORDER — GABAPENTIN 100 MG/1
200 CAPSULE ORAL NIGHTLY
Status: DISCONTINUED | OUTPATIENT
Start: 2019-01-11 | End: 2019-01-14 | Stop reason: HOSPADM

## 2019-01-11 RX ORDER — HYDROCODONE BITARTRATE AND ACETAMINOPHEN 7.5; 325 MG/1; MG/1
1 TABLET ORAL 3 TIMES DAILY
Status: DISCONTINUED | OUTPATIENT
Start: 2019-01-11 | End: 2019-01-14 | Stop reason: HOSPADM

## 2019-01-11 RX ORDER — IBUPROFEN 200 MG
600 CAPSULE ORAL 2 TIMES DAILY
Status: DISCONTINUED | OUTPATIENT
Start: 2019-01-11 | End: 2019-01-14 | Stop reason: HOSPADM

## 2019-01-11 RX ORDER — OMEPRAZOLE 20 MG/1
20 CAPSULE, DELAYED RELEASE ORAL
COMMUNITY
End: 2019-01-21 | Stop reason: SDUPTHER

## 2019-01-11 RX ORDER — DULOXETIN HYDROCHLORIDE 60 MG/1
60 CAPSULE, DELAYED RELEASE ORAL DAILY
Status: DISCONTINUED | OUTPATIENT
Start: 2019-01-11 | End: 2019-01-14 | Stop reason: HOSPADM

## 2019-01-11 RX ORDER — OXYBUTYNIN CHLORIDE 5 MG/1
5 TABLET, EXTENDED RELEASE ORAL DAILY
Status: DISCONTINUED | OUTPATIENT
Start: 2019-01-11 | End: 2019-01-14 | Stop reason: HOSPADM

## 2019-01-11 RX ORDER — METOPROLOL TARTRATE 50 MG/1
50 TABLET, FILM COATED ORAL EVERY 12 HOURS SCHEDULED
Status: DISCONTINUED | OUTPATIENT
Start: 2019-01-11 | End: 2019-01-14 | Stop reason: HOSPADM

## 2019-01-11 RX ORDER — ASPIRIN 81 MG/1
81 TABLET, CHEWABLE ORAL DAILY
Status: DISCONTINUED | OUTPATIENT
Start: 2019-01-11 | End: 2019-01-14 | Stop reason: HOSPADM

## 2019-01-11 RX ORDER — GABAPENTIN 100 MG/1
100 CAPSULE ORAL
COMMUNITY
End: 2019-04-09 | Stop reason: HOSPADM

## 2019-01-11 RX ORDER — MULTIVIT WITH MINERALS/LUTEIN
1 TABLET ORAL DAILY
COMMUNITY
End: 2021-04-12

## 2019-01-11 RX ORDER — SODIUM CHLORIDE 0.9 % (FLUSH) 0.9 %
3-10 SYRINGE (ML) INJECTION AS NEEDED
Status: DISCONTINUED | OUTPATIENT
Start: 2019-01-11 | End: 2019-01-14 | Stop reason: HOSPADM

## 2019-01-11 RX ADMIN — METOPROLOL TARTRATE 50 MG: 50 TABLET, FILM COATED ORAL at 20:36

## 2019-01-11 RX ADMIN — METOPROLOL TARTRATE 5 MG: 1 INJECTION, SOLUTION INTRAVENOUS at 11:18

## 2019-01-11 RX ADMIN — CALCIUM CARBONATE 625 MG: 1250 TABLET ORAL at 21:00

## 2019-01-11 RX ADMIN — Medication 81 MG: at 20:37

## 2019-01-11 RX ADMIN — OXYBUTYNIN CHLORIDE 5 MG: 5 TABLET, EXTENDED RELEASE ORAL at 21:00

## 2019-01-11 RX ADMIN — GABAPENTIN 200 MG: 100 CAPSULE ORAL at 20:37

## 2019-01-11 RX ADMIN — APIXABAN 2.5 MG: 2.5 TABLET, FILM COATED ORAL at 20:36

## 2019-01-11 RX ADMIN — DULOXETINE HYDROCHLORIDE 60 MG: 60 CAPSULE, DELAYED RELEASE ORAL at 20:36

## 2019-01-11 RX ADMIN — HYDROCODONE BITARTRATE AND ACETAMINOPHEN 1 TABLET: 7.5; 325 TABLET ORAL at 20:37

## 2019-01-11 RX ADMIN — ATORVASTATIN CALCIUM 10 MG: 10 TABLET, FILM COATED ORAL at 20:36

## 2019-01-11 RX ADMIN — SODIUM CHLORIDE 500 ML: 9 INJECTION, SOLUTION INTRAVENOUS at 11:18

## 2019-01-11 RX ADMIN — MULTIPLE VITAMINS W/ MINERALS TAB 1 TABLET: TAB at 20:37

## 2019-01-11 NOTE — PROGRESS NOTES
Clinical Pharmacy Services: Medication History    Alexa Peace is a 89 y.o. female presenting to [facility] for   Chief Complaint   Patient presents with   • Rapid Heart Rate   • Dizziness       She  has a past medical history of Anticoagulated, Arthritis, Atrial premature complex, CAD (coronary artery disease), Cancer (CMS/[facility]), Colon polyp, Depression, GERD (gastroesophageal reflux disease), Heart attack (CMS/[facility]), Hyperlipidemia, Hypertension, Macular degeneration, Pre-diabetes, and Spinal stenosis.    Allergies as of 01/11/2019 - Reviewed 01/11/2019   Allergen Reaction Noted   • Ciprofloxacin  04/15/2016   • Metronidazole  04/15/2016   • Prednisone Other (See Comments) 08/09/2017       Medication information was obtained from: Family, medication list  Pharmacy and Phone Number: [facility] [phone]    Prior to Admission Medications     Prescriptions Last Dose Informant Patient Reported? Taking?    acetaminophen (TYLENOL) 500 MG tablet  Family Member Yes Yes    Take 500-1,000 mg by mouth Every Evening. PRN    aspirin 81 MG chewable tablet  Family Member Yes Yes    Chew 81 mg Daily.    calcium carbonate (OS-KO) 600 MG tablet  Family Member Yes Yes    Take 600 mg by mouth 2 (Two) Times a Day.    DULoxetine (CYMBALTA) 60 MG capsule  Family Member No Yes    Take 1 capsule by mouth Daily.    gabapentin (NEURONTIN) 100 MG capsule  Family Member Yes Yes    Take 100 mg by mouth Daily With Breakfast & Lunch.    gabapentin (NEURONTIN) 100 MG capsule  Family Member Yes Yes    Take 200 mg by mouth every night at bedtime.    HYDROcodone-acetaminophen (NORCO) 7.5-325 MG per tablet  Family Member Yes Yes    Take 1 tablet by mouth 3 (Three) Times a Day.    Mirabegron ER (MYRBETRIQ) 25 MG tablet sustained-release 24 hour 24 hr tablet  Family Member Yes Yes    Take 25 mg by mouth Every Night.    Multiple Vitamins-Minerals (CENTRUM SILVER) tablet  Family Member Yes Yes    Take 1 tablet by mouth Daily.    omeprazole  (priLOSEC) 20 MG capsule  Family Member Yes Yes    Take 20 mg by mouth Daily With Breakfast.    polyethylene glycol (MIRALAX) pack packet  Family Member No Yes    Take 17 g by mouth Daily.    Patient taking differently:  Take 17 g by mouth Daily As Needed.    pravastatin (PRAVACHOL) 40 MG tablet  Family Member Yes Yes    Take 40 mg by mouth Daily.    vitamin C (ASCORBIC ACID) 500 MG tablet  Family Member Yes Yes    Take 500 mg by mouth Daily With Lunch.            Medication notes: Added per medication list provided:  Calcium, Vitamin C  Myrbetriq is a new medication started about 2 weeks, ago, family was concerned about this causing an increase heart rate for the patient    This medication list is complete to the best of my knowledge as of 1/11/2019    Please call if questions.    Ronna Holman, Medication History Technician  1/11/2019 3:29 PM

## 2019-01-11 NOTE — PLAN OF CARE
Problem: Fall Risk (Adult)  Goal: Identify Related Risk Factors and Signs and Symptoms  Outcome: Outcome(s) achieved Date Met: 01/11/19    Goal: Absence of Fall  Outcome: Ongoing (interventions implemented as appropriate)      Problem: Arrhythmia/Dysrhythmia (Symptomatic) (Adult)  Goal: Signs and Symptoms of Listed Potential Problems Will be Absent, Minimized or Managed (Arrhythmia/Dysrhythmia)  Outcome: Ongoing (interventions implemented as appropriate)

## 2019-01-11 NOTE — ED TRIAGE NOTES
"Pt had \"nerves burnt\" yesterday in her back.  She was told to come to ER today if symptoms persisted  "

## 2019-01-11 NOTE — H&P
Patient Name: Alexa Peace  :[id number]  89 y.o.    Date of Admission: 2019  Date of Consultation:  19  Encounter Provider: Go Kim MD  Place of Service: [facility] CARDIOLOGY  Referring Provider: Go Kim MD  Patient Care Team:  Lizandro Childers MD as PCP - General (Internal Medicine)      Chief complaint: New onset Atrial Fibrillation    History of Present Illness:Ms. Peace is a 89 yo female patient of mine with history of coronary disease and stenting in her LAD in . She has done well since that time and was last seen in the office in . She denied any chest pain or palpitations. She still lives independently. She presents to the emergency room today with complaints of shortness of air. She reports that she underwent a procedure in Dr. Fernandes's office on her back yesterday and it was noted at that time she was in atrial fibrillation. She has no documented history of atrial fibrillation. She dose  take a baby asa daily and was given lopressor for heart rate and fluids for hydration . CXR revealed small pleural effusions in favor to represent mild pulmonary edema. Labs: trop <0.010, Hgb/Hct 12.1/37.2, bun/creat 18/0.6, K+4.4. I will further evaluate before starting anticoagulation.     Current testing:      CXR:    Final Result   Abnormally prominent pulmonary interstitium with small   pleural effusions is favored to represent mild pulmonary edema although   atypical, bilateral pneumonia could also have such an appearance           Past Medical History:   Diagnosis Date   • Anticoagulated     PLAVIX   • Arthritis    • Atrial premature complex    • CAD (coronary artery disease)    • Cancer (CMS/HCC)     skin   • Colon polyp    • Depression    • GERD (gastroesophageal reflux disease)    • Heart attack (CMS/HCC)    • Hyperlipidemia    • Hypertension    • Macular degeneration    • Pre-diabetes    • Spinal stenosis        Past Surgical History:   Procedure  Laterality Date   • CATARACT EXTRACTION     • CORONARY ANGIOPLASTY WITH STENT PLACEMENT     • EPIDURAL BLOCK     • LUMBAR DISCECTOMY FUSION INSTRUMENTATION N/A 12/7/2017    Procedure: L2-3, L3-4, L4-5 laminectomy and fusion with local bone graft;  Surgeon: Andre Layne MD;  Location: [facility];  Service:    • TONSILECTOMY, ADENOIDECTOMY, BILATERAL MYRINGOTOMY AND TUBES           Prior to Admission medications    Medication Sig Start Date End Date Taking? Authorizing Provider   acetaminophen (TYLENOL) 500 MG tablet Take 500 mg by mouth Every 6 (Six) Hours As Needed for Mild Pain (1-3).   Yes Shyam Rosado MD   aspirin 81 MG chewable tablet Chew 85 mg Daily.   Yes Shyam Rosado MD   DULoxetine (CYMBALTA) 60 MG capsule Take 1 capsule by mouth Daily. 9/21/18  Yes Lizandro Childers MD   gabapentin (NEURONTIN) 100 MG capsule TAKE 1 CAP IN THE MORNING, 1 AT LUNCH, AND 3 AT BEDTIME 10/18/18  Yes Andre Layne MD   glucose blood test strip Use to test blood sugar once daily 2/26/18  Yes Lizandro Childers MD   HYDROcodone-acetaminophen (NORCO) 7.5-325 MG per tablet  8/21/18  Yes Shyam Rosado MD   Menthol, Topical Analgesic, (BIOFREEZE EX) Apply  topically As Needed.   Yes Shyam Rosado MD   Multiple Vitamins-Minerals (CENTRUM SILVER PO) Take 1 tablet by mouth Daily.   Yes Shyam Rosado MD   omeprazole (priLOSEC) 20 MG capsule 1 by mouth daily before breakfast 9/21/18  Yes Lizandro Childers MD   polyethylene glycol (MIRALAX) pack packet Take 17 g by mouth Daily. 12/11/17  Yes Vimal Moe MD   pravastatin (PRAVACHOL) 40 MG tablet TAKE 1 TABLET BY MOUTH DAILY. 10/22/18  Yes Lizandro Childers MD       Allergies   Allergen Reactions   • Ciprofloxacin      UNKNOWN   • Metronidazole      UNKWOWN   • Prednisone Other (See Comments)     Elevated blood pressure       Social History     Socioeconomic History   • Marital status:      Spouse name: Not on file   • Number of  "children: Not on file   • Years of education: Not on file   • Highest education level: Not on file   Tobacco Use   • Smoking status: Never Smoker   • Smokeless tobacco: Never Used   Substance and Sexual Activity   • Alcohol use: No   • Drug use: No   • Sexual activity: Defer       Family History   Problem Relation Age of Onset   • Diabetes Mother    • Depression Mother    • Anxiety disorder Mother    • Heart disease Father    • Hypertension Father    • Diabetes Father    • Diabetes Brother    • No Known Problems Maternal Grandmother    • No Known Problems Maternal Grandfather    • No Known Problems Paternal Grandmother    • No Known Problems Paternal Grandfather    • Malig Hyperthermia Neg Hx        REVIEW OF SYSTEMS:   All systems reviewed.  Pertinent positives identified in HPI.  All other systems are negative.      Objective:     Vitals:    01/11/19 1333 01/11/19 1433 01/11/19 1603 01/11/19 1700   BP: 163/89 162/96 153/87 156/94   BP Location:    Left arm   Patient Position:    Lying   Pulse: 95 109 99 111   Resp:    18   Temp:    97.7 °F (36.5 °C)   TempSrc:    Oral   SpO2: 96% 96%  95%   Weight:    54.1 kg (119 lb 4.8 oz)   Height:    157.5 cm (62.01\")     Body mass index is 21.81 kg/m².    General Appearance:    Alert, cooperative, in no acute distress   Head:    Normocephalic, without obvious abnormality, atraumatic   Eyes:            Lids and lashes normal, conjunctivae and sclerae normal, no   icterus, no pallor, corneas clear, PERRLA   Ears:    Ears appear intact with no abnormalities noted   Throat:   No oral lesions, no thrush, oral mucosa moist   Neck:   No adenopathy, supple, trachea midline, no thyromegaly, no   carotid bruit, no JVD   Back:     No kyphosis present, no scoliosis present, no skin lesions, erythema or scars, no tenderness to percussion or palpation, range of motion normal   Lungs:     Clear to auscultation,respirations regular, even and unlabored    Heart:  Irregular, tachycardic "   Chest Wall:    No abnormalities observed   Abdomen:     Normal bowel sounds, no masses, no organomegaly, soft        non-tender, non-distended, no guarding, no rebound  tenderness   Extremities:   Moves all extremities well, no edema, no cyanosis, no redness   Pulses:   Pulses palpable and equal bilaterally. Normal radial, carotid, femoral, dorsalis pedis and posterior tibial pulses bilaterally. Normal abdominal aorta   Skin:  Psychiatric:   No bleeding, bruising or rash    Alert and oriented x 3, normal mood and affect   Lab Review:     Results from last 7 days   Lab Units  01/11/19   1102   SODIUM mmol/L  140   POTASSIUM mmol/L  4.4   CHLORIDE mmol/L  102   CO2 mmol/L  29.8*   BUN mg/dL  18   CREATININE mg/dL  0.60   CALCIUM mg/dL  9.8*   BILIRUBIN mg/dL  0.5   ALK PHOS U/L  55   ALT (SGPT) U/L  14   AST (SGOT) U/L  17   GLUCOSE mg/dL  123*     Results from last 7 days   Lab Units  01/11/19   1250  01/11/19   1102   TROPONIN T ng/mL  <0.010  <0.010     Results from last 7 days   Lab Units  01/11/19   1102   WBC 10*3/mm3  6.53   HEMOGLOBIN g/dL  12.1   HEMATOCRIT %  37.2   PLATELETS 10*3/mm3  273     Results from last 7 days   Lab Units  01/11/19   1102   INR   1.08     Results from last 7 days   Lab Units  01/11/19   1102   MAGNESIUM mg/dL  2.1                    I personally viewed and interpreted the patient's EKG/Telemetry data.        Assessment and Plan:       89 year old patient, very active.  History of CAD with stents.  Now with asymptomatic rapid AF.  We discussed anticoagulation and she agrees to it.  Will start apixaban 2.5 mg BID and metoprolol.    Go Kim MD  01/11/19  7:01 PM

## 2019-01-11 NOTE — ED PROVIDER NOTES
" EMERGENCY DEPARTMENT ENCOUNTER    CHIEF COMPLAINT  Chief Complaint: a-fib  History given by: patient  History limited by: nothing  Room Number: 09/09  PMD: Lizandro Childers MD      HPI:  Pt is a 89 y.o. female who presents complaining of new onset a-fib. Pt states that she went to have a procedure done on her back by Dr. Fernandes yesterday and the physician there informed her that she was in a-fib. Pt has no h/x of a-fib and does not take blood thinners except for 81mg of ASA daily. They completed the procedure in which they cauterized the nerve endings in her back. Pt was put to sleep for this procedure after they \"gave me a shot to control my heart\".  Pt's family states that they think it was conscious sedation procedure. She was She states that they did not refer her to a cardiologist after the procedure. Pt denies any chest pain, weakness, and fatigue but states that she is mildly SOA.    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/2017   • Osteopenia 05/05/2017   • Atopic rhinitis 05/05/2017   • Urge incontinence of urine 05/05/2017   • Atherosclerosis of native coronary artery 05/05/2017   • Essential hypertension 05/05/2017   • Gastroesophageal reflux disease without esophagitis 05/05/2017   • Impaired glucose tolerance 05/05/2017   • History of coronary artery stent placement 05/25/2017   • Chronic bilateral low back pain 07/21/2017   • Acquired scoliosis 11/21/2017   • Spinal stenosis of lumbar " region with neurogenic claudication 11/21/2017   • PAC (premature atrial contraction) 12/11/2017   • Slow transit constipation 12/11/2017     Resolved Ambulatory Problems     Diagnosis Date Noted   • Foot drop 08/04/2016   • Hyperglycemia 05/05/2017     Past Medical History:   Diagnosis Date   • Anticoagulated    • Arthritis    • Atrial premature complex    • CAD (coronary artery disease)    • Cancer (CMS/HCC)    • Colon polyp    • Depression    • GERD (gastroesophageal reflux disease)    • Heart attack (CMS/HCC)    • Hyperlipidemia    • Hypertension    • Macular degeneration    • Pre-diabetes    • Spinal stenosis        PAST SURGICAL HISTORY  Past Surgical History:   Procedure Laterality Date   • CATARACT EXTRACTION     • CORONARY ANGIOPLASTY WITH STENT PLACEMENT     • EPIDURAL BLOCK     • LUMBAR DISCECTOMY FUSION INSTRUMENTATION N/A 12/7/2017    Procedure: L2-3, L3-4, L4-5 laminectomy and fusion with local bone graft;  Surgeon: Andre Layne MD;  Location: [facility];  Service:    • TONSILECTOMY, ADENOIDECTOMY, BILATERAL MYRINGOTOMY AND TUBES         FAMILY HISTORY  Family History   Problem Relation Age of Onset   • Diabetes Mother    • Depression Mother    • Anxiety disorder Mother    • Heart disease Father    • Hypertension Father    • Diabetes Father    • Diabetes Brother    • No Known Problems Maternal Grandmother    • No Known Problems Maternal Grandfather    • No Known Problems Paternal Grandmother    • No Known Problems Paternal Grandfather    • Malig Hyperthermia Neg Hx        SOCIAL HISTORY  Social History     Socioeconomic History   • Marital status:      Spouse name: Not on file   • Number of children: Not on file   • Years of education: Not on file   • Highest education level: Not on file   Social Needs   • Financial resource strain: Not on file   • Food insecurity - worry: Not on file   • Food insecurity - inability: Not on file   • Transportation needs - medical: Not on file   •  Transportation needs - non-medical: Not on file   Occupational History   • Not on file   Tobacco Use   • Smoking status: Never Smoker   • Smokeless tobacco: Never Used   Substance and Sexual Activity   • Alcohol use: No   • Drug use: No   • Sexual activity: Defer   Other Topics Concern   • Not on file   Social History Narrative   • Not on file       ALLERGIES  Ciprofloxacin; Metronidazole; and Prednisone    REVIEW OF SYSTEMS  Review of Systems   Constitutional: Negative for fever.   HENT: Negative for sore throat.    Eyes: Negative.    Respiratory: Positive for shortness of breath. Negative for cough.    Cardiovascular: Negative for chest pain.        A-fib   Gastrointestinal: Negative for abdominal pain, diarrhea and vomiting.   Genitourinary: Negative for dysuria.   Musculoskeletal: Negative for neck pain.   Skin: Negative for rash.   Allergic/Immunologic: Negative.    Neurological: Negative for weakness, numbness and headaches.   Hematological: Negative.    Psychiatric/Behavioral: Negative.    All other systems reviewed and are negative.      PHYSICAL EXAM  ED Triage Vitals   Temp Heart Rate Resp BP SpO2   01/11/19 0921 01/11/19 0919 01/11/19 0919 01/11/19 1028 01/11/19 0919   97.5 °F (36.4 °C) (!) 138 18 (!) 168/111 94 %      Temp src Heart Rate Source Patient Position BP Location FiO2 (%)   01/11/19 0921 01/11/19 0919 -- -- --   Tympanic Monitor          Physical Exam   Constitutional: She is oriented to person, place, and time. No distress.   HENT:   Head: Normocephalic and atraumatic.   Mouth/Throat: Oropharynx is clear and moist.   Eyes: EOM are normal. Pupils are equal, round, and reactive to light.   Neck: Normal range of motion. Neck supple.   Cardiovascular: Normal rate and normal heart sounds. An irregularly irregular rhythm present.   HM=030h, no pedal edema or calf tenderness   Pulmonary/Chest: Effort normal and breath sounds normal. No respiratory distress.   CTAB   Abdominal: Soft. Bowel sounds are  normal. There is no tenderness. There is no rebound and no guarding.   Musculoskeletal: Normal range of motion. She exhibits no edema.   Neurological: She is alert and oriented to person, place, and time. She has normal sensation and normal strength.   Skin: Skin is warm and dry. No rash noted.   Psychiatric: Mood and affect normal.   Nursing note and vitals reviewed.      LAB RESULTS  Lab Results (last 24 hours)     Procedure Component Value Units Date/Time    CBC & Differential [[id number]] Collected:  01/11/19 1102    Specimen:  Blood Updated:  01/11/19 1144    Narrative:       The following orders were created for panel order CBC & Differential.  Procedure                               Abnormality         Status                     ---------                               -----------         ------                     Scan Slide[[id number]]                                                                  CBC Auto Differential[[id number]]        Abnormal            Final result                 Please view results for these tests on the individual orders.    Comprehensive Metabolic Panel [[id number]]  (Abnormal) Collected:  01/11/19 1102    Specimen:  Blood Updated:  01/11/19 1155     Glucose 123 mg/dL      BUN 18 mg/dL      Creatinine 0.60 mg/dL      Sodium 140 mmol/L      Potassium 4.4 mmol/L      Chloride 102 mmol/L      CO2 29.8 mmol/L      Calcium 9.8 mg/dL      Total Protein 6.6 g/dL      Albumin 3.90 g/dL      ALT (SGPT) 14 U/L      AST (SGOT) 17 U/L      Alkaline Phosphatase 55 U/L      Total Bilirubin 0.5 mg/dL      eGFR Non African Amer 94 mL/min/1.73      Globulin 2.7 gm/dL      A/G Ratio 1.4 g/dL      BUN/Creatinine Ratio 30.0     Anion Gap 8.2 mmol/L     Narrative:       The MDRD GFR formula is only valid for adults with stable renal function between ages 18 and 70.    Protime-INR [[id number]]  (Normal) Collected:  01/11/19 1102    Specimen:  Blood Updated:  01/11/19 1144     Protime 13.8 Seconds      INR  1.08    BNP [[id number]]  (Normal) Collected:  01/11/19 1102    Specimen:  Blood Updated:  01/11/19 1200     proBNP 1,430.0 pg/mL     Narrative:       Among patients with dyspnea, NT-proBNP is highly sensitive for the detection of acute congestive heart failure. In addition NT-proBNP of <300 pg/ml effectively rules out acute congestive heart failure with 99% negative predictive value.    Troponin [[id number]]  (Normal) Collected:  01/11/19 1102    Specimen:  Blood Updated:  01/11/19 1200     Troponin T <0.010 ng/mL     Narrative:       Troponin T Reference Ranges:  Less than 0.03 ng/mL:    Negative for AMI  0.03 to 0.09 ng/mL:      Indeterminant for AMI  Greater than 0.09 ng/mL: Positive for AMI    Magnesium [[id number]]  (Normal) Collected:  01/11/19 1102    Specimen:  Blood Updated:  01/11/19 1155     Magnesium 2.1 mg/dL     TSH [[id number]]  (Normal) Collected:  01/11/19 1102    Specimen:  Blood Updated:  01/11/19 1200     TSH 1.050 mIU/mL     T4, Free [[id number]]  (Normal) Collected:  01/11/19 1102    Specimen:  Blood Updated:  01/11/19 1200     Free T4 1.16 ng/dL     CBC Auto Differential [[id number]]  (Abnormal) Collected:  01/11/19 1102    Specimen:  Blood Updated:  01/11/19 1144     WBC 6.53 10*3/mm3      RBC 3.46 10*6/mm3      Hemoglobin 12.1 g/dL      Hematocrit 37.2 %      .5 fL      MCH 35.0 pg      MCHC 32.5 g/dL      RDW 14.7 %      RDW-SD 57.4 fl      MPV 10.5 fL      Platelets 273 10*3/mm3      Neutrophil % 62.5 %      Lymphocyte % 22.8 %      Monocyte % 9.5 %      Eosinophil % 3.5 %      Basophil % 1.7 %      Immature Grans % 0.2 %      Neutrophils, Absolute 4.08 10*3/mm3      Lymphocytes, Absolute 1.49 10*3/mm3      Monocytes, Absolute 0.62 10*3/mm3      Eosinophils, Absolute 0.23 10*3/mm3      Basophils, Absolute 0.11 10*3/mm3      Immature Grans, Absolute 0.01 10*3/mm3     Troponin [[id number]]  (Normal) Collected:  01/11/19 1250    Specimen:  Blood Updated:  01/11/19 1333     Troponin T <0.010  ng/mL     Narrative:       Troponin T Reference Ranges:  Less than 0.03 ng/mL:    Negative for AMI  0.03 to 0.09 ng/mL:      Indeterminant for AMI  Greater than 0.09 ng/mL: Positive for AMI          I ordered the above labs and reviewed the results    RADIOLOGY  XR Chest 1 View   Final Result   Abnormally prominent pulmonary interstitium with small   pleural effusions is favored to represent mild pulmonary edema although   atypical, bilateral pneumonia could also have such an appearance.       This report was finalized on 1/11/2019 11:19 AM by Dr. Blake Littlejohn M.D.               I ordered the above noted radiological studies. Interpreted by radiologist. Reviewed by me in PACS.       PROCEDURES  Procedures  EKG    EKG time: 0927  Rhythm/Rate: a-fib, 115  No Acute Ischemia  Non-Specific ST-T changes    changed compared to prior on 2017 when Pt was in a normal sinus rhythm.    Interpreted Contemporaneously by me.  Independently viewed by me  EKG    EKG time: 1248  Rhythm/Rate: a-fib, 98  No Acute Ischemia  Non-Specific ST-T changes    unchanged compared to earlier today at 0927 this morning.    Interpreted Contemporaneously by me.  Independently viewed by me      PROGRESS AND CONSULTS  ED Course as of Jan 11 1430 Fri Jan 11, 2019   1201 proBNP: 1,430.0 [GP]      ED Course User Index  [GP] Aris Leach MD       0921  Ordered EKG for further evaluation.     1053  Ordered labs and CXR for further evaluation. Ordered IV fluids for hydration and lopressor to treat.     1220  Rechecked Pt who  Is resting comfortably. Informed her that her electrolytes are normal. Discussed risks of stroke while on a-fib. Discussed plans to consult with Dr. Kim to determine treatment plan for patient.    1223  Placed call to [facility] for consult.     1233  Rechecked Pt who has started to complain of pain in the right side of her neck and left upper back. She has also started feeling SOA. Her family placed her on 2L of O2 and her sats are  now 100%. Informed her that her CXR shows a small pleural effusion which could be contributing to her SOA. Discussed plans to order a repeat EKG for further evaluation.     1238  Ordered troponin and EKG for further evaluation.     1322  Discussed Pt's case with Dr. Kim (Cardiology) who would like to admit Pt to telemetry for further evaluation and treatment. He would like to evaluate Pt before starting her on anticoagulation.    1340  Rechecked Pt who is resting comfortably. Informed P that I spoke with Dr. Kim who would like to admit her for further evaluation and treatment. Pt understands and agrees to all plans. All questions answered.     MEDICAL DECISION MAKING  Results were reviewed/discussed with the patient and they were also made aware of online access. Pt also made aware that some labs, such as cultures, will not be resulted during ER visit and follow up with PMD is necessary.     MDM  Number of Diagnoses or Management Options  New onset atrial fibrillation with RVR (CMS/HCC):      Amount and/or Complexity of Data Reviewed  Clinical lab tests: ordered and reviewed (Labs are unremarkable.)  Tests in the radiology section of CPT®: ordered and reviewed (CXR shows abnormally prominent pulmonary interstitium with small pleural effusions which is favored to represent mild pulmonary edema although atypical, bilateral pneumonia could also have such an appearance.)  Tests in the medicine section of CPT®: ordered and reviewed (See EKG note.)  Discuss the patient with other providers: yes (Dr. Kim (cardiology))           DIAGNOSIS  Final diagnoses:   New onset atrial fibrillation with RVR (CMS/HCC)       DISPOSITION  ADMISSION    Discussed treatment plan and reason for admission with pt/family and admitting physician.  Pt/family voiced understanding of the plan for admission for further testing/treatment as needed.       Latest Documented Vital Signs:  As of 2:30 PM  BP- 163/89 HR- 95 Temp- 97.5 °F (36.4 °C) (Tympanic)  O2 sat- 96%    --  Documentation assistance provided by trace Siddiqui for Dr. Leach.  Information recorded by the scribe was done at my direction and has been verified and validated by me.     Riya Siddiqui  01/11/19 1346       Aris Leach MD  01/11/19 2308

## 2019-01-12 ENCOUNTER — APPOINTMENT (OUTPATIENT)
Dept: CARDIOLOGY | Facility: HOSPITAL | Age: 84
End: 2019-01-12
Attending: INTERNAL MEDICINE

## 2019-01-12 LAB
BH CV ECHO MEAS - ACS: 1.7 CM
BH CV ECHO MEAS - AI DEC SLOPE: 225 CM/SEC^2
BH CV ECHO MEAS - AI MAX PG: 64 MMHG
BH CV ECHO MEAS - AI MAX VEL: 400 CM/SEC
BH CV ECHO MEAS - AI P1/2T: 520.7 MSEC
BH CV ECHO MEAS - AO MAX PG (FULL): 0.36 MMHG
BH CV ECHO MEAS - AO MAX PG: 4.2 MMHG
BH CV ECHO MEAS - AO MEAN PG (FULL): 0 MMHG
BH CV ECHO MEAS - AO MEAN PG: 2 MMHG
BH CV ECHO MEAS - AO ROOT AREA (BSA CORRECTED): 2.1
BH CV ECHO MEAS - AO ROOT AREA: 8 CM^2
BH CV ECHO MEAS - AO ROOT DIAM: 3.2 CM
BH CV ECHO MEAS - AO V2 MAX: 102 CM/SEC
BH CV ECHO MEAS - AO V2 MEAN: 62.2 CM/SEC
BH CV ECHO MEAS - AO V2 VTI: 18.9 CM
BH CV ECHO MEAS - AVA(I,A): 2.8 CM^2
BH CV ECHO MEAS - AVA(I,D): 2.8 CM^2
BH CV ECHO MEAS - AVA(V,A): 2.7 CM^2
BH CV ECHO MEAS - AVA(V,D): 2.7 CM^2
BH CV ECHO MEAS - BSA(HAYCOCK): 1.5 M^2
BH CV ECHO MEAS - BSA: 1.5 M^2
BH CV ECHO MEAS - BZI_BMI: 21.8 KILOGRAMS/M^2
BH CV ECHO MEAS - BZI_METRIC_HEIGHT: 157.5 CM
BH CV ECHO MEAS - BZI_METRIC_WEIGHT: 54 KG
BH CV ECHO MEAS - EDV(CUBED): 171 ML
BH CV ECHO MEAS - EDV(MOD-SP2): 39 ML
BH CV ECHO MEAS - EDV(MOD-SP4): 37 ML
BH CV ECHO MEAS - EDV(TEICH): 150.5 ML
BH CV ECHO MEAS - EF(CUBED): 54.8 %
BH CV ECHO MEAS - EF(MOD-BP): 54 %
BH CV ECHO MEAS - EF(MOD-SP2): 56.4 %
BH CV ECHO MEAS - EF(MOD-SP4): 51.4 %
BH CV ECHO MEAS - EF(TEICH): 46 %
BH CV ECHO MEAS - ESV(CUBED): 77.3 ML
BH CV ECHO MEAS - ESV(MOD-SP2): 17 ML
BH CV ECHO MEAS - ESV(MOD-SP4): 18 ML
BH CV ECHO MEAS - ESV(TEICH): 81.3 ML
BH CV ECHO MEAS - FS: 23.2 %
BH CV ECHO MEAS - IVS/LVPW: 0.97
BH CV ECHO MEAS - IVSD: 0.97 CM
BH CV ECHO MEAS - LV DIASTOLIC VOL/BSA (35-75): 24.1 ML/M^2
BH CV ECHO MEAS - LV MASS(C)D: 211.6 GRAMS
BH CV ECHO MEAS - LV MASS(C)DI: 138 GRAMS/M^2
BH CV ECHO MEAS - LV MAX PG: 3.8 MMHG
BH CV ECHO MEAS - LV MEAN PG: 2 MMHG
BH CV ECHO MEAS - LV SYSTOLIC VOL/BSA (12-30): 11.7 ML/M^2
BH CV ECHO MEAS - LV V1 MAX: 97.5 CM/SEC
BH CV ECHO MEAS - LV V1 MEAN: 59.7 CM/SEC
BH CV ECHO MEAS - LV V1 VTI: 18.4 CM
BH CV ECHO MEAS - LVIDD: 5.6 CM
BH CV ECHO MEAS - LVIDS: 4.3 CM
BH CV ECHO MEAS - LVLD AP2: 5.8 CM
BH CV ECHO MEAS - LVLD AP4: 6.6 CM
BH CV ECHO MEAS - LVLS AP2: 5.4 CM
BH CV ECHO MEAS - LVLS AP4: 5.7 CM
BH CV ECHO MEAS - LVOT AREA (M): 2.8 CM^2
BH CV ECHO MEAS - LVOT AREA: 2.8 CM^2
BH CV ECHO MEAS - LVOT DIAM: 1.9 CM
BH CV ECHO MEAS - LVPWD: 1 CM
BH CV ECHO MEAS - MR MAX PG: 106.5 MMHG
BH CV ECHO MEAS - MR MAX VEL: 516 CM/SEC
BH CV ECHO MEAS - MV DEC SLOPE: 1321 CM/SEC^2
BH CV ECHO MEAS - MV MAX PG: 14.6 MMHG
BH CV ECHO MEAS - MV MEAN PG: 4 MMHG
BH CV ECHO MEAS - MV P1/2T MAX VEL: 189 CM/SEC
BH CV ECHO MEAS - MV P1/2T: 41.9 MSEC
BH CV ECHO MEAS - MV V2 MAX: 191 CM/SEC
BH CV ECHO MEAS - MV V2 MEAN: 87.9 CM/SEC
BH CV ECHO MEAS - MV V2 VTI: 22.8 CM
BH CV ECHO MEAS - MVA P1/2T LCG: 1.2 CM^2
BH CV ECHO MEAS - MVA(P1/2T): 5.2 CM^2
BH CV ECHO MEAS - MVA(VTI): 2.3 CM^2
BH CV ECHO MEAS - PA ACC TIME: 0.09 SEC
BH CV ECHO MEAS - PA MAX PG (FULL): 4.5 MMHG
BH CV ECHO MEAS - PA MAX PG: 6.2 MMHG
BH CV ECHO MEAS - PA PR(ACCEL): 37.6 MMHG
BH CV ECHO MEAS - PA V2 MAX: 124 CM/SEC
BH CV ECHO MEAS - PULM DIAS VEL: 49.4 CM/SEC
BH CV ECHO MEAS - PULM S/D: 1.5
BH CV ECHO MEAS - PULM SYS VEL: 76.1 CM/SEC
BH CV ECHO MEAS - RAP SYSTOLE: 3 MMHG
BH CV ECHO MEAS - RV MAX PG: 1.7 MMHG
BH CV ECHO MEAS - RV MEAN PG: 1 MMHG
BH CV ECHO MEAS - RV V1 MAX: 65 CM/SEC
BH CV ECHO MEAS - RV V1 MEAN: 42.5 CM/SEC
BH CV ECHO MEAS - RV V1 VTI: 7 CM
BH CV ECHO MEAS - RVSP: 55.7 MMHG
BH CV ECHO MEAS - SI(AO): 99.2 ML/M^2
BH CV ECHO MEAS - SI(CUBED): 61.1 ML/M^2
BH CV ECHO MEAS - SI(LVOT): 34 ML/M^2
BH CV ECHO MEAS - SI(MOD-SP2): 14.4 ML/M^2
BH CV ECHO MEAS - SI(MOD-SP4): 12.4 ML/M^2
BH CV ECHO MEAS - SI(TEICH): 45.2 ML/M^2
BH CV ECHO MEAS - SUP REN AO DIAM: 1.1 CM
BH CV ECHO MEAS - SV(AO): 152 ML
BH CV ECHO MEAS - SV(CUBED): 93.6 ML
BH CV ECHO MEAS - SV(LVOT): 52.2 ML
BH CV ECHO MEAS - SV(MOD-SP2): 22 ML
BH CV ECHO MEAS - SV(MOD-SP4): 19 ML
BH CV ECHO MEAS - SV(TEICH): 69.3 ML
BH CV ECHO MEAS - TAPSE (>1.6): 1.4 CM2
BH CV ECHO MEAS - TR MAX VEL: 363 CM/SEC
BH CV XLRA - RV BASE: 2.6 CM
LEFT ATRIUM VOLUME INDEX: 42 ML/M2
MAXIMAL PREDICTED HEART RATE: 129 BPM
STRESS TARGET HR: 110 BPM

## 2019-01-12 PROCEDURE — 93306 TTE W/DOPPLER COMPLETE: CPT | Performed by: INTERNAL MEDICINE

## 2019-01-12 PROCEDURE — 99232 SBSQ HOSP IP/OBS MODERATE 35: CPT | Performed by: INTERNAL MEDICINE

## 2019-01-12 PROCEDURE — 93306 TTE W/DOPPLER COMPLETE: CPT

## 2019-01-12 PROCEDURE — 93005 ELECTROCARDIOGRAM TRACING: CPT | Performed by: INTERNAL MEDICINE

## 2019-01-12 PROCEDURE — 93010 ELECTROCARDIOGRAM REPORT: CPT | Performed by: INTERNAL MEDICINE

## 2019-01-12 RX ORDER — DIGOXIN 125 MCG
125 TABLET ORAL
Status: DISCONTINUED | OUTPATIENT
Start: 2019-01-12 | End: 2019-01-14 | Stop reason: HOSPADM

## 2019-01-12 RX ADMIN — APIXABAN 2.5 MG: 2.5 TABLET, FILM COATED ORAL at 21:59

## 2019-01-12 RX ADMIN — MULTIPLE VITAMINS W/ MINERALS TAB 1 TABLET: TAB at 08:48

## 2019-01-12 RX ADMIN — GABAPENTIN 100 MG: 100 CAPSULE ORAL at 11:59

## 2019-01-12 RX ADMIN — HYDROCODONE BITARTRATE AND ACETAMINOPHEN 1 TABLET: 7.5; 325 TABLET ORAL at 18:27

## 2019-01-12 RX ADMIN — PANTOPRAZOLE SODIUM 40 MG: 40 TABLET, DELAYED RELEASE ORAL at 06:16

## 2019-01-12 RX ADMIN — DIGOXIN 125 MCG: 125 TABLET ORAL at 11:59

## 2019-01-12 RX ADMIN — SODIUM CHLORIDE, PRESERVATIVE FREE 3 ML: 5 INJECTION INTRAVENOUS at 08:49

## 2019-01-12 RX ADMIN — CALCIUM CARBONATE 625 MG: 1250 TABLET ORAL at 08:48

## 2019-01-12 RX ADMIN — HYDROCODONE BITARTRATE AND ACETAMINOPHEN 1 TABLET: 7.5; 325 TABLET ORAL at 08:48

## 2019-01-12 RX ADMIN — Medication 81 MG: at 08:48

## 2019-01-12 RX ADMIN — METOPROLOL TARTRATE 50 MG: 50 TABLET, FILM COATED ORAL at 08:48

## 2019-01-12 RX ADMIN — OXYCODONE HYDROCHLORIDE AND ACETAMINOPHEN 500 MG: 500 TABLET ORAL at 11:59

## 2019-01-12 RX ADMIN — APIXABAN 2.5 MG: 2.5 TABLET, FILM COATED ORAL at 08:48

## 2019-01-12 RX ADMIN — METOPROLOL TARTRATE 50 MG: 50 TABLET, FILM COATED ORAL at 22:00

## 2019-01-12 RX ADMIN — GABAPENTIN 200 MG: 100 CAPSULE ORAL at 21:59

## 2019-01-12 RX ADMIN — SODIUM CHLORIDE, PRESERVATIVE FREE 3 ML: 5 INJECTION INTRAVENOUS at 22:00

## 2019-01-12 RX ADMIN — GABAPENTIN 100 MG: 100 CAPSULE ORAL at 08:48

## 2019-01-12 NOTE — PLAN OF CARE
Problem: Patient Care Overview  Goal: Plan of Care Review  Outcome: Ongoing (interventions implemented as appropriate)   01/12/19 1522   Coping/Psychosocial   Plan of Care Reviewed With patient   Plan of Care Review   Progress improving   OTHER   Outcome Summary no c/o pain n/v or soa. placed 2l nc on pt while sleeping for decreased o2. vss. new medication education started. will continue to monitor

## 2019-01-12 NOTE — PROGRESS NOTES
Hospital Follow Up    LOS:  LOS: 1 day   Patient Name: Alexa Peace  Age/Sex: 89 y.o. female  : 1927  MRN: [id number]    Date of Hospital Visit: 19  Length of Stay: 1  Encounter Provider: Carlos Enrique Pyle MD  Place of Service: [facility] CARDIOLOGY    Subjective:     Follow Up for: atrial fib    Interval History: Remains in atrial fibrillation.  Heart rate still fairly elevated.  Objective:     Objective:  Temp:  [97.5 °F (36.4 °C)-98.6 °F (37 °C)] 97.7 °F (36.5 °C)  Heart Rate:  [] 92  Resp:  [18] 18  BP: (123-185)/() 123/72  Body mass index is 21.81 kg/m².    Intake/Output Summary (Last 24 hours) at 2019 0908  Last data filed at 2019 0542  Gross per 24 hour   Intake 120 ml   Output --   Net 120 ml         19  1028 19  1700   Weight: 54.1 kg (119 lb 5 oz) 54.1 kg (119 lb 4.8 oz)     Weight change:     Physical Exam:   General Appearance: Alert, cooperative, in no acute distress. AAOx4.   HEENT: Normocephalic.  Neck: Supple. No JVD. No Carotid bruit. No thyromegaly  Lungs: CTAB. Normal respiratory effort and rate.  Heart:: Irrgular rate and rhythm, normal S1 and S2, no murmurs, gallops or rubs.  Abdomen: Soft, nontender, non-distended. positive bowel sounds  Extremities: Warm, no cyanosis, or clubbing. No edema.     Lab Review:   Results from last 7 days   Lab Units  19   1102   SODIUM mmol/L  140   POTASSIUM mmol/L  4.4   CHLORIDE mmol/L  102   CO2 mmol/L  29.8*   BUN mg/dL  18   CREATININE mg/dL  0.60   GLUCOSE mg/dL  123*   CALCIUM mg/dL  9.8*       Results from last 7 days   Lab Units  19   1250  19   1102   TROPONIN T ng/mL  <0.010  <0.010     Results from last 7 days   Lab Units  19   1102   WBC 10*3/mm3  6.53   HEMOGLOBIN g/dL  12.1   HEMATOCRIT %  37.2   PLATELETS 10*3/mm3  273     Results from last 7 days   Lab Units  19   1102   INR   1.08     Results from last 7 days   Lab Units  19    1102   MAGNESIUM mg/dL  2.1         Results from last 7 days   Lab Units  01/11/19   1102   PROBNP pg/mL  1,430.0     Results from last 7 days   Lab Units  01/11/19   1102   TSH mIU/mL  1.050         I reviewed the patient's new clinical results.          I personally viewed and interpreted the patient's EKG/Telemetry data.  Current Medications:   Scheduled Meds:  apixaban 2.5 mg Oral Q12H   aspirin 81 mg Oral Daily   atorvastatin 10 mg Oral Daily   calcium carbonate 625 mg Oral BID   DULoxetine 60 mg Oral Daily   gabapentin 100 mg Oral Daily With Breakfast & Lunch   gabapentin 200 mg Oral Nightly   HYDROcodone-acetaminophen 1 tablet Oral TID   metoprolol tartrate 50 mg Oral Q12H   multivitamin with minerals 1 tablet Oral Daily   oxybutynin XL 5 mg Oral Daily   pantoprazole 40 mg Oral QAM   sodium chloride 3 mL Intravenous Q12H   vitamin C 500 mg Oral Daily With Lunch     Continuous Infusions:     Allergies:  Allergies   Allergen Reactions   • Ciprofloxacin      UNKNOWN   • Metronidazole      UNKWOWN   • Prednisone Other (See Comments)     Elevated blood pressure       Assessment & Plan       New onset atrial fibrillation (CMS/HCC)  Coronary artery disease: Status post previous stents        Plan: Add digoxin for rate control.  Evaluate with echocardiogram.      Carlos Enrique Pyle MD  01/12/19

## 2019-01-12 NOTE — PLAN OF CARE
Problem: Fall Risk (Adult)  Goal: Absence of Fall  Outcome: Ongoing (interventions implemented as appropriate)      Problem: Patient Care Overview  Goal: Plan of Care Review  Outcome: Ongoing (interventions implemented as appropriate)   01/12/19 0541   Coping/Psychosocial   Plan of Care Reviewed With patient   Plan of Care Review   Progress no change   OTHER   Outcome Summary Pt has no c/o pain, afib on monitor, up with assist. No s/s of distress noted will continue to monitor at 0542 1/12/19

## 2019-01-13 PROCEDURE — 94799 UNLISTED PULMONARY SVC/PX: CPT

## 2019-01-13 PROCEDURE — 99232 SBSQ HOSP IP/OBS MODERATE 35: CPT | Performed by: INTERNAL MEDICINE

## 2019-01-13 RX ORDER — FUROSEMIDE 20 MG/1
20 TABLET ORAL
Status: DISCONTINUED | OUTPATIENT
Start: 2019-01-13 | End: 2019-01-14 | Stop reason: HOSPADM

## 2019-01-13 RX ORDER — POTASSIUM CHLORIDE 750 MG/1
20 CAPSULE, EXTENDED RELEASE ORAL DAILY
Status: DISCONTINUED | OUTPATIENT
Start: 2019-01-13 | End: 2019-01-14 | Stop reason: HOSPADM

## 2019-01-13 RX ADMIN — FUROSEMIDE 20 MG: 20 TABLET ORAL at 14:20

## 2019-01-13 RX ADMIN — METOPROLOL TARTRATE 50 MG: 50 TABLET, FILM COATED ORAL at 20:49

## 2019-01-13 RX ADMIN — HYDROCODONE BITARTRATE AND ACETAMINOPHEN 1 TABLET: 7.5; 325 TABLET ORAL at 20:49

## 2019-01-13 RX ADMIN — OXYBUTYNIN CHLORIDE 5 MG: 5 TABLET, EXTENDED RELEASE ORAL at 09:19

## 2019-01-13 RX ADMIN — PANTOPRAZOLE SODIUM 40 MG: 40 TABLET, DELAYED RELEASE ORAL at 09:18

## 2019-01-13 RX ADMIN — MULTIPLE VITAMINS W/ MINERALS TAB 1 TABLET: TAB at 09:19

## 2019-01-13 RX ADMIN — ATORVASTATIN CALCIUM 10 MG: 10 TABLET, FILM COATED ORAL at 09:19

## 2019-01-13 RX ADMIN — POTASSIUM CHLORIDE 20 MEQ: 750 CAPSULE, EXTENDED RELEASE ORAL at 12:23

## 2019-01-13 RX ADMIN — DULOXETINE HYDROCHLORIDE 60 MG: 60 CAPSULE, DELAYED RELEASE ORAL at 09:19

## 2019-01-13 RX ADMIN — SODIUM CHLORIDE, PRESERVATIVE FREE 3 ML: 5 INJECTION INTRAVENOUS at 09:20

## 2019-01-13 RX ADMIN — METOPROLOL TARTRATE 50 MG: 50 TABLET, FILM COATED ORAL at 09:19

## 2019-01-13 RX ADMIN — HYDROCODONE BITARTRATE AND ACETAMINOPHEN 1 TABLET: 7.5; 325 TABLET ORAL at 12:27

## 2019-01-13 RX ADMIN — CALCIUM CARBONATE 625 MG: 1250 TABLET ORAL at 09:23

## 2019-01-13 RX ADMIN — HYDROCODONE BITARTRATE AND ACETAMINOPHEN 1 TABLET: 7.5; 325 TABLET ORAL at 09:18

## 2019-01-13 RX ADMIN — Medication 81 MG: at 09:19

## 2019-01-13 RX ADMIN — OXYCODONE HYDROCHLORIDE AND ACETAMINOPHEN 500 MG: 500 TABLET ORAL at 12:24

## 2019-01-13 RX ADMIN — APIXABAN 2.5 MG: 2.5 TABLET, FILM COATED ORAL at 20:49

## 2019-01-13 RX ADMIN — GABAPENTIN 200 MG: 100 CAPSULE ORAL at 20:50

## 2019-01-13 RX ADMIN — GABAPENTIN 100 MG: 100 CAPSULE ORAL at 09:18

## 2019-01-13 RX ADMIN — GABAPENTIN 100 MG: 100 CAPSULE ORAL at 12:23

## 2019-01-13 RX ADMIN — DIGOXIN 125 MCG: 125 TABLET ORAL at 12:24

## 2019-01-13 RX ADMIN — APIXABAN 2.5 MG: 2.5 TABLET, FILM COATED ORAL at 09:18

## 2019-01-13 NOTE — PROGRESS NOTES
Hospital Follow Up    LOS:  LOS: 2 days   Patient Name: Alexa Peace  Age/Sex: 89 y.o. female  : 1927  MRN: [id number]    Date of Hospital Visit: 19  Length of Stay: 2  Encounter Provider: Carlos Enrique Pyle MD  Place of Service: [facility] CARDIOLOGY    Subjective:     Follow Up for: Atrial fibrillation, pleural effusion    Interval History: The patient still short of breath.  Echocardiogram reviewed.  Moderate to severe mitral regurgitation.  Also noted moderate to severe left pleural effusion which is seen on chest x-ray.      Objective:     Objective:  Temp:  [97.7 °F (36.5 °C)-98.1 °F (36.7 °C)] 98.1 °F (36.7 °C)  Heart Rate:  [69-90] 73  Resp:  [16] 16  BP: (123-164)/(64-73) 164/73  Body mass index is 21.77 kg/m².    Intake/Output Summary (Last 24 hours) at 2019 1046  Last data filed at 2019 0200  Gross per 24 hour   Intake 600 ml   Output --   Net 600 ml         19  1028 19  1700 19  1057   Weight: 54.1 kg (119 lb 5 oz) 54.1 kg (119 lb 4.8 oz) 54 kg (119 lb)     Weight change: -0.142 kg (-5 oz)    Physical Exam:   General Appearance: Alert, cooperative, in no acute distress. AAOx4.   HEENT: Normocephalic.  Neck: Supple. No JVD. No Carotid bruit. No thyromegaly  Lungs: CTAB. Normal respiratory effort and rate.  Heart:: Irregular rate and rhythm, normal S1 and S2, soft systolic murmur   Abdomen: Soft, nontender, non-distended. positive bowel sounds  Extremities: Warm, no cyanosis, or clubbing. No edema.     Lab Review:   Results from last 7 days   Lab Units  19   1102   SODIUM mmol/L  140   POTASSIUM mmol/L  4.4   CHLORIDE mmol/L  102   CO2 mmol/L  29.8*   BUN mg/dL  18   CREATININE mg/dL  0.60   GLUCOSE mg/dL  123*   CALCIUM mg/dL  9.8*       Results from last 7 days   Lab Units  19   1250  19   1102   TROPONIN T ng/mL  <0.010  <0.010     Results from last 7 days   Lab Units  19   1102   WBC 10*3/mm3  6.53    HEMOGLOBIN g/dL  12.1   HEMATOCRIT %  37.2   PLATELETS 10*3/mm3  273     Results from last 7 days   Lab Units  01/11/19   1102   INR   1.08     Results from last 7 days   Lab Units  01/11/19   1102   MAGNESIUM mg/dL  2.1         Results from last 7 days   Lab Units  01/11/19   1102   PROBNP pg/mL  1,430.0     Results from last 7 days   Lab Units  01/11/19   1102   TSH mIU/mL  1.050         I reviewed the patient's new clinical results.          I personally viewed and interpreted the patient's EKG/Telemetry data.  Current Medications:   Scheduled Meds:  apixaban 2.5 mg Oral Q12H   aspirin 81 mg Oral Daily   atorvastatin 10 mg Oral Daily   calcium carbonate 625 mg Oral BID   digoxin 125 mcg Oral Daily   DULoxetine 60 mg Oral Daily   gabapentin 100 mg Oral Daily With Breakfast & Lunch   gabapentin 200 mg Oral Nightly   HYDROcodone-acetaminophen 1 tablet Oral TID   metoprolol tartrate 50 mg Oral Q12H   multivitamin with minerals 1 tablet Oral Daily   oxybutynin XL 5 mg Oral Daily   pantoprazole 40 mg Oral QAM   sodium chloride 3 mL Intravenous Q12H   vitamin C 500 mg Oral Daily With Lunch     Continuous Infusions:     Allergies:  Allergies   Allergen Reactions   • Ciprofloxacin      UNKNOWN   • Metronidazole      UNKWOWN   • Prednisone Other (See Comments)     Elevated blood pressure       Assessment & Plan   1.  Atrial fibrillation: New onset.  Anticoagulated with apixaban.  Rate control with metoprolol  2.  Mitral regurgitation: Moderate to severe per echocardiogram  3.  Pleural effusion: Elevated proBNP.  We'll start diuretic therapy  4.  Hypertension: May need adjustment in medication.  Continue to monitor  5.  Coronary artery disease: Status post previous intervention.  No angina pectoris at this time.              Carlos Enrique Pyle MD  01/13/19

## 2019-01-13 NOTE — PLAN OF CARE
Problem: Fall Risk (Adult)  Goal: Absence of Fall  Outcome: Ongoing (interventions implemented as appropriate)      Problem: Arrhythmia/Dysrhythmia (Symptomatic) (Adult)  Goal: Signs and Symptoms of Listed Potential Problems Will be Absent, Minimized or Managed (Arrhythmia/Dysrhythmia)  Outcome: Ongoing (interventions implemented as appropriate)      Problem: Patient Care Overview  Goal: Plan of Care Review  Outcome: Ongoing (interventions implemented as appropriate)   01/13/19 2618   Coping/Psychosocial   Plan of Care Reviewed With patient;caregiver   Plan of Care Review   Progress improving   OTHER   Outcome Summary no c/o pain; no s/s distress noted; pt's daughter visited her today; Discharge maybe 1/14/19 after observing how pt does on Lasix; will continue to monitor pt closely

## 2019-01-13 NOTE — PLAN OF CARE
Problem: Fall Risk (Adult)  Goal: Absence of Fall  Outcome: Ongoing (interventions implemented as appropriate)      Problem: Arrhythmia/Dysrhythmia (Symptomatic) (Adult)  Goal: Signs and Symptoms of Listed Potential Problems Will be Absent, Minimized or Managed (Arrhythmia/Dysrhythmia)  Outcome: Ongoing (interventions implemented as appropriate)      Problem: Patient Care Overview  Goal: Plan of Care Review  Outcome: Ongoing (interventions implemented as appropriate)   01/13/19 0312   Coping/Psychosocial   Plan of Care Reviewed With patient   Plan of Care Review   Progress improving   OTHER   Outcome Summary pt no longer with afib; NSR with 1AVB; VSS, placed on O2 at 2L n/c while sleeping per request & desaturation 88% on RA while asleep; ambulating with walker & SBA well; tolerating new medications; possible d/c today; cont current POC

## 2019-01-14 VITALS
OXYGEN SATURATION: 94 % | DIASTOLIC BLOOD PRESSURE: 73 MMHG | RESPIRATION RATE: 16 BRPM | HEART RATE: 69 BPM | TEMPERATURE: 97.6 F | WEIGHT: 119 LBS | SYSTOLIC BLOOD PRESSURE: 143 MMHG | HEIGHT: 62 IN | BODY MASS INDEX: 21.9 KG/M2

## 2019-01-14 PROBLEM — I34.0 NON-RHEUMATIC MITRAL REGURGITATION: Status: ACTIVE | Noted: 2019-01-14

## 2019-01-14 PROBLEM — I38 VALVULAR HEART DISEASE: Status: ACTIVE | Noted: 2019-01-14

## 2019-01-14 PROBLEM — J90 PLEURAL EFFUSION ON LEFT: Status: ACTIVE | Noted: 2019-01-14

## 2019-01-14 LAB
ANION GAP SERPL CALCULATED.3IONS-SCNC: 9.1 MMOL/L
BUN BLD-MCNC: 14 MG/DL (ref 8–23)
BUN/CREAT SERPL: 24.6 (ref 7–25)
CALCIUM SPEC-SCNC: 9 MG/DL (ref 8.2–9.6)
CHLORIDE SERPL-SCNC: 104 MMOL/L (ref 98–107)
CO2 SERPL-SCNC: 28.9 MMOL/L (ref 22–29)
CREAT BLD-MCNC: 0.57 MG/DL (ref 0.57–1)
GFR SERPL CREATININE-BSD FRML MDRD: 99 ML/MIN/1.73
GLUCOSE BLD-MCNC: 109 MG/DL (ref 65–99)
POTASSIUM BLD-SCNC: 3.8 MMOL/L (ref 3.5–5.2)
SODIUM BLD-SCNC: 142 MMOL/L (ref 136–145)

## 2019-01-14 PROCEDURE — 80048 BASIC METABOLIC PNL TOTAL CA: CPT | Performed by: INTERNAL MEDICINE

## 2019-01-14 PROCEDURE — 36415 COLL VENOUS BLD VENIPUNCTURE: CPT | Performed by: INTERNAL MEDICINE

## 2019-01-14 PROCEDURE — 99238 HOSP IP/OBS DSCHRG MGMT 30/<: CPT | Performed by: INTERNAL MEDICINE

## 2019-01-14 RX ORDER — FUROSEMIDE 20 MG/1
20 TABLET ORAL DAILY
Qty: 30 TABLET | Refills: 11 | Status: SHIPPED | OUTPATIENT
Start: 2019-01-14 | End: 2019-11-13 | Stop reason: SDUPTHER

## 2019-01-14 RX ORDER — POTASSIUM CHLORIDE 750 MG/1
20 CAPSULE, EXTENDED RELEASE ORAL DAILY
Qty: 30 CAPSULE | Refills: 11 | Status: SHIPPED | OUTPATIENT
Start: 2019-01-15 | End: 2020-02-10 | Stop reason: SDUPTHER

## 2019-01-14 RX ORDER — METOPROLOL TARTRATE 50 MG/1
50 TABLET, FILM COATED ORAL EVERY 12 HOURS SCHEDULED
Qty: 60 TABLET | Refills: 11 | Status: SHIPPED | OUTPATIENT
Start: 2019-01-14 | End: 2019-11-22 | Stop reason: SDUPTHER

## 2019-01-14 RX ORDER — DIGOXIN 125 MCG
125 TABLET ORAL
Qty: 30 TABLET | Refills: 11 | Status: SHIPPED | OUTPATIENT
Start: 2019-01-14 | End: 2019-03-08 | Stop reason: SDUPTHER

## 2019-01-14 RX ADMIN — APIXABAN 2.5 MG: 2.5 TABLET, FILM COATED ORAL at 08:45

## 2019-01-14 RX ADMIN — OXYBUTYNIN CHLORIDE 5 MG: 5 TABLET, EXTENDED RELEASE ORAL at 08:45

## 2019-01-14 RX ADMIN — ATORVASTATIN CALCIUM 10 MG: 10 TABLET, FILM COATED ORAL at 08:44

## 2019-01-14 RX ADMIN — FUROSEMIDE 20 MG: 20 TABLET ORAL at 08:43

## 2019-01-14 RX ADMIN — METOPROLOL TARTRATE 50 MG: 50 TABLET, FILM COATED ORAL at 08:44

## 2019-01-14 RX ADMIN — PANTOPRAZOLE SODIUM 40 MG: 40 TABLET, DELAYED RELEASE ORAL at 08:45

## 2019-01-14 RX ADMIN — HYDROCODONE BITARTRATE AND ACETAMINOPHEN 1 TABLET: 7.5; 325 TABLET ORAL at 08:44

## 2019-01-14 RX ADMIN — GABAPENTIN 100 MG: 100 CAPSULE ORAL at 08:45

## 2019-01-14 RX ADMIN — SODIUM CHLORIDE, PRESERVATIVE FREE 3 ML: 5 INJECTION INTRAVENOUS at 08:45

## 2019-01-14 RX ADMIN — OXYCODONE HYDROCHLORIDE AND ACETAMINOPHEN 500 MG: 500 TABLET ORAL at 12:01

## 2019-01-14 RX ADMIN — CALCIUM CARBONATE 625 MG: 1250 TABLET ORAL at 08:46

## 2019-01-14 RX ADMIN — Medication 81 MG: at 08:44

## 2019-01-14 RX ADMIN — GABAPENTIN 100 MG: 100 CAPSULE ORAL at 12:02

## 2019-01-14 RX ADMIN — POTASSIUM CHLORIDE 20 MEQ: 750 CAPSULE, EXTENDED RELEASE ORAL at 08:44

## 2019-01-14 RX ADMIN — DULOXETINE HYDROCHLORIDE 60 MG: 60 CAPSULE, DELAYED RELEASE ORAL at 08:44

## 2019-01-14 RX ADMIN — DIGOXIN 125 MCG: 125 TABLET ORAL at 12:01

## 2019-01-14 RX ADMIN — HYDROCODONE BITARTRATE AND ACETAMINOPHEN 1 TABLET: 7.5; 325 TABLET ORAL at 12:01

## 2019-01-14 RX ADMIN — SODIUM CHLORIDE, PRESERVATIVE FREE 3 ML: 5 INJECTION INTRAVENOUS at 01:16

## 2019-01-14 RX ADMIN — MULTIPLE VITAMINS W/ MINERALS TAB 1 TABLET: TAB at 08:44

## 2019-01-14 NOTE — PROGRESS NOTES
Discharge Planning Assessment  [facility]     Patient Name: Alexa Peace  MRN: [id number]  Today's Date: 1/14/2019    Admit Date: 1/11/2019    Discharge Needs Assessment     Row Name 01/14/19 1202       Living Environment    Lives With  alone;grandchild(kishore)    Current Living Arrangements  home/apartment/condo    Primary Care Provided by  self    Provides Primary Care For  no one    Family Caregiver if Needed  child(kishore), adult;grandchild(kishore), adult    Quality of Family Relationships  helpful;involved;supportive       Resource/Environmental Concerns    Transportation Concerns  car, none       Transition Planning    Patient/Family Anticipates Transition to  home with family    Transportation Anticipated  family or friend will provide       Discharge Needs Assessment    Readmission Within the Last 30 Days  no previous admission in last 30 days    Concerns to be Addressed  no discharge needs identified    Equipment Currently Used at Home  walker, rolling    Equipment Needed After Discharge  none        Discharge Plan     Row Name 01/14/19 1209       Plan    Plan  Home with family    Plan Comments  Spoke with pt and her daughter at bedside. Role of CCP explained. Facesheet info verified. Pharmacy verified. Pts PCP updated on facesheet. Dr Childers retired. Pt has a new pt appt with Dr Jacobs on March 1st. Pt denies any issues affording their medications or remembering to take them. Pt has a Living Will/POA and plans to bring a copy in next time as pt is being discharged today. Pt denies any history of HH. Pt has been to [facility] in the past. Pts granddaughter lives with her in a S/S home with a basement. Pt states she doesn't go down into the basement. Pt has 5-6 steps to enter her house. Pt was [place] and still drives short distances. Pts family provides transportation anywhere else the pt needs to go and will provide transportation at [place]. Pts granddaughter is gettting  and will be moving out. Pts  dtr requested info on caregivers if needed in the future. In home caregiver list and brochures given to pt. Pt was started on eliquis. Pt is signed up for meds to beds. Called [facility], they applied free 30day coupon to pts prescription and will be delivering medication to pts room shortly. Pt plans to return home at [place] and denies any needs at this time. CCP to follow. JChasteenRN/CCP          Destination      No service coordination in this encounter.      Durable Medical Equipment      No service coordination in this encounter.      Dialysis/Infusion      No service coordination in this encounter.      Home Medical Care      No service coordination in this encounter.      Community Resources      No service coordination in this encounter.        Expected Discharge Date and Time     Expected Discharge Date Expected Discharge Time    Jan 14, 2019         Demographic Summary    No documentation.       Functional Status    No documentation.       Psychosocial    No documentation.       Abuse/Neglect    No documentation.       Legal    No documentation.       Substance Abuse    No documentation.       Patient Forms    No documentation.           Angelina Patel RN

## 2019-01-14 NOTE — PLAN OF CARE
Problem: Fall Risk (Adult)  Goal: Absence of Fall  Outcome: Ongoing (interventions implemented as appropriate)      Problem: Arrhythmia/Dysrhythmia (Symptomatic) (Adult)  Goal: Signs and Symptoms of Listed Potential Problems Will be Absent, Minimized or Managed (Arrhythmia/Dysrhythmia)  Outcome: Ongoing (interventions implemented as appropriate)      Problem: Patient Care Overview  Goal: Plan of Care Review  Outcome: Ongoing (interventions implemented as appropriate)   01/14/19 7637   Coping/Psychosocial   Plan of Care Reviewed With patient   Plan of Care Review   Progress improving   OTHER   Outcome Summary No c/o pain or evidence respiratory distress noted. Ambulates to bathroom with assistance to void. Resting well. Will continue to monitor.

## 2019-01-14 NOTE — DISCHARGE SUMMARY
Alexa Peace  [id number]    Date of Admit: 1/11/2019  Date of Discharge:  1/14/2019    Discharge Diagnosis:  Active Hospital Problems    Diagnosis Date Noted   • New onset atrial fibrillation (CMS/[facility]) [I48.91] 01/11/2019      Resolved Hospital Problems   No resolved problems to display.           Hospital Course:    is a 89 yo female patient of mine with history of coronary disease and stenting in her LAD in 2007. She still lives independently and came to ED 01/11/2019 with c/o palpitations and shortness of breath. She was found to be in new onset atrial fibrillation at Dr Fernandes's office after she underwent back procedure with dr Fernandes. She was admitted for rate control and initiation of anticoagulation therapy. She had echocardiogram done on 01/12/19, where she had normal LV function and EF 54%. Moderate left atrial cavity dilation, mild aorotic valve regurgitation, moderate-to-severe mitral valve regurgitation, and moderate tricuspid valve regurgitation are also demonstrated on echo. CXR showed small pleural effusions and started on diurectic therapy, as well. She is now stable for discharge to home, and will go home on a beta blocker, digoxin, Eliquis, and lasix. She will follow up with my NP, Anne Marie Tamez, in 1 week and with me in 4 weeks.       Procedures Performed  Echo 01/12/2019  · Calculated EF = 54%  · Left ventricular systolic function is normal.  · Left ventricular wall thickness is consistent with hypertrophy. Sigmoid-shaped ventricular septum is present.  · Left atrial cavity size is moderately dilated.  · Mild aortic valve regurgitation is present.  · Moderate-to-severe mitral valve regurgitation is present  · Moderate tricuspid valve regurgitation is present.  · Estimated right ventricular systolic pressure from tricuspid regurgitation is moderately elevated (45-55 mmHg).  · Calculated right ventricular systolic pressure from tricuspid regurgitation is 55.7 mmHg.  · There is a large  size left pleural effusion.       Consults     Date and Time Order Name Status Description    1/11/2019 1223 LCG (on-call MD unless specified) Completed           Discharge Medications     Your medication list      START taking these medications      Instructions Last Dose Given Next Dose Due   apixaban 2.5 MG tablet tablet  Commonly known as:  ELIQUIS      Take 1 tablet by mouth Every 12 (Twelve) Hours.       digoxin 125 MCG tablet  Commonly known as:  LANOXIN      Take 1 tablet by mouth Daily.       furosemide 20 MG tablet  Commonly known as:  LASIX      Take 1 tablet by mouth Daily.       metoprolol tartrate 50 MG tablet  Commonly known as:  LOPRESSOR      Take 1 tablet by mouth Every 12 (Twelve) Hours.       potassium chloride 10 MEQ CR capsule  Commonly known as:  MICRO-K      Take 2 capsules by mouth Daily.          CHANGE how you take these medications      Instructions Last Dose Given Next Dose Due   polyethylene glycol pack packet  Commonly known as:  MIRALAX  What changed:    · when to take this  · reasons to take this      Take 17 g by mouth Daily.          CONTINUE taking these medications      Instructions Last Dose Given Next Dose Due   acetaminophen 500 MG tablet  Commonly known as:  TYLENOL      Take 500-1,000 mg by mouth Every Evening. PRN       aspirin 81 MG chewable tablet      Chew 81 mg Daily.       calcium carbonate 600 MG tablet  Commonly known as:  OS-KO      Take 600 mg by mouth 2 (Two) Times a Day.       CENTRUM SILVER tablet      Take 1 tablet by mouth Daily.       DULoxetine 60 MG capsule  Commonly known as:  CYMBALTA      Take 1 capsule by mouth Daily.       gabapentin 100 MG capsule  Commonly known as:  NEURONTIN      Take 100 mg by mouth Daily With Breakfast & Lunch.       gabapentin 100 MG capsule  Commonly known as:  NEURONTIN      Take 200 mg by mouth every night at bedtime.       HYDROcodone-acetaminophen 7.5-325 MG per tablet  Commonly known as:  NORCO      Take 1 tablet by mouth  3 (Three) Times a Day.       MYRBETRIQ 25 MG tablet sustained-release 24 hour 24 hr tablet  Generic drug:  Mirabegron ER      Take 25 mg by mouth Every Night.       omeprazole 20 MG capsule  Commonly known as:  priLOSEC      Take 20 mg by mouth Daily With Breakfast.       pravastatin 40 MG tablet  Commonly known as:  PRAVACHOL      Take 40 mg by mouth Daily.       vitamin C 500 MG tablet  Commonly known as:  ASCORBIC ACID      Take 500 mg by mouth Daily With Lunch.             Where to Get Your Medications      These medications were sent to [facility] [place] [id number]    Hours:  7:00AM-6PM Mon-Fri Phone:  [phone]   · apixaban 2.5 MG tablet tablet  · digoxin 125 MCG tablet  · furosemide 20 MG tablet  · metoprolol tartrate 50 MG tablet  · potassium chloride 10 MEQ CR capsule         Discharge Diet: Healthy Heart    Activity at Discharge: Activity as tolerated    Discharge disposition: Home    Condition on Discharge: Stable    Follow-up Appointments  Future Appointments   Date Time Provider Department Center   1/16/2019  9:00 AM Anne Marie Tamez APRN [facility] None   3/1/2019 10:00 AM Brian Jacobs MD [facility] None   6/19/2019 12:30 PM Go Kim MD [facility] None     Additional Instructions for the Follow-ups that You Need to Schedule     Discharge Follow-up with PCP   As directed       Currently Documented PCP:    Lizandro Childers MD    PCP Phone Number:    None     Follow Up Details:  1-2 weeks         Discharge Follow-up with Specialty: follow up with Anne Marie Tamez in 1 week and follow up with Dr. Kim in 4 weeks   As directed      Specialty:  follow up with Anne Marie Tamez in 1 week and follow up with Dr. Kim in 4 weeks                  Go Kim MD  01/14/19  11:20 AM

## 2019-01-15 NOTE — PROGRESS NOTES
Case Management Discharge Note    Final Note: Pt dc'd home    Destination      No service has been selected for the patient.      Durable Medical Equipment      No service has been selected for the patient.      Dialysis/Infusion      No service has been selected for the patient.      Home Medical Care      No service has been selected for the patient.      Community Resources      No service has been selected for the patient.        Other: Other(private vehicle)    Final Discharge Disposition Code: 01 - home or self-care

## 2019-01-21 RX ORDER — OMEPRAZOLE 20 MG/1
20 CAPSULE, DELAYED RELEASE ORAL
Qty: 90 CAPSULE | Refills: 1 | Status: SHIPPED | OUTPATIENT
Start: 2019-01-21 | End: 2019-01-23 | Stop reason: SDUPTHER

## 2019-01-23 RX ORDER — OMEPRAZOLE 20 MG/1
20 CAPSULE, DELAYED RELEASE ORAL
Qty: 90 CAPSULE | Refills: 1 | Status: SHIPPED | OUTPATIENT
Start: 2019-01-23

## 2019-01-26 ENCOUNTER — OFFICE VISIT (OUTPATIENT)
Dept: RETAIL CLINIC | Facility: CLINIC | Age: 84
End: 2019-01-26

## 2019-01-26 VITALS
TEMPERATURE: 98.5 F | RESPIRATION RATE: 18 BRPM | DIASTOLIC BLOOD PRESSURE: 56 MMHG | HEART RATE: 75 BPM | SYSTOLIC BLOOD PRESSURE: 104 MMHG

## 2019-01-26 DIAGNOSIS — N39.0 URINARY TRACT INFECTION WITH HEMATURIA, SITE UNSPECIFIED: Primary | ICD-10-CM

## 2019-01-26 DIAGNOSIS — R31.9 URINARY TRACT INFECTION WITH HEMATURIA, SITE UNSPECIFIED: Primary | ICD-10-CM

## 2019-01-26 LAB
BILIRUB BLD-MCNC: NEGATIVE MG/DL
CLARITY, POC: ABNORMAL
COLOR UR: YELLOW
GLUCOSE UR STRIP-MCNC: NEGATIVE MG/DL
KETONES UR QL: NEGATIVE
LEUKOCYTE EST, POC: ABNORMAL
NITRITE UR-MCNC: NEGATIVE MG/ML
PH UR: 6 [PH] (ref 5–8)
PROT UR STRIP-MCNC: ABNORMAL MG/DL
RBC # UR STRIP: ABNORMAL /UL
SP GR UR: 1.01 (ref 1–1.03)
UROBILINOGEN UR QL: ABNORMAL

## 2019-01-26 PROCEDURE — 99213 OFFICE O/P EST LOW 20 MIN: CPT | Performed by: NURSE PRACTITIONER

## 2019-01-26 PROCEDURE — 81003 URINALYSIS AUTO W/O SCOPE: CPT | Performed by: NURSE PRACTITIONER

## 2019-01-26 RX ORDER — SULFAMETHOXAZOLE AND TRIMETHOPRIM 800; 160 MG/1; MG/1
1 TABLET ORAL 2 TIMES DAILY
Qty: 14 TABLET | Refills: 0 | Status: SHIPPED | OUTPATIENT
Start: 2019-01-26 | End: 2019-02-02

## 2019-01-26 RX ORDER — SULFAMETHOXAZOLE AND TRIMETHOPRIM 800; 160 MG/1; MG/1
1 TABLET ORAL 2 TIMES DAILY
Qty: 14 TABLET | Refills: 0 | Status: SHIPPED | OUTPATIENT
Start: 2019-01-26 | End: 2019-01-26

## 2019-01-26 NOTE — PROGRESS NOTES
Subjective:     Alexa Peace is a 89 y.o.     Urinary Tract Infection    This is a new problem. Episode onset: cannot remmeber. There has been no fever. Pertinent negatives include no hematuria or nausea. Frequency: baseline. Associated symptoms comments: incontinence. She has tried nothing for the symptoms. There is no history of recurrent UTIs.         The following portions of the patient's history were reviewed and updated as appropriate: allergies, current medications, past family history, past medical history, past social history, past surgical history and problem list.      Review of Systems   Respiratory: Negative.    Cardiovascular:        See history   Gastrointestinal: Negative for nausea.   Genitourinary: Negative for dysuria and hematuria. Frequency: baseline.        Incontinence   Musculoskeletal: Back pain: baseline.         Objective:      Physical Exam   HENT:   Head: Normocephalic and atraumatic.   Cardiovascular: Normal rate, regular rhythm, S1 normal, S2 normal and normal heart sounds.   Pulmonary/Chest: Breath sounds normal.   Abdominal: There is no tenderness.   Vitals reviewed.          Alexa was seen today for urinary tract infection.    Diagnoses and all orders for this visit:    Urinary tract infection with hematuria, site unspecified  -     POC Urinalysis Dipstick, Automated  -     Urine Culture - Urine, Urine, Clean Catch    Other orders  -     Discontinue: sulfamethoxazole-trimethoprim (BACTRIM DS) 800-160 MG per tablet; Take 1 tablet by mouth 2 (Two) Times a Day for 7 days.  -     sulfamethoxazole-trimethoprim (BACTRIM DS) 800-160 MG per tablet; Take 1 tablet by mouth 2 (Two) Times a Day for 7 days.    Culture pending

## 2019-01-29 ENCOUNTER — TELEPHONE (OUTPATIENT)
Dept: RETAIL CLINIC | Facility: CLINIC | Age: 84
End: 2019-01-29

## 2019-01-29 LAB
BACTERIA UR CULT: NORMAL
BACTERIA UR CULT: NORMAL

## 2019-01-29 NOTE — TELEPHONE ENCOUNTER
Attempted to call and check on patient and give urine culture results which were negative for any bacteria. No answer.

## 2019-02-20 ENCOUNTER — OFFICE VISIT (OUTPATIENT)
Dept: CARDIOLOGY | Facility: CLINIC | Age: 84
End: 2019-02-20

## 2019-02-20 VITALS
HEIGHT: 63 IN | DIASTOLIC BLOOD PRESSURE: 64 MMHG | HEART RATE: 66 BPM | BODY MASS INDEX: 18.75 KG/M2 | WEIGHT: 105.8 LBS | SYSTOLIC BLOOD PRESSURE: 114 MMHG

## 2019-02-20 DIAGNOSIS — I25.10 CORONARY ARTERY DISEASE INVOLVING NATIVE CORONARY ARTERY OF NATIVE HEART WITHOUT ANGINA PECTORIS: Primary | ICD-10-CM

## 2019-02-20 DIAGNOSIS — I48.0 PAROXYSMAL ATRIAL FIBRILLATION (HCC): ICD-10-CM

## 2019-02-20 PROCEDURE — 93000 ELECTROCARDIOGRAM COMPLETE: CPT | Performed by: INTERNAL MEDICINE

## 2019-02-20 PROCEDURE — 99213 OFFICE O/P EST LOW 20 MIN: CPT | Performed by: INTERNAL MEDICINE

## 2019-02-20 NOTE — PROGRESS NOTES
Subjective:     Encounter Date:02/20/2019      Patient ID: Alexa Peace is a 89 y.o. female.    Chief Complaint: PAF, CAD    History of Present Illness    Dear Dr. Jacobs,     I had the pleasure of seeing your patient in cardiac followup today. As you well know, she is a mimi 89-year-old woman with history of coronary artery disease status post stenting of her LAD. She was found to be in new onset atrial fibrillation after a back procedure by Dr. Sampson. Her evaluation with echocardiography demonstrated moderate to severe mitral regurgitation but she had no symptoms of heart failure. She is anticoagulated with apixaban. Since I have last seen her, she is back in sinus rhythm. She has no complaints. Despite her advanced age she is able to get around and do everything without difficulty.         Review of Systems   All other systems reviewed and are negative.        ECG 12 Lead  Date/Time: 2/20/2019 12:49 PM  Performed by: Go Kim MD  Authorized by: Go Kim MD   Comparison: compared with previous ECG   Similar to previous ECG  Rhythm: sinus rhythm  BPM: 66  Other findings: left ventricular hypertrophy               Objective:     Physical Exam   Constitutional: She is oriented to person, place, and time. She appears well-developed and well-nourished.   HENT:   Head: Normocephalic and atraumatic.   Neck: Normal range of motion. Neck supple.   Cardiovascular: Normal rate, regular rhythm and normal heart sounds.   Pulmonary/Chest: Effort normal and breath sounds normal.   Abdominal: Soft. Bowel sounds are normal.   Musculoskeletal: Normal range of motion.   Neurological: She is alert and oriented to person, place, and time.   Skin: Skin is warm and dry.   Psychiatric: She has a normal mood and affect. Her behavior is normal. Thought content normal.   Vitals reviewed.      Lab Review:       Assessment:          Diagnosis Plan   1. Coronary artery disease involving native coronary artery of native heart  without angina pectoris     2. Paroxysmal atrial fibrillation (CMS/[facility])            Plan:       It was a pleasure to see your patient in cardiac followup today. She is back in sinus rhythm and doing well. Given her advanced age she is likely to recur in atrial fibrillation, so I would maintain her anticoagulation. She is asymptomatic from her coronary disease. She will see me again in 6 months or sooner if symptoms warrant.         Coronary Artery Disease  Assessment  • The patient has no angina    Plan  • Lifestyle modifications discussed include adhering to a heart healthy diet, avoidance of tobacco products, maintenance of a healthy weight, medication compliance, regular exercise and regular monitoring of cholesterol and blood pressure    Subjective - Objective  • There has been a previous stent procedure using MANASA  • Current antiplatelet therapy includes aspirin 81 mg    Atrial Fibrillation and Atrial Flutter  Assessment  • The patient has paroxysmal atrial fibrillation  • This is non-valvular in etiology  • The patient's CHADS2-VASc score is 3  • A [id number] score of 2 or more is considered a high risk for a thromboembolic event  • Apixaban prescribed    Plan  • Continue aspirin and apixaban for antithrombotic therapy, bleeding issues discussed  • Continue beta blocker for rate control

## 2019-03-01 ENCOUNTER — TELEPHONE (OUTPATIENT)
Dept: CARDIOLOGY | Facility: CLINIC | Age: 84
End: 2019-03-01

## 2019-03-01 ENCOUNTER — OFFICE VISIT (OUTPATIENT)
Dept: FAMILY MEDICINE CLINIC | Facility: CLINIC | Age: 84
End: 2019-03-01

## 2019-03-01 VITALS
OXYGEN SATURATION: 97 % | RESPIRATION RATE: 12 BRPM | TEMPERATURE: 97.8 F | HEIGHT: 63 IN | BODY MASS INDEX: 20.2 KG/M2 | SYSTOLIC BLOOD PRESSURE: 110 MMHG | DIASTOLIC BLOOD PRESSURE: 70 MMHG | HEART RATE: 74 BPM | WEIGHT: 114 LBS

## 2019-03-01 DIAGNOSIS — R07.9 CHEST PAIN, UNSPECIFIED TYPE: ICD-10-CM

## 2019-03-01 DIAGNOSIS — M48.062 SPINAL STENOSIS OF LUMBAR REGION WITH NEUROGENIC CLAUDICATION: ICD-10-CM

## 2019-03-01 DIAGNOSIS — R79.9 ABNORMAL FINDING OF BLOOD CHEMISTRY: ICD-10-CM

## 2019-03-01 DIAGNOSIS — K21.9 GASTROESOPHAGEAL REFLUX DISEASE WITHOUT ESOPHAGITIS: ICD-10-CM

## 2019-03-01 DIAGNOSIS — E55.9 VITAMIN D DEFICIENCY: ICD-10-CM

## 2019-03-01 DIAGNOSIS — I10 ESSENTIAL HYPERTENSION: ICD-10-CM

## 2019-03-01 DIAGNOSIS — E78.2 MIXED HYPERLIPIDEMIA: ICD-10-CM

## 2019-03-01 DIAGNOSIS — Z00.00 MEDICARE ANNUAL WELLNESS VISIT, INITIAL: Primary | ICD-10-CM

## 2019-03-01 DIAGNOSIS — I48.91 NEW ONSET ATRIAL FIBRILLATION (HCC): ICD-10-CM

## 2019-03-01 LAB
25(OH)D3 SERPL-MCNC: 71.3 NG/ML (ref 30–100)
ALBUMIN SERPL-MCNC: 4 G/DL (ref 3.5–5.2)
ALBUMIN/GLOB SERPL: 1.4 G/DL
ALP SERPL-CCNC: 59 U/L (ref 39–117)
ALT SERPL W P-5'-P-CCNC: 16 U/L (ref 1–33)
ANION GAP SERPL CALCULATED.3IONS-SCNC: 10 MMOL/L
AST SERPL-CCNC: 19 U/L (ref 1–32)
BILIRUB SERPL-MCNC: 0.5 MG/DL (ref 0.1–1.2)
BUN BLD-MCNC: 18 MG/DL (ref 8–23)
BUN/CREAT SERPL: 28.6 (ref 7–25)
CALCIUM SPEC-SCNC: 9.8 MG/DL (ref 8.2–9.6)
CHLORIDE SERPL-SCNC: 103 MMOL/L (ref 98–107)
CHOLEST SERPL-MCNC: 108 MG/DL (ref 0–200)
CO2 SERPL-SCNC: 29 MMOL/L (ref 22–29)
CREAT BLD-MCNC: 0.63 MG/DL (ref 0.57–1)
DIGOXIN SERPL-MCNC: 0.5 NG/ML (ref 0.6–1.2)
ERYTHROCYTE [DISTWIDTH] IN BLOOD BY AUTOMATED COUNT: 15.5 % (ref 12.3–15.4)
GFR SERPL CREATININE-BSD FRML MDRD: 89 ML/MIN/1.73
GLOBULIN UR ELPH-MCNC: 2.8 GM/DL
GLUCOSE BLD-MCNC: 113 MG/DL (ref 65–99)
HBA1C MFR BLD: 5.7 % (ref 4.8–5.6)
HCT VFR BLD AUTO: 38.1 % (ref 34–46.6)
HDLC SERPL-MCNC: 45 MG/DL (ref 40–60)
HGB BLD-MCNC: 12.2 G/DL (ref 12–15.9)
LDLC SERPL CALC-MCNC: 46 MG/DL (ref 0–100)
LDLC/HDLC SERPL: 1.03 {RATIO}
LYMPHOCYTES # BLD AUTO: 2.1 10*3/MM3 (ref 0.7–3.1)
LYMPHOCYTES NFR BLD AUTO: 25.4 % (ref 19.6–45.3)
MCH RBC QN AUTO: 34.5 PG (ref 26.6–33)
MCHC RBC AUTO-ENTMCNC: 32.2 G/DL (ref 31.5–35.7)
MCV RBC AUTO: 107.2 FL (ref 79–97)
MONOCYTES # BLD AUTO: 0.3 10*3/MM3 (ref 0.1–0.9)
MONOCYTES NFR BLD AUTO: 3.1 % (ref 5–12)
NEUTROPHILS # BLD AUTO: 5.8 10*3/MM3 (ref 1.4–7)
NEUTROPHILS NFR BLD AUTO: 71.5 % (ref 42.7–76)
PLATELET # BLD AUTO: 203 10*3/MM3 (ref 140–450)
PMV BLD AUTO: 8.4 FL (ref 6–12)
POTASSIUM BLD-SCNC: 4.9 MMOL/L (ref 3.5–5.2)
PROT SERPL-MCNC: 6.8 G/DL (ref 6–8.5)
RBC # BLD AUTO: 3.55 10*6/MM3 (ref 3.77–5.28)
SODIUM BLD-SCNC: 142 MMOL/L (ref 136–145)
TRIGL SERPL-MCNC: 84 MG/DL (ref 0–150)
VLDLC SERPL-MCNC: 16.8 MG/DL (ref 5–40)
WBC NRBC COR # BLD: 8.1 10*3/MM3 (ref 3.4–10.8)

## 2019-03-01 PROCEDURE — 96160 PT-FOCUSED HLTH RISK ASSMT: CPT | Performed by: INTERNAL MEDICINE

## 2019-03-01 PROCEDURE — 80061 LIPID PANEL: CPT | Performed by: INTERNAL MEDICINE

## 2019-03-01 PROCEDURE — 80053 COMPREHEN METABOLIC PANEL: CPT | Performed by: INTERNAL MEDICINE

## 2019-03-01 PROCEDURE — 83036 HEMOGLOBIN GLYCOSYLATED A1C: CPT | Performed by: INTERNAL MEDICINE

## 2019-03-01 PROCEDURE — 72100 X-RAY EXAM L-S SPINE 2/3 VWS: CPT | Performed by: INTERNAL MEDICINE

## 2019-03-01 PROCEDURE — G0438 PPPS, INITIAL VISIT: HCPCS | Performed by: INTERNAL MEDICINE

## 2019-03-01 PROCEDURE — 82306 VITAMIN D 25 HYDROXY: CPT | Performed by: INTERNAL MEDICINE

## 2019-03-01 PROCEDURE — 85025 COMPLETE CBC W/AUTO DIFF WBC: CPT | Performed by: INTERNAL MEDICINE

## 2019-03-01 PROCEDURE — 36415 COLL VENOUS BLD VENIPUNCTURE: CPT | Performed by: INTERNAL MEDICINE

## 2019-03-01 PROCEDURE — 80162 ASSAY OF DIGOXIN TOTAL: CPT | Performed by: INTERNAL MEDICINE

## 2019-03-01 PROCEDURE — 93000 ELECTROCARDIOGRAM COMPLETE: CPT | Performed by: INTERNAL MEDICINE

## 2019-03-01 PROCEDURE — 99214 OFFICE O/P EST MOD 30 MIN: CPT | Performed by: INTERNAL MEDICINE

## 2019-03-01 RX ORDER — CHOLECALCIFEROL (VITAMIN D3) 50 MCG
2000 TABLET ORAL DAILY
Qty: 30 TABLET | Refills: 4 | Status: SHIPPED | OUTPATIENT
Start: 2019-03-01 | End: 2019-05-30 | Stop reason: SDUPTHER

## 2019-03-01 NOTE — PROGRESS NOTES
QUICK REFERENCE INFORMATION:  The ABCs of the Annual Wellness Visit    Initial Medicare Wellness Visit    HEALTH RISK ASSESSMENT    4/13/1927    Recent Hospitalizations:  Recently treated at the following:  [facility].        Current Medical Providers:  Patient Care Team:  Brian Jacobs MD as PCP - General (Internal Medicine)        Smoking Status:  Social History     Tobacco Use   Smoking Status Never Smoker   Smokeless Tobacco Never Used       Alcohol Consumption:  Social History     Substance and Sexual Activity   Alcohol Use No       Depression Screen:   PHQ-2/PHQ-9 Depression Screening 3/1/2019   Little interest or pleasure in doing things 0   Feeling down, depressed, or hopeless 0   Trouble falling or staying asleep, or sleeping too much 0   Feeling tired or having little energy 0   Poor appetite or overeating 0   Feeling bad about yourself - or that you are a failure or have let yourself or your family down 0   Trouble concentrating on things, such as reading the newspaper or watching television 0   Moving or speaking so slowly that other people could have noticed. Or the opposite - being so fidgety or restless that you have been moving around a lot more than usual 0   Thoughts that you would be better off dead, or of hurting yourself in some way -   Total Score 0   If you checked off any problems, how difficult have these problems made it for you to do your work, take care of things at home, or get along with other people? Not difficult at all       Health Habits and Functional and Cognitive Screening:  Functional & Cognitive Status 3/1/2019   Do you have difficulty preparing food and eating? No   Do you have difficulty bathing yourself, getting dressed or grooming yourself? No   Do you have difficulty using the toilet? No   Do you have difficulty moving around from place to place? No   Do you have trouble with steps or getting out of a bed or a chair? No   In the past year have you fallen  or experienced a near fall? No   Current Diet Well Balanced Diet   Dental Exam Up to date   Eye Exam Up to date   Exercise (times per week) 7 times per week   Current Exercise Activities Include Aerobics   Do you need help using the phone?  No   Are you deaf or do you have serious difficulty hearing?  No   Do you need help with transportation? Yes   Do you need help shopping? Yes   Do you need help preparing meals?  Yes   Do you need help with housework?  Yes   Do you need help with laundry? Yes   Do you need help taking your medications? Yes   Do you need help managing money? No   Do you ever drive or ride in a car without wearing a seat belt? Yes   Have you felt unusual stress, anger or loneliness in the last month? No   Who do you live with? Alone   If you need help, do you have trouble finding someone available to you? No   Have you been bothered in the last four weeks by sexual problems? No   Do you have difficulty concentrating, remembering or making decisions? No           Does the patient have evidence of cognitive impairment? Yes    Asiprin use counseling: Taking ASA appropriately as indicated      Recent Lab Results:    Visual Acuity:  No exam data present    Age-appropriate Screening Schedule:  Refer to the list below for future screening recommendations based on patient's age, sex and/or medical conditions. Orders for these recommended tests are listed in the plan section. The patient has been provided with a written plan.    Health Maintenance   Topic Date Due   • ZOSTER VACCINE (2 of 3) 09/01/2007   • MAMMOGRAM  04/18/2016   • DXA SCAN  06/23/2019   • LIPID PANEL  12/10/2019   • TDAP/TD VACCINES (2 - Td) 05/01/2021   • INFLUENZA VACCINE  Completed   • PNEUMOCOCCAL VACCINES (65+ LOW/MEDIUM RISK)  Completed        Subjective   History of Present Illness    Alexa Peace is a 89 y.o. female who presents for an Annual Wellness Visit.    The following portions of the patient's history were reviewed and  updated as appropriate: allergies, current medications, past family history, past medical history, past social history, past surgical history and problem list.    Outpatient Medications Prior to Visit   Medication Sig Dispense Refill   • acetaminophen (TYLENOL) 500 MG tablet Take 500-1,000 mg by mouth Every Evening. PRN     • apixaban (ELIQUIS) 2.5 MG tablet tablet Take 1 tablet by mouth every 12 (Twelve) hours. 60 tablet 11   • aspirin 81 MG chewable tablet Chew 81 mg Daily.     • digoxin (LANOXIN) 125 MCG tablet Take 1 tablet by mouth daily. 30 tablet 11   • DULoxetine (CYMBALTA) 60 MG capsule Take 1 capsule by mouth Daily. 90 capsule 1   • furosemide (LASIX) 20 MG tablet Take 1 tablet by mouth daily. 30 tablet 11   • gabapentin (NEURONTIN) 100 MG capsule Take 100 mg by mouth Daily With Breakfast & Lunch.     • HYDROcodone-acetaminophen (NORCO) 7.5-325 MG per tablet Take 1 tablet by mouth 3 (Three) Times a Day.     • metoprolol tartrate (LOPRESSOR) 50 MG tablet Take 1 tablet by mouth every 12 (Twelve) hours. 60 tablet 11   • Multiple Vitamins-Minerals (CENTRUM SILVER) tablet Take 1 tablet by mouth Daily.     • omeprazole (priLOSEC) 20 MG capsule Take 1 capsule by mouth Daily With Breakfast. 90 capsule 1   • potassium chloride (MICRO-K) 10 MEQ CR capsule Take 2 capsules by mouth daily. 30 capsule 11   • pravastatin (PRAVACHOL) 40 MG tablet Take 40 mg by mouth Daily.     • calcium carbonate (OS-KO) 600 MG tablet Take 600 mg by mouth 2 (Two) Times a Day.     • vitamin C (ASCORBIC ACID) 500 MG tablet Take 500 mg by mouth Daily With Lunch.     • polyethylene glycol (MIRALAX) pack packet Take 17 g by mouth Daily. (Patient taking differently: Take 17 g by mouth Daily As Needed.)     • gabapentin (NEURONTIN) 100 MG capsule Take 200 mg by mouth every night at bedtime.     • Mirabegron ER (MYRBETRIQ) 25 MG tablet sustained-release 24 hour 24 hr tablet Take 25 mg by mouth Every Night.       No facility-administered  "medications prior to visit.        Patient Active Problem List   Diagnosis   • Bad memory   • Depression   • Diverticulosis of intestine   • Hyperlipidemia   • Macular degeneration   • Neuropathy, peripheral   • Osteopenia   • Atopic rhinitis   • Urge incontinence of urine   • Atherosclerosis of native coronary artery   • Essential hypertension   • Gastroesophageal reflux disease without esophagitis   • Impaired glucose tolerance   • History of coronary artery stent placement   • Chronic bilateral low back pain   • Acquired scoliosis   • Spinal stenosis of lumbar region with neurogenic claudication   • PAC (premature atrial contraction)   • Slow transit constipation   • New onset atrial fibrillation (CMS/HCC)   • Pleural effusion on left   • Valvular heart disease   • Non-rheumatic mitral regurgitation       Advance Care Planning:  has an advance directive - a copy HAS NOT been provided. Have asked the patient to send this to us to add to record.    Identification of Risk Factors:  Risk factors include: NA.    Review of Systems    Compared to one year ago, the patient feels her physical health is worse.  Compared to one year ago, the patient feels her mental health is worse.    Objective     Physical Exam    Vitals:    03/01/19 1007   BP: 110/70   BP Location: Left arm   Patient Position: Sitting   Cuff Size: Adult   Pulse: 74   Resp: 12   Temp: 97.8 °F (36.6 °C)   TempSrc: Oral   SpO2: 97%   Weight: 51.7 kg (114 lb)   Height: 160 cm (62.99\")   PainSc: 0-No pain       Patient's Body mass index is 20.2 kg/m². BMI is within normal parameters. No follow-up required..      Assessment/Plan   Patient Self-Management and Personalized Health Advice  The patient has been provided with information about: NA and preventive services including:   · NA.    Visit Diagnoses:    ICD-10-CM ICD-9-CM   1. Spinal stenosis of lumbar region with neurogenic claudication M48.062 724.03   2. Essential hypertension I10 401.9   3. Mixed " hyperlipidemia E78.2 272.2   4. Gastroesophageal reflux disease without esophagitis K21.9 530.81   5. Abnormal finding of blood chemistry  R79.9 790.6   6. Vitamin D deficiency  E55.9 268.9   7. New onset atrial fibrillation (CMS/HCC) I48.91 427.31   8. Chest pain, unspecified type R07.9 786.50       Orders Placed This Encounter   Procedures   • XR Spine Lumbar 2 or 3 View     Order Specific Question:   Reason for Exam:     Answer:   low back pain   • Comprehensive Metabolic Panel   • Lipid Panel   • Hemoglobin A1c   • Vitamin D 25 Hydroxy   • Digoxin Level   • ECG 12 Lead     Order Specific Question:   Reason for Exam:     Answer:   chest pain   • CBC & Differential     Order Specific Question:   Manual Differential     Answer:   No       Outpatient Encounter Medications as of 3/1/2019   Medication Sig Dispense Refill   • acetaminophen (TYLENOL) 500 MG tablet Take 500-1,000 mg by mouth Every Evening. PRN     • apixaban (ELIQUIS) 2.5 MG tablet tablet Take 1 tablet by mouth every 12 (Twelve) hours. 60 tablet 11   • aspirin 81 MG chewable tablet Chew 81 mg Daily.     • digoxin (LANOXIN) 125 MCG tablet Take 1 tablet by mouth daily. 30 tablet 11   • DULoxetine (CYMBALTA) 60 MG capsule Take 1 capsule by mouth Daily. 90 capsule 1   • furosemide (LASIX) 20 MG tablet Take 1 tablet by mouth daily. 30 tablet 11   • gabapentin (NEURONTIN) 100 MG capsule Take 100 mg by mouth Daily With Breakfast & Lunch.     • HYDROcodone-acetaminophen (NORCO) 7.5-325 MG per tablet Take 1 tablet by mouth 3 (Three) Times a Day.     • metoprolol tartrate (LOPRESSOR) 50 MG tablet Take 1 tablet by mouth every 12 (Twelve) hours. 60 tablet 11   • Multiple Vitamins-Minerals (CENTRUM SILVER) tablet Take 1 tablet by mouth Daily.     • omeprazole (priLOSEC) 20 MG capsule Take 1 capsule by mouth Daily With Breakfast. 90 capsule 1   • potassium chloride (MICRO-K) 10 MEQ CR capsule Take 2 capsules by mouth daily. 30 capsule 11   • pravastatin (PRAVACHOL) 40  MG tablet Take 40 mg by mouth Daily.     • [DISCONTINUED] calcium carbonate (OS-KO) 600 MG tablet Take 600 mg by mouth 2 (Two) Times a Day.     • [DISCONTINUED] vitamin C (ASCORBIC ACID) 500 MG tablet Take 500 mg by mouth Daily With Lunch.     • Cholecalciferol (VITAMIN D) 2000 units tablet Take 2,000 Units by mouth Daily. 30 tablet 4   • polyethylene glycol (MIRALAX) pack packet Take 17 g by mouth Daily. (Patient taking differently: Take 17 g by mouth Daily As Needed.)     • [DISCONTINUED] gabapentin (NEURONTIN) 100 MG capsule Take 200 mg by mouth every night at bedtime.     • [DISCONTINUED] Mirabegron ER (MYRBETRIQ) 25 MG tablet sustained-release 24 hour 24 hr tablet Take 25 mg by mouth Every Night.       No facility-administered encounter medications on file as of 3/1/2019.        Reviewed use of high risk medication in the elderly: not applicable  Reviewed for potential of harmful drug interactions in the elderly: not applicable    Follow Up:  No Follow-up on file.     An After Visit Summary and PPPS with all of these plans were given to the patient.

## 2019-03-01 NOTE — TELEPHONE ENCOUNTER
Kelly from Dr Barros office is calling requesting you over read today's EKG (in epic) and call Dr Jacobs to discuss.    Dr Jacobs can be reached at [phone]    Gema Neville RN  Triage nurse

## 2019-03-01 NOTE — PROGRESS NOTES
Subjective   Alexa Peace is a 89 y.o. female.     History of Present Illness   She was seen today for Medicare wellness exam.  She has history of atrial fibrillation is being placed on a factor X inhibitor.  Her EKG showed atrial flutter but has been unchanged from prior once per Dr. hernandez.  Her lipids have been controlled with diet and statin.  Pressures been running 110s over 70.  Patient has extensive spinal stenosis and L-spine indicated severe scoliosis with osteophytes and degenerative changes.  Patient will benefit greatly from lumbar brace.  He does have chronic low back pain and L-spine showed severe scoliosis with degenerative changes    X-Ray  Interpretation report in house X-rays that I personally viewed    Relevant Clinical Issues/Diagnoses/Indications: Low back pain L-spine        Clinical Findings: #1 severe scoliosis #2 osteophytes #3 severe degenerative changes along the entire lumbar spine          Comparative Data: No previous x-ray          Date of Previous X-ray:    Change on current X-ray:    Dictated utilizing Dragon dictation. If there are questions or for further clarification, please contact me.    The following portions of the patient's history were reviewed and updated as appropriate: allergies, current medications, past family history, past medical history, past social history, past surgical history and problem list.    Review of Systems   Constitutional: Negative for fatigue and fever.   HENT: Positive for congestion. Negative for trouble swallowing.    Eyes: Negative for discharge and visual disturbance.   Respiratory: Negative for choking and shortness of breath.    Cardiovascular: Negative for chest pain and palpitations.   Gastrointestinal: Negative for abdominal pain and blood in stool.   Endocrine: Negative.    Genitourinary: Negative for genital sores and hematuria.   Musculoskeletal: Positive for back pain. Negative for gait problem and joint swelling.   Skin: Negative for color  change, pallor, rash and wound.   Allergic/Immunologic: Positive for environmental allergies. Negative for immunocompromised state.   Neurological: Negative for facial asymmetry and speech difficulty.   Psychiatric/Behavioral: Negative for hallucinations and suicidal ideas.       Objective   Physical Exam   Constitutional: She is oriented to person, place, and time. She appears well-developed and well-nourished.   HENT:   Head: Normocephalic.   Eyes: Conjunctivae are normal. Pupils are equal, round, and reactive to light.   Neck: Normal range of motion. Neck supple.   Cardiovascular: Normal rate, regular rhythm and normal heart sounds.   Pulmonary/Chest: Effort normal and breath sounds normal.   Abdominal: Soft. Bowel sounds are normal.   Genitourinary: Vaginal discharge found.   Genitourinary Comments: Patient was severe irritation of her vaginal area   Musculoskeletal: Normal range of motion.   Neurological: She is alert and oriented to person, place, and time.   Skin: Skin is warm and dry.   Psychiatric: She has a normal mood and affect. Her behavior is normal. Judgment and thought content normal.   Nursing note and vitals reviewed.      Assessment/Plan   Problems Addressed this Visit        Cardiovascular and Mediastinum    Hyperlipidemia    Relevant Orders    XR Spine Lumbar 2 or 3 View (Completed)    CBC & Differential (Completed)    Comprehensive Metabolic Panel    Lipid Panel    Hemoglobin A1c    Vitamin D 25 Hydroxy    Digoxin Level    CBC Auto Differential (Completed)    Essential hypertension    Relevant Orders    XR Spine Lumbar 2 or 3 View (Completed)    CBC & Differential (Completed)    Comprehensive Metabolic Panel    Lipid Panel    Hemoglobin A1c    Vitamin D 25 Hydroxy    Digoxin Level    CBC Auto Differential (Completed)    New onset atrial fibrillation (CMS/HCC)       Digestive    Gastroesophageal reflux disease without esophagitis    Relevant Orders    XR Spine Lumbar 2 or 3 View (Completed)     CBC & Differential (Completed)    Comprehensive Metabolic Panel    Lipid Panel    Hemoglobin A1c    Vitamin D 25 Hydroxy    Digoxin Level    CBC Auto Differential (Completed)       Nervous and Auditory    Spinal stenosis of lumbar region with neurogenic claudication    Relevant Orders    XR Spine Lumbar 2 or 3 View (Completed)    CBC & Differential (Completed)    Comprehensive Metabolic Panel    Lipid Panel    Hemoglobin A1c    Vitamin D 25 Hydroxy    Digoxin Level    CBC Auto Differential (Completed)    Back Brace    Ambulatory Referral to Physical Therapy Evaluate and treat, Ortho (Completed)      Other Visit Diagnoses     Medicare annual wellness visit, initial    -  Primary    Abnormal finding of blood chemistry         Relevant Orders    Hemoglobin A1c    Digoxin Level    Vitamin D deficiency         Relevant Orders    Vitamin D 25 Hydroxy    Chest pain, unspecified type        Relevant Orders    ECG 12 Lead

## 2019-03-01 NOTE — PATIENT INSTRUCTIONS
Medicare Wellness  Personal Prevention Plan of Service     Date of Office Visit:  2019  Encounter Provider:  Brian Jacobs MD  Place of Service:  [facility]  Patient Name: Alexa Peace  :  1927    As part of the Medicare Wellness portion of your visit today, we are providing you with this personalized preventive plan of services (PPPS). This plan is based upon recommendations of the [place] Preventive Services Task Force (USPSTF) and the Advisory Committee on Immunization Practices (ACIP).    This lists the preventive care services that should be considered, and provides dates of when you are due. Items listed as completed are up-to-date and do not require any further intervention.    Health Maintenance   Topic Date Due   • ZOSTER VACCINE (2 of 3) 2007   • MAMMOGRAM  2016   • DXA SCAN  2019   • LIPID PANEL  12/10/2019   • MEDICARE ANNUAL WELLNESS  2020   • TDAP/TD VACCINES (2 - Td) 2021   • INFLUENZA VACCINE  Completed   • PNEUMOCOCCAL VACCINES (65+ LOW/MEDIUM RISK)  Completed       Orders Placed This Encounter   Procedures   • XR Spine Lumbar 2 or 3 View     Order Specific Question:   Reason for Exam:     Answer:   low back pain   • Comprehensive Metabolic Panel   • Lipid Panel   • Hemoglobin A1c   • Vitamin D 25 Hydroxy   • Digoxin Level   • ECG 12 Lead     Order Specific Question:   Reason for Exam:     Answer:   chest pain   • CBC & Differential     Order Specific Question:   Manual Differential     Answer:   No       No Follow-up on file.

## 2019-03-01 NOTE — PROGRESS NOTES
Procedure     ECG 12 Lead  Date/Time: 3/1/2019 2:32 PM  Performed by: Brian Jacobs MD  Authorized by: Brian Jacobs MD   Comparison: not compared with previous ECG   Rhythm: atrial flutter  Rate: normal  Conduction: conduction normal  ST Segments: ST segments normal  T Waves: T waves normal  QRS axis: left  Other: no other findings    Clinical impression: abnormal EKG  Comments: EKG Interpretation Report    Heart rate:    72 beats/min, NC interval:  0 msec, QRS duration:  85 msec  QTu:377 msec, QTc:  401 msec

## 2019-03-08 RX ORDER — DIGOXIN 125 MCG
125 TABLET ORAL
Qty: 90 TABLET | Refills: 3 | Status: SHIPPED | OUTPATIENT
Start: 2019-03-08 | End: 2019-03-12 | Stop reason: SDUPTHER

## 2019-03-12 RX ORDER — DIGOXIN 125 MCG
125 TABLET ORAL
Qty: 90 TABLET | Refills: 3 | Status: SHIPPED | OUTPATIENT
Start: 2019-03-12 | End: 2020-02-10 | Stop reason: SDUPTHER

## 2019-03-25 ENCOUNTER — TELEPHONE (OUTPATIENT)
Dept: FAMILY MEDICINE CLINIC | Facility: CLINIC | Age: 84
End: 2019-03-25

## 2019-03-29 ENCOUNTER — TELEPHONE (OUTPATIENT)
Dept: FAMILY MEDICINE CLINIC | Facility: CLINIC | Age: 84
End: 2019-03-29

## 2019-03-29 RX ORDER — DULOXETIN HYDROCHLORIDE 60 MG/1
60 CAPSULE, DELAYED RELEASE ORAL DAILY
Qty: 90 CAPSULE | Refills: 1 | Status: SHIPPED | OUTPATIENT
Start: 2019-03-29 | End: 2019-08-16 | Stop reason: SDUPTHER

## 2019-04-01 ENCOUNTER — TELEPHONE (OUTPATIENT)
Dept: FAMILY MEDICINE CLINIC | Facility: CLINIC | Age: 84
End: 2019-04-01

## 2019-04-01 NOTE — TELEPHONE ENCOUNTER
PT WANTS TO KNOW WHO DR. LACY WOULD RECOMMEND HER SEEING FOR A DERMATOLOGIST AND A UROLOGIST?  SHE SAYS SHE HAS DR BRADFORD THIS AFTERNOON, YOU CAN LEAVE VM WHEN CALLING

## 2019-04-04 ENCOUNTER — APPOINTMENT (OUTPATIENT)
Dept: GENERAL RADIOLOGY | Facility: HOSPITAL | Age: 84
End: 2019-04-04

## 2019-04-04 ENCOUNTER — HOSPITAL ENCOUNTER (INPATIENT)
Facility: HOSPITAL | Age: 84
LOS: 5 days | Discharge: NURSING, REHAB OR LONG-TERM CARE | End: 2019-04-09
Attending: EMERGENCY MEDICINE | Admitting: INTERNAL MEDICINE

## 2019-04-04 DIAGNOSIS — R26.2 DIFFICULTY WALKING: ICD-10-CM

## 2019-04-04 DIAGNOSIS — R50.9 FEVER, UNSPECIFIED FEVER CAUSE: ICD-10-CM

## 2019-04-04 DIAGNOSIS — J18.9 PNEUMONIA OF RIGHT LOWER LOBE DUE TO INFECTIOUS ORGANISM: Primary | ICD-10-CM

## 2019-04-04 LAB
ALBUMIN SERPL-MCNC: 4.2 G/DL (ref 3.5–5.2)
ALBUMIN/GLOB SERPL: 1.3 G/DL
ALP SERPL-CCNC: 61 U/L (ref 39–117)
ALT SERPL W P-5'-P-CCNC: 21 U/L (ref 1–33)
ANION GAP SERPL CALCULATED.3IONS-SCNC: 13.1 MMOL/L
AST SERPL-CCNC: 23 U/L (ref 1–32)
B PARAPERT DNA SPEC QL NAA+PROBE: NOT DETECTED
B PERT DNA SPEC QL NAA+PROBE: NOT DETECTED
BACTERIA UR QL AUTO: ABNORMAL /HPF
BILIRUB SERPL-MCNC: 0.3 MG/DL (ref 0.2–1.2)
BILIRUB UR QL STRIP: NEGATIVE
BUN BLD-MCNC: 17 MG/DL (ref 8–23)
BUN/CREAT SERPL: 25.8 (ref 7–25)
C PNEUM DNA NPH QL NAA+NON-PROBE: NOT DETECTED
CALCIUM SPEC-SCNC: 9.2 MG/DL (ref 8.2–9.6)
CHLORIDE SERPL-SCNC: 96 MMOL/L (ref 98–107)
CLARITY UR: CLEAR
CO2 SERPL-SCNC: 28.9 MMOL/L (ref 22–29)
COLOR UR: YELLOW
CREAT BLD-MCNC: 0.66 MG/DL (ref 0.57–1)
D-LACTATE SERPL-SCNC: 2 MMOL/L (ref 0.5–2)
DEPRECATED RDW RBC AUTO: 56 FL (ref 37–54)
DIGOXIN SERPL-MCNC: 0.6 NG/ML (ref 0.6–1.2)
EOSINOPHIL # BLD MANUAL: 0.26 10*3/MM3 (ref 0–0.4)
EOSINOPHIL NFR BLD MANUAL: 3 % (ref 0.3–6.2)
ERYTHROCYTE [DISTWIDTH] IN BLOOD BY AUTOMATED COUNT: 13.9 % (ref 12.3–15.4)
FLUAV H1 2009 PAND RNA NPH QL NAA+PROBE: NOT DETECTED
FLUAV H1 HA GENE NPH QL NAA+PROBE: NOT DETECTED
FLUAV H3 RNA NPH QL NAA+PROBE: NOT DETECTED
FLUAV SUBTYP SPEC NAA+PROBE: NOT DETECTED
FLUBV RNA ISLT QL NAA+PROBE: NOT DETECTED
GFR SERPL CREATININE-BSD FRML MDRD: 84 ML/MIN/1.73
GLOBULIN UR ELPH-MCNC: 3.2 GM/DL
GLUCOSE BLD-MCNC: 139 MG/DL (ref 65–99)
GLUCOSE UR STRIP-MCNC: NEGATIVE MG/DL
HADV DNA SPEC NAA+PROBE: NOT DETECTED
HCOV 229E RNA SPEC QL NAA+PROBE: NOT DETECTED
HCOV HKU1 RNA SPEC QL NAA+PROBE: NOT DETECTED
HCOV NL63 RNA SPEC QL NAA+PROBE: NOT DETECTED
HCOV OC43 RNA SPEC QL NAA+PROBE: NOT DETECTED
HCT VFR BLD AUTO: 39.6 % (ref 34–46.6)
HGB BLD-MCNC: 12.9 G/DL (ref 12–15.9)
HGB UR QL STRIP.AUTO: ABNORMAL
HMPV RNA NPH QL NAA+NON-PROBE: NOT DETECTED
HOLD SPECIMEN: NORMAL
HOLD SPECIMEN: NORMAL
HPIV1 RNA SPEC QL NAA+PROBE: NOT DETECTED
HPIV2 RNA SPEC QL NAA+PROBE: NOT DETECTED
HPIV3 RNA NPH QL NAA+PROBE: NOT DETECTED
HPIV4 P GENE NPH QL NAA+PROBE: NOT DETECTED
HYALINE CASTS UR QL AUTO: ABNORMAL /LPF
KETONES UR QL STRIP: ABNORMAL
LEUKOCYTE ESTERASE UR QL STRIP.AUTO: NEGATIVE
LYMPHOCYTES # BLD MANUAL: 0.35 10*3/MM3 (ref 0.7–3.1)
LYMPHOCYTES NFR BLD MANUAL: 4 % (ref 19.6–45.3)
LYMPHOCYTES NFR BLD MANUAL: 4 % (ref 5–12)
M PNEUMO IGG SER IA-ACNC: NOT DETECTED
MACROCYTES BLD QL SMEAR: ABNORMAL
MCH RBC QN AUTO: 35.5 PG (ref 26.6–33)
MCHC RBC AUTO-ENTMCNC: 32.6 G/DL (ref 31.5–35.7)
MCV RBC AUTO: 109.1 FL (ref 79–97)
MONOCYTES # BLD AUTO: 0.35 10*3/MM3 (ref 0.1–0.9)
NEUTROPHILS # BLD AUTO: 7.76 10*3/MM3 (ref 1.4–7)
NEUTROPHILS NFR BLD MANUAL: 89 % (ref 42.7–76)
NITRITE UR QL STRIP: NEGATIVE
PH UR STRIP.AUTO: 6.5 [PH] (ref 5–8)
PLAT MORPH BLD: NORMAL
PLATELET # BLD AUTO: 201 10*3/MM3 (ref 140–450)
PMV BLD AUTO: 11.3 FL (ref 6–12)
POTASSIUM BLD-SCNC: 4.3 MMOL/L (ref 3.5–5.2)
PROCALCITONIN SERPL-MCNC: <0.02 NG/ML (ref 0.1–0.25)
PROT SERPL-MCNC: 7.4 G/DL (ref 6–8.5)
PROT UR QL STRIP: NEGATIVE
RBC # BLD AUTO: 3.63 10*6/MM3 (ref 3.77–5.28)
RBC # UR: ABNORMAL /HPF
REF LAB TEST METHOD: ABNORMAL
RHINOVIRUS RNA SPEC NAA+PROBE: NOT DETECTED
RSV RNA NPH QL NAA+NON-PROBE: NOT DETECTED
SODIUM BLD-SCNC: 138 MMOL/L (ref 136–145)
SP GR UR STRIP: 1.01 (ref 1–1.03)
SQUAMOUS #/AREA URNS HPF: ABNORMAL /HPF
UROBILINOGEN UR QL STRIP: ABNORMAL
WBC MORPH BLD: NORMAL
WBC NRBC COR # BLD: 8.72 10*3/MM3 (ref 3.4–10.8)
WBC UR QL AUTO: ABNORMAL /HPF
WHOLE BLOOD HOLD SPECIMEN: NORMAL

## 2019-04-04 PROCEDURE — 93005 ELECTROCARDIOGRAM TRACING: CPT | Performed by: NURSE PRACTITIONER

## 2019-04-04 PROCEDURE — 71046 X-RAY EXAM CHEST 2 VIEWS: CPT

## 2019-04-04 PROCEDURE — 87486 CHLMYD PNEUM DNA AMP PROBE: CPT | Performed by: NURSE PRACTITIONER

## 2019-04-04 PROCEDURE — 81001 URINALYSIS AUTO W/SCOPE: CPT | Performed by: NURSE PRACTITIONER

## 2019-04-04 PROCEDURE — 87633 RESP VIRUS 12-25 TARGETS: CPT | Performed by: NURSE PRACTITIONER

## 2019-04-04 PROCEDURE — 87798 DETECT AGENT NOS DNA AMP: CPT | Performed by: NURSE PRACTITIONER

## 2019-04-04 PROCEDURE — 99285 EMERGENCY DEPT VISIT HI MDM: CPT

## 2019-04-04 PROCEDURE — P9612 CATHETERIZE FOR URINE SPEC: HCPCS

## 2019-04-04 PROCEDURE — 87581 M.PNEUMON DNA AMP PROBE: CPT | Performed by: NURSE PRACTITIONER

## 2019-04-04 PROCEDURE — 93010 ELECTROCARDIOGRAM REPORT: CPT | Performed by: INTERNAL MEDICINE

## 2019-04-04 PROCEDURE — 85007 BL SMEAR W/DIFF WBC COUNT: CPT | Performed by: NURSE PRACTITIONER

## 2019-04-04 PROCEDURE — 80162 ASSAY OF DIGOXIN TOTAL: CPT | Performed by: NURSE PRACTITIONER

## 2019-04-04 PROCEDURE — 85025 COMPLETE CBC W/AUTO DIFF WBC: CPT | Performed by: NURSE PRACTITIONER

## 2019-04-04 PROCEDURE — 36415 COLL VENOUS BLD VENIPUNCTURE: CPT

## 2019-04-04 PROCEDURE — 87040 BLOOD CULTURE FOR BACTERIA: CPT | Performed by: NURSE PRACTITIONER

## 2019-04-04 PROCEDURE — 84145 PROCALCITONIN (PCT): CPT | Performed by: NURSE PRACTITIONER

## 2019-04-04 PROCEDURE — 83605 ASSAY OF LACTIC ACID: CPT | Performed by: NURSE PRACTITIONER

## 2019-04-04 PROCEDURE — 25010000002 CEFTRIAXONE PER 250 MG: Performed by: NURSE PRACTITIONER

## 2019-04-04 PROCEDURE — 80053 COMPREHEN METABOLIC PANEL: CPT | Performed by: NURSE PRACTITIONER

## 2019-04-04 RX ORDER — IBUPROFEN 800 MG/1
800 TABLET ORAL ONCE
Status: COMPLETED | OUTPATIENT
Start: 2019-04-04 | End: 2019-04-04

## 2019-04-04 RX ORDER — ACETAMINOPHEN 500 MG
1000 TABLET ORAL ONCE
Status: COMPLETED | OUTPATIENT
Start: 2019-04-04 | End: 2019-04-04

## 2019-04-04 RX ORDER — SODIUM CHLORIDE 0.9 % (FLUSH) 0.9 %
10 SYRINGE (ML) INJECTION AS NEEDED
Status: DISCONTINUED | OUTPATIENT
Start: 2019-04-04 | End: 2019-04-09 | Stop reason: HOSPADM

## 2019-04-04 RX ORDER — CEFTRIAXONE SODIUM 1 G/50ML
1 INJECTION, SOLUTION INTRAVENOUS ONCE
Status: COMPLETED | OUTPATIENT
Start: 2019-04-04 | End: 2019-04-05

## 2019-04-04 RX ADMIN — IBUPROFEN 800 MG: 800 TABLET, FILM COATED ORAL at 23:09

## 2019-04-04 RX ADMIN — SODIUM CHLORIDE 1000 ML: 9 INJECTION, SOLUTION INTRAVENOUS at 20:34

## 2019-04-04 RX ADMIN — SODIUM CHLORIDE 500 ML: 9 INJECTION, SOLUTION INTRAVENOUS at 22:54

## 2019-04-04 RX ADMIN — CEFTRIAXONE SODIUM 1 G: 1 INJECTION, SOLUTION INTRAVENOUS at 22:54

## 2019-04-04 RX ADMIN — ACETAMINOPHEN 1000 MG: 500 TABLET, FILM COATED ORAL at 20:47

## 2019-04-05 ENCOUNTER — APPOINTMENT (OUTPATIENT)
Dept: CARDIOLOGY | Facility: HOSPITAL | Age: 84
End: 2019-04-05

## 2019-04-05 PROBLEM — A41.9 SEPSIS DUE TO PNEUMONIA (HCC): Status: ACTIVE | Noted: 2019-04-05

## 2019-04-05 PROBLEM — R09.02 HYPOXEMIA: Status: ACTIVE | Noted: 2019-04-05

## 2019-04-05 PROBLEM — J18.9 SEPSIS DUE TO PNEUMONIA: Status: ACTIVE | Noted: 2019-04-05

## 2019-04-05 PROBLEM — R06.02 SHORTNESS OF BREATH: Status: ACTIVE | Noted: 2019-04-05

## 2019-04-05 PROBLEM — Z79.01 CHRONIC ANTICOAGULATION: Status: ACTIVE | Noted: 2019-04-05

## 2019-04-05 PROBLEM — R60.0 LOCALIZED EDEMA: Status: ACTIVE | Noted: 2019-04-05

## 2019-04-05 PROBLEM — I48.0 PAROXYSMAL ATRIAL FIBRILLATION: Status: ACTIVE | Noted: 2019-01-11

## 2019-04-05 LAB
ANION GAP SERPL CALCULATED.3IONS-SCNC: 12.3 MMOL/L
BASOPHILS # BLD AUTO: 0.05 10*3/MM3 (ref 0–0.2)
BASOPHILS NFR BLD AUTO: 1.2 % (ref 0–1.5)
BH CV LOWER VASCULAR LEFT COMMON FEMORAL AUGMENT: NORMAL
BH CV LOWER VASCULAR LEFT COMMON FEMORAL COMPETENT: NORMAL
BH CV LOWER VASCULAR LEFT COMMON FEMORAL COMPRESS: NORMAL
BH CV LOWER VASCULAR LEFT COMMON FEMORAL PHASIC: NORMAL
BH CV LOWER VASCULAR LEFT COMMON FEMORAL SPONT: NORMAL
BH CV LOWER VASCULAR LEFT DISTAL FEMORAL COMPRESS: NORMAL
BH CV LOWER VASCULAR LEFT GASTRONEMIUS COMPRESS: NORMAL
BH CV LOWER VASCULAR LEFT GREATER SAPH AK COMPRESS: NORMAL
BH CV LOWER VASCULAR LEFT GREATER SAPH BK COMPRESS: NORMAL
BH CV LOWER VASCULAR LEFT MID FEMORAL AUGMENT: NORMAL
BH CV LOWER VASCULAR LEFT MID FEMORAL COMPETENT: NORMAL
BH CV LOWER VASCULAR LEFT MID FEMORAL COMPRESS: NORMAL
BH CV LOWER VASCULAR LEFT MID FEMORAL PHASIC: NORMAL
BH CV LOWER VASCULAR LEFT MID FEMORAL SPONT: NORMAL
BH CV LOWER VASCULAR LEFT PERONEAL COMPRESS: NORMAL
BH CV LOWER VASCULAR LEFT POPLITEAL AUGMENT: NORMAL
BH CV LOWER VASCULAR LEFT POPLITEAL COMPETENT: NORMAL
BH CV LOWER VASCULAR LEFT POPLITEAL COMPRESS: NORMAL
BH CV LOWER VASCULAR LEFT POPLITEAL PHASIC: NORMAL
BH CV LOWER VASCULAR LEFT POPLITEAL SPONT: NORMAL
BH CV LOWER VASCULAR LEFT POSTERIOR TIBIAL COMPRESS: NORMAL
BH CV LOWER VASCULAR LEFT PROXIMAL FEMORAL COMPRESS: NORMAL
BH CV LOWER VASCULAR LEFT SAPHENOFEMORAL JUNCTION AUGMENT: NORMAL
BH CV LOWER VASCULAR LEFT SAPHENOFEMORAL JUNCTION COMPETENT: NORMAL
BH CV LOWER VASCULAR LEFT SAPHENOFEMORAL JUNCTION COMPRESS: NORMAL
BH CV LOWER VASCULAR LEFT SAPHENOFEMORAL JUNCTION PHASIC: NORMAL
BH CV LOWER VASCULAR LEFT SAPHENOFEMORAL JUNCTION SPONT: NORMAL
BH CV LOWER VASCULAR RIGHT COMMON FEMORAL AUGMENT: NORMAL
BH CV LOWER VASCULAR RIGHT COMMON FEMORAL COMPETENT: NORMAL
BH CV LOWER VASCULAR RIGHT COMMON FEMORAL COMPRESS: NORMAL
BH CV LOWER VASCULAR RIGHT COMMON FEMORAL PHASIC: NORMAL
BH CV LOWER VASCULAR RIGHT COMMON FEMORAL SPONT: NORMAL
BH CV LOWER VASCULAR RIGHT DISTAL FEMORAL COMPRESS: NORMAL
BH CV LOWER VASCULAR RIGHT GASTRONEMIUS COMPRESS: NORMAL
BH CV LOWER VASCULAR RIGHT GREATER SAPH AK COMPRESS: NORMAL
BH CV LOWER VASCULAR RIGHT GREATER SAPH BK COMPRESS: NORMAL
BH CV LOWER VASCULAR RIGHT MID FEMORAL AUGMENT: NORMAL
BH CV LOWER VASCULAR RIGHT MID FEMORAL COMPETENT: NORMAL
BH CV LOWER VASCULAR RIGHT MID FEMORAL COMPRESS: NORMAL
BH CV LOWER VASCULAR RIGHT MID FEMORAL PHASIC: NORMAL
BH CV LOWER VASCULAR RIGHT MID FEMORAL SPONT: NORMAL
BH CV LOWER VASCULAR RIGHT PERONEAL COMPRESS: NORMAL
BH CV LOWER VASCULAR RIGHT POPLITEAL AUGMENT: NORMAL
BH CV LOWER VASCULAR RIGHT POPLITEAL COMPETENT: NORMAL
BH CV LOWER VASCULAR RIGHT POPLITEAL COMPRESS: NORMAL
BH CV LOWER VASCULAR RIGHT POPLITEAL PHASIC: NORMAL
BH CV LOWER VASCULAR RIGHT POPLITEAL SPONT: NORMAL
BH CV LOWER VASCULAR RIGHT POSTERIOR TIBIAL COMPRESS: NORMAL
BH CV LOWER VASCULAR RIGHT PROXIMAL FEMORAL COMPRESS: NORMAL
BH CV LOWER VASCULAR RIGHT SAPHENOFEMORAL JUNCTION AUGMENT: NORMAL
BH CV LOWER VASCULAR RIGHT SAPHENOFEMORAL JUNCTION COMPETENT: NORMAL
BH CV LOWER VASCULAR RIGHT SAPHENOFEMORAL JUNCTION COMPRESS: NORMAL
BH CV LOWER VASCULAR RIGHT SAPHENOFEMORAL JUNCTION PHASIC: NORMAL
BH CV LOWER VASCULAR RIGHT SAPHENOFEMORAL JUNCTION SPONT: NORMAL
BUN BLD-MCNC: 12 MG/DL (ref 8–23)
BUN/CREAT SERPL: 21.1 (ref 7–25)
CALCIUM SPEC-SCNC: 8.2 MG/DL (ref 8.2–9.6)
CHLORIDE SERPL-SCNC: 104 MMOL/L (ref 98–107)
CO2 SERPL-SCNC: 26.7 MMOL/L (ref 22–29)
CREAT BLD-MCNC: 0.57 MG/DL (ref 0.57–1)
DEPRECATED RDW RBC AUTO: 60.6 FL (ref 37–54)
EOSINOPHIL # BLD AUTO: 0.1 10*3/MM3 (ref 0–0.4)
EOSINOPHIL NFR BLD AUTO: 2.4 % (ref 0.3–6.2)
ERYTHROCYTE [DISTWIDTH] IN BLOOD BY AUTOMATED COUNT: 14.3 % (ref 12.3–15.4)
GFR SERPL CREATININE-BSD FRML MDRD: 99 ML/MIN/1.73
GLUCOSE BLD-MCNC: 115 MG/DL (ref 65–99)
HCT VFR BLD AUTO: 34.2 % (ref 34–46.6)
HEMOCCULT STL QL: NEGATIVE
HGB BLD-MCNC: 10.3 G/DL (ref 12–15.9)
IMM GRANULOCYTES # BLD AUTO: 0.01 10*3/MM3 (ref 0–0.05)
IMM GRANULOCYTES NFR BLD AUTO: 0.2 % (ref 0–0.5)
L PNEUMO1 AG UR QL IA: NEGATIVE
LYMPHOCYTES # BLD AUTO: 1.38 10*3/MM3 (ref 0.7–3.1)
LYMPHOCYTES NFR BLD AUTO: 33.1 % (ref 19.6–45.3)
MCH RBC QN AUTO: 34.9 PG (ref 26.6–33)
MCHC RBC AUTO-ENTMCNC: 30.1 G/DL (ref 31.5–35.7)
MCV RBC AUTO: 115.9 FL (ref 79–97)
MONOCYTES # BLD AUTO: 0.59 10*3/MM3 (ref 0.1–0.9)
MONOCYTES NFR BLD AUTO: 14.1 % (ref 5–12)
NEUTROPHILS # BLD AUTO: 2.04 10*3/MM3 (ref 1.4–7)
NEUTROPHILS NFR BLD AUTO: 49 % (ref 42.7–76)
NRBC BLD AUTO-RTO: 0.2 /100 WBC (ref 0–0)
PLATELET # BLD AUTO: 147 10*3/MM3 (ref 140–450)
PMV BLD AUTO: 11.3 FL (ref 6–12)
POTASSIUM BLD-SCNC: 3.6 MMOL/L (ref 3.5–5.2)
RBC # BLD AUTO: 2.95 10*6/MM3 (ref 3.77–5.28)
S PNEUM AG SPEC QL LA: NEGATIVE
SODIUM BLD-SCNC: 143 MMOL/L (ref 136–145)
WBC NRBC COR # BLD: 4.17 10*3/MM3 (ref 3.4–10.8)

## 2019-04-05 PROCEDURE — 25010000002 AZITHROMYCIN PER 500 MG: Performed by: NURSE PRACTITIONER

## 2019-04-05 PROCEDURE — 82272 OCCULT BLD FECES 1-3 TESTS: CPT | Performed by: HOSPITALIST

## 2019-04-05 PROCEDURE — 87081 CULTURE SCREEN ONLY: CPT | Performed by: INTERNAL MEDICINE

## 2019-04-05 PROCEDURE — 93970 EXTREMITY STUDY: CPT

## 2019-04-05 PROCEDURE — 25010000002 CEFEPIME PER 500 MG: Performed by: INTERNAL MEDICINE

## 2019-04-05 PROCEDURE — 94640 AIRWAY INHALATION TREATMENT: CPT

## 2019-04-05 PROCEDURE — 87899 AGENT NOS ASSAY W/OPTIC: CPT | Performed by: INTERNAL MEDICINE

## 2019-04-05 PROCEDURE — 94799 UNLISTED PULMONARY SVC/PX: CPT

## 2019-04-05 PROCEDURE — 80048 BASIC METABOLIC PNL TOTAL CA: CPT | Performed by: INTERNAL MEDICINE

## 2019-04-05 PROCEDURE — 97110 THERAPEUTIC EXERCISES: CPT

## 2019-04-05 PROCEDURE — 85025 COMPLETE CBC W/AUTO DIFF WBC: CPT | Performed by: INTERNAL MEDICINE

## 2019-04-05 PROCEDURE — 25010000002 VANCOMYCIN 10 G RECONSTITUTED SOLUTION: Performed by: HOSPITALIST

## 2019-04-05 PROCEDURE — 97161 PT EVAL LOW COMPLEX 20 MIN: CPT

## 2019-04-05 RX ORDER — DULOXETIN HYDROCHLORIDE 60 MG/1
60 CAPSULE, DELAYED RELEASE ORAL DAILY
Status: DISCONTINUED | OUTPATIENT
Start: 2019-04-05 | End: 2019-04-09 | Stop reason: HOSPADM

## 2019-04-05 RX ORDER — CHOLECALCIFEROL (VITAMIN D3) 125 MCG
5 CAPSULE ORAL NIGHTLY PRN
Status: DISCONTINUED | OUTPATIENT
Start: 2019-04-05 | End: 2019-04-09 | Stop reason: HOSPADM

## 2019-04-05 RX ORDER — ACETAMINOPHEN 325 MG/1
650 TABLET ORAL EVERY 4 HOURS PRN
Status: DISCONTINUED | OUTPATIENT
Start: 2019-04-05 | End: 2019-04-09 | Stop reason: HOSPADM

## 2019-04-05 RX ORDER — GABAPENTIN 100 MG/1
100 CAPSULE ORAL
Status: DISCONTINUED | OUTPATIENT
Start: 2019-04-05 | End: 2019-04-06

## 2019-04-05 RX ORDER — METOPROLOL TARTRATE 50 MG/1
50 TABLET, FILM COATED ORAL EVERY 12 HOURS SCHEDULED
Status: DISCONTINUED | OUTPATIENT
Start: 2019-04-05 | End: 2019-04-09 | Stop reason: HOSPADM

## 2019-04-05 RX ORDER — CEFTRIAXONE SODIUM 1 G/50ML
1 INJECTION, SOLUTION INTRAVENOUS EVERY 24 HOURS
Status: DISCONTINUED | OUTPATIENT
Start: 2019-04-05 | End: 2019-04-05

## 2019-04-05 RX ORDER — POTASSIUM CHLORIDE 750 MG/1
20 CAPSULE, EXTENDED RELEASE ORAL DAILY
Status: DISCONTINUED | OUTPATIENT
Start: 2019-04-05 | End: 2019-04-09 | Stop reason: HOSPADM

## 2019-04-05 RX ORDER — SODIUM CHLORIDE 0.9 % (FLUSH) 0.9 %
3-10 SYRINGE (ML) INJECTION AS NEEDED
Status: DISCONTINUED | OUTPATIENT
Start: 2019-04-05 | End: 2019-04-09 | Stop reason: HOSPADM

## 2019-04-05 RX ORDER — HYDROCODONE BITARTRATE AND ACETAMINOPHEN 7.5; 325 MG/1; MG/1
1 TABLET ORAL EVERY 8 HOURS PRN
Status: DISCONTINUED | OUTPATIENT
Start: 2019-04-05 | End: 2019-04-09 | Stop reason: HOSPADM

## 2019-04-05 RX ORDER — PANTOPRAZOLE SODIUM 40 MG/1
40 TABLET, DELAYED RELEASE ORAL EVERY MORNING
Status: DISCONTINUED | OUTPATIENT
Start: 2019-04-05 | End: 2019-04-09 | Stop reason: HOSPADM

## 2019-04-05 RX ORDER — ONDANSETRON 4 MG/1
4 TABLET, ORALLY DISINTEGRATING ORAL EVERY 6 HOURS PRN
Status: DISCONTINUED | OUTPATIENT
Start: 2019-04-05 | End: 2019-04-09 | Stop reason: HOSPADM

## 2019-04-05 RX ORDER — DIGOXIN 125 MCG
125 TABLET ORAL
Status: DISCONTINUED | OUTPATIENT
Start: 2019-04-05 | End: 2019-04-09 | Stop reason: HOSPADM

## 2019-04-05 RX ORDER — SODIUM CHLORIDE 9 MG/ML
75 INJECTION, SOLUTION INTRAVENOUS CONTINUOUS
Status: DISCONTINUED | OUTPATIENT
Start: 2019-04-05 | End: 2019-04-05

## 2019-04-05 RX ORDER — ROSUVASTATIN CALCIUM 5 MG/1
5 TABLET, COATED ORAL NIGHTLY
Status: DISCONTINUED | OUTPATIENT
Start: 2019-04-05 | End: 2019-04-09 | Stop reason: HOSPADM

## 2019-04-05 RX ORDER — ASPIRIN 81 MG/1
81 TABLET, CHEWABLE ORAL DAILY
Status: DISCONTINUED | OUTPATIENT
Start: 2019-04-05 | End: 2019-04-09 | Stop reason: HOSPADM

## 2019-04-05 RX ORDER — NITROGLYCERIN 0.4 MG/1
0.4 TABLET SUBLINGUAL
Status: DISCONTINUED | OUTPATIENT
Start: 2019-04-05 | End: 2019-04-09 | Stop reason: HOSPADM

## 2019-04-05 RX ORDER — GUAIFENESIN/DEXTROMETHORPHAN 100-10MG/5
5 SYRUP ORAL EVERY 4 HOURS PRN
Status: DISCONTINUED | OUTPATIENT
Start: 2019-04-05 | End: 2019-04-09 | Stop reason: HOSPADM

## 2019-04-05 RX ORDER — FUROSEMIDE 20 MG/1
20 TABLET ORAL DAILY
Status: DISCONTINUED | OUTPATIENT
Start: 2019-04-05 | End: 2019-04-09 | Stop reason: HOSPADM

## 2019-04-05 RX ORDER — ALBUTEROL SULFATE 2.5 MG/3ML
2.5 SOLUTION RESPIRATORY (INHALATION) ONCE AS NEEDED
Status: DISCONTINUED | OUTPATIENT
Start: 2019-04-05 | End: 2019-04-09 | Stop reason: HOSPADM

## 2019-04-05 RX ORDER — SODIUM CHLORIDE FOR INHALATION 7 %
4 VIAL, NEBULIZER (ML) INHALATION
Status: DISCONTINUED | OUTPATIENT
Start: 2019-04-05 | End: 2019-04-07

## 2019-04-05 RX ORDER — ONDANSETRON 4 MG/1
4 TABLET, FILM COATED ORAL EVERY 6 HOURS PRN
Status: DISCONTINUED | OUTPATIENT
Start: 2019-04-05 | End: 2019-04-09 | Stop reason: HOSPADM

## 2019-04-05 RX ORDER — PRAVASTATIN SODIUM 40 MG
40 TABLET ORAL DAILY
Status: DISCONTINUED | OUTPATIENT
Start: 2019-04-05 | End: 2019-04-05 | Stop reason: CLARIF

## 2019-04-05 RX ORDER — SODIUM CHLORIDE 0.9 % (FLUSH) 0.9 %
3 SYRINGE (ML) INJECTION EVERY 12 HOURS SCHEDULED
Status: DISCONTINUED | OUTPATIENT
Start: 2019-04-05 | End: 2019-04-09 | Stop reason: HOSPADM

## 2019-04-05 RX ORDER — IPRATROPIUM BROMIDE AND ALBUTEROL SULFATE 2.5; .5 MG/3ML; MG/3ML
3 SOLUTION RESPIRATORY (INHALATION)
Status: DISCONTINUED | OUTPATIENT
Start: 2019-04-05 | End: 2019-04-07

## 2019-04-05 RX ORDER — CALCIUM CARBONATE 200(500)MG
2 TABLET,CHEWABLE ORAL 2 TIMES DAILY PRN
Status: DISCONTINUED | OUTPATIENT
Start: 2019-04-05 | End: 2019-04-09 | Stop reason: HOSPADM

## 2019-04-05 RX ORDER — BISACODYL 5 MG/1
5 TABLET, DELAYED RELEASE ORAL DAILY PRN
Status: DISCONTINUED | OUTPATIENT
Start: 2019-04-05 | End: 2019-04-09 | Stop reason: HOSPADM

## 2019-04-05 RX ORDER — ONDANSETRON 2 MG/ML
4 INJECTION INTRAMUSCULAR; INTRAVENOUS EVERY 6 HOURS PRN
Status: DISCONTINUED | OUTPATIENT
Start: 2019-04-05 | End: 2019-04-09 | Stop reason: HOSPADM

## 2019-04-05 RX ADMIN — CEFEPIME HYDROCHLORIDE 2 G: 2 INJECTION, POWDER, FOR SOLUTION INTRAVENOUS at 20:49

## 2019-04-05 RX ADMIN — METOPROLOL TARTRATE 50 MG: 50 TABLET, FILM COATED ORAL at 08:56

## 2019-04-05 RX ADMIN — VANCOMYCIN HYDROCHLORIDE 1250 MG: 10 INJECTION, POWDER, LYOPHILIZED, FOR SOLUTION INTRAVENOUS at 10:49

## 2019-04-05 RX ADMIN — GABAPENTIN 100 MG: 100 CAPSULE ORAL at 08:57

## 2019-04-05 RX ADMIN — FUROSEMIDE 20 MG: 20 TABLET ORAL at 08:56

## 2019-04-05 RX ADMIN — METOPROLOL TARTRATE 50 MG: 50 TABLET, FILM COATED ORAL at 20:47

## 2019-04-05 RX ADMIN — APIXABAN 2.5 MG: 2.5 TABLET, FILM COATED ORAL at 08:56

## 2019-04-05 RX ADMIN — CEFEPIME HYDROCHLORIDE 2 G: 2 INJECTION, POWDER, FOR SOLUTION INTRAVENOUS at 09:05

## 2019-04-05 RX ADMIN — IPRATROPIUM BROMIDE AND ALBUTEROL SULFATE 3 ML: 2.5; .5 SOLUTION RESPIRATORY (INHALATION) at 10:56

## 2019-04-05 RX ADMIN — ASPIRIN 81 MG: 81 TABLET, CHEWABLE ORAL at 08:56

## 2019-04-05 RX ADMIN — POTASSIUM CHLORIDE 20 MEQ: 750 CAPSULE, EXTENDED RELEASE ORAL at 08:55

## 2019-04-05 RX ADMIN — DIGOXIN 125 MCG: 125 TABLET ORAL at 12:38

## 2019-04-05 RX ADMIN — DULOXETINE HYDROCHLORIDE 60 MG: 60 CAPSULE, DELAYED RELEASE ORAL at 08:57

## 2019-04-05 RX ADMIN — PANTOPRAZOLE SODIUM 40 MG: 40 TABLET, DELAYED RELEASE ORAL at 08:57

## 2019-04-05 RX ADMIN — IPRATROPIUM BROMIDE AND ALBUTEROL SULFATE 3 ML: 2.5; .5 SOLUTION RESPIRATORY (INHALATION) at 07:20

## 2019-04-05 RX ADMIN — IPRATROPIUM BROMIDE AND ALBUTEROL SULFATE 3 ML: 2.5; .5 SOLUTION RESPIRATORY (INHALATION) at 20:31

## 2019-04-05 RX ADMIN — AZITHROMYCIN MONOHYDRATE 500 MG: 500 INJECTION, POWDER, LYOPHILIZED, FOR SOLUTION INTRAVENOUS at 00:30

## 2019-04-05 RX ADMIN — IPRATROPIUM BROMIDE AND ALBUTEROL SULFATE 3 ML: 2.5; .5 SOLUTION RESPIRATORY (INHALATION) at 02:57

## 2019-04-05 RX ADMIN — IPRATROPIUM BROMIDE AND ALBUTEROL SULFATE 3 ML: 2.5; .5 SOLUTION RESPIRATORY (INHALATION) at 15:49

## 2019-04-05 RX ADMIN — APIXABAN 2.5 MG: 2.5 TABLET, FILM COATED ORAL at 20:48

## 2019-04-05 RX ADMIN — SODIUM CHLORIDE, PRESERVATIVE FREE 3 ML: 5 INJECTION INTRAVENOUS at 08:58

## 2019-04-05 RX ADMIN — SODIUM CHLORIDE, PRESERVATIVE FREE 3 ML: 5 INJECTION INTRAVENOUS at 02:48

## 2019-04-05 RX ADMIN — SODIUM CHLORIDE 4 ML: 7 NEBU SOLN,3 % NEBU at 20:31

## 2019-04-05 RX ADMIN — SODIUM CHLORIDE 4 ML: 7 NEBU SOLN,3 % NEBU at 07:22

## 2019-04-05 RX ADMIN — ROSUVASTATIN CALCIUM 5 MG: 5 TABLET, FILM COATED ORAL at 20:47

## 2019-04-05 RX ADMIN — SODIUM CHLORIDE 4 ML: 7 NEBU SOLN,3 % NEBU at 15:49

## 2019-04-05 RX ADMIN — GABAPENTIN 100 MG: 100 CAPSULE ORAL at 12:38

## 2019-04-05 RX ADMIN — IPRATROPIUM BROMIDE AND ALBUTEROL SULFATE 3 ML: 2.5; .5 SOLUTION RESPIRATORY (INHALATION) at 23:39

## 2019-04-05 NOTE — PLAN OF CARE
Problem: Patient Care Overview  Goal: Plan of Care Review  Outcome: Ongoing (interventions implemented as appropriate)   04/05/19 0414   Coping/Psychosocial   Plan of Care Reviewed With patient   Plan of Care Review   Progress no change   OTHER   Outcome Summary Patient admotted this shift with pneumonia. Alert and oriented. Sepsis Bolus completed. No complaint of pain voiced this shift. Nursing will continue to monitor..     Goal: Individualization and Mutuality  Outcome: Ongoing (interventions implemented as appropriate)    Goal: Discharge Needs Assessment  Outcome: Ongoing (interventions implemented as appropriate)    Goal: Interprofessional Rounds/Family Conf  Outcome: Ongoing (interventions implemented as appropriate)      Problem: Pneumonia (Adult)  Goal: Signs and Symptoms of Listed Potential Problems Will be Absent, Minimized or Managed (Pneumonia)  Outcome: Ongoing (interventions implemented as appropriate)      Problem: Fall Risk (Adult)  Goal: Identify Related Risk Factors and Signs and Symptoms  Outcome: Outcome(s) achieved Date Met: 04/05/19    Goal: Absence of Fall  Outcome: Ongoing (interventions implemented as appropriate)      Problem: Pain, Chronic (Adult)  Goal: Identify Related Risk Factors and Signs and Symptoms  Outcome: Ongoing (interventions implemented as appropriate)    Goal: Acceptable Pain/Comfort Level and Functional Ability  Outcome: Ongoing (interventions implemented as appropriate)

## 2019-04-05 NOTE — THERAPY EVALUATION
Acute Care - Physical Therapy Initial Evaluation  [facility]     Patient Name: Alexa Peace  : 1927  MRN: [id number]  Today's Date: 2019   Onset of Illness/Injury or Date of Surgery: 19  Date of Referral to PT: 19  Referring Physician: Jere Conde      Admit Date: 2019    Visit Dx:     ICD-10-CM ICD-9-CM   1. Pneumonia of right lower lobe due to infectious organism (CMS/HCC) J18.1 486   2. Fever, unspecified fever cause R50.9 780.60   3. Difficulty walking R26.2 719.7     Patient Active Problem List   Diagnosis   • Bad memory   • Depression   • Diverticulosis of intestine   • Hyperlipidemia   • Macular degeneration   • Neuropathy, peripheral   • Osteopenia   • Atopic rhinitis   • Urge incontinence of urine   • Atherosclerosis of native coronary artery   • Essential hypertension   • Gastroesophageal reflux disease without esophagitis   • Impaired glucose tolerance   • History of coronary artery stent placement   • Chronic bilateral low back pain   • Acquired scoliosis   • Spinal stenosis of lumbar region with neurogenic claudication   • PAC (premature atrial contraction)   • Slow transit constipation   • Paroxysmal atrial fibrillation (CMS/HCC)   • Pleural effusion on left   • Valvular heart disease   • Non-rheumatic mitral regurgitation   • Pneumonia of right lower lobe due to infectious organism (CMS/HCC)   • Localized edema   • Shortness of breath   • Hypoxemia   • Sepsis due to pneumonia (CMS/HCC)   • Chronic anticoagulation     Past Medical History:   Diagnosis Date   • Anticoagulated     PLAVIX   • Arthritis    • Atrial premature complex    • CAD (coronary artery disease)    • Cancer (CMS/HCC)     skin   • Colon polyp    • Depression    • GERD (gastroesophageal reflux disease)    • Heart attack (CMS/HCC)    • Hyperlipidemia    • Hypertension    • Macular degeneration    • New onset atrial fibrillation (CMS/HCC) 2019   • Pre-diabetes    • Spinal stenosis      Past  Surgical History:   Procedure Laterality Date   • ADENOIDECTOMY     • CATARACT EXTRACTION     • CORONARY ANGIOPLASTY WITH STENT PLACEMENT     • EPIDURAL BLOCK     • LUMBAR DISCECTOMY FUSION INSTRUMENTATION N/A 12/7/2017    Procedure: L2-3, L3-4, L4-5 laminectomy and fusion with local bone graft;  Surgeon: Andre Layne MD;  Location: [facility] OR;  Service:    • TONSILECTOMY, ADENOIDECTOMY, BILATERAL MYRINGOTOMY AND TUBES     • TONSILLECTOMY          PT ASSESSMENT (last 12 hours)      Physical Therapy Evaluation     Row Name 04/05/19 1622          PT Evaluation Time/Intention    Subjective Information  complains of;fatigue  -MS     Document Type  evaluation  -MS     Mode of Treatment  physical therapy;individual therapy  -MS     Patient Effort  good  -MS     Row Name 04/05/19 1622          General Information    Onset of Illness/Injury or Date of Surgery  04/04/19  -MS     Referring Physician  Jere Conde  -MS     Patient Observations  agree to therapy;cooperative  -MS     Prior Level of Function  independent:  -MS     Equipment Currently Used at Home  none  -MS     Pertinent History of Current Functional Problem  Pt. admitted with PNA  -MS     Existing Precautions/Restrictions  fall  (Significant)   -MS     Risks Reviewed  patient:  -MS     Benefits Reviewed  patient:  -MS     Barriers to Rehab  none identified  -MS     Row Name 04/05/19 1622          Cognitive Assessment/Intervention- PT/OT    Orientation Status (Cognition)  oriented x 3  -MS     Follows Commands (Cognition)  WNL  -MS     Personal Safety Interventions  fall prevention program maintained;gait belt;nonskid shoes/slippers when out of bed;supervised activity  -MS     Row Name 04/05/19 1622          Bed Mobility Assessment/Treatment    Bed Mobility Assessment/Treatment  supine-sit;sit-supine  -MS     Supine-Sit Picher (Bed Mobility)  contact guard  -MS     Sit-Supine Picher (Bed Mobility)  contact guard  -MS     Row Name  04/05/19 1622          Transfer Assessment/Treatment    Transfer Assessment/Treatment  sit-stand transfer;stand-sit transfer  -MS     Sit-Stand Hooker (Transfers)  contact guard  -MS     Stand-Sit Hooker (Transfers)  contact guard  -MS     Row Name 04/05/19 1622          Sit-Stand Transfer    Assistive Device (Sit-Stand Transfers)  walker, front-wheeled  -MS     Row Name 04/05/19 1622          Stand-Sit Transfer    Assistive Device (Stand-Sit Transfers)  walker, front-wheeled  -MS     Row Name 04/05/19 1622          Gait/Stairs Assessment/Training    Hooker Level (Gait)  contact guard;2 person assist  -MS     Assistive Device (Gait)  walker, front-wheeled  -MS     Distance in Feet (Gait)  100 feet  -MS     Pattern (Gait)  step-through  -MS     Deviations/Abnormal Patterns (Gait)  stride length decreased  -MS     Bilateral Gait Deviations  forward flexed posture  -MS     Comment (Gait/Stairs)  Verbal/tactile cues for posture correction.  -MS     Row Name 04/05/19 1622          General ROM    GENERAL ROM COMMENTS  BUE/LE (WFL's)  -MS     Row Name 04/05/19 1622          MMT (Manual Muscle Testing)    General MMT Comments  BUE/LE (3+/5)  -MS     Row Name 04/05/19 1622          Pain Assessment    Additional Documentation  Pain Scale: Numbers Pre/Post-Treatment (Group)  -MS     Row Name 04/05/19 1622          Pain Scale: Numbers Pre/Post-Treatment    Pain Scale: Numbers, Pretreatment  0/10 - no pain  -MS     Pain Scale: Numbers, Post-Treatment  0/10 - no pain  -MS     Row Name 04/05/19 1622          Physical Therapy Clinical Impression    Date of Referral to PT  04/05/19  -MS     Criteria for Skilled Interventions Met (PT Clinical Impression)  treatment indicated  -MS     Rehab Potential (PT Clinical Summary)  good, to achieve stated therapy goals  -MS     Row Name 04/05/19 1622          Physical Therapy Goals    Bed Mobility Goal Selection (PT)  bed mobility, PT goal 1  -MS     Transfer Goal Selection  (PT)  transfer, PT goal 1  -MS     Gait Training Goal Selection (PT)  gait training, PT goal 1  -MS     Row Name 04/05/19 1622          Bed Mobility Goal 1 (PT)    Activity/Assistive Device (Bed Mobility Goal 1, PT)  bed mobility activities, all  -MS     [place] Level/Cues Needed (Bed Mobility Goal 1, PT)  independent  -MS     Time Frame (Bed Mobility Goal 1, PT)  long term goal (LTG);3 days  -MS     Row Name 04/05/19 1622          Transfer Goal 1 (PT)    Activity/Assistive Device (Transfer Goal 1, PT)  transfers, all;walker, rolling  -MS     [place] Level/Cues Needed (Transfer Goal 1, PT)  independent  -MS     Time Frame (Transfer Goal 1, PT)  long term goal (LTG);3 days  -MS     Row Name 04/05/19 1622          Gait Training Goal 1 (PT)    Activity/Assistive Device (Gait Training Goal 1, PT)  gait (walking locomotion);walker, rolling  -MS     [place] Level (Gait Training Goal 1, PT)  independent  -MS     Distance (Gait Goal 1, PT)  150 feet  -MS     Time Frame (Gait Training Goal 1, PT)  long term goal (LTG);2 - 3 days  -MS     Row Name 04/05/19 1622          Positioning and Restraints    Pre-Treatment Position  in bed  -MS     Post Treatment Position  bed  -MS     In Bed  notified nsg;supine;call light within reach;encouraged to call for assist;exit alarm on All lines intact.  -MS       User Key  (r) = Recorded By, (t) = Taken By, (c) = Cosigned By    Initials Name Provider Type    Jere Mann, PT Physical Therapist        Physical Therapy Education     Title: PT OT SLP Therapies (Done)     Topic: Physical Therapy (Done)     Point: Mobility training (Done)     Learning Progress Summary           Patient Acceptance, E,D, VU,NR by MS at 4/5/2019  4:26 PM                   Point: Home exercise program (Done)     Learning Progress Summary           Patient Acceptance, E,D, VU,NR by MS at 4/5/2019  4:26 PM                   Point: Body mechanics (Done)     Learning Progress Summary            Patient Acceptance, E,D, VU,NR by MS at 4/5/2019  4:26 PM                   Point: Precautions (Done)     Learning Progress Summary           Patient Acceptance, E,D, VU,NR by MS at 4/5/2019  4:26 PM                               User Key     Initials Effective Dates Name Provider Type Discipline    MS 04/03/18 -  Jere Jean-Baptiste, PT Physical Therapist PT              PT Recommendation and Plan  Anticipated Discharge Disposition (PT): home with assist, home with home health  Planned Therapy Interventions (PT Eval): balance training, bed mobility training, gait training, home exercise program, patient/family education, postural re-education, strengthening, transfer training  Therapy Frequency (PT Clinical Impression): daily  Outcome Summary/Treatment Plan (PT)  Anticipated Discharge Disposition (PT): home with assist, home with home health  Plan of Care Reviewed With: patient  Outcome Summary: Pt. will benefit from skilled inpt. P.T. to address her functional deficits and to assist pt. in regaining her maximum level of independence with functional mobility.   Outcome Measures     Row Name 04/05/19 1600             How much help from another person do you currently need...    Turning from your back to your side while in flat bed without using bedrails?  4  -MS      Moving from lying on back to sitting on the side of a flat bed without bedrails?  3  -MS      Moving to and from a bed to a chair (including a wheelchair)?  3  -MS      Standing up from a chair using your arms (e.g., wheelchair, bedside chair)?  3  -MS      Climbing 3-5 steps with a railing?  3  -MS      To walk in hospital room?  3  -MS      AM-PAC 6 Clicks Score  19  -MS         Functional Assessment    Outcome Measure Options  AM-PAC 6 Clicks Basic Mobility (PT)  -MS        User Key  (r) = Recorded By, (t) = Taken By, (c) = Cosigned By    Initials Name Provider Type    MS Jere Jean-Baptiste, PT Physical Therapist         Time Calculation:   PT Charges      Row Name 04/05/19 1627 04/05/19 0936          Time Calculation    Start Time  1555  -MS  --     Stop Time  1610  -MS  --     Time Calculation (min)  15 min  -MS  --     PT Received On  04/05/19  -MS  --     PT - Next Appointment  04/06/19  -MS  04/05/19  -EE     PT Goal Re-Cert Due Date  04/08/19  -MS  --        Time Calculation- PT    Total Timed Code Minutes- PT  13 minute(s)  -MS  --       User Key  (r) = Recorded By, (t) = Taken By, (c) = Cosigned By    Initials Name Provider Type    Ana Hollingsworth, PT Physical Therapist    Jere Mann, PT Physical Therapist        Therapy Charges for Today     Code Description Service Date Service Provider Modifiers Qty    96026311138 HC PT EVAL LOW COMPLEXITY 1 4/5/2019 Jere Jean-Baptiste, PT GP 1    32337747872 HC PT THER PROC EA 15 MIN 4/5/2019 Jere Jean-Baptiste, PT GP 1    64624352239 HC PT THER SUPP EA 15 MIN 4/5/2019 Jere Jean-Baptiste, PT GP 1          PT G-Codes  Outcome Measure Options: AM-PAC 6 Clicks Basic Mobility (PT)  AM-PAC 6 Clicks Score: 19      Jere Jean-Baptiste PT  4/5/2019

## 2019-04-05 NOTE — PROGRESS NOTES
Continued Stay Note  [facility]     Patient Name: Alexa Peace  MRN: [id number]  Today's Date: 4/5/2019    Admit Date: 4/4/2019    Discharge Plan     Row Name 04/05/19 1204       Plan    Plan  Plan home with family.   YENNIFER Ramos RN    Plan Comments   FACE SHEET VERIFIED/ IM LETTER SIGNED.   Spoke to pt and pt's granddaughter (Ronna Baird [phone]) at bedside with pt's permission.  Pt's PCP is Dr. Brian Jacobs.  Pt lives in a story and half house with granddaughter  ( Ronna Baird [phone]).  Pt is independent with ADL's.   Pt has grab bar, shower chair, and rolling walker for home use.  Pt gets prescriptions at [facility]  ( [facility] [address] [facility]).  Pt denies any issues affording medications.  Pt is not current with .   [facility]  [facility] denies any needs.  Plan home.           Discharge Codes    No documentation.             Antonieta Ramos, RN     Respiratory

## 2019-04-05 NOTE — H&P
Patient Name:  Alexa Peace  YOB: 1927  MRN:  [id number]  Admit Date:  4/4/2019  Patient Care Team:  Brian Jacobs MD as PCP - General (Internal Medicine)      Subjective   History Present Illness     Chief Complaint   Patient presents with   • Fever   • Syncope       Ms. Peace is a 89 y.o. non-smoker with a history of HTN and Afib on eliquis that presents to [facility] complaining of a tight, non-productive cough and shortness of breath for the past couple days along with a fever.  She notes that everyone in her family has had a respiratory illness going around.  She denies recent antibiotics but was in the hospital for almost 3 days in January.      History of Present Illness  Review of Systems   Constitutional: Positive for chills and fever.   HENT: Negative for nosebleeds and sore throat.    Eyes: Negative for photophobia, redness and visual disturbance.   Respiratory: Positive for chest tightness and shortness of breath. Negative for cough and choking.    Cardiovascular: Positive for leg swelling. Negative for chest pain and palpitations.   Gastrointestinal: Negative for abdominal pain, constipation, diarrhea, nausea and vomiting.   Endocrine: Negative for cold intolerance and heat intolerance.   Genitourinary: Negative for difficulty urinating, dysuria and hematuria.   Musculoskeletal: Negative for arthralgias and myalgias.   Skin: Negative for pallor and rash.   Neurological: Positive for weakness (generalized). Negative for dizziness, light-headedness and headaches.   Hematological: Negative for adenopathy. Bruises/bleeds easily (anticoagulated).   Psychiatric/Behavioral: Negative for confusion and decreased concentration.        Personal History     Past Medical History:   Diagnosis Date   • Anticoagulated     PLAVIX   • Arthritis    • Atrial premature complex    • CAD (coronary artery disease)    • Cancer (CMS/HCC)     skin   • Colon polyp    • Depression    • GERD  (gastroesophageal reflux disease)    • Heart attack (CMS/HCC)    • Hyperlipidemia    • Hypertension    • Macular degeneration    • New onset atrial fibrillation (CMS/HCC) 01/11/2019   • Pre-diabetes    • Spinal stenosis      Past Surgical History:   Procedure Laterality Date   • ADENOIDECTOMY     • CATARACT EXTRACTION     • CORONARY ANGIOPLASTY WITH STENT PLACEMENT     • EPIDURAL BLOCK     • LUMBAR DISCECTOMY FUSION INSTRUMENTATION N/A 12/7/2017    Procedure: L2-3, L3-4, L4-5 laminectomy and fusion with local bone graft;  Surgeon: Andre Layne MD;  Location: [facility];  Service:    • TONSILECTOMY, ADENOIDECTOMY, BILATERAL MYRINGOTOMY AND TUBES     • TONSILLECTOMY       Family History   Problem Relation Age of Onset   • Diabetes Mother    • Depression Mother    • Anxiety disorder Mother    • Heart disease Father    • Hypertension Father    • Diabetes Father    • Diabetes Brother    • No Known Problems Maternal Grandmother    • No Known Problems Maternal Grandfather    • No Known Problems Paternal Grandmother    • No Known Problems Paternal Grandfather    • Malig Hyperthermia Neg Hx      Social History     Tobacco Use   • Smoking status: Never Smoker   • Smokeless tobacco: Never Used   Substance Use Topics   • Alcohol use: No   • Drug use: No     Medications Prior to Admission   Medication Sig Dispense Refill Last Dose   • acetaminophen (TYLENOL) 500 MG tablet Take 500-1,000 mg by mouth Every Evening. PRN   4/4/2019 at Unknown time   • apixaban (ELIQUIS) 2.5 MG tablet tablet Take 1 tablet by mouth every 12 (Twelve) hours. 60 tablet 11 4/4/2019 at Unknown time   • aspirin 81 MG chewable tablet Chew 81 mg Daily.   4/4/2019 at Unknown time   • Cholecalciferol (VITAMIN D) 2000 units tablet Take 2,000 Units by mouth Daily. 30 tablet 4 4/4/2019 at Unknown time   • digoxin (LANOXIN) 125 MCG tablet Take 1 tablet by mouth daily. 90 tablet 3 4/4/2019 at Unknown time   • DULoxetine (CYMBALTA) 60 MG capsule Take 1  capsule by mouth Daily. 90 capsule 1 4/4/2019 at Unknown time   • furosemide (LASIX) 20 MG tablet Take 1 tablet by mouth daily. 30 tablet 11 4/4/2019 at Unknown time   • gabapentin (NEURONTIN) 100 MG capsule Take 100 mg by mouth Daily With Breakfast & Lunch.   4/4/2019 at Unknown time   • HYDROcodone-acetaminophen (NORCO) 7.5-325 MG per tablet Take 1 tablet by mouth 3 (Three) Times a Day.   4/4/2019 at Unknown time   • metoprolol tartrate (LOPRESSOR) 50 MG tablet Take 1 tablet by mouth every 12 (Twelve) hours. 60 tablet 11 4/4/2019 at Unknown time   • Multiple Vitamins-Minerals (CENTRUM SILVER) tablet Take 1 tablet by mouth Daily.   4/4/2019 at Unknown time   • omeprazole (priLOSEC) 20 MG capsule Take 1 capsule by mouth Daily With Breakfast. 90 capsule 1 4/4/2019 at Unknown time   • potassium chloride (MICRO-K) 10 MEQ CR capsule Take 2 capsules by mouth daily. 30 capsule 11 4/4/2019 at Unknown time   • pravastatin (PRAVACHOL) 40 MG tablet Take 40 mg by mouth Daily.   4/4/2019 at Unknown time   • polyethylene glycol (MIRALAX) pack packet Take 17 g by mouth Daily. (Patient taking differently: Take 17 g by mouth Daily As Needed.)   Not Taking     Allergies:    Allergies   Allergen Reactions   • Ciprofloxacin      UNKNOWN   • Metronidazole      UNKWOWN   • Prednisone Other (See Comments)     Elevated blood pressure       Objective    Objective     Vital Signs  Temp:  [98.1 °F (36.7 °C)-102.6 °F (39.2 °C)] 98.1 °F (36.7 °C)  Heart Rate:  [] 62  Resp:  [16-20] 20  BP: (102-168)/(50-92) 110/50  SpO2:  [90 %-99 %] 95 %  on  Flow (L/min):  [2] 2;   Device (Oxygen Therapy): room air  Body mass index is 21.02 kg/m².    Physical Exam   Constitutional: She is oriented to person, place, and time. No distress.   HENT:   Head: Normocephalic and atraumatic.   Mouth/Throat: Oropharynx is clear and moist.   Eyes: Conjunctivae and EOM are normal. Pupils are equal, round, and reactive to light.   Neck: Normal range of motion. Neck  supple. No JVD present.   Cardiovascular: Normal rate, regular rhythm and intact distal pulses.   Pulmonary/Chest: Effort normal. She has decreased breath sounds in the right lower field and the left lower field. She has rales in the right lower field.   Abdominal: Soft. Bowel sounds are normal. There is no tenderness.   Musculoskeletal: She exhibits edema (BLE, L>R). She exhibits no tenderness.   Neurological: She is alert and oriented to person, place, and time.   Skin: Skin is warm and dry. Capillary refill takes less than 2 seconds. She is not diaphoretic.   Psychiatric: She has a normal mood and affect. Her behavior is normal.   Nursing note and vitals reviewed.      Results Review:  I reviewed the patient's new clinical results.  I reviewed the patient's new imaging results and agree with the interpretation.  I reviewed the patient's other test results and agree with the interpretation  I personally viewed and interpreted the patient's EKG/Telemetry data  Discussed with ED provider.    Lab Results (last 24 hours)     Procedure Component Value Units Date/Time    CBC & Differential [[id number]] Collected:  04/04/19 2029    Specimen:  Blood Updated:  04/04/19 2129    Narrative:       The following orders were created for panel order CBC & Differential.  Procedure                               Abnormality         Status                     ---------                               -----------         ------                     CBC Auto Differential[[id number]]        Abnormal            Final result                 Please view results for these tests on the individual orders.    Comprehensive Metabolic Panel [[id number]]  (Abnormal) Collected:  04/04/19 2029    Specimen:  Blood Updated:  04/04/19 2120     Glucose 139 mg/dL      BUN 17 mg/dL      Creatinine 0.66 mg/dL      Sodium 138 mmol/L      Potassium 4.3 mmol/L      Chloride 96 mmol/L      CO2 28.9 mmol/L      Calcium 9.2 mg/dL      Total Protein 7.4 g/dL       "Albumin 4.20 g/dL      ALT (SGPT) 21 U/L      AST (SGOT) 23 U/L      Alkaline Phosphatase 61 U/L      Total Bilirubin 0.3 mg/dL      eGFR Non African Amer 84 mL/min/1.73      Globulin 3.2 gm/dL      A/G Ratio 1.3 g/dL      BUN/Creatinine Ratio 25.8     Anion Gap 13.1 mmol/L     Narrative:       GFR Normal >60  Chronic Kidney Disease <60  Kidney Failure <15    Blood Culture - Blood, Arm, Right [[id number]] Collected:  04/04/19 2029    Specimen:  Blood from Arm, Right Updated:  04/04/19 2046    Lactic Acid, Plasma [[id number]]  (Normal) Collected:  04/04/19 2029    Specimen:  Blood Updated:  04/04/19 2104     Lactate 2.0 mmol/L     Procalcitonin [[id number]]  (Abnormal) Collected:  04/04/19 2029    Specimen:  Blood Updated:  04/04/19 2205     Procalcitonin <0.02 ng/mL     Narrative:       As a Marker for Sepsis (Non-Neonates):   1. <0.5 ng/mL represents a low risk of severe sepsis and/or septic shock.  1. >2 ng/mL represents a high risk of severe sepsis and/or septic shock.    As a Marker for Lower Respiratory Tract Infections that require antibiotic therapy:  PCT on Admission     Antibiotic Therapy             6-12 Hrs later  > 0.5                Strongly Recommended            >0.25 - <0.5         Recommended  0.1 - 0.25           Discouraged                   Remeasure/reassess PCT  <0.1                 Strongly Discouraged          Remeasure/reassess PCT      As 28 day mortality risk marker: \"Change in Procalcitonin Result\" (> 80 % or <=80 %) if Day 0 (or Day 1) and Day 4 values are available. Refer to http://www.[employer]-pct-calculator.com/   Change in PCT <=80 %   A decrease of PCT levels below or equal to 80 % defines a positive change in PCT test result representing a higher risk for 28-day all-cause mortality of patients diagnosed with severe sepsis or septic shock.  Change in PCT > 80 %   A decrease of PCT levels of more than 80 % defines a negative change in PCT result representing a lower risk for 28-day " all-cause mortality of patients diagnosed with severe sepsis or septic shock.                CBC Auto Differential [[id number]]  (Abnormal) Collected:  04/04/19 2029    Specimen:  Blood Updated:  04/04/19 2129     WBC 8.72 10*3/mm3      RBC 3.63 10*6/mm3      Hemoglobin 12.9 g/dL      Hematocrit 39.6 %      .1 fL      MCH 35.5 pg      MCHC 32.6 g/dL      RDW 13.9 %      RDW-SD 56.0 fl      MPV 11.3 fL      Platelets 201 10*3/mm3     Manual Differential [[id number]]  (Abnormal) Collected:  04/04/19 2029    Specimen:  Blood Updated:  04/04/19 2129     Neutrophil % 89.0 %      Lymphocyte % 4.0 %      Monocyte % 4.0 %      Eosinophil % 3.0 %      Neutrophils Absolute 7.76 10*3/mm3      Lymphocytes Absolute 0.35 10*3/mm3      Monocytes Absolute 0.35 10*3/mm3      Eosinophils Absolute 0.26 10*3/mm3      Macrocytes Slight/1+     WBC Morphology Normal     Platelet Morphology Normal    Digoxin Level [[id number]]  (Normal) Collected:  04/04/19 2030    Specimen:  Blood Updated:  04/04/19 2335     Digoxin 0.60 ng/mL     Respiratory Panel, PCR - Swab, Nasopharynx [[id number]]  (Normal) Collected:  04/04/19 2046    Specimen:  Swab from Nasopharynx Updated:  04/04/19 2152     ADENOVIRUS, PCR Not Detected     Coronavirus 229E Not Detected     Coronavirus HKU1 Not Detected     Coronavirus NL63 Not Detected     Coronavirus OC43 Not Detected     Human Metapneumovirus Not Detected     Human Rhinovirus/Enterovirus Not Detected     Influenza B PCR Not Detected     Parainfluenza Virus 1 Not Detected     Parainfluenza Virus 2 Not Detected     Parainfluenza Virus 3 Not Detected     Parainfluenza Virus 4 Not Detected     Bordetella pertussis pcr Not Detected     Influenza A H1 2009 PCR Not Detected     Chlamydophila pneumoniae PCR Not Detected     Mycoplasma pneumo by PCR Not Detected     Influenza A PCR Not Detected     Influenza A H3 Not Detected     Influenza A H1 Not Detected     RSV, PCR Not Detected     Bordetella parapertussis PCR  Not Detected    Blood Culture - Blood, Arm, Left [[id number]] Collected:  04/04/19 2242    Specimen:  Blood from Arm, Left Updated:  04/04/19 2248    Urinalysis With Culture If Indicated - Urine, Catheter [[id number]]  (Abnormal) Collected:  04/04/19 2300    Specimen:  Urine, Catheter Updated:  04/04/19 2320     Color, UA Yellow     Appearance, UA Clear     pH, UA 6.5     Specific Gravity, UA 1.015     Glucose, UA Negative     Ketones, UA Trace     Bilirubin, UA Negative     Blood, UA Small (1+)     Protein, UA Negative     Leuk Esterase, UA Negative     Nitrite, UA Negative     Urobilinogen, UA 1.0 E.U./dL    Urinalysis, Microscopic Only - Urine, Catheter [[id number]]  (Abnormal) Collected:  04/04/19 2300    Specimen:  Urine, Catheter Updated:  04/04/19 2320     RBC, UA 6-12 /HPF      WBC, UA 0-2 /HPF      Bacteria, UA None Seen /HPF      Squamous Epithelial Cells, UA 0-2 /HPF      Hyaline Casts, UA 0-2 /LPF      Methodology Automated Microscopy    Legionella Antigen, Urine - Urine, Urine, Clean Catch [[id number]]  (Normal) Collected:  04/05/19 0558    Specimen:  Urine, Clean Catch Updated:  04/05/19 0643     LEGIONELLA ANTIGEN, URINE Negative    S. Pneumo Ag Urine or CSF - Urine, Urine, Clean Catch [[id number]]  (Normal) Collected:  04/05/19 0558    Specimen:  Urine, Clean Catch Updated:  04/05/19 0643     Strep Pneumo Ag Negative          Imaging Results (last 24 hours)     Procedure Component Value Units Date/Time    XR Chest 2 View [[id number]] Collected:  04/04/19 2104     Updated:  04/04/19 2111    Narrative:       Chest radiograph     HISTORY:Cough     TECHNIQUE: Two AP and lateral radiographs     COMPARISON:Chest radiograph 01/11/2019     FINDINGS:  The lungs are hypoinflated. Subtle asymmetric pulmonary opacification is  present within the right lower lung. Pulmonary vascular congestion is  present. There is no pleural effusion. No pneumothorax is seen. The  heart size accentuated by low lung volumes but appears  mildly enlarged.       Impression:       1.  Vascular congestion with subtle asymmetric pulmonary opacification  within the right base. Primary differential considerations include  asymmetric pulmonary edema versus early right lower lung pneumonia and  correlation with patient history is recommended.  2.  Probable mild cardiomegaly.     This report was finalized on 4/4/2019 9:08 PM by Dr. Kapil Jane M.D.             Results for orders placed during the hospital encounter of 01/11/19   Adult Transthoracic Echo Complete W/ Cont if Necessary Per Protocol    Narrative · Calculated EF = 54%  · Left ventricular systolic function is normal.  · Left ventricular wall thickness is consistent with hypertrophy.   Sigmoid-shaped ventricular septum is present.  · Left atrial cavity size is moderately dilated.  · Mild aortic valve regurgitation is present.  · Moderate-to-severe mitral valve regurgitation is present  · Moderate tricuspid valve regurgitation is present.  · Estimated right ventricular systolic pressure from tricuspid   regurgitation is moderately elevated (45-55 mmHg).  · Calculated right ventricular systolic pressure from tricuspid   regurgitation is 55.7 mmHg.  · There is a large size left pleural effusion.          ECG 12 Lead   Preliminary Result   HEART RATE= 131  bpm   RR Interval= 460  ms   MA Interval= 102  ms   P Horizontal Axis= 139  deg   P Front Axis= 0  deg   QRSD Interval= 82  ms   QT Interval= 292  ms   QRS Axis= -24  deg   T Wave Axis= 102  deg   - ABNORMAL ECG -   Sinus tachycardia   Probable LVH with secondary repol abnrm   Anterior Q waves, possibly due to LVH   Electronically Signed By:    Date and Time of Study: 2019-04-04 20:10:16           Assessment/Plan     Active Hospital Problems    Diagnosis POA   • Localized edema [R60.0] Yes   • Shortness of breath [R06.02] Yes   • Hypoxemia [R09.02] Yes   • Sepsis due to pneumonia (CMS/HCC) [J18.9, A41.9] Yes   • Chronic anticoagulation [Z79.01] Not  Applicable   • Pneumonia of right lower lobe due to infectious organism (CMS/HCC) [J18.1] Yes   • Non-rheumatic mitral regurgitation [I34.0] Yes   • Paroxysmal atrial fibrillation (CMS/HCC) [I48.0] Yes   • Essential hypertension [I10] Yes   • Gastroesophageal reflux disease without esophagitis [K21.9] Yes   RLL PNA with Sepsis  - fever and tachycardia on admission  - procalcitonin is low at this point but clinically she has a pneumonia and RVP is negative so will treat with abx  - she received ceftriaxone and azithromycin in the ER-will not continue azithromycin as no atypicals on RVP; ceftriaxone is probably adequate but she was in the hospital within the past 3 months (though only by 9 days) so will change to vancomycin and cefepime for the time being  - check blood and sputum cultures, legionella, s. Pneumo, and MRSA screen  - pulm toilet  - wean oxygen as tolerated    LE Edema  - L>R, check BLE venous duplex    Afib  - currently in sinus rhythm  - continue on metoprolol and digoxin  - on low-dose eliquis    I discussed the patients findings and my recommendations with patient and nursing staff.    VTE Prophylaxis - Xarelto.  Code Status - Full code.       Jere Bill MD  [facility]  04/05/19  7:00 AM

## 2019-04-05 NOTE — PLAN OF CARE
Problem: Patient Care Overview  Goal: Plan of Care Review   04/05/19 0281   Coping/Psychosocial   Plan of Care Reviewed With patient   OTHER   Outcome Summary Pt. will benefit from skilled inpt. P.T. to address her functional deficits and to assist pt. in regaining her maximum level of independence with functional mobility.

## 2019-04-05 NOTE — ED PROVIDER NOTES
MD ATTESTATION NOTE    On exam, the pt is elderly and ill-appearing. Pt is not overtly septic, and in no respiratory distress.  My exam was limited because the patient was on the bedside commode at the time.  I agree with the plan to treat the pt with empiric abx and continue to look for a source of infection through admission.    The BANDAR and I have discussed this patient's history, physical exam, and treatment plan.  I have reviewed the documentation and personally had a face to face interaction with the patient. I affirm the documentation and agree with the treatment and plan.  The attached note describes my personal findings.    Documentation assistance provided by trace Agudelo for Sanjay Smith MD.  Information recorded by the scribe was done at my direction and has been verified and validated by me.     Brina Agudelo  04/04/19 4893       Sanjay Smith MD  04/04/19 6602

## 2019-04-05 NOTE — PROGRESS NOTES
Discharge Planning Assessment  [facility]     Patient Name: Alexa Peace  MRN: [id number]  Today's Date: 4/5/2019    Admit Date: 4/4/2019    Discharge Needs Assessment     Row Name 04/05/19 1202       Living Environment    Lives With  grandchild(kishore)    Name(s) of Who Lives With Patient  Granddaughter  ( Ronna Baird [phone]    Current Living Arrangements  home/apartment/condo    Primary Care Provided by  self    Provides Primary Care For  no one    Family Caregiver if Needed  grandchild(kishore), adult;child(kishore), adult    Family Caregiver Names  Granddaughhter  ( Ronna Baird [phone]), Daughters ( Sheridan Marrero [phone]) or Corine Baird    Quality of Family Relationships  involved;supportive    Able to Return to Prior Arrangements  no    Living Arrangement Comments  Pt lives in a story and half house with granddaughter  ( Ronna Baird [phone]).       Resource/Environmental Concerns    Resource/Environmental Concerns  none    Transportation Concerns  car, none       Transition Planning    Patient/Family Anticipates Transition to  home with family    Patient/Family Anticipated Services at Transition  none    Transportation Anticipated  family or friend will provide       Discharge Needs Assessment    Readmission Within the Last 30 Days  no previous admission in last 30 days    Concerns to be Addressed  denies needs/concerns at this time    Equipment Currently Used at Home  grab bar;shower chair;walker, rolling    Anticipated Changes Related to Illness  none    Equipment Needed After Discharge  grab bar, tub/shower;shower chair;walker, rolling    Offered/Gave Vendor List  no        Discharge Plan     Row Name 04/05/19 1204       Plan    Plan  Plan home with familyMigue Ramos RN    Plan Comments  FACE SHEET VERIFIED/ IM LETTER SIGNED.   Spoke to pt and pt's granddaughter (Ronna Baird [phone]) at bedside with pt's permission.  Pt's PCP is Dr. Brian Jacobs.  Pt lives in a story and half  house with granddaughter  ( Ronna Baird [phone]).  Pt is independent with ADL's.   Pt has grab bar, shower chair, and rolling walker for home use.  Pt gets prescriptions at [facility]  ( Jossy Pineda).  Pt denies any issues affording medications.  Pt is not current with HH.   Pt has been in [facility].  Pt denies any needs.  Plan home.           Destination      No service coordination in this encounter.      Durable Medical Equipment      No service coordination in this encounter.      Dialysis/Infusion      No service coordination in this encounter.      Home Medical Care      No service coordination in this encounter.      Therapy      No service coordination in this encounter.      Community Resources      No service coordination in this encounter.          Demographic Summary     Row Name 04/05/19 1200       General Information    Admission Type  inpatient    Arrived From  emergency department    Required Notices Provided  Important Message from Medicare    Referral Source  admission list    Reason for Consult  discharge planning    Preferred Language  English     Used During This Interaction  no       Contact Information    Permission Granted to Share Info With  family/designee Alma  ( Ronna Baird [phone])        Functional Status     Row Name 04/05/19 1201       Functional Status    Usual Activity Tolerance  good    Current Activity Tolerance  moderate       Functional Status, IADL    Medications  independent    Meal Preparation  assistive person    Housekeeping  assistive person    Laundry  assistive person    Shopping  assistive person       Mental Status    General Appearance WDL  WDL       Mental Status Summary    Recent Changes in Mental Status/Cognitive Functioning  no changes        Psychosocial    No documentation.       Abuse/Neglect    No documentation.       Legal    No documentation.       Substance Abuse    No documentation.       Patient Forms    No  documentation.           Antonieta Ramos, RN

## 2019-04-05 NOTE — PROGRESS NOTES
"Pharmacokinetic Consult - Vancomycin Dosing (Initial Note)    Alexa Peace is a 89 y.o. female 157.5 cm (62\") 52.1 kg (114 lb 14.4 oz)   Pharmacy consulted to dose vancomycin for PNA x7 days.  Pharmacy dosing vancomycin per Dr. Pennington's request.   Goal trough: 15 - 20 mcg/mL   Additional Abx Therapy: Cefepime 2 g IV Q12H for PNA (Day 1)    Anti-Infectives (From admission, onward)    Ordered     Dose/Rate Route Frequency Start Stop    04/05/19 0704  cefepime (MAXIPIME) 2 g/100 mL 0.9% NS (mbp)     Ordering Provider:  Jere Bill MD    2 g  over 4 Hours Intravenous Every 12 Hours 04/05/19 2100 04/12/19 2059 04/05/19 0732  Vancomycin Pharmacy Intermittent Dosing     Ordering Provider:  Blake Mello MD     Does not apply Daily 04/05/19 0900 04/12/19 0859    04/05/19 0732  vancomycin 1250 mg/250 mL 0.9% NS IVPB ([facility])     Ordering Provider:  Blake Mello MD    1,250 mg  over 120 Minutes Intravenous Once 04/05/19 0830      04/05/19 0704  cefepime (MAXIPIME) 2 g/100 mL 0.9% NS (mbp)     Ordering Provider:  Jere Bill MD    2 g  200 mL/hr over 30 Minutes Intravenous Once 04/05/19 0800      04/05/19 0704  Pharmacy to dose vancomycin     Ordering Provider:  Jere Bill MD     Does not apply Continuous PRN 04/05/19 0703 04/12/19 0702    04/04/19 2221  cefTRIAXone (ROCEPHIN) IVPB 1 g     Ordering Provider:  Soila Vila APRN    1 g  100 mL/hr over 30 Minutes Intravenous Once 04/04/19 2223 04/05/19 0031    04/04/19 2221  azithromycin (ZITHROMAX) 500 mg 0.9% NaCl (Add-vantage) 250 mL     Ordering Provider:  Soila Vila APRN    500 mg  over 60 Minutes Intravenous Once 04/04/19 2223 04/05/19 0130           Relevant clinical data and objective history reviewed:  PMH of HTN, Afib (on apixaban 2.5 mg PO @home), nad hospitilization back in January presented with cough, COA, and fever.     Lab Results   Component Value Date    CREATININE 0.66 04/04/2019     Estimated Creatinine Clearance: " "37.7 mL/min (by C-G formula based on SCr of 0.66 mg/dL).    Lab Results   Component Value Date    WBC 8.72 04/04/2019     Temp Readings from Last 1 Encounters:   04/05/19 98.2 °F (36.8 °C) (Oral)       Baseline culture/source/susceptibility:   4/4 Blood Cx (2/2): In process  4/4 RVP: Negative  4/4 Procal: <.02  4/5 Strep pneumo and Legionella Ag Negative  4/5 MRSA Screen: To be collected    Radiology  4/5 Chest XR: \"Vascular congestion with subtle asymmetric pulmonary opacification within the right base. Primary differential considerations include asymmetric pulmonary edema versus early right lower lung pneumonia \"     Assessment/Plan  Given the patient's PMH, clinical status (Tmax 102.6), weight, and age, will give vancomycin 1250 mg x1 dose (~24 mg/kg) to load and start vancomycin 750mg IV Q24H (14.4 mg/kg).  Vancomycin level due on 4/8 @0830.  Will monitor serum creatinine and vancomycin levels and adjust as needed.Thank you Dr. Bill for this consult.     Chaz Matos, PharmD, BCPS  04/05/19 8:09 AM    "

## 2019-04-05 NOTE — PLAN OF CARE
Problem: Patient Care Overview  Goal: Plan of Care Review  Outcome: Ongoing (interventions implemented as appropriate)   04/05/19 3205   Coping/Psychosocial   Plan of Care Reviewed With patient   Plan of Care Review   Progress improving   OTHER   Outcome Summary Patient has been pleasant and cooperative during shift. Patient is negative for DVT. Need OB x1. MRSA screen obtained from patient. Patient on antibiotics. No complaints of pain, nausea, or SOA. Patient has been ambulating in room. Will continue to monitor and assist patient as needed       Problem: Pneumonia (Adult)  Goal: Signs and Symptoms of Listed Potential Problems Will be Absent, Minimized or Managed (Pneumonia)  Outcome: Ongoing (interventions implemented as appropriate)      Problem: Fall Risk (Adult)  Goal: Absence of Fall  Outcome: Ongoing (interventions implemented as appropriate)      Problem: Pain, Chronic (Adult)  Goal: Identify Related Risk Factors and Signs and Symptoms  Outcome: Ongoing (interventions implemented as appropriate)    Goal: Acceptable Pain/Comfort Level and Functional Ability  Outcome: Ongoing (interventions implemented as appropriate)

## 2019-04-05 NOTE — ED PROVIDER NOTES
"EMERGENCY DEPARTMENT ENCOUNTER    CHIEF COMPLAINT  Chief Complaint: fever  History given by:patient  History limited by:none  Time Seen: 2009  Room Number: 29/29  PMD: Brian Jacobs MD      HPI:  Pt is a 89 y.o. female who presents with fever (tmax-102.6). Pt also complains of productive cough with green sputum. Pt denies sore throat, cp, SOA, N/V/D, urinary sx, and abd pain. Pt states \"her whole family has been sick.\" Pt did get her influenza vaccination. Family at bedside states pt has been confused as well. Pt is on chronic pain medication and takes it 3x daily.     Duration: PTA  Timing:constant  Location:diffuse  Quality:tmax-102.6  Intensity/Severity:moderate  Progression:unchanged  Associated Symptoms:productive cough, confusion   Previous Episodes:none  Treatment before arrival:none    PAST MEDICAL HISTORY  Active Ambulatory Problems     Diagnosis Date Noted   • Bad memory 04/18/2016   • Depression 05/05/2017   • Diverticulosis of intestine 05/05/2017   • Hyperlipidemia 05/05/2017   • Macular degeneration 05/05/2017   • Neuropathy, peripheral 05/05/2017   • Osteopenia 05/05/2017   • Atopic rhinitis 05/05/2017   • Urge incontinence of urine 05/05/2017   • Atherosclerosis of native coronary artery 05/05/2017   • Essential hypertension 05/05/2017   • Gastroesophageal reflux disease without esophagitis 05/05/2017   • Impaired glucose tolerance 05/05/2017   • History of coronary artery stent placement 05/25/2017   • Chronic bilateral low back pain 07/21/2017   • Acquired scoliosis 11/21/2017   • Spinal stenosis of lumbar region with neurogenic claudication 11/21/2017   • PAC (premature atrial contraction) 12/11/2017   • Slow transit constipation 12/11/2017   • New onset atrial fibrillation (CMS/HCC) 01/11/2019   • Pleural effusion on left 01/14/2019   • Valvular heart disease 01/14/2019   • Non-rheumatic mitral regurgitation 01/14/2019     Resolved Ambulatory Problems     Diagnosis Date Noted   • Foot drop " 08/04/2016   • Hyperglycemia 05/05/2017     Past Medical History:   Diagnosis Date   • Anticoagulated    • Arthritis    • Atrial premature complex    • CAD (coronary artery disease)    • Cancer (CMS/HCC)    • Colon polyp    • Depression    • GERD (gastroesophageal reflux disease)    • Heart attack (CMS/HCC)    • Hyperlipidemia    • Hypertension    • Macular degeneration    • New onset atrial fibrillation (CMS/HCC) 01/11/2019   • Pre-diabetes    • Spinal stenosis        PAST SURGICAL HISTORY  Past Surgical History:   Procedure Laterality Date   • CATARACT EXTRACTION     • CORONARY ANGIOPLASTY WITH STENT PLACEMENT     • EPIDURAL BLOCK     • LUMBAR DISCECTOMY FUSION INSTRUMENTATION N/A 12/7/2017    Procedure: L2-3, L3-4, L4-5 laminectomy and fusion with local bone graft;  Surgeon: Andre Layne MD;  Location: [facility];  Service:    • TONSILECTOMY, ADENOIDECTOMY, BILATERAL MYRINGOTOMY AND TUBES         FAMILY HISTORY  Family History   Problem Relation Age of Onset   • Diabetes Mother    • Depression Mother    • Anxiety disorder Mother    • Heart disease Father    • Hypertension Father    • Diabetes Father    • Diabetes Brother    • No Known Problems Maternal Grandmother    • No Known Problems Maternal Grandfather    • No Known Problems Paternal Grandmother    • No Known Problems Paternal Grandfather    • Malig Hyperthermia Neg Hx        SOCIAL HISTORY  Social History     Socioeconomic History   • Marital status:      Spouse name: Not on file   • Number of children: Not on file   • Years of education: Not on file   • Highest education level: Not on file   Tobacco Use   • Smoking status: Never Smoker   • Smokeless tobacco: Never Used   Substance and Sexual Activity   • Alcohol use: No   • Drug use: No   • Sexual activity: Defer         ALLERGIES  Ciprofloxacin; Metronidazole; and Prednisone    REVIEW OF SYSTEMS  Review of Systems   Constitutional: Positive for fever (tmax-102.6). Negative for activity  change, appetite change ( decreased), chills and fatigue.   HENT: Negative.  Negative for congestion, ear pain, rhinorrhea and sore throat.    Eyes: Negative.    Respiratory: Positive for cough (productive with green sputum ). Negative for shortness of breath.    Cardiovascular: Negative.  Negative for chest pain, palpitations and leg swelling ( pedal).   Gastrointestinal: Negative.  Negative for abdominal pain, constipation, diarrhea, nausea and vomiting.   Endocrine: Negative.    Genitourinary: Negative.  Negative for decreased urine volume, difficulty urinating, dysuria, pelvic pain and urgency.   Musculoskeletal: Negative.  Negative for back pain.   Skin: Negative.  Negative for rash.   Allergic/Immunologic: Negative.    Neurological: Negative.  Negative for dizziness, weakness, light-headedness, numbness and headaches.   Hematological: Negative.    Psychiatric/Behavioral: Positive for confusion. The patient is not nervous/anxious.    All other systems reviewed and are negative.      PHYSICAL EXAM  ED Triage Vitals [04/04/19 1938]   Temp Heart Rate Resp BP SpO2   (!) 102.6 °F (39.2 °C) (!) 126 18 158/67 99 %      Temp src Heart Rate Source Patient Position BP Location FiO2 (%)   Tympanic -- -- -- --       Physical Exam   Constitutional: She is well-developed, well-nourished, and in no distress. No distress.   HENT:   Head: Normocephalic and atraumatic.   Mouth/Throat: Oropharynx is clear and moist and mucous membranes are normal.   Eyes: Pupils are equal, round, and reactive to light.   Neck: Normal range of motion.   Cardiovascular: Regular rhythm and normal heart sounds. Tachycardia present.   Pulmonary/Chest: Effort normal. She has decreased breath sounds. She has no wheezes. She has rhonchi ( right).   Abdominal: Soft. Bowel sounds are normal. There is no tenderness.   Musculoskeletal: Normal range of motion. She exhibits no edema.   Neurological: She is alert.   Skin: Skin is warm and dry. No rash noted.    Psychiatric: Mood, memory, affect and judgment normal.   Nursing note and vitals reviewed.      LAB RESULTS  Recent Results (from the past 24 hour(s))   Comprehensive Metabolic Panel    Collection Time: 04/04/19  8:29 PM   Result Value Ref Range    Glucose 139 (H) 65 - 99 mg/dL    BUN 17 8 - 23 mg/dL    Creatinine 0.66 0.57 - 1.00 mg/dL    Sodium 138 136 - 145 mmol/L    Potassium 4.3 3.5 - 5.2 mmol/L    Chloride 96 (L) 98 - 107 mmol/L    CO2 28.9 22.0 - 29.0 mmol/L    Calcium 9.2 8.2 - 9.6 mg/dL    Total Protein 7.4 6.0 - 8.5 g/dL    Albumin 4.20 3.50 - 5.20 g/dL    ALT (SGPT) 21 1 - 33 U/L    AST (SGOT) 23 1 - 32 U/L    Alkaline Phosphatase 61 39 - 117 U/L    Total Bilirubin 0.3 0.2 - 1.2 mg/dL    eGFR Non African Amer 84 >60 mL/min/1.73    Globulin 3.2 gm/dL    A/G Ratio 1.3 g/dL    BUN/Creatinine Ratio 25.8 (H) 7.0 - 25.0    Anion Gap 13.1 mmol/L   Lactic Acid, Plasma    Collection Time: 04/04/19  8:29 PM   Result Value Ref Range    Lactate 2.0 0.5 - 2.0 mmol/L   Procalcitonin    Collection Time: 04/04/19  8:29 PM   Result Value Ref Range    Procalcitonin <0.02 (L) 0.10 - 0.25 ng/mL   CBC Auto Differential    Collection Time: 04/04/19  8:29 PM   Result Value Ref Range    WBC 8.72 3.40 - 10.80 10*3/mm3    RBC 3.63 (L) 3.77 - 5.28 10*6/mm3    Hemoglobin 12.9 12.0 - 15.9 g/dL    Hematocrit 39.6 34.0 - 46.6 %    .1 (H) 79.0 - 97.0 fL    MCH 35.5 (H) 26.6 - 33.0 pg    MCHC 32.6 31.5 - 35.7 g/dL    RDW 13.9 12.3 - 15.4 %    RDW-SD 56.0 (H) 37.0 - 54.0 fl    MPV 11.3 6.0 - 12.0 fL    Platelets 201 140 - 450 10*3/mm3   Manual Differential    Collection Time: 04/04/19  8:29 PM   Result Value Ref Range    Neutrophil % 89.0 (H) 42.7 - 76.0 %    Lymphocyte % 4.0 (L) 19.6 - 45.3 %    Monocyte % 4.0 (L) 5.0 - 12.0 %    Eosinophil % 3.0 0.3 - 6.2 %    Neutrophils Absolute 7.76 (H) 1.40 - 7.00 10*3/mm3    Lymphocytes Absolute 0.35 (L) 0.70 - 3.10 10*3/mm3    Monocytes Absolute 0.35 0.10 - 0.90 10*3/mm3    Eosinophils  Absolute 0.26 0.00 - 0.40 10*3/mm3    Macrocytes Slight/1+ None Seen    WBC Morphology Normal Normal    Platelet Morphology Normal Normal   Light Blue Top    Collection Time: 04/04/19  8:30 PM   Result Value Ref Range    Extra Tube hold for add-on    Gold Top - SST    Collection Time: 04/04/19  8:30 PM   Result Value Ref Range    Extra Tube Hold for add-ons.    Respiratory Panel, PCR - Swab, Nasopharynx    Collection Time: 04/04/19  8:46 PM   Result Value Ref Range    ADENOVIRUS, PCR Not Detected Not Detected    Coronavirus 229E Not Detected Not Detected    Coronavirus HKU1 Not Detected Not Detected    Coronavirus NL63 Not Detected Not Detected    Coronavirus OC43 Not Detected Not Detected    Human Metapneumovirus Not Detected Not Detected    Human Rhinovirus/Enterovirus Not Detected Not Detected    Influenza B PCR Not Detected Not Detected    Parainfluenza Virus 1 Not Detected Not Detected    Parainfluenza Virus 2 Not Detected Not Detected    Parainfluenza Virus 3 Not Detected Not Detected    Parainfluenza Virus 4 Not Detected Not Detected    Bordetella pertussis pcr Not Detected Not Detected    Influenza A H1 2009 PCR Not Detected Not Detected    Chlamydophila pneumoniae PCR Not Detected Not Detected    Mycoplasma pneumo by PCR Not Detected Not Detected    Influenza A PCR Not Detected Not Detected    Influenza A H3 Not Detected Not Detected    Influenza A H1 Not Detected Not Detected    RSV, PCR Not Detected Not Detected    Bordetella parapertussis PCR Not Detected Not Detected   Urinalysis With Culture If Indicated - Urine, Catheter    Collection Time: 04/04/19 11:00 PM   Result Value Ref Range    Color, UA Yellow Yellow, Straw    Appearance, UA Clear Clear    pH, UA 6.5 5.0 - 8.0    Specific Gravity, UA 1.015 1.005 - 1.030    Glucose, UA Negative Negative    Ketones, UA Trace (A) Negative    Bilirubin, UA Negative Negative    Blood, UA Small (1+) (A) Negative    Protein, UA Negative Negative    Leuk Esterase, UA  Negative Negative    Nitrite, UA Negative Negative    Urobilinogen, UA 1.0 E.U./dL 0.2 - 1.0 E.U./dL   Urinalysis, Microscopic Only - Urine, Catheter    Collection Time: 04/04/19 11:00 PM   Result Value Ref Range    RBC, UA 6-12 (A) None Seen, 0-2 /HPF    WBC, UA 0-2 None Seen, 0-2 /HPF    Bacteria, UA None Seen None Seen /HPF    Squamous Epithelial Cells, UA 0-2 None Seen, 0-2 /HPF    Hyaline Casts, UA 0-2 None Seen /LPF    Methodology Automated Microscopy        I ordered the above labs and reviewed the results    RADIOLOGY  XR Chest 2 View   Final Result   1.  Vascular congestion with subtle asymmetric pulmonary opacification   within the right base. Primary differential considerations include   asymmetric pulmonary edema versus early right lower lung pneumonia and   correlation with patient history is recommended.   2.  Probable mild cardiomegaly.       This report was finalized on 4/4/2019 9:08 PM by Dr. Kapil Jane M.D.              I ordered the above noted radiological studies and reviewed the images on the PACS system.            EKG    ekg was interpreted by Dr. Smith        PROGRESS AND CONSULTS    2120-Reviewed pt's history and workup with Dr. Smith. Care turned over to Dr. Smith  After a bedside evaluation; Dr Smith agrees with the plan of care    CONSULTS  Time: 1119  Discussed case with Dr. Bill  Reviewed history, exam, results and treatments.  Discussed concerns and plan of care.  Dr. Bill accepts pt to be admitted to tele        COURSE & MEDICAL DECISION MAKING  Pertinent Labs and Imaging studies that were ordered and reviewed are noted above.  Results were reviewed/discussed with the patient and they were also made aware of online assess.   Pt also made aware that some labs, such as cultures, will not be resulted during ER visit and follow up with PMD is necessary.     MEDICATIONS GIVEN IN ER  Medications   sodium chloride 0.9 % flush 10 mL (not administered)   azithromycin (ZITHROMAX) 500  "mg 0.9% NaCl (Add-vantage) 250 mL (not administered)   sodium chloride 0.9 % bolus 500 mL (500 mL Intravenous New Bag 4/4/19 2254)   acetaminophen (TYLENOL) tablet 1,000 mg (1,000 mg Oral Given 4/4/19 2047)   sodium chloride 0.9 % bolus 1,000 mL (0 mL Intravenous Stopped 4/4/19 2212)   cefTRIAXone (ROCEPHIN) IVPB 1 g (1 g Intravenous New Bag 4/4/19 2254)   ibuprofen (ADVIL,MOTRIN) tablet 800 mg (800 mg Oral Given 4/4/19 2309)       /79 (BP Location: Right arm, Patient Position: Sitting)   Pulse 91   Temp (!) 101.1 °F (38.4 °C) (Tympanic)   Resp 16   Ht 157.5 cm (62\")   Wt 47.6 kg (105 lb)   LMP  (LMP Unknown)   SpO2 94%   BMI 19.20 kg/m²     ADMISSION    Discussed treatment plan and reason for admission with pt/family and admitting physician.  Pt/family voiced understanding of the plan for admission for further testing/treatment as needed.      DIAGNOSIS  Final diagnoses:   Pneumonia of right lower lobe due to infectious organism (CMS/HCC)   Fever, unspecified fever cause         Documentation assistance provided by trace [employer] for [employer] Briggs.  Information recorded by the scribe was done at my direction and has been verified and validated by me.                 Rae Courtney  04/04/19 9147       Soila Vila APRN  04/04/19 4652    "

## 2019-04-05 NOTE — PROGRESS NOTES
Pt seen earlier this AM by my partner Dr. Bill  Chart reviewed  AF today and VSS  Continue Cefepime/Vanc  Cultures negative so far  DC Vanc when MRSA screen is negative  No DVT on U/S this AM  Continue Eliquis (should suffice for DVT ppx)  Anemia noted with elevated MCV  Will check labs  Further orders to follow as suggested by evolving hospital course

## 2019-04-06 PROBLEM — I50.32 CHRONIC DIASTOLIC CHF (CONGESTIVE HEART FAILURE): Status: ACTIVE | Noted: 2019-04-06

## 2019-04-06 PROBLEM — R06.02 SHORTNESS OF BREATH: Status: RESOLVED | Noted: 2019-04-05 | Resolved: 2019-04-06

## 2019-04-06 PROBLEM — Z66 DNR (DO NOT RESUSCITATE): Status: ACTIVE | Noted: 2019-04-06

## 2019-04-06 LAB
ANION GAP SERPL CALCULATED.3IONS-SCNC: 12.4 MMOL/L
BASOPHILS # BLD AUTO: 0.05 10*3/MM3 (ref 0–0.2)
BASOPHILS NFR BLD AUTO: 0.8 % (ref 0–1.5)
BUN BLD-MCNC: 9 MG/DL (ref 8–23)
BUN/CREAT SERPL: 15.3 (ref 7–25)
CALCIUM SPEC-SCNC: 8.8 MG/DL (ref 8.2–9.6)
CHLORIDE SERPL-SCNC: 106 MMOL/L (ref 98–107)
CO2 SERPL-SCNC: 24.6 MMOL/L (ref 22–29)
CREAT BLD-MCNC: 0.59 MG/DL (ref 0.57–1)
DEPRECATED RDW RBC AUTO: 58.4 FL (ref 37–54)
EOSINOPHIL # BLD AUTO: 0.13 10*3/MM3 (ref 0–0.4)
EOSINOPHIL NFR BLD AUTO: 2.1 % (ref 0.3–6.2)
ERYTHROCYTE [DISTWIDTH] IN BLOOD BY AUTOMATED COUNT: 14.6 % (ref 12.3–15.4)
FERRITIN SERPL-MCNC: 243 NG/ML (ref 13–150)
FOLATE SERPL-MCNC: >20 NG/ML (ref 4.78–24.2)
GFR SERPL CREATININE-BSD FRML MDRD: 96 ML/MIN/1.73
GLUCOSE BLD-MCNC: 123 MG/DL (ref 65–99)
HCT VFR BLD AUTO: 34.7 % (ref 34–46.6)
HGB BLD-MCNC: 10.7 G/DL (ref 12–15.9)
IMM GRANULOCYTES # BLD AUTO: 0.02 10*3/MM3 (ref 0–0.05)
IMM GRANULOCYTES NFR BLD AUTO: 0.3 % (ref 0–0.5)
IRON 24H UR-MRATE: 38 MCG/DL (ref 37–145)
IRON SATN MFR SERPL: 15 % (ref 20–50)
LYMPHOCYTES # BLD AUTO: 1.95 10*3/MM3 (ref 0.7–3.1)
LYMPHOCYTES NFR BLD AUTO: 32.1 % (ref 19.6–45.3)
MCH RBC QN AUTO: 34.1 PG (ref 26.6–33)
MCHC RBC AUTO-ENTMCNC: 30.8 G/DL (ref 31.5–35.7)
MCV RBC AUTO: 110.5 FL (ref 79–97)
MONOCYTES # BLD AUTO: 0.65 10*3/MM3 (ref 0.1–0.9)
MONOCYTES NFR BLD AUTO: 10.7 % (ref 5–12)
NEUTROPHILS # BLD AUTO: 3.27 10*3/MM3 (ref 1.4–7)
NEUTROPHILS NFR BLD AUTO: 54 % (ref 42.7–76)
NRBC BLD AUTO-RTO: 0 /100 WBC (ref 0–0)
PLATELET # BLD AUTO: 164 10*3/MM3 (ref 140–450)
PMV BLD AUTO: 11.1 FL (ref 6–12)
POTASSIUM BLD-SCNC: 4 MMOL/L (ref 3.5–5.2)
RBC # BLD AUTO: 3.14 10*6/MM3 (ref 3.77–5.28)
SODIUM BLD-SCNC: 143 MMOL/L (ref 136–145)
TIBC SERPL-MCNC: 259 MCG/DL (ref 298–536)
TRANSFERRIN SERPL-MCNC: 174 MG/DL (ref 200–360)
VIT B12 BLD-MCNC: 1370 PG/ML (ref 211–946)
WBC NRBC COR # BLD: 6.07 10*3/MM3 (ref 3.4–10.8)

## 2019-04-06 PROCEDURE — 82728 ASSAY OF FERRITIN: CPT | Performed by: HOSPITALIST

## 2019-04-06 PROCEDURE — 94799 UNLISTED PULMONARY SVC/PX: CPT

## 2019-04-06 PROCEDURE — 83540 ASSAY OF IRON: CPT | Performed by: HOSPITALIST

## 2019-04-06 PROCEDURE — 80048 BASIC METABOLIC PNL TOTAL CA: CPT | Performed by: INTERNAL MEDICINE

## 2019-04-06 PROCEDURE — 25010000002 CEFEPIME PER 500 MG: Performed by: INTERNAL MEDICINE

## 2019-04-06 PROCEDURE — 25010000002 VANCOMYCIN 750 MG RECONSTITUTED SOLUTION: Performed by: INTERNAL MEDICINE

## 2019-04-06 PROCEDURE — 82607 VITAMIN B-12: CPT | Performed by: HOSPITALIST

## 2019-04-06 PROCEDURE — 85025 COMPLETE CBC W/AUTO DIFF WBC: CPT | Performed by: INTERNAL MEDICINE

## 2019-04-06 PROCEDURE — 84466 ASSAY OF TRANSFERRIN: CPT | Performed by: HOSPITALIST

## 2019-04-06 PROCEDURE — 97110 THERAPEUTIC EXERCISES: CPT

## 2019-04-06 PROCEDURE — 82746 ASSAY OF FOLIC ACID SERUM: CPT | Performed by: HOSPITALIST

## 2019-04-06 RX ORDER — RISPERIDONE 0.25 MG/1
0.25 TABLET ORAL NIGHTLY
Status: COMPLETED | OUTPATIENT
Start: 2019-04-06 | End: 2019-04-06

## 2019-04-06 RX ADMIN — METOPROLOL TARTRATE 50 MG: 50 TABLET, FILM COATED ORAL at 20:27

## 2019-04-06 RX ADMIN — ASPIRIN 81 MG: 81 TABLET, CHEWABLE ORAL at 08:52

## 2019-04-06 RX ADMIN — SODIUM CHLORIDE 4 ML: 7 NEBU SOLN,3 % NEBU at 15:48

## 2019-04-06 RX ADMIN — METOPROLOL TARTRATE 50 MG: 50 TABLET, FILM COATED ORAL at 08:52

## 2019-04-06 RX ADMIN — SODIUM CHLORIDE 750 MG: 900 INJECTION, SOLUTION INTRAVENOUS at 08:54

## 2019-04-06 RX ADMIN — PANTOPRAZOLE SODIUM 40 MG: 40 TABLET, DELAYED RELEASE ORAL at 08:52

## 2019-04-06 RX ADMIN — IPRATROPIUM BROMIDE AND ALBUTEROL SULFATE 3 ML: 2.5; .5 SOLUTION RESPIRATORY (INHALATION) at 09:01

## 2019-04-06 RX ADMIN — FUROSEMIDE 20 MG: 20 TABLET ORAL at 08:52

## 2019-04-06 RX ADMIN — CEFEPIME HYDROCHLORIDE 2 G: 2 INJECTION, POWDER, FOR SOLUTION INTRAVENOUS at 08:54

## 2019-04-06 RX ADMIN — SODIUM CHLORIDE 4 ML: 7 NEBU SOLN,3 % NEBU at 09:06

## 2019-04-06 RX ADMIN — SODIUM CHLORIDE 4 ML: 7 NEBU SOLN,3 % NEBU at 19:48

## 2019-04-06 RX ADMIN — CEFEPIME HYDROCHLORIDE 2 G: 2 INJECTION, POWDER, FOR SOLUTION INTRAVENOUS at 20:27

## 2019-04-06 RX ADMIN — APIXABAN 2.5 MG: 2.5 TABLET, FILM COATED ORAL at 20:27

## 2019-04-06 RX ADMIN — DULOXETINE HYDROCHLORIDE 60 MG: 60 CAPSULE, DELAYED RELEASE ORAL at 08:52

## 2019-04-06 RX ADMIN — IPRATROPIUM BROMIDE AND ALBUTEROL SULFATE 3 ML: 2.5; .5 SOLUTION RESPIRATORY (INHALATION) at 19:48

## 2019-04-06 RX ADMIN — IPRATROPIUM BROMIDE AND ALBUTEROL SULFATE 3 ML: 2.5; .5 SOLUTION RESPIRATORY (INHALATION) at 15:44

## 2019-04-06 RX ADMIN — SODIUM CHLORIDE, PRESERVATIVE FREE 3 ML: 5 INJECTION INTRAVENOUS at 20:28

## 2019-04-06 RX ADMIN — DIGOXIN 125 MCG: 125 TABLET ORAL at 12:14

## 2019-04-06 RX ADMIN — GABAPENTIN 100 MG: 100 CAPSULE ORAL at 08:51

## 2019-04-06 RX ADMIN — ROSUVASTATIN CALCIUM 5 MG: 5 TABLET, FILM COATED ORAL at 20:27

## 2019-04-06 RX ADMIN — IPRATROPIUM BROMIDE AND ALBUTEROL SULFATE 3 ML: 2.5; .5 SOLUTION RESPIRATORY (INHALATION) at 11:40

## 2019-04-06 RX ADMIN — RISPERIDONE 0.25 MG: 0.25 TABLET, FILM COATED ORAL at 20:27

## 2019-04-06 RX ADMIN — IPRATROPIUM BROMIDE AND ALBUTEROL SULFATE 3 ML: 2.5; .5 SOLUTION RESPIRATORY (INHALATION) at 23:22

## 2019-04-06 RX ADMIN — APIXABAN 2.5 MG: 2.5 TABLET, FILM COATED ORAL at 08:52

## 2019-04-06 RX ADMIN — POTASSIUM CHLORIDE 20 MEQ: 750 CAPSULE, EXTENDED RELEASE ORAL at 08:52

## 2019-04-06 RX ADMIN — IPRATROPIUM BROMIDE AND ALBUTEROL SULFATE 3 ML: 2.5; .5 SOLUTION RESPIRATORY (INHALATION) at 03:51

## 2019-04-06 NOTE — PLAN OF CARE
Problem: Patient Care Overview  Goal: Plan of Care Review  Outcome: Ongoing (interventions implemented as appropriate)   04/06/19 0257   Coping/Psychosocial   Plan of Care Reviewed With patient   Plan of Care Review   Progress improving   OTHER   Outcome Summary pt resting in bed quietly. denies pain. ambulating with walker around unit. will continue to monitor.      Goal: Individualization and Mutuality  Outcome: Ongoing (interventions implemented as appropriate)    Goal: Discharge Needs Assessment  Outcome: Ongoing (interventions implemented as appropriate)      Problem: Pneumonia (Adult)  Goal: Signs and Symptoms of Listed Potential Problems Will be Absent, Minimized or Managed (Pneumonia)  Outcome: Ongoing (interventions implemented as appropriate)      Problem: Fall Risk (Adult)  Goal: Absence of Fall  Outcome: Ongoing (interventions implemented as appropriate)      Problem: Pain, Chronic (Adult)  Goal: Identify Related Risk Factors and Signs and Symptoms  Outcome: Outcome(s) achieved Date Met: 04/06/19    Goal: Acceptable Pain/Comfort Level and Functional Ability  Outcome: Ongoing (interventions implemented as appropriate)

## 2019-04-06 NOTE — THERAPY TREATMENT NOTE
Acute Care - Physical Therapy Treatment Note  [facility]     Patient Name: Alexa Peace  : 1927  MRN: [id number]  Today's Date: 2019  Onset of Illness/Injury or Date of Surgery: 19  Date of Referral to PT: 19  Referring Physician: Jere Conde    Admit Date: 2019    Visit Dx:    ICD-10-CM ICD-9-CM   1. Pneumonia of right lower lobe due to infectious organism (CMS/HCC) J18.1 486   2. Fever, unspecified fever cause R50.9 780.60   3. Difficulty walking R26.2 719.7     Patient Active Problem List   Diagnosis   • Bad memory   • Depression   • Diverticulosis of intestine   • Hyperlipidemia   • Macular degeneration   • Neuropathy, peripheral   • Osteopenia   • Atopic rhinitis   • Urge incontinence of urine   • Atherosclerosis of native coronary artery   • Essential hypertension   • Gastroesophageal reflux disease without esophagitis   • Impaired glucose tolerance   • History of coronary artery stent placement   • Chronic bilateral low back pain   • Acquired scoliosis   • Spinal stenosis of lumbar region with neurogenic claudication   • PAC (premature atrial contraction)   • Slow transit constipation   • Paroxysmal atrial fibrillation (CMS/HCC)   • Pleural effusion on left   • Valvular heart disease   • Non-rheumatic mitral regurgitation   • Pneumonia of right lower lobe due to infectious organism (CMS/HCC)   • Localized edema   • Hypoxemia   • Sepsis due to pneumonia (CMS/HCC)   • Chronic anticoagulation   • DNR (do not resuscitate)   • Chronic diastolic CHF (congestive heart failure) (CMS/HCC)       Therapy Treatment    Rehabilitation Treatment Summary     Row Name 19 1500             Treatment Time/Intention    Discipline  occupational therapy assistant  -SI      Document Type  therapy note (daily note)  -SI      Subjective Information  no complaints  -SI      Therapy Frequency (PT Clinical Impression)  daily  -SI      Patient Effort  good  -SI      Existing  Precautions/Restrictions  fall  -SI      Patient Response to Treatment  excellent  -SI      Recorded by [SI] Roxana Dickey, PTA 04/06/19 1510      Row Name 04/06/19 1500             Cognitive Assessment/Intervention- PT/OT    Orientation Status (Cognition)  oriented x 3  -SI      Follows Commands (Cognition)  WNL  -SI      Personal Safety Interventions  fall prevention program maintained;gait belt  -SI      Recorded by [SI] Roxana Dickey, PTA 04/06/19 1510      Row Name 04/06/19 1500             Bed Mobility Assessment/Treatment    Supine-Sit [place] (Bed Mobility)  contact guard  -SI      Sit-Supine [place] (Bed Mobility)  contact guard  -SI      Recorded by [SI] Roxana Dickey, PTA 04/06/19 1510      Row Name 04/06/19 1500             Sit-Stand Transfer    Sit-Stand [place] (Transfers)  contact guard  -SI      Assistive Device (Sit-Stand Transfers)  walker, front-wheeled  -SI      Recorded by [SI] Roxana Dickey, PTA 04/06/19 1510      Row Name 04/06/19 1500             Stand-Sit Transfer    Stand-Sit [place] (Transfers)  contact guard  -SI      Assistive Device (Stand-Sit Transfers)  walker, front-wheeled  -SI      Recorded by [SI] Roxana Dickey, PTA 04/06/19 1510      Row Name 04/06/19 1500             Gait/Stairs Assessment/Training    [place] Level (Gait)  contact guard  -SI      Assistive Device (Gait)  walker, front-wheeled  -SI      Distance in Feet (Gait)  200  -SI      Pattern (Gait)  step-through  -SI      Deviations/Abnormal Patterns (Gait)  stride length decreased;gait speed decreased  -SI      Bilateral Gait Deviations  forward flexed posture  -SI      Recorded by [SI] Roxana Dickey, PTA 04/06/19 1510      Row Name 04/06/19 1500             Positioning and Restraints    Pre-Treatment Position  in bed  -SI      Post Treatment Position  bed  -SI      In Bed  supine;call light within reach;exit alarm on  -SI      Recorded by [SI] Roxana Dickey, PTA 04/06/19 1510       Row Name 04/06/19 1500             Pain Scale: Numbers Pre/Post-Treatment    Pain Scale: Numbers, Pretreatment  0/10 - no pain  -SI      Pain Scale: Numbers, Post-Treatment  0/10 - no pain  -SI      Recorded by [JOSE GUADALUPE] Roxana Dickey, DONIS 04/06/19 1510        User Key  (r) = Recorded By, (t) = Taken By, (c) = Cosigned By    Initials Name Effective Dates Discipline    SI Roxana Dickey, PTA 05/18/15 -  PT                   Physical Therapy Education     Title: PT OT SLP Therapies (In Progress)     Topic: Physical Therapy (In Progress)     Point: Mobility training (In Progress)     Learning Progress Summary           Patient Acceptance, E, NR by SI at 4/6/2019  3:10 PM    Comment:  upright posture with gait    Acceptance, E,D, VU,NR by MS at 4/5/2019  4:26 PM                   Point: Home exercise program (In Progress)     Learning Progress Summary           Patient Acceptance, E, NR by SI at 4/6/2019  3:10 PM    Comment:  upright posture with gait    Acceptance, E,D, VU,NR by MS at 4/5/2019  4:26 PM                   Point: Body mechanics (In Progress)     Learning Progress Summary           Patient Acceptance, E, NR by SI at 4/6/2019  3:10 PM    Comment:  upright posture with gait    Acceptance, E,D, VU,NR by MS at 4/5/2019  4:26 PM                   Point: Precautions (In Progress)     Learning Progress Summary           Patient Acceptance, E, NR by SI at 4/6/2019  3:10 PM    Comment:  upright posture with gait    Acceptance, E,D, VU,NR by MS at 4/5/2019  4:26 PM                               User Key     Initials Effective Dates Name Provider Type Discipline     05/18/15 -  Roxana Dickey, \[facility] [place] [facility]\"" Physical Therapy Assistant PT    MS 04/03/18 -  Jere Jean-Baptiste PT Physical Therapist PT                PT Recommendation and Plan  Therapy Frequency (PT Clinical Impression): daily  Plan of Care Reviewed With: patient  Outcome Measures     Row Name 04/06/19 1500 04/05/19 1600          How much help from another person  do you currently need...    Turning from your back to your side while in flat bed without using bedrails?  4  -SI  4  -MS     Moving from lying on back to sitting on the side of a flat bed without bedrails?  3  -SI  3  -MS     Moving to and from a bed to a chair (including a wheelchair)?  3  -SI  3  -MS     Standing up from a chair using your arms (e.g., wheelchair, bedside chair)?  3  -SI  3  -MS     Climbing 3-5 steps with a railing?  3  -SI  3  -MS     To walk in hospital room?  3  -SI  3  -MS     AM-PAC 6 Clicks Score  19  -SI  19  -MS        Functional Assessment    Outcome Measure Options  AM-PAC 6 Clicks Basic Mobility (PT)  -SI  AM-PAC 6 Clicks Basic Mobility (PT)  -MS       User Key  (r) = Recorded By, (t) = Taken By, (c) = Cosigned By    Initials Name Provider Type    Roxana Hubbard PTA Physical Therapy Assistant    MS Jere Jean-Baptiste, PT Physical Therapist         Time Calculation:   PT Charges     Row Name 04/06/19 1512             Time Calculation    Start Time  1450  -SI      Stop Time  1512  -SI      Time Calculation (min)  22 min  -SI      PT Received On  04/06/19  -SI      PT - Next Appointment  04/07/19  -SI         Time Calculation- PT    Total Timed Code Minutes- PT  20 minute(s)  -SI        User Key  (r) = Recorded By, (t) = Taken By, (c) = Cosigned By    Initials Name Provider Type    Roxana Hubbard PTA Physical Therapy Assistant        Therapy Charges for Today     Code Description Service Date Service Provider Modifiers Qty    [id number] HC PT THER PROC EA 15 MIN 4/6/2019 Roxana Dickey PTA GP 1          PT G-Codes  Outcome Measure Options: AM-PAC 6 Clicks Basic Mobility (PT)  AM-PAC 6 Clicks Score: 19    Roxana Dickey PTA  4/6/2019

## 2019-04-06 NOTE — PROGRESS NOTES
Name: Alexa Peace ADMIT: 2019   : 1927  PCP: Brian Jacobs MD    MRN: [id number] LOS: 2 days   AGE/SEX: 89 y.o. female  ROOM: [facility]   Subjective   Chief Complaint   Patient presents with   • Fever   • Syncope   fever better  Still with cough  On IV abx  On chronic a/c      ROS  No f/c  No n/v  No cp/palp  No soa/cough    Objective   Vital Signs  Temp:  [98 °F (36.7 °C)-99.9 °F (37.7 °C)] 98 °F (36.7 °C)  Heart Rate:  [] 85  Resp:  [14-20] 14  BP: (136-164)/(76-87) 144/76  SpO2:  [91 %-100 %] 95 %  on   ;   Device (Oxygen Therapy): room air  Body mass index is 21.02 kg/m².    Physical Exam   Constitutional: She is oriented to person, place, and time.   eldelry   HENT:   Head: Normocephalic and atraumatic.   Eyes: Conjunctivae are normal. No scleral icterus.   Neck: Neck supple. No tracheal deviation present.   Cardiovascular: Normal rate and regular rhythm.   Murmur heard.  Pulmonary/Chest: Effort normal and breath sounds normal.   Decreased bs at bases   Abdominal: Soft. There is no tenderness. There is no guarding.   Genitourinary:   Genitourinary Comments: Deferred    Neurological: She is alert and oriented to person, place, and time. She exhibits normal muscle tone.   Skin: Skin is warm and dry. There is pallor.   Psychiatric: She has a normal mood and affect. Her behavior is normal.       Results Review:       I reviewed the patient's new clinical results.  Results from last 7 days   Lab Units 19  0730 19  0834 19   WBC 10*3/mm3 6.07 4.17 8.72   HEMOGLOBIN g/dL 10.7* 10.3* 12.9   PLATELETS 10*3/mm3 164 147 201     Results from last 7 days   Lab Units 19  0730 19  0834 19   SODIUM mmol/L 143 143 138   POTASSIUM mmol/L 4.0 3.6 4.3   CHLORIDE mmol/L 106 104 96*   CO2 mmol/L 24.6 26.7 28.9   BUN mg/dL 9 12 17   CREATININE mg/dL 0.59 0.57 0.66   GLUCOSE mg/dL 123* 115* 139*   Estimated Creatinine Clearance: 37.7 mL/min (by C-G formula based  on SCr of 0.59 mg/dL).  Results from last 7 days   Lab Units 04/04/19 2029   ALBUMIN g/dL 4.20   BILIRUBIN mg/dL 0.3   ALK PHOS U/L 61   AST (SGOT) U/L 23   ALT (SGPT) U/L 21     Results from last 7 days   Lab Units 04/06/19  0730 04/05/19  0834 04/04/19 2029   CALCIUM mg/dL 8.8 8.2 9.2   ALBUMIN g/dL  --   --  4.20     Results from last 7 days   Lab Units 04/04/19 2029   PROCALCITONIN ng/mL <0.02*   LACTATE mmol/L 2.0       Coag     HbA1C   Lab Results   Component Value Date    HGBA1C 5.70 (H) 03/01/2019    HGBA1C 5.20 07/06/2018    HGBA1C 5.20 05/05/2017     Infection   Results from last 7 days   Lab Units 04/05/19  0917 04/04/19  2242 04/04/19 2029   BLOODCX   --  No growth at 24 hours No growth at 24 hours   MRSA SCREEN CX  No Methicillin Resistant Staphylococcus aureus isolated  --   --    PROCALCITONIN ng/mL  --   --  <0.02*     Radiology(recent) Xr Chest 2 View    Result Date: 4/4/2019  1.  Vascular congestion with subtle asymmetric pulmonary opacification within the right base. Primary differential considerations include asymmetric pulmonary edema versus early right lower lung pneumonia and correlation with patient history is recommended. 2.  Probable mild cardiomegaly.  This report was finalized on 4/4/2019 9:08 PM by Dr. Kapil Jane M.D.      No results found for: TROPONINT, TROPONINI, BNP  No components found for: TSH;2      apixaban 2.5 mg Oral Q12H   aspirin 81 mg Oral Daily   cefepime 2 g Intravenous Q12H   digoxin 125 mcg Oral Daily   DULoxetine 60 mg Oral Daily   furosemide 20 mg Oral Daily   gabapentin 100 mg Oral Daily With Breakfast & Lunch   ipratropium-albuterol 3 mL Nebulization Q4H - RT   metoprolol tartrate 50 mg Oral Q12H   pantoprazole 40 mg Oral QAM   potassium chloride 20 mEq Oral Daily   rosuvastatin 5 mg Oral Nightly   sodium chloride 3 mL Intravenous Q12H   sodium chloride 4 mL Nebulization TID - RT      Diet Regular; Cardiac      Assessment/Plan      Active Hospital Problems     Diagnosis  POA   • **Pneumonia of right lower lobe due to infectious organism (CMS/HCC) [J18.1]  Yes   • DNR (do not resuscitate) [Z66]  Unknown   • Localized edema [R60.0]  Yes   • Shortness of breath [R06.02]  Yes   • Hypoxemia [R09.02]  Yes   • Sepsis due to pneumonia (CMS/HCC) [J18.9, A41.9]  Yes   • Chronic anticoagulation [Z79.01]  Not Applicable   • Non-rheumatic mitral regurgitation [I34.0]  Yes   • Paroxysmal atrial fibrillation (CMS/HCC) [I48.0]  Yes   • Essential hypertension [I10]  Yes   • Gastroesophageal reflux disease without esophagitis [K21.9]  Yes      Resolved Hospital Problems   No resolved problems to display.       · IV abx- dc vanc and likely further deescalate to po abx tomorrow  · Doppler negative for VTE  · On eliquis, metop, dig for her P.Afib  · DNR/DNI- confirmed with pt and will order  · PT consultation      CHARLOTTE RN  Reviewed previous records    Vimal Moe MD  [facility]  04/06/19  2:30 PM

## 2019-04-06 NOTE — PLAN OF CARE
Problem: Patient Care Overview  Goal: Plan of Care Review  Pt oob wih CG assist of 1 using RWX for 200 feet.  Tolerated the activity well

## 2019-04-07 LAB
DIGOXIN SERPL-MCNC: <0.3 NG/ML (ref 0.6–1.2)
MRSA SPEC QL CULT: NORMAL

## 2019-04-07 PROCEDURE — 94799 UNLISTED PULMONARY SVC/PX: CPT

## 2019-04-07 PROCEDURE — 97110 THERAPEUTIC EXERCISES: CPT

## 2019-04-07 PROCEDURE — 90791 PSYCH DIAGNOSTIC EVALUATION: CPT | Performed by: MARRIAGE & FAMILY THERAPIST

## 2019-04-07 PROCEDURE — 80162 ASSAY OF DIGOXIN TOTAL: CPT | Performed by: INTERNAL MEDICINE

## 2019-04-07 PROCEDURE — 25010000002 CEFEPIME 2 G/NS 100 ML SOLUTION: Performed by: INTERNAL MEDICINE

## 2019-04-07 RX ORDER — IPRATROPIUM BROMIDE AND ALBUTEROL SULFATE 2.5; .5 MG/3ML; MG/3ML
3 SOLUTION RESPIRATORY (INHALATION)
Status: DISCONTINUED | OUTPATIENT
Start: 2019-04-07 | End: 2019-04-09 | Stop reason: HOSPADM

## 2019-04-07 RX ORDER — AMOXICILLIN 250 MG/1
750 CAPSULE ORAL EVERY 12 HOURS SCHEDULED
Status: DISCONTINUED | OUTPATIENT
Start: 2019-04-07 | End: 2019-04-09 | Stop reason: ALTCHOICE

## 2019-04-07 RX ORDER — DOXYCYCLINE 100 MG/1
100 CAPSULE ORAL EVERY 12 HOURS SCHEDULED
Status: DISCONTINUED | OUTPATIENT
Start: 2019-04-07 | End: 2019-04-09 | Stop reason: HOSPADM

## 2019-04-07 RX ORDER — SODIUM CHLORIDE FOR INHALATION 7 %
4 VIAL, NEBULIZER (ML) INHALATION
Status: DISCONTINUED | OUTPATIENT
Start: 2019-04-07 | End: 2019-04-09 | Stop reason: HOSPADM

## 2019-04-07 RX ADMIN — ROSUVASTATIN CALCIUM 5 MG: 5 TABLET, FILM COATED ORAL at 21:04

## 2019-04-07 RX ADMIN — AMOXICILLIN 750 MG: 250 CAPSULE ORAL at 21:04

## 2019-04-07 RX ADMIN — CEFEPIME HYDROCHLORIDE 2 G: 2 INJECTION, POWDER, FOR SOLUTION INTRAVENOUS at 08:45

## 2019-04-07 RX ADMIN — METOPROLOL TARTRATE 50 MG: 50 TABLET, FILM COATED ORAL at 08:42

## 2019-04-07 RX ADMIN — APIXABAN 2.5 MG: 2.5 TABLET, FILM COATED ORAL at 08:42

## 2019-04-07 RX ADMIN — SODIUM CHLORIDE, PRESERVATIVE FREE 3 ML: 5 INJECTION INTRAVENOUS at 08:45

## 2019-04-07 RX ADMIN — DOXYCYCLINE 100 MG: 100 CAPSULE ORAL at 21:04

## 2019-04-07 RX ADMIN — ASPIRIN 81 MG: 81 TABLET, CHEWABLE ORAL at 08:43

## 2019-04-07 RX ADMIN — SODIUM CHLORIDE 4 ML: 7 NEBU SOLN,3 % NEBU at 19:18

## 2019-04-07 RX ADMIN — APIXABAN 2.5 MG: 2.5 TABLET, FILM COATED ORAL at 21:04

## 2019-04-07 RX ADMIN — IPRATROPIUM BROMIDE AND ALBUTEROL SULFATE 3 ML: 2.5; .5 SOLUTION RESPIRATORY (INHALATION) at 19:18

## 2019-04-07 RX ADMIN — METOPROLOL TARTRATE 50 MG: 50 TABLET, FILM COATED ORAL at 21:04

## 2019-04-07 RX ADMIN — SODIUM CHLORIDE 4 ML: 7 NEBU SOLN,3 % NEBU at 08:57

## 2019-04-07 RX ADMIN — IPRATROPIUM BROMIDE AND ALBUTEROL SULFATE 3 ML: 2.5; .5 SOLUTION RESPIRATORY (INHALATION) at 08:52

## 2019-04-07 RX ADMIN — DULOXETINE HYDROCHLORIDE 60 MG: 60 CAPSULE, DELAYED RELEASE ORAL at 08:43

## 2019-04-07 RX ADMIN — DIGOXIN 125 MCG: 125 TABLET ORAL at 11:48

## 2019-04-07 RX ADMIN — IPRATROPIUM BROMIDE AND ALBUTEROL SULFATE 3 ML: 2.5; .5 SOLUTION RESPIRATORY (INHALATION) at 03:25

## 2019-04-07 RX ADMIN — IPRATROPIUM BROMIDE AND ALBUTEROL SULFATE 3 ML: 2.5; .5 SOLUTION RESPIRATORY (INHALATION) at 12:31

## 2019-04-07 RX ADMIN — POTASSIUM CHLORIDE 20 MEQ: 750 CAPSULE, EXTENDED RELEASE ORAL at 08:43

## 2019-04-07 RX ADMIN — FUROSEMIDE 20 MG: 20 TABLET ORAL at 08:42

## 2019-04-07 RX ADMIN — PANTOPRAZOLE SODIUM 40 MG: 40 TABLET, DELAYED RELEASE ORAL at 08:41

## 2019-04-07 RX ADMIN — SODIUM CHLORIDE, PRESERVATIVE FREE 3 ML: 5 INJECTION INTRAVENOUS at 21:39

## 2019-04-07 NOTE — PROGRESS NOTES
Name: Alexa Peace ADMIT: 2019   : 1927  PCP: Brian Jacobs MD    MRN: [id number] LOS: 3 days   AGE/SEX: 89 y.o. female  ROOM: [facility]   Subjective   Chief Complaint   Patient presents with   • Fever   • Syncope   fever resolved  Cough improving  On IV abx  On chronic a/c      ROS  No f/c  No n/v  No cp/palp  No soa/cough    Objective   Vital Signs  Temp:  [97.7 °F (36.5 °C)-98.1 °F (36.7 °C)] 98 °F (36.7 °C)  Heart Rate:  [] 96  Resp:  [16] 16  BP: (137-167)/(80-88) 137/84  SpO2:  [91 %-100 %] 91 %  on   ;   Device (Oxygen Therapy): room air  Body mass index is 21.02 kg/m².    Physical Exam   Constitutional: She is oriented to person, place, and time.   eldelry   HENT:   Head: Normocephalic and atraumatic.   Eyes: Conjunctivae are normal. No scleral icterus.   Neck: Neck supple. No tracheal deviation present.   Cardiovascular: Normal rate and regular rhythm.   Murmur heard.  Pulmonary/Chest: Effort normal and breath sounds normal.   Decreased bs at bases   Abdominal: Soft. There is no tenderness. There is no guarding.   Genitourinary:   Genitourinary Comments: Deferred    Neurological: She is alert and oriented to person, place, and time. She exhibits normal muscle tone.   Skin: Skin is warm and dry. There is pallor.   Psychiatric: She has a normal mood and affect. Her behavior is normal.       Results Review:       I reviewed the patient's new clinical results.  Results from last 7 days   Lab Units 19  0730 19  0834 19   WBC 10*3/mm3 6.07 4.17 8.72   HEMOGLOBIN g/dL 10.7* 10.3* 12.9   PLATELETS 10*3/mm3 164 147 201     Results from last 7 days   Lab Units 19  0730 19  0834 19   SODIUM mmol/L 143 143 138   POTASSIUM mmol/L 4.0 3.6 4.3   CHLORIDE mmol/L 106 104 96*   CO2 mmol/L 24.6 26.7 28.9   BUN mg/dL 9 12 17   CREATININE mg/dL 0.59 0.57 0.66   GLUCOSE mg/dL 123* 115* 139*   Estimated Creatinine Clearance: 37.7 mL/min (by C-G formula based  on SCr of 0.59 mg/dL).  Results from last 7 days   Lab Units 04/04/19 2029   ALBUMIN g/dL 4.20   BILIRUBIN mg/dL 0.3   ALK PHOS U/L 61   AST (SGOT) U/L 23   ALT (SGPT) U/L 21     Results from last 7 days   Lab Units 04/06/19  0730 04/05/19  0834 04/04/19 2029   CALCIUM mg/dL 8.8 8.2 9.2   ALBUMIN g/dL  --   --  4.20     Results from last 7 days   Lab Units 04/04/19 2029   PROCALCITONIN ng/mL <0.02*   LACTATE mmol/L 2.0       Coag     HbA1C   Lab Results   Component Value Date    HGBA1C 5.70 (H) 03/01/2019    HGBA1C 5.20 07/06/2018    HGBA1C 5.20 05/05/2017     Infection   Results from last 7 days   Lab Units 04/05/19  0917 04/04/19  2242 04/04/19 2029   BLOODCX   --  No growth at 2 days No growth at 2 days   MRSA SCREEN CX  No Methicillin Resistant Staphylococcus aureus isolated  --   --    PROCALCITONIN ng/mL  --   --  <0.02*     Radiology(recent) No radiology results for the last day  No results found for: TROPONINT, TROPONINI, BNP  No components found for: TSH;2      amoxicillin 750 mg Oral Q12H   apixaban 2.5 mg Oral Q12H   aspirin 81 mg Oral Daily   digoxin 125 mcg Oral Daily   doxycycline 100 mg Oral Q12H   DULoxetine 60 mg Oral Daily   furosemide 20 mg Oral Daily   ipratropium-albuterol 3 mL Nebulization TID - RT   metoprolol tartrate 50 mg Oral Q12H   pantoprazole 40 mg Oral QAM   potassium chloride 20 mEq Oral Daily   rosuvastatin 5 mg Oral Nightly   sodium chloride 3 mL Intravenous Q12H   sodium chloride 4 mL Nebulization BID - RT      Diet Regular; Cardiac      Assessment/Plan      Active Hospital Problems    Diagnosis  POA   • **Pneumonia of right lower lobe due to infectious organism (CMS/HCC) [J18.1]  Yes   • DNR (do not resuscitate) [Z66]  Yes   • Chronic diastolic CHF (congestive heart failure) (CMS/HCC) [I50.32]  Yes   • Localized edema [R60.0]  Yes   • Hypoxemia [R09.02]  Yes   • Sepsis due to pneumonia (CMS/HCC) [J18.9, A41.9]  Yes   • Chronic anticoagulation [Z79.01]  Not Applicable   •  Non-rheumatic mitral regurgitation [I34.0]  Yes   • Paroxysmal atrial fibrillation (CMS/HCC) [I48.0]  Yes   • Essential hypertension [I10]  Yes   • Gastroesophageal reflux disease without esophagitis [K21.9]  Yes      Resolved Hospital Problems    Diagnosis Date Resolved POA   • Shortness of breath [R06.02] 04/06/2019 Yes       · deescalate to po abx tomorrow  · Doppler negative for VTE  · On eliquis, metop, dig for her P.Afib  · DNR/DNI- confirmed with pt    Dispo- likely dc to home on 4/8      Reviewed previous records    Vimal Moe MD  [facility]  04/07/19  2:30 PM

## 2019-04-07 NOTE — PLAN OF CARE
Problem: Patient Care Overview  Goal: Plan of Care Review  Outcome: Ongoing (interventions implemented as appropriate)   04/07/19 0226   Coping/Psychosocial   Plan of Care Reviewed With patient   Plan of Care Review   Progress improving   OTHER   Outcome Summary pt resting in bed quietly. denies pain. up in chair some this shift. ambulating with walker and assist x1 to bathroom. denies soa. will continue to monitor.      Goal: Individualization and Mutuality  Outcome: Ongoing (interventions implemented as appropriate)    Goal: Discharge Needs Assessment  Outcome: Ongoing (interventions implemented as appropriate)      Problem: Pneumonia (Adult)  Goal: Signs and Symptoms of Listed Potential Problems Will be Absent, Minimized or Managed (Pneumonia)  Outcome: Ongoing (interventions implemented as appropriate)      Problem: Fall Risk (Adult)  Goal: Absence of Fall  Outcome: Ongoing (interventions implemented as appropriate)      Problem: Pain, Chronic (Adult)  Goal: Acceptable Pain/Comfort Level and Functional Ability  Outcome: Ongoing (interventions implemented as appropriate)

## 2019-04-07 NOTE — PLAN OF CARE
Problem: Patient Care Overview  Goal: Plan of Care Review  Outcome: Ongoing (interventions implemented as appropriate)   04/07/19 0760   Coping/Psychosocial   Plan of Care Reviewed With patient   Plan of Care Review   Progress improving   OTHER   Outcome Summary Patient ahs been pleasant and cooperative during shift. Patient transitioned to PO abx. Access saw patient today. Patient still converting in and out of afib. No complaints of pain, nausea, or SOA. Patient ambulating with PT. Will continue to monitor and assist patient as needed.       Problem: Pneumonia (Adult)  Goal: Signs and Symptoms of Listed Potential Problems Will be Absent, Minimized or Managed (Pneumonia)  Outcome: Ongoing (interventions implemented as appropriate)      Problem: Fall Risk (Adult)  Goal: Absence of Fall  Outcome: Ongoing (interventions implemented as appropriate)      Problem: Pain, Chronic (Adult)  Goal: Acceptable Pain/Comfort Level and Functional Ability  Outcome: Ongoing (interventions implemented as appropriate)

## 2019-04-07 NOTE — CONSULTS
"Access Ctr Note.   All information per Pt report, unless otherwise noted.     Pt found sitting up in bed. Pt is a 89y/o W/W/F. She lives in the house she shared with  for over 60 yrs. One adult grandtr lives with her and will move out after getting  later this month. She has 3 adult dtrs all of whom live locally. Pt worked as a  until she was in her 80's. Pt had very good health until shortly after turning 89y/o.     Pt experiencing sxs c/w mild depression and anxiety. Pt's  of nearly 65yrs  5 yrs ago this May. Since turning 89 Pt has had health problems that have limited her mobility and resulted in her no longer being able to drive. Pt is losing more friends to death. She used to enjoy sewing and reading and vision changes have made it less possible for her to do those things. These life transitions and losses have taken an emotional toll on Pt. Pt feels some guilt for feeling sad about her struggles because she knows she has so much to thankful for. \"I have lots of support\" includnig family and Jain and some friends. Told Pt that even with all the positive she has in her life, she can also have sadness and grief relating to life transitions. Both can co-exist. Pt liked that and was thankful to hear that one doesn't negate the other.       No SI/HI or wish for death. Appetite has decreased for Pt, \"nothing tastes good\".  Pt has been sleeping more, somewhat related to aging, but also due to boredom and depression. Pt worries more about the unknown. No previous mental health hx. No substance abuse. Depression and anxiety both rated \"6 or 7\" out of 10 (10 being worst).    Recommended Pt consider audio books so she can still enjoy stories, since she isn't able to read as easily. Additionally, recommended Pt consider outpatient therapy to have an outlet to process her life transitions. Pt open to receiving referrals. Encouraged Pt to consider discussing w/MD if she is a candidate for " a mild anti-depressant/anti-anxiety medication.    Informed RNTerry, of referrals given and recommendation to discuss medication w/attending MD.      Access will follow briefly.

## 2019-04-07 NOTE — THERAPY TREATMENT NOTE
Acute Care - Physical Therapy Treatment Note  [facility]     Patient Name: Alexa Peace  : 1927  MRN: [id number]  Today's Date: 2019  Onset of Illness/Injury or Date of Surgery: 19  Date of Referral to PT: 19  Referring Physician: Jere Conde    Admit Date: 2019    Visit Dx:    ICD-10-CM ICD-9-CM   1. Pneumonia of right lower lobe due to infectious organism (CMS/HCC) J18.1 486   2. Fever, unspecified fever cause R50.9 780.60   3. Difficulty walking R26.2 719.7     Patient Active Problem List   Diagnosis   • Bad memory   • Depression   • Diverticulosis of intestine   • Hyperlipidemia   • Macular degeneration   • Neuropathy, peripheral   • Osteopenia   • Atopic rhinitis   • Urge incontinence of urine   • Atherosclerosis of native coronary artery   • Essential hypertension   • Gastroesophageal reflux disease without esophagitis   • Impaired glucose tolerance   • History of coronary artery stent placement   • Chronic bilateral low back pain   • Acquired scoliosis   • Spinal stenosis of lumbar region with neurogenic claudication   • PAC (premature atrial contraction)   • Slow transit constipation   • Paroxysmal atrial fibrillation (CMS/HCC)   • Pleural effusion on left   • Valvular heart disease   • Non-rheumatic mitral regurgitation   • Pneumonia of right lower lobe due to infectious organism (CMS/HCC)   • Localized edema   • Hypoxemia   • Sepsis due to pneumonia (CMS/HCC)   • Chronic anticoagulation   • DNR (do not resuscitate)   • Chronic diastolic CHF (congestive heart failure) (CMS/HCC)       Therapy Treatment    Rehabilitation Treatment Summary     Row Name 19 1315             Treatment Time/Intention    Discipline  physical therapy assistant  -SI      Document Type  therapy note (daily note)  -SI      Subjective Information  -- Pt states she lives alone and has few stairs to enter home  -SI      Therapy Frequency (PT Clinical Impression)  daily  -SI      Patient Effort   good  -SI      Existing Precautions/Restrictions  fall  -SI      Patient Response to Treatment  -- excellnet  -SI      Recorded by [SI] Roxana Dickey, PTA 04/07/19 1321      Row Name 04/07/19 1315             Cognitive Assessment/Intervention- PT/OT    Orientation Status (Cognition)  oriented x 3  -SI      Follows Commands (Cognition)  WNL  -SI      Personal Safety Interventions  fall prevention program maintained;gait belt  -SI      Recorded by [SI] Roxana iDckey, PTA 04/07/19 1321      Row Name 04/07/19 1315             Bed Mobility Assessment/Treatment    Supine-Sit [place] (Bed Mobility)  contact guard  -SI      Sit-Supine [place] (Bed Mobility)  contact guard  -SI      Recorded by [SI] Roxana Dickey, PTA 04/07/19 1321      Row Name 04/07/19 1315             Sit-Stand Transfer    Sit-Stand [place] (Transfers)  contact guard  -SI      Assistive Device (Sit-Stand Transfers)  walker, front-wheeled  -SI      Recorded by [SI] Roxana Dickey, PTA 04/07/19 1321      Row Name 04/07/19 1315             Stand-Sit Transfer    Stand-Sit [place] (Transfers)  contact guard  -SI      Assistive Device (Stand-Sit Transfers)  walker, front-wheeled  -SI      Recorded by [SI] Roxana Dickey, PTA 04/07/19 1321      Row Name 04/07/19 1315             Gait/Stairs Assessment/Training    [place] Level (Gait)  contact guard  -SI      Assistive Device (Gait)  walker, front-wheeled  -SI      Distance in Feet (Gait)  300 20 feet without assist device as pt says does some at home  -SI      Pattern (Gait)  step-through  -SI      Deviations/Abnormal Patterns (Gait)  stride length decreased;gait speed decreased  -SI      Bilateral Gait Deviations  forward flexed posture  -SI      Comment (Gait/Stairs)  fair balalnce and stability at this time.  Most safe using the RWX  -SI      Recorded by [SI] Roxana Dickey, PTA 04/07/19 1321      Row Name 04/07/19 1315             Positioning and Restraints    Pre-Treatment  Position  in bed  -SI      Post Treatment Position  bed  -SI      In Bed  supine;call light within reach;exit alarm on  -SI      Recorded by [SI] Roxana Dickey, PTA 04/07/19 1321      Row Name 04/07/19 1315             Pain Scale: Numbers Pre/Post-Treatment    Pain Scale: Numbers, Pretreatment  0/10 - no pain  -SI      Pain Scale: Numbers, Post-Treatment  0/10 - no pain  -SI      Recorded by [SI] Roxana Dickey, PTA 04/07/19 1321        User Key  (r) = Recorded By, (t) = Taken By, (c) = Cosigned By    Initials Name Effective Dates Discipline    SI Roaxna Dickey, PTA 05/18/15 -  PT                   Physical Therapy Education     Title: PT OT SLP Therapies (In Progress)     Topic: Physical Therapy (In Progress)     Point: Mobility training (In Progress)     Learning Progress Summary           Patient Acceptance, E, NR by SI at 4/7/2019  1:21 PM    Comment:  upright posture and don't get RWX too far out in front of her    Acceptance, E,TB, VU by MS at 4/7/2019 11:49 AM    Acceptance, E, NR by SI at 4/6/2019  3:10 PM    Comment:  upright posture with gait    Acceptance, E,D, VU,NR by MS1 at 4/5/2019  4:26 PM                   Point: Home exercise program (In Progress)     Learning Progress Summary           Patient Acceptance, E, NR by SI at 4/6/2019  3:10 PM    Comment:  upright posture with gait    Acceptance, E,D, VU,NR by MS1 at 4/5/2019  4:26 PM                   Point: Body mechanics (Done)     Learning Progress Summary           Patient Acceptance, E,TB, VU by MS at 4/7/2019 11:49 AM    Acceptance, E, NR by SI at 4/6/2019  3:10 PM    Comment:  upright posture with gait    Acceptance, E,D, VU,NR by MS1 at 4/5/2019  4:26 PM                   Point: Precautions (Done)     Learning Progress Summary           Patient Acceptance, E,TB, VU by MS at 4/7/2019 11:49 AM    Acceptance, E, NR by SI at 4/6/2019  3:10 PM    Comment:  upright posture with gait    Acceptance, E,D, VU,NR by MS1 at 4/5/2019  4:26 PM                                User Key     Initials Effective Dates Name Provider Type Discipline    SI 05/18/15 -  Roxana Dickey, PTA Physical Therapy Assistant PT    MS 02/23/17 -  Sb Rodas, RN Registered Nurse Nurse    MS1 04/03/18 -  Jere Jean-Baptiste, PT Physical Therapist PT                PT Recommendation and Plan  Therapy Frequency (PT Clinical Impression): daily  Plan of Care Reviewed With: patient  Outcome Measures     Row Name 04/07/19 1300 04/06/19 1500 04/05/19 1600       How much help from another person do you currently need...    Turning from your back to your side while in flat bed without using bedrails?  4  -SI  4  -SI  4  -MS    Moving from lying on back to sitting on the side of a flat bed without bedrails?  3  -SI  3  -SI  3  -MS    Moving to and from a bed to a chair (including a wheelchair)?  3  -SI  3  -SI  3  -MS    Standing up from a chair using your arms (e.g., wheelchair, bedside chair)?  3  -SI  3  -SI  3  -MS    Climbing 3-5 steps with a railing?  3  -SI  3  -SI  3  -MS    To walk in hospital room?  3  -SI  3  -SI  3  -MS    AM-PAC 6 Clicks Score  19  -SI  19  -SI  19  -MS       Functional Assessment    Outcome Measure Options  AM-PAC 6 Clicks Basic Mobility (PT)  -SI  AM-PAC 6 Clicks Basic Mobility (PT)  -SI  AM-PAC 6 Clicks Basic Mobility (PT)  -MS      User Key  (r) = Recorded By, (t) = Taken By, (c) = Cosigned By    Initials Name Provider Type    SI Roxana Dickey PTA Physical Therapy Assistant    MS Jere Jean-Baptiste, PT Physical Therapist         Time Calculation:   PT Charges     Row Name 04/07/19 1324             Time Calculation    Start Time  1300  -SI      Stop Time  1320  -SI      Time Calculation (min)  20 min  -SI      PT Received On  04/07/19  -SI      PT - Next Appointment  04/08/19  -SI         Time Calculation- PT    Total Timed Code Minutes- PT  20 minute(s)  -SI        User Key  (r) = Recorded By, (t) = Taken By, (c) = Cosigned By    Initials Name Provider Type     SI Roxana Dickey PTA Physical Therapy Assistant        Therapy Charges for Today     Code Description Service Date Service Provider Modifiers Qty    80355745224 HC PT THER PROC EA 15 MIN 4/6/2019 Roxana Dickey, DONIS GP 1    73490666837 HC PT THER PROC EA 15 MIN 4/7/2019 Roxana Dickey PTA GP 1          PT G-Codes  Outcome Measure Options: AM-PAC 6 Clicks Basic Mobility (PT)  AM-PAC 6 Clicks Score: 19    Roxana Dickey PTA  4/7/2019

## 2019-04-07 NOTE — PLAN OF CARE
Problem: Patient Care Overview  Goal: Plan of Care Review  Pt is oob with CG asist of 1 for amb with  feet.  FF posture and fair balance and stability

## 2019-04-08 ENCOUNTER — TELEPHONE (OUTPATIENT)
Dept: FAMILY MEDICINE CLINIC | Facility: CLINIC | Age: 84
End: 2019-04-08

## 2019-04-08 PROCEDURE — 94799 UNLISTED PULMONARY SVC/PX: CPT

## 2019-04-08 PROCEDURE — 97110 THERAPEUTIC EXERCISES: CPT

## 2019-04-08 RX ADMIN — IPRATROPIUM BROMIDE AND ALBUTEROL SULFATE 3 ML: 2.5; .5 SOLUTION RESPIRATORY (INHALATION) at 15:04

## 2019-04-08 RX ADMIN — SODIUM CHLORIDE, PRESERVATIVE FREE 3 ML: 5 INJECTION INTRAVENOUS at 20:31

## 2019-04-08 RX ADMIN — ASPIRIN 81 MG: 81 TABLET, CHEWABLE ORAL at 08:00

## 2019-04-08 RX ADMIN — PANTOPRAZOLE SODIUM 40 MG: 40 TABLET, DELAYED RELEASE ORAL at 06:08

## 2019-04-08 RX ADMIN — APIXABAN 2.5 MG: 2.5 TABLET, FILM COATED ORAL at 20:30

## 2019-04-08 RX ADMIN — ROSUVASTATIN CALCIUM 5 MG: 5 TABLET, FILM COATED ORAL at 20:30

## 2019-04-08 RX ADMIN — IPRATROPIUM BROMIDE AND ALBUTEROL SULFATE 3 ML: 2.5; .5 SOLUTION RESPIRATORY (INHALATION) at 06:49

## 2019-04-08 RX ADMIN — METOPROLOL TARTRATE 50 MG: 50 TABLET, FILM COATED ORAL at 08:00

## 2019-04-08 RX ADMIN — SODIUM CHLORIDE, PRESERVATIVE FREE 3 ML: 5 INJECTION INTRAVENOUS at 08:00

## 2019-04-08 RX ADMIN — DOXYCYCLINE 100 MG: 100 CAPSULE ORAL at 08:00

## 2019-04-08 RX ADMIN — DOXYCYCLINE 100 MG: 100 CAPSULE ORAL at 20:30

## 2019-04-08 RX ADMIN — Medication 5 MG: at 00:33

## 2019-04-08 RX ADMIN — APIXABAN 2.5 MG: 2.5 TABLET, FILM COATED ORAL at 08:00

## 2019-04-08 RX ADMIN — IPRATROPIUM BROMIDE AND ALBUTEROL SULFATE 3 ML: 2.5; .5 SOLUTION RESPIRATORY (INHALATION) at 20:57

## 2019-04-08 RX ADMIN — POTASSIUM CHLORIDE 20 MEQ: 750 CAPSULE, EXTENDED RELEASE ORAL at 08:00

## 2019-04-08 RX ADMIN — AMOXICILLIN 750 MG: 250 CAPSULE ORAL at 20:30

## 2019-04-08 RX ADMIN — AMOXICILLIN 750 MG: 250 CAPSULE ORAL at 08:01

## 2019-04-08 RX ADMIN — DULOXETINE HYDROCHLORIDE 60 MG: 60 CAPSULE, DELAYED RELEASE ORAL at 08:00

## 2019-04-08 RX ADMIN — FUROSEMIDE 20 MG: 20 TABLET ORAL at 08:00

## 2019-04-08 RX ADMIN — DIGOXIN 125 MCG: 125 TABLET ORAL at 12:23

## 2019-04-08 RX ADMIN — SODIUM CHLORIDE 4 ML: 7 NEBU SOLN,3 % NEBU at 20:57

## 2019-04-08 RX ADMIN — METOPROLOL TARTRATE 50 MG: 50 TABLET, FILM COATED ORAL at 20:30

## 2019-04-08 RX ADMIN — SODIUM CHLORIDE 4 ML: 7 NEBU SOLN,3 % NEBU at 06:49

## 2019-04-08 NOTE — PROGRESS NOTES
Name: Alxea Peace ADMIT: 2019   : 1927  PCP: Brian Jacobs MD    MRN: [id number] LOS: 4 days   AGE/SEX: 89 y.o. female  ROOM: [facility]   Subjective   Chief Complaint   Patient presents with   • Fever   • Syncope   fever resolved  Cough resolved  On IV abx  On chronic a/c  Working with therapists    ROS  No f/c  No n/v  No cp/palp  No soa/cough    Objective   Vital Signs  Temp:  [97.1 °F (36.2 °C)-98.5 °F (36.9 °C)] 97.1 °F (36.2 °C)  Heart Rate:  [] 77  Resp:  [14-18] 16  BP: (106-161)/(68-99) 106/70  SpO2:  [91 %-100 %] 100 %  on   ;   Device (Oxygen Therapy): room air  Body mass index is 21.02 kg/m².    Physical Exam   Constitutional: She is oriented to person, place, and time.   eldelry   HENT:   Head: Normocephalic and atraumatic.   Eyes: Conjunctivae are normal. No scleral icterus.   Neck: Neck supple. No tracheal deviation present.   Cardiovascular: Normal rate and regular rhythm.   Murmur heard.  Pulmonary/Chest: Effort normal and breath sounds normal.   Decreased bs at bases   Abdominal: Soft. There is no tenderness. There is no guarding.   Genitourinary:   Genitourinary Comments: Deferred    Neurological: She is alert and oriented to person, place, and time. She exhibits normal muscle tone.   Skin: Skin is warm and dry. There is pallor.   Psychiatric: She has a normal mood and affect. Her behavior is normal.       Results Review:       I reviewed the patient's new clinical results.  Results from last 7 days   Lab Units 19  0730 19  0834 19   WBC 10*3/mm3 6.07 4.17 8.72   HEMOGLOBIN g/dL 10.7* 10.3* 12.9   PLATELETS 10*3/mm3 164 147 201     Results from last 7 days   Lab Units 19  0730 19  0834 19   SODIUM mmol/L 143 143 138   POTASSIUM mmol/L 4.0 3.6 4.3   CHLORIDE mmol/L 106 104 96*   CO2 mmol/L 24.6 26.7 28.9   BUN mg/dL 9 12 17   CREATININE mg/dL 0.59 0.57 0.66   GLUCOSE mg/dL 123* 115* 139*   Estimated Creatinine Clearance: 37.7  mL/min (by C-G formula based on SCr of 0.59 mg/dL).  Results from last 7 days   Lab Units 04/04/19 2029   ALBUMIN g/dL 4.20   BILIRUBIN mg/dL 0.3   ALK PHOS U/L 61   AST (SGOT) U/L 23   ALT (SGPT) U/L 21     Results from last 7 days   Lab Units 04/06/19  0730 04/05/19  0834 04/04/19 2029   CALCIUM mg/dL 8.8 8.2 9.2   ALBUMIN g/dL  --   --  4.20     Results from last 7 days   Lab Units 04/04/19 2029   PROCALCITONIN ng/mL <0.02*   LACTATE mmol/L 2.0       Coag     HbA1C   Lab Results   Component Value Date    HGBA1C 5.70 (H) 03/01/2019    HGBA1C 5.20 07/06/2018    HGBA1C 5.20 05/05/2017     Infection   Results from last 7 days   Lab Units 04/05/19  0917 04/04/19  2242 04/04/19 2029   BLOODCX   --  No growth at 3 days No growth at 3 days   MRSA SCREEN CX  No Methicillin Resistant Staphylococcus aureus isolated  --   --    PROCALCITONIN ng/mL  --   --  <0.02*     Radiology(recent) No radiology results for the last day  No results found for: TROPONINT, TROPONINI, BNP  No components found for: TSH;2      amoxicillin 750 mg Oral Q12H   apixaban 2.5 mg Oral Q12H   aspirin 81 mg Oral Daily   digoxin 125 mcg Oral Daily   doxycycline 100 mg Oral Q12H   DULoxetine 60 mg Oral Daily   furosemide 20 mg Oral Daily   ipratropium-albuterol 3 mL Nebulization TID - RT   metoprolol tartrate 50 mg Oral Q12H   pantoprazole 40 mg Oral QAM   potassium chloride 20 mEq Oral Daily   rosuvastatin 5 mg Oral Nightly   sodium chloride 3 mL Intravenous Q12H   sodium chloride 4 mL Nebulization BID - RT      Diet Regular; Cardiac      Assessment/Plan      Active Hospital Problems    Diagnosis  POA   • **Pneumonia of right lower lobe due to infectious organism (CMS/HCC) [J18.1]  Yes   • DNR (do not resuscitate) [Z66]  Yes   • Chronic diastolic CHF (congestive heart failure) (CMS/HCC) [I50.32]  Yes   • Localized edema [R60.0]  Yes   • Hypoxemia [R09.02]  Yes   • Sepsis due to pneumonia (CMS/HCC) [J18.9, A41.9]  Yes   • Chronic anticoagulation  [Z79.01]  Not Applicable   • Non-rheumatic mitral regurgitation [I34.0]  Yes   • Paroxysmal atrial fibrillation (CMS/HCC) [I48.0]  Yes   • Essential hypertension [I10]  Yes   • Gastroesophageal reflux disease without esophagitis [K21.9]  Yes      Resolved Hospital Problems    Diagnosis Date Resolved POA   • Shortness of breath [R06.02] 04/06/2019 Yes       · deescalate to po abx   · Doppler negative for VTE  · On eliquis, metop, dig for her P.Afib  · DNR/DNI- confirmed with pt    Dispo- likely dc to home vs subacute rehab tomorrow    CHARLOTTE RN      Reviewed previous records    Vimal Moe MD  [facility]  04/08/19  2:30 PM

## 2019-04-08 NOTE — DISCHARGE PLACEMENT REQUEST
"Lilly Mane (89 y.o. Female)     Date of Birth Social Security Number Address Home Phone MRN    04/13/1927  [id number] [id number] [phone] [id number]    Orthodox Marital Status          None        Admission Date Admission Type Admitting Provider Attending Provider Department, Room/Bed    4/4/19 Emergency Jere Bill MD Jackson, Alan David, MD [address], [address]/1    Discharge Date Discharge Disposition Discharge Destination                       Attending Provider:  Vimal Moe MD    Allergies:  Ciprofloxacin, Metronidazole, Prednisone    Isolation:  None   Infection:  None   Code Status:  No CPR    Ht:  157.5 cm (62\")   Wt:  52.1 kg (114 lb 14.4 oz)    Admission Cmt:  None   Principal Problem:  Pneumonia of right lower lobe due to infectious organism (CMS/[facility]) [J18.1]                 Active Insurance as of 4/4/2019     Primary Coverage     Payor Plan Insurance Group Employer/Plan Group    HUMANA MEDICARE REPLACEMENT HUMANA MEDICARE REPL [id number]     Payor Plan Address Payor Plan Phone Number Payor Plan Fax Number Effective Dates    [address] [address] [phone]  1/1/2017 - None Entered    [facility] [id number]       Subscriber Name Subscriber Birth Date Member ID       LILLY MANE 4/13/1927 [id number]                 Emergency Contacts      (Rel.) Home Phone Work Phone Mobile Phone    Sheridan Gregory (Daughter) [phone] -- [phone]    Priscila Baird (Daughter) -- -- [phone]    priscila baird (Daughter) -- -- [phone]              "

## 2019-04-08 NOTE — PROGRESS NOTES
Continued Stay Note  [facility]     Patient Name: Alexa Peace  MRN: [id number]  Today's Date: 4/8/2019    Admit Date: 4/4/2019    Discharge Plan     Row Name 04/08/19 0924       Plan    Plan  Plan Home with  or [facility] for skilled care if pre cert obtained.   YENNIFER Ramos RN    Patient/Family in Agreement with Plan  yes    Plan Comments  Spoke to pt at bedside.  Pt requests referral to [facility].   [facility]up[facility]  ( [phone]) called with referral.  Plan [facility] for skilled care if pre cert obtained.   YENNIFER Ramos RN        Discharge Codes    No documentation.             Antonieta Ramos, RN

## 2019-04-08 NOTE — PLAN OF CARE
Problem: Patient Care Overview  Goal: Plan of Care Review  Outcome: Ongoing (interventions implemented as appropriate)   04/08/19 1600   Coping/Psychosocial   Plan of Care Reviewed With patient   Plan of Care Review   Progress improving   OTHER   Outcome Summary Patient has been pleasant and cooperative during shift. Discharge planned for tomorrow and patient is waiting for pre cert. No complaints of pain, nausea, or SOA. Patient ambulated with PT. Will continue to monitor and assist patient as needed.       Problem: Pneumonia (Adult)  Goal: Signs and Symptoms of Listed Potential Problems Will be Absent, Minimized or Managed (Pneumonia)  Outcome: Ongoing (interventions implemented as appropriate)      Problem: Fall Risk (Adult)  Goal: Absence of Fall  Outcome: Ongoing (interventions implemented as appropriate)      Problem: Pain, Chronic (Adult)  Goal: Acceptable Pain/Comfort Level and Functional Ability  Outcome: Ongoing (interventions implemented as appropriate)

## 2019-04-08 NOTE — PROGRESS NOTES
Continued Stay Note  [facility]     Patient Name: Alexa Peace  MRN: [id number]  Today's Date: 4/8/2019    Admit Date: 4/4/2019    Discharge Plan     Row Name 04/08/19 0931       Plan    Plan  Plan home with [facility] or [facility] for skilled care if pre cert obtained.  YENNIFER Ramos RN    Patient/Family in Agreement with Plan  yes    Plan Comments  Spoke with pt at bedside and provided her list of HH agency.  Pt's choice is [facility].  [facility]  ([phone]) called to follow.  Plan home with [facility] or [facility] for skilled care if pre cert obtained.  YENNIFER Ramos RN    Row Name 04/08/19 0924       Plan    Plan  Plan Home with HH or [facility] for skilled care if pre cert obtained.   YENNIFER Ramos RN    Patient/Family in Agreement with Plan  yes    Plan Comments  Spoke to pt at bedside.  Pt [facility].   [facility]  ( [phone]) called with referral.  Plan [facility] for skilled care if pre cert obtained.   YENNIFER Ramos RN        Discharge Codes    No documentation.             Antonieta Ramos, JUNI

## 2019-04-08 NOTE — PLAN OF CARE
Problem: Patient Care Overview  Goal: Plan of Care Review  Outcome: Ongoing (interventions implemented as appropriate)   04/08/19 6855   Coping/Psychosocial   Plan of Care Reviewed With patient   Plan of Care Review   Progress improving   OTHER   Outcome Summary Pt tolerated treatment with no complaints. Pt required CGA for transfers. Pt ambulate 150 feet with CGA, with rwx. Pt stated she walked earlier around nursing station before PT arrived.

## 2019-04-08 NOTE — PLAN OF CARE
Problem: Patient Care Overview  Goal: Plan of Care Review  Outcome: Ongoing (interventions implemented as appropriate)   04/08/19 0417   Coping/Psychosocial   Plan of Care Reviewed With patient   Plan of Care Review   Progress improving   OTHER   Outcome Summary Patient alert and oriented. VSS. Up with assist and walked to toilet. Denies pain. NSR- Aflutter-Afib on monitor or eliquis. No signs of acute distress noted. Will continue to monitor.        Problem: Pneumonia (Adult)  Goal: Signs and Symptoms of Listed Potential Problems Will be Absent, Minimized or Managed (Pneumonia)  Outcome: Ongoing (interventions implemented as appropriate)   04/08/19 0417   Goal/Outcome Evaluation   Problems Assessed (Pneumonia) all   Problems Present (Pneumonia) respiratory compromise       Problem: Fall Risk (Adult)  Goal: Absence of Fall  Outcome: Ongoing (interventions implemented as appropriate)   04/08/19 0417   Fall Risk (Adult)   Absence of Fall making progress toward outcome       Problem: Pain, Chronic (Adult)  Goal: Acceptable Pain/Comfort Level and Functional Ability  Outcome: Ongoing (interventions implemented as appropriate)   04/08/19 0417   Pain, Chronic (Adult)   Acceptable Pain/Comfort Level and Functional Ability making progress toward outcome

## 2019-04-08 NOTE — PROGRESS NOTES
Access Ctr Note.     Chart reviewed.    Pt pleasant this morning. She stated hope that she can go to [facility] for rehab before returning home. Spoke about working to accept the changes she's gone through and being hopeful for the changes that are temporary.  Pt spoke with attending about periodic visual hallucinations and discussed consulting with psychiatrist. Pt said she'd rather wait on that right now. Pt very aware of the fact she is having hallucniations.  Pt said she may be discharged today.     Access to follow.

## 2019-04-08 NOTE — THERAPY TREATMENT NOTE
Acute Care - Physical Therapy Treatment Note  [facility]     Patient Name: Alexa Peace  : 1927  MRN: [id number]  Today's Date: 2019  Onset of Illness/Injury or Date of Surgery: 19  Date of Referral to PT: 19  Referring Physician: Jere Conde    Admit Date: 2019    Visit Dx:    ICD-10-CM ICD-9-CM   1. Pneumonia of right lower lobe due to infectious organism (CMS/HCC) J18.1 486   2. Fever, unspecified fever cause R50.9 780.60   3. Difficulty walking R26.2 719.7     Patient Active Problem List   Diagnosis   • Bad memory   • Depression   • Diverticulosis of intestine   • Hyperlipidemia   • Macular degeneration   • Neuropathy, peripheral   • Osteopenia   • Atopic rhinitis   • Urge incontinence of urine   • Atherosclerosis of native coronary artery   • Essential hypertension   • Gastroesophageal reflux disease without esophagitis   • Impaired glucose tolerance   • History of coronary artery stent placement   • Chronic bilateral low back pain   • Acquired scoliosis   • Spinal stenosis of lumbar region with neurogenic claudication   • PAC (premature atrial contraction)   • Slow transit constipation   • Paroxysmal atrial fibrillation (CMS/HCC)   • Pleural effusion on left   • Valvular heart disease   • Non-rheumatic mitral regurgitation   • Pneumonia of right lower lobe due to infectious organism (CMS/HCC)   • Localized edema   • Hypoxemia   • Sepsis due to pneumonia (CMS/HCC)   • Chronic anticoagulation   • DNR (do not resuscitate)   • Chronic diastolic CHF (congestive heart failure) (CMS/HCC)       Therapy Treatment    Rehabilitation Treatment Summary     Row Name 19 1613 19 0900          Treatment Time/Intention    Discipline  physical therapy assistant  -EH  --     Document Type  therapy note (daily note)  -EH  --     Subjective Information  no complaints  -EH  --     Mode of Treatment  physical therapy  -EH  --     Patient/Family Observations  pt sitting up in chair,  family member present in room   -EH  --     Care Plan Review  patient/other agree to care plan  -EH  --     Therapy Frequency (PT Clinical Impression)  5 times/wk  -EH  5 times/wk  -AR     Patient Effort  good  -EH  --     Existing Precautions/Restrictions  fall  -EH  --     Recorded by [] Elaine Tracy, [facility] 04/08/19 1615 [AR] Rustam Amanda, PT 04/08/19 0904     Row Name 04/08/19 1613             Cognitive Assessment/Intervention- PT/OT    Orientation Status (Cognition)  oriented x 3  -EH      Follows Commands (Cognition)  WNL  -EH      Personal Safety Interventions  fall prevention program maintained;gait belt;nonskid shoes/slippers when out of bed  -EH      Recorded by [] Elaine Tracy, PTA 04/08/19 1615      Row Name 04/08/19 1613             Bed Mobility Assessment/Treatment    Supine-Sit Elsie (Bed Mobility)  not tested  -      Sit-Supine Elsie (Bed Mobility)  not tested  -EH      Recorded by [] Elaine Tracy, PTA 04/08/19 1615      Row Name 04/08/19 1613             Sit-Stand Transfer    Sit-Stand Elsie (Transfers)  contact guard  -EH      Assistive Device (Sit-Stand Transfers)  walker, front-wheeled  -EH      Recorded by [] Elaine Tracy, PTA 04/08/19 1615      Row Name 04/08/19 1613             Stand-Sit Transfer    Stand-Sit Elsie (Transfers)  contact guard  -EH      Assistive Device (Stand-Sit Transfers)  walker, front-wheeled  -EH      Recorded by [] Elaine Tracy, DONIS 04/08/19 1615      Row Name 04/08/19 1613             Gait/Stairs Assessment/Training    [place]ls[place] Level (Gait)  contact guard  -EH      Assistive Device (Gait)  walker, front-wheeled  -EH      Distance in Feet (Gait)  150 pt ambulated earlier around nsg station.   -EH      Pattern (Gait)  step-through  -EH      Deviations/Abnormal Patterns (Gait)  stride length decreased  -EH      Bilateral Gait Deviations  forward flexed posture  -EH      Recorded by [] Elaine Tracy, PTA 04/08/19 1615       Row Name 04/08/19 1613             Positioning and Restraints    Pre-Treatment Position  sitting in chair/recliner  -      Post Treatment Position  chair  -EH      In Chair  sitting;call light within reach;encouraged to call for assist;exit alarm on;with family/caregiver  -      Recorded by [] OlafElaine silverio, DONIS 04/08/19 1615        User Key  (r) = Recorded By, (t) = Taken By, (c) = Cosigned By    Initials Name Effective Dates Discipline    Amanda Rodriguez, PT 04/03/18 -  PT     Olafnusrat Elaine, DONIS 08/19/18 -  PT               Rehab Goal Summary     Row Name 04/08/19 0900             Physical Therapy Goals    Bed Mobility Goal Selection (PT)  bed mobility, PT goal 1  -AR      Transfer Goal Selection (PT)  transfer, PT goal 1  -AR      Gait Training Goal Selection (PT)  gait training, PT goal 1  -AR         Bed Mobility Goal 1 (PT)    Time Frame (Bed Mobility Goal 1, PT)  1 week  -AR      Progress/Outcomes (Bed Mobility Goal 1, PT)  goal ongoing  -AR         Transfer Goal 1 (PT)    Progress/Outcome (Transfer Goal 1, PT)  goal ongoing  -AR         Gait Training Goal 1 (PT)    [place] Level (Gait Training Goal 1, PT)  supervision required  -AR      Distance (Gait Goal 1, PT)  300  -AR      Time Frame (Gait Training Goal 1, PT)  1 week  -AR      Progress/Outcome (Gait Training Goal 1, PT)  goal revised this date  -AR        User Key  (r) = Recorded By, (t) = Taken By, (c) = Cosigned By    Initials Name Provider Type Discipline    Amanda Rodriguez, PT Physical Therapist PT          Physical Therapy Education     Title: PT OT SLP Therapies (Done)     Topic: Physical Therapy (Done)     Point: Mobility training (Done)     Learning Progress Summary           Patient Acceptance, E,D, VU by  at 4/8/2019  4:12 PM    Acceptance, E,TB, VU by MS at 4/8/2019 12:25 PM    Acceptance, E, NR by SI at 4/7/2019  1:21 PM    Comment:  upright posture and don't get RWX too far out in front of her    Acceptance, E,TB, VU  by MS at 4/7/2019 11:49 AM    Acceptance, E, NR by SI at 4/6/2019  3:10 PM    Comment:  upright posture with gait    Acceptance, E,D, VU,NR by MS1 at 4/5/2019  4:26 PM                   Point: Home exercise program (Done)     Learning Progress Summary           Patient Acceptance, E,D, VU by  at 4/8/2019  4:12 PM    Acceptance, E, NR by SI at 4/6/2019  3:10 PM    Comment:  upright posture with gait    Acceptance, E,D, VU,NR by MS1 at 4/5/2019  4:26 PM                   Point: Body mechanics (Done)     Learning Progress Summary           Patient Acceptance, E,D, VU by  at 4/8/2019  4:12 PM    Acceptance, E,TB, VU by MS at 4/8/2019 12:25 PM    Acceptance, E,TB, VU by MS at 4/7/2019 11:49 AM    Acceptance, E, NR by SI at 4/6/2019  3:10 PM    Comment:  upright posture with gait    Acceptance, E,D, VU,NR by MS1 at 4/5/2019  4:26 PM                   Point: Precautions (Done)     Learning Progress Summary           Patient Acceptance, E,D, VU by  at 4/8/2019  4:12 PM    Acceptance, E,TB, VU by MS at 4/8/2019 12:25 PM    Acceptance, E,TB, VU by MS at 4/7/2019 11:49 AM    Acceptance, E, NR by SI at 4/6/2019  3:10 PM    Comment:  upright posture with gait    Acceptance, E,D, VU,NR by MS1 at 4/5/2019  4:26 PM                               User Key     Initials Effective Dates Name Provider Type Discipline     05/18/15 -  Roxana Dickey, PTA Physical Therapy Assistant PT    MS 02/23/17 -  Sb Rodas, RN Registered Nurse Nurse    [facility] 04/03/18 -  Jere Jean-Baptiste, PT Physical Therapist PT     08/19/18 -  Elaine Tracy PTA Physical Therapy Assistant PT                PT Recommendation and Plan  Therapy Frequency (PT Clinical Impression): 5 times/wk     Outcome Measures     Row Name 04/08/19 1600 04/07/19 1300 04/06/19 1500       How much help from another person do you currently need...    Turning from your back to your side while in flat bed without using bedrails?  4  -EH  4  -SI  4  -SI    Moving from lying  on back to sitting on the side of a flat bed without bedrails?  3  -  3  -SI  3  -SI    Moving to and from a bed to a chair (including a wheelchair)?  3  -  3  -SI  3  -SI    Standing up from a chair using your arms (e.g., wheelchair, bedside chair)?  3  -  3  -SI  3  -SI    Climbing 3-5 steps with a railing?  3  -  3  -SI  3  -SI    To walk in hospital room?  3  -  3  -SI  3  -SI    AM-PAC 6 Clicks Score  19  -EH  19  -SI  19  -SI       Functional Assessment    Outcome Measure Options  --  AM-PAC 6 Clicks Basic Mobility (PT)  -SI  AM-PAC 6 Clicks Basic Mobility (PT)  -SI      User Key  (r) = Recorded By, (t) = Taken By, (c) = Cosigned By    Initials Name Provider Type    SI Roxana Dickey, DONIS Physical Therapy Assistant     Elaine Tracy PTA Physical Therapy Assistant         Time Calculation:   PT Charges     Row Name 04/08/19 1612 04/08/19 0904          Time Calculation    Start Time  1527  -  --     Stop Time  1539  -  --     Time Calculation (min)  12 min  -  --     PT Received On  04/08/19  -  --     PT - Next Appointment  04/09/19  -  --     PT Goal Re-Cert Due Date  --  04/15/19  -AR        Time Calculation- PT    Total Timed Code Minutes- PT  12 minute(s)  -  --       User Key  (r) = Recorded By, (t) = Taken By, (c) = Cosigned By    Initials Name Provider Type    Amanda Rodriguez, PT Physical Therapist     Elaine Tracy PTA Physical Therapy Assistant        Therapy Charges for Today     Code Description Service Date Service Provider Modifiers Qty    46065590795 HC PT THER PROC EA 15 MIN 4/8/2019 Elaine Tracy PTA GP 1          PT G-Codes  Outcome Measure Options: AM-PAC 6 Clicks Basic Mobility (PT)  AM-PAC 6 Clicks Score: 19    Elaine Tracy PTA  4/8/2019

## 2019-04-09 VITALS
WEIGHT: 114.9 LBS | DIASTOLIC BLOOD PRESSURE: 66 MMHG | RESPIRATION RATE: 16 BRPM | HEIGHT: 62 IN | BODY MASS INDEX: 21.14 KG/M2 | HEART RATE: 84 BPM | SYSTOLIC BLOOD PRESSURE: 143 MMHG | TEMPERATURE: 97 F | OXYGEN SATURATION: 96 %

## 2019-04-09 PROBLEM — R09.02 HYPOXEMIA: Status: RESOLVED | Noted: 2019-04-05 | Resolved: 2019-04-09

## 2019-04-09 LAB
BACTERIA SPEC AEROBE CULT: NORMAL
BACTERIA SPEC AEROBE CULT: NORMAL

## 2019-04-09 PROCEDURE — 94799 UNLISTED PULMONARY SVC/PX: CPT

## 2019-04-09 PROCEDURE — 97165 OT EVAL LOW COMPLEX 30 MIN: CPT

## 2019-04-09 PROCEDURE — 97110 THERAPEUTIC EXERCISES: CPT

## 2019-04-09 PROCEDURE — 97535 SELF CARE MNGMENT TRAINING: CPT

## 2019-04-09 RX ORDER — CHOLECALCIFEROL (VITAMIN D3) 125 MCG
5 CAPSULE ORAL NIGHTLY PRN
Start: 2019-04-09 | End: 2019-10-02

## 2019-04-09 RX ORDER — AMOXICILLIN 875 MG/1
875 TABLET, COATED ORAL EVERY 12 HOURS SCHEDULED
Status: DISCONTINUED | OUTPATIENT
Start: 2019-04-09 | End: 2019-04-09 | Stop reason: HOSPADM

## 2019-04-09 RX ORDER — AMOXICILLIN 875 MG/1
875 TABLET, COATED ORAL EVERY 12 HOURS SCHEDULED
Qty: 4 TABLET | Refills: 0
Start: 2019-04-09 | End: 2019-04-11

## 2019-04-09 RX ORDER — DOXYCYCLINE 100 MG/1
100 CAPSULE ORAL EVERY 12 HOURS SCHEDULED
Qty: 4 CAPSULE | Refills: 0
Start: 2019-04-09 | End: 2019-04-11

## 2019-04-09 RX ORDER — CALCIUM CARBONATE 200(500)MG
2 TABLET,CHEWABLE ORAL 2 TIMES DAILY PRN
Start: 2019-04-09

## 2019-04-09 RX ORDER — GUAIFENESIN/DEXTROMETHORPHAN 100-10MG/5
5 SYRUP ORAL EVERY 4 HOURS PRN
Start: 2019-04-09 | End: 2021-03-09

## 2019-04-09 RX ORDER — IPRATROPIUM BROMIDE AND ALBUTEROL SULFATE 2.5; .5 MG/3ML; MG/3ML
3 SOLUTION RESPIRATORY (INHALATION) 3 TIMES DAILY PRN
Qty: 360 ML
Start: 2019-04-09 | End: 2019-10-02

## 2019-04-09 RX ADMIN — SODIUM CHLORIDE, PRESERVATIVE FREE 3 ML: 5 INJECTION INTRAVENOUS at 08:34

## 2019-04-09 RX ADMIN — DOXYCYCLINE 100 MG: 100 CAPSULE ORAL at 08:33

## 2019-04-09 RX ADMIN — ASPIRIN 81 MG: 81 TABLET, CHEWABLE ORAL at 08:34

## 2019-04-09 RX ADMIN — FUROSEMIDE 20 MG: 20 TABLET ORAL at 08:33

## 2019-04-09 RX ADMIN — POTASSIUM CHLORIDE 20 MEQ: 750 CAPSULE, EXTENDED RELEASE ORAL at 08:33

## 2019-04-09 RX ADMIN — IPRATROPIUM BROMIDE AND ALBUTEROL SULFATE 3 ML: 2.5; .5 SOLUTION RESPIRATORY (INHALATION) at 07:50

## 2019-04-09 RX ADMIN — APIXABAN 2.5 MG: 2.5 TABLET, FILM COATED ORAL at 08:34

## 2019-04-09 RX ADMIN — DULOXETINE HYDROCHLORIDE 60 MG: 60 CAPSULE, DELAYED RELEASE ORAL at 08:34

## 2019-04-09 RX ADMIN — SODIUM CHLORIDE 4 ML: 7 NEBU SOLN,3 % NEBU at 07:50

## 2019-04-09 RX ADMIN — PANTOPRAZOLE SODIUM 40 MG: 40 TABLET, DELAYED RELEASE ORAL at 07:34

## 2019-04-09 RX ADMIN — AMOXICILLIN 750 MG: 250 CAPSULE ORAL at 08:34

## 2019-04-09 RX ADMIN — METOPROLOL TARTRATE 50 MG: 50 TABLET, FILM COATED ORAL at 08:33

## 2019-04-09 RX ADMIN — DIGOXIN 125 MCG: 125 TABLET ORAL at 12:50

## 2019-04-09 NOTE — THERAPY TREATMENT NOTE
Acute Care - Physical Therapy Treatment Note  [facility]     Patient Name: Alexa Peaec  : 1927  MRN: [id number]  Today's Date: 2019  Onset of Illness/Injury or Date of Surgery: 19  Date of Referral to PT: 19  Referring Physician: Dr. Moe    Admit Date: 2019    Visit Dx:    ICD-10-CM ICD-9-CM   1. Pneumonia of right lower lobe due to infectious organism (CMS/HCC) J18.1 486   2. Fever, unspecified fever cause R50.9 780.60   3. Difficulty walking R26.2 719.7     Patient Active Problem List   Diagnosis   • Bad memory   • Depression   • Diverticulosis of intestine   • Hyperlipidemia   • Macular degeneration   • Neuropathy, peripheral   • Osteopenia   • Atopic rhinitis   • Urge incontinence of urine   • Atherosclerosis of native coronary artery   • Essential hypertension   • Gastroesophageal reflux disease without esophagitis   • Impaired glucose tolerance   • History of coronary artery stent placement   • Chronic bilateral low back pain   • Acquired scoliosis   • Spinal stenosis of lumbar region with neurogenic claudication   • PAC (premature atrial contraction)   • Slow transit constipation   • Paroxysmal atrial fibrillation (CMS/HCC)   • Pleural effusion on left   • Valvular heart disease   • Non-rheumatic mitral regurgitation   • Pneumonia of right lower lobe due to infectious organism (CMS/HCC)   • Localized edema   • Hypoxemia   • Sepsis due to pneumonia (CMS/HCC)   • Chronic anticoagulation   • DNR (do not resuscitate)   • Chronic diastolic CHF (congestive heart failure) (CMS/HCC)       Therapy Treatment    Rehabilitation Treatment Summary     Row Name 19 1030             Treatment Time/Intention    Discipline  physical therapist  -EE      Document Type  therapy note (daily note)  -EE      Subjective Information  no complaints  -EE      Mode of Treatment  physical therapy;individual therapy  -EE      Patient/Family Observations  Pt sitting up in chair in no acute distress;  family at bedside.  -EE      Patient Effort  good  -EE      Existing Precautions/Restrictions  fall  -EE      Recorded by [EE] Ana Saenz, PT 04/09/19 1047      Row Name 04/09/19 1030             Cognitive Assessment/Intervention- PT/OT    Orientation Status (Cognition)  oriented x 4  -EE      Follows Commands (Cognition)  follows two step commands  -EE      Personal Safety Interventions  fall prevention program maintained;gait belt;muscle strengthening facilitated;nonskid shoes/slippers when out of bed;supervised activity  -EE      Recorded by [EE] Ana Saenz, PT 04/09/19 1047      Row Name 04/09/19 1030             Safety Issues, Functional Mobility    Impairments Affecting Function (Mobility)  balance;endurance/activity tolerance  -EE      Recorded by [EE] Ana Saenz, PT 04/09/19 1047      Row Name 04/09/19 1030             Bed Mobility Assessment/Treatment    Comment (Bed Mobility)  NT, up in chair  -EE      Recorded by [EE] Ana Saenz, PT 04/09/19 1047      Row Name 04/09/19 1030             Sit-Stand Transfer    Sit-Stand [place] (Transfers)  contact guard;stand by assist  -EE      Assistive Device (Sit-Stand Transfers)  walker, front-wheeled  -EE      Recorded by [EE] Ana Saenz, PT 04/09/19 1047      Row Name 04/09/19 1030             Stand-Sit Transfer    Stand-Sit [place] (Transfers)  stand by assist  -EE      Recorded by [EE] Ana Saenz, PT 04/09/19 1047      Row Name 04/09/19 1030             Gait/Stairs Assessment/Training    [place] Level (Gait)  contact guard;stand by assist;verbal cues  -EE      Assistive Device (Gait)  walker, front-wheeled  -EE      Distance in Feet (Gait)  300  -EE      Pattern (Gait)  step-through  -EE      Deviations/Abnormal Patterns (Gait)  fito decreased;stride length decreased  -EE      Bilateral Gait Deviations  forward flexed posture  -EE      Comment (Gait/Stairs)  verbal cues to stay close to walker and for upright posture  -EE      Recorded  by [EE] Ana Saenz, PT 04/09/19 1047      Row Name 04/09/19 1030             Positioning and Restraints    Pre-Treatment Position  sitting in chair/recliner  -EE      Post Treatment Position  chair  -EE      In Chair  sitting;call light within reach;encouraged to call for assist;with family/caregiver  -EE      Recorded by [EE] Ana Saenz, PT 04/09/19 1047      Row Name 04/09/19 1030             Pain Scale: Numbers Pre/Post-Treatment    Pain Scale: Numbers, Pretreatment  0/10 - no pain  -EE      Recorded by [EE] Ana Saenz, PT 04/09/19 1047        User Key  (r) = Recorded By, (t) = Taken By, (c) = Cosigned By    Initials Name Effective Dates Discipline    EE Ana Saenz, PT 04/03/18 -  PT               Rehab Goal Summary     Row Name 04/09/19 0857             Occupational Therapy Goals    Transfer Goal Selection (OT)  transfer, OT goal 1  -SK      Bathing Goal Selection (OT)  bathing, OT goal 1  -SK      Endurance Goal Selection (OT)  endurance, OT goal 1  -SK         Transfer Goal 1 (OT)    Activity/Assistive Device (Transfer Goal 1, OT)  shower chair  -SK      [place] Level/Cues Needed (Transfer Goal 1, OT)  supervision required  -SK      Time Frame (Transfer Goal 1, OT)  1 week  -SK      Progress/Outcome (Transfer Goal 1, OT)  goal ongoing  -SK         Bathing Goal 1 (OT)    Activity/Assistive Device (Bathing Goal 1, OT)  bathing skills, all;shower chair  -SK      [place] Level/Cues Needed (Bathing Goal 1, OT)  supervision required  -SK      Time Frame (Bathing Goal 1, OT)  1 week  -SK      Progress/Outcomes (Bathing Goal 1, OT)  goal ongoing  -SK          Endurance Goal 1 (OT)    Endurance Goal 1 (OT)  Pt will tolerate 5 min upright activity during ADLs to demonstrate increased endurance   -SK      Activity Level (Endurance Goal 1, OT)  endurance 2 fair +  -SK      Time Frame (Endurance Goal 1, OT)  1 week  -SK      Progress/Outcome (Endurance Goal 1, OT)  goal ongoing  -SK        User Key  (r) =  Recorded By, (t) = Taken By, (c) = Cosigned By    Initials Name Provider Type Discipline    Zenaida Ramos OT Occupational Therapist OT          Physical Therapy Education     Title: PT OT SLP Therapies (In Progress)     Topic: Physical Therapy (Done)     Point: Mobility training (Done)     Learning Progress Summary           Patient Acceptance, E,TB, VU,NR by  at 4/9/2019 10:48 AM    Acceptance, E,D, VU by  at 4/8/2019  4:12 PM    Acceptance, E,TB, VU by MS at 4/8/2019 12:25 PM    Acceptance, E, NR by SI at 4/7/2019  1:21 PM    Comment:  upright posture and don't get RWX too far out in front of her    Acceptance, E,TB, VU by MS at 4/7/2019 11:49 AM    Acceptance, E, NR by SI at 4/6/2019  3:10 PM    Comment:  upright posture with gait    Acceptance, E,D, VU,NR by MS1 at 4/5/2019  4:26 PM                   Point: Home exercise program (Done)     Learning Progress Summary           Patient Acceptance, E,D, VU by  at 4/8/2019  4:12 PM    Acceptance, E, NR by SI at 4/6/2019  3:10 PM    Comment:  upright posture with gait    Acceptance, E,D, VU,NR by MS1 at 4/5/2019  4:26 PM                   Point: Body mechanics (Done)     Learning Progress Summary           Patient Acceptance, E,TB, VU,NR by  at 4/9/2019 10:48 AM    Acceptance, E,D, VU by  at 4/8/2019  4:12 PM    Acceptance, E,TB, VU by MS at 4/8/2019 12:25 PM    Acceptance, E,TB, VU by MS at 4/7/2019 11:49 AM    Acceptance, E, NR by SI at 4/6/2019  3:10 PM    Comment:  upright posture with gait    Acceptance, E,D, VU,NR by MS1 at 4/5/2019  4:26 PM                   Point: Precautions (Done)     Learning Progress Summary           Patient Acceptance, E,D, VU by  at 4/8/2019  4:12 PM    Acceptance, E,TB, VU by MS at 4/8/2019 12:25 PM    Acceptance, E,TB, VU by MS at 4/7/2019 11:49 AM    Acceptance, E, NR by SI at 4/6/2019  3:10 PM    Comment:  upright posture with gait    SHARON Moore D, EHSAN,NR by MS1 at 4/5/2019  4:26 PM                                User Key     Initials Effective Dates Name Provider Type Discipline    SI 05/18/15 -  Roxana Dickey, PTA Physical Therapy Assistant PT     04/03/18 -  Ana Saenz, PT Physical Therapist PT    MS 02/23/17 -  Sb Rodas, RN Registered Nurse Nurse    MS1 04/03/18 -  Jere Jean-Baptiste, PT Physical Therapist PT     08/19/18 -  Elaine Tracy PTA Physical Therapy Assistant PT                PT Recommendation and Plan     Plan of Care Reviewed With: patient  Progress: improving  Outcome Summary: Pt demonstrates steady progress with mobility. Able to tolerate increased gait distance today, requiring slightly less assistance with transfers and balance today. Continues to require occasional cues for safety during ambulation. No new PT concerns.  Outcome Measures     Row Name 04/09/19 1048 04/09/19 1022 04/09/19 0900       How much help from another person do you currently need...    Turning from your back to your side while in flat bed without using bedrails?  4  -EE  --  --    Moving from lying on back to sitting on the side of a flat bed without bedrails?  4  -EE  --  --    Moving to and from a bed to a chair (including a wheelchair)?  3  -EE  --  --    Standing up from a chair using your arms (e.g., wheelchair, bedside chair)?  3  -EE  --  --    Climbing 3-5 steps with a railing?  3  -EE  --  --    To walk in hospital room?  3  -EE  --  --    AM-PAC 6 Clicks Score  20  -EE  --  --       How much help from another is currently needed...    Putting on and taking off regular lower body clothing?  --  --  3  -SK    Bathing (including washing, rinsing, and drying)  --  --  3  -SK    Toileting (which includes using toilet bed pan or urinal)  --  --  3  -SK    Putting on and taking off regular upper body clothing  --  --  3  -SK    Taking care of personal grooming (such as brushing teeth)  --  --  4  -SK    Eating meals  --  --  4  -SK    Score  --  --  20  -SK       Functional Assessment    Outcome Measure Options   --  AM-PAC 6 Clicks Basic Mobility (PT)  -EE  AM-PAC 6 Clicks Daily Activity (OT)  -SK    Row Name 04/08/19 1600 04/07/19 1300 04/06/19 1500       How much help from another person do you currently need...    Turning from your back to your side while in flat bed without using bedrails?  4  -EH  4  -SI  4  -SI    Moving from lying on back to sitting on the side of a flat bed without bedrails?  3  -  3  -SI  3  -SI    Moving to and from a bed to a chair (including a wheelchair)?  3  -  3  -SI  3  -SI    Standing up from a chair using your arms (e.g., wheelchair, bedside chair)?  3  -  3  -SI  3  -SI    Climbing 3-5 steps with a railing?  3  -  3  -SI  3  -SI    To walk in hospital room?  3  -  3  -SI  3  -SI    AM-PAC 6 Clicks Score  19  -EH  19  -SI  19  -SI       Functional Assessment    Outcome Measure Options  --  AM-PAC 6 Clicks Basic Mobility (PT)  -SI  AM-PAC 6 Clicks Basic Mobility (PT)  -SI      User Key  (r) = Recorded By, (t) = Taken By, (c) = Cosigned By    Initials Name Provider Type    SI Roxana Dickey, PTA Physical Therapy Assistant    Ana Hollingsworth, PT Physical Therapist     Elaine Tracy, PTA Physical Therapy Assistant    SK Zenaida Donato, OT Occupational Therapist         Time Calculation:   PT Charges     Row Name 04/09/19 1049             Time Calculation    Start Time  1030  -EE      Stop Time  1044  -EE      Time Calculation (min)  14 min  -EE      PT Received On  04/09/19  -EE      PT - Next Appointment  04/10/19  -EE         Time Calculation- PT    Total Timed Code Minutes- PT  14 minute(s)  -EE        User Key  (r) = Recorded By, (t) = Taken By, (c) = Cosigned By    Initials Name Provider Type    Ana Hollingsworth, PT Physical Therapist        Therapy Charges for Today     Code Description Service Date Service Provider Modifiers Qty    39506980002 HC PT THER PROC EA 15 MIN 4/9/2019 Ana Saenz, PT GP 1          PT G-Codes  Outcome Measure Options: AM-PAC 6 Clicks Basic Mobility  (PT)  AM-PAC 6 Clicks Score: 20  Score: 20    Ana Saenz, PT  4/9/2019

## 2019-04-09 NOTE — PROGRESS NOTES
Continued Stay Note  [facility]     Patient Name: Alexa Peace  MRN: [id number]  Today's Date: 4/9/2019    Admit Date: 4/4/2019    Discharge Plan     Row Name 04/09/19 1144       Plan    Plan  Plan [facility] for skilled care- pre cert obtained.   YENNIFER Ramos RN    Patient/Family in Agreement with Plan  yes    Plan Comments  Per [facility]  ( [phone]) [facility] has a bed and can accept pt.  Pre cert has been obtained.  Plan [facility] for skilled care.   YENNIFER Ramos, RN        Discharge Codes    No documentation.             Antonieta Ramos, RN

## 2019-04-09 NOTE — PLAN OF CARE
Problem: Patient Care Overview  Goal: Plan of Care Review  Outcome: Ongoing (interventions implemented as appropriate)   04/09/19 0251   Coping/Psychosocial   Plan of Care Reviewed With patient   Plan of Care Review   Progress improving   OTHER   Outcome Summary pt resting in bed quietly. denies pain. denies soa. up with walker. will continue to monitor.      Goal: Individualization and Mutuality  Outcome: Ongoing (interventions implemented as appropriate)    Goal: Discharge Needs Assessment  Outcome: Ongoing (interventions implemented as appropriate)      Problem: Pneumonia (Adult)  Goal: Signs and Symptoms of Listed Potential Problems Will be Absent, Minimized or Managed (Pneumonia)  Outcome: Ongoing (interventions implemented as appropriate)      Problem: Fall Risk (Adult)  Goal: Absence of Fall  Outcome: Ongoing (interventions implemented as appropriate)      Problem: Pain, Chronic (Adult)  Goal: Acceptable Pain/Comfort Level and Functional Ability  Outcome: Ongoing (interventions implemented as appropriate)

## 2019-04-09 NOTE — PROGRESS NOTES
Case Management Discharge Note    Final Note: Pt discharged to [facility] for skilled care.  YENNIFER Ramos RN    Destination - Selection Complete      Service Provider Request Status Selected Services Address Phone Number Fax Number    [facility] [address] [id number] [phone]      Durable Medical Equipment      No service has been selected for the patient.      Dialysis/Infusion      No service has been selected for the patient.      Home Medical Care      No service has been selected for the patient.      Therapy      No service has been selected for the patient.      Community Resources      No service has been selected for the patient.        Transportation Services  Other: Other(Private Auto)    Final Discharge Disposition Code: 03 - skilled nursing facility (SNF)

## 2019-04-09 NOTE — PLAN OF CARE
Problem: Patient Care Overview  Goal: Plan of Care Review   04/09/19 0909   Coping/Psychosocial   Plan of Care Reviewed With patient   OTHER   Outcome Summary Pt pleasant 89 yo female admitted with PNA, demonstrating generalized weakness with ADLs. Pt lives at home with granddaughter, however is alone during the day Pt CGA for toilet transfer, min A with toileting due to assist needed with brief, CGA when standing sink-side to perform grooming activity. Pt req vcs for posture when walking/performing tranfers. Pt will benefit from skilled OT to address deficits, and either home with HH and assist at home or SNF for additional therapy before returning home

## 2019-04-09 NOTE — PLAN OF CARE
Problem: Patient Care Overview  Goal: Plan of Care Review  Outcome: Ongoing (interventions implemented as appropriate)   04/09/19 3141   Coping/Psychosocial   Plan of Care Reviewed With patient   Plan of Care Review   Progress improving   OTHER   Outcome Summary patient with dc orders. family to transport to facility. no acute distress noted at this time.     Goal: Individualization and Mutuality  Outcome: Ongoing (interventions implemented as appropriate)    Goal: Discharge Needs Assessment  Outcome: Ongoing (interventions implemented as appropriate)    Goal: Interprofessional Rounds/Family Conf  Outcome: Ongoing (interventions implemented as appropriate)      Problem: Pneumonia (Adult)  Goal: Signs and Symptoms of Listed Potential Problems Will be Absent, Minimized or Managed (Pneumonia)  Outcome: Ongoing (interventions implemented as appropriate)      Problem: Fall Risk (Adult)  Goal: Absence of Fall  Outcome: Ongoing (interventions implemented as appropriate)      Problem: Pain, Chronic (Adult)  Goal: Acceptable Pain/Comfort Level and Functional Ability  Outcome: Ongoing (interventions implemented as appropriate)

## 2019-04-09 NOTE — NURSING NOTE
Went over AVS with daughter and patient.   Daughter to transport patient to facility.  Transport to transport patient to discharge lobby.  No acute distress noted at this time.

## 2019-04-09 NOTE — PLAN OF CARE
Problem: Patient Care Overview  Goal: Plan of Care Review   04/09/19 1048   Coping/Psychosocial   Plan of Care Reviewed With patient   Plan of Care Review   Progress improving   OTHER   Outcome Summary Pt demonstrates steady progress with mobility. Able to tolerate increased gait distance today, requiring slightly less assistance with transfers and balance today. Continues to require occasional cues for safety during ambulation. No new PT concerns.

## 2019-04-09 NOTE — PROGRESS NOTES
Continued Stay Note  [facility]     Patient Name: Alexa Peace  MRN: [id number]  Today's Date: 4/9/2019    Admit Date: 4/4/2019    Discharge Plan     Row Name 04/09/19 1332       Plan    Plan  Plan [facility] for skilled care- pre cert obtained.  YENNIFER Ramos RN    Patient/Family in Agreement with Plan  yes    Plan Comments  Spoke to pt and daughter at bedside.  Pt has a bed and can be accepted at [facility] today.  Daughter states she will transport pt.  Discharge summary faxed to [facility].  Packet to RN.  Plan [facility] for skilled care.  YENNIFER Ramos RN    Row Name 04/09/19 8438       Plan    Plan  Plan [facility] for skilled care- pre cert obtained.   YENNIFER Ramos RN    Patient/Family in Agreement with Plan  yes    Plan Comments  Per [facility]  ( [phone]) [facility] has a bed and can accept pt.  Pre cert has been obtained.  Plan [facility] for skilled care.   YENNIFER Ramos RN        Discharge Codes    No documentation.       Expected Discharge Date and Time     Expected Discharge Date Expected Discharge Time    Apr 9, 2019             Antonieta Ramos, JUNI

## 2019-04-09 NOTE — DISCHARGE SUMMARY
"       NAME: Alexa Peace ADMIT: 2019   : 1927  PCP: Brian Jacobs MD    MRN: [id number] LOS: 5 days   AGE/SEX: 89 y.o. female  ROOM: E661/1     Date of Admission:  2019  Date of Discharge:  2019    PCP: Brian Jacobs MD    CHIEF COMPLAINT  Fever and Syncope      DISCHARGE DIAGNOSIS  Active Hospital Problems    Diagnosis  POA   • **Pneumonia of right lower lobe due to infectious organism (CMS/HCC) [J18.1]  Yes   • DNR (do not resuscitate) [Z66]  Yes   • Chronic diastolic CHF (congestive heart failure) (CMS/HCC) [I50.32]  Yes   • Localized edema [R60.0]  Yes   • Sepsis due to pneumonia (CMS/HCC) [J18.9, A41.9]  Yes   • Chronic anticoagulation [Z79.01]  Not Applicable   • Non-rheumatic mitral regurgitation [I34.0]  Yes   • Paroxysmal atrial fibrillation (CMS/HCC) [I48.0]  Yes   • Essential hypertension [I10]  Yes   • Gastroesophageal reflux disease without esophagitis [K21.9]  Yes      Resolved Hospital Problems    Diagnosis Date Resolved POA   • Shortness of breath [R06.02] 2019 Yes   • Hypoxemia [R09.02] 2019 Yes       SECONDARY DIAGNOSES  Past Medical History:   Diagnosis Date   • Anticoagulated     PLAVIX   • Arthritis    • Atrial premature complex    • CAD (coronary artery disease)    • Cancer (CMS/HCC)     skin   • Colon polyp    • Depression    • GERD (gastroesophageal reflux disease)    • Heart attack (CMS/HCC)    • Hyperlipidemia    • Hypertension    • Macular degeneration    • New onset atrial fibrillation (CMS/HCC) 2019   • Pre-diabetes    • Spinal stenosis          HOSPITAL COURSE  Ms. Peace is a 89 y.o. non-smoker with a history of HTN and Afib on eliquis that presents to [facility] complaining of a tight, non-productive cough and shortness of breath for the past couple days along with a fever. She came to [facility] ER and was found to have PNA. She was started on broad spectrum abx for \"HCAP\" though this was probably a little overkill. Abx were " deescalated. She had improvement in her symptoms and by 4/9 was ready for discharge to subacute rehab with 2 more days of po abx. Her chronic hydrocodone and gabapentin have been stopped after discussion with patient and family they were probably causing more side effects than helping.    DIAGNOSTICS    Duplex Venous Lower Extremity - Bilateral CAR [[id number]] Reviewed: 04/05/19 1837   Order Status: Completed Collected: 04/05/19 1000    Updated: 04/05/19 1349    Right Common Femoral Spont Y    Right Common Femoral Phasic Y    Right Common Femoral Augment Y    Right Common Femoral Competent Y    Right Common Femoral Compress C    Right Saphenofemoral Junction Spont Y    Right Saphenofemoral Junction Phasic Y    Right Saphenofemoral Junction Augment Y    Right Saphenofemoral Junction Competent Y    Right Saphenofemoral Junction Compress C    Right Proximal Femoral Compress C    Right Mid Femoral Spont Y    Right Mid Femoral Phasic Y    Right Mid Femoral Augment Y    Right Mid Femoral Competent Y    Right Mid Femoral Compress C    Right Distal Femoral Compress C    Right Popliteal Spont Y    Right Popliteal Phasic Y    Right Popliteal Augment Y    Right Popliteal Competent Y    Right Popliteal Compress C    Right Posterior Tibial Compress C    Right Peroneal Compress C    Right GastronemiusSoleal Compress C    Right Greater Saph AK Compress C    Right Greater Saph BK Compress C    Left Common Femoral Spont Y    Left Common Femoral Phasic Y    Left Common Femoral Augment Y    Left Common Femoral Competent Y    Left Common Femoral Compress C    Left Saphenofemoral Junction Spont Y    Left Saphenofemoral Junction Phasic Y    Left Saphenofemoral Junction Augment Y    Left Saphenofemoral Junction Competent Y    Left Saphenofemoral Junction Compress C    Left Proximal Femoral Compress C    Left Mid Femoral Spont Y    Left Mid Femoral Phasic Y    Left Mid Femoral Augment Y    Left Mid Femoral Competent Y    Left Mid Femoral  Compress C    Left Distal Femoral Compress C    Left Popliteal Spont Y    Left Popliteal Phasic Y    Left Popliteal Augment Y    Left Popliteal Competent Y    Left Popliteal Compress C    Left Posterior Tibial Compress C    Left Peroneal Compress C    Left GastronemiusSoleal Compress C    Left Greater Saph AK Compress C    Left Greater Saph BK Compress C   Narrative:     · Normal bilateral lower extremity venous duplex scan.      XR Chest 2 View [[id number]] Humberto as Reviewed   Order Status: Completed Collected: 04/04/19 2104    Updated: 04/04/19 2111   Narrative:     Chest radiograph     HISTORY:Cough     TECHNIQUE: Two AP and lateral radiographs     COMPARISON:Chest radiograph 01/11/2019     FINDINGS:  The lungs are hypoinflated. Subtle asymmetric pulmonary opacification is  present within the right lower lung. Pulmonary vascular congestion is  present. There is no pleural effusion. No pneumothorax is seen. The  heart size accentuated by low lung volumes but appears mildly enlarged.      Impression:     1.  Vascular congestion with subtle asymmetric pulmonary opacification  within the right base. Primary differential considerations include  asymmetric pulmonary edema versus early right lower lung pneumonia and  correlation with patient history is recommended.  2.  Probable mild cardiomegaly.        Collected  Updated  Procedure  Result Status      04/07/2019 0644  04/07/2019 0800  Digoxin Level [[id number]]   (Abnormal)   Blood     Final result  Digoxin <0.30 Abnormally low  ng/mL          04/06/2019 0730  04/06/2019 0854  Basic Metabolic Panel [[id number]]    (Abnormal)   Blood     Final result  Glucose 123 Abnormally high  mg/dL   BUN 9 mg/dL   Creatinine 0.59 mg/dL   Sodium 143 mmol/L   Potassium 4.0 mmol/L   Chloride 106 mmol/L   CO2 24.6 mmol/L   Calcium 8.8 mg/dL   eGFR Non African Am 96 mL/min/1.73   BUN/Creatinine Ratio 15.3    Anion Gap 12.4 mmol/L          04/06/2019 0730  04/06/2019 0858  Ferritin  [[id number]]   (Abnormal)   Blood     Final result  Ferritin 243.00 Abnormally high  ng/mL          04/06/2019 0730  04/06/2019 0846  Folate [[id number]]   Blood     Final result  Folate >20.00 ng/mL          04/06/2019 0730 04/06/2019 0854  Iron Profile [[id number]]   (Abnormal)   Blood     Final result  Iron 38 mcg/dL   Iron Saturation 15 Abnormally low  %   Transferrin 174 Abnormally low  mg/dL   TIBC 259 Abnormally low  mcg/dL          04/06/2019 0730 04/06/2019 0846  Vitamin B12 [[id number]]   (Abnormal)   Blood     Final result  Vitamin B-12 1,370 Abnormally high  pg/mL          04/06/2019 0730 04/06/2019 0850  CBC Auto Differential [[id number]]   (Abnormal)   Blood     Final result  WBC 6.07 10*3/mm3   RBC 3.14 Abnormally low  10*6/mm3   Hemoglobin 10.7 Abnormally low  g/dL   Hematocrit 34.7 %   .5 Abnormally high  fL   MCH 34.1 Abnormally high  pg   MCHC 30.8 Abnormally low  g/dL   RDW 14.6 %   RDW-SD 58.4 Abnormally high  fl   MPV 11.1 fL   Platelets 164 10*3/mm3            PHYSICAL EXAM  Objective    Alert  nad  No resp distress  Lungs fairly clear    CONDITION ON DISCHARGE  Stable.      DISCHARGE DISPOSITION   Skilled Nursing Facility (DC - External)      DISCHARGE MEDICATIONS       Your medication list      START taking these medications      Instructions Last Dose Given Next Dose Due   amoxicillin 875 MG tablet  Commonly known as:  AMOXIL      Take 1 tablet by mouth Every 12 (Twelve) Hours for 4 doses.       calcium carbonate 500 MG chewable tablet  Commonly known as:  TUMS      Chew 1,000 mg 2 (Two) Times a Day As Needed for Indigestion or Heartburn.       doxycycline 100 MG capsule  Commonly known as:  MONODOX      Take 1 capsule by mouth Every 12 (Twelve) Hours for 4 doses.       guaifenesin-dextromethorphan 100-10 MG/5ML syrup  Commonly known as:  ROBITUSSIN DM      Take 5 mL by mouth Every 4 (Four) Hours As Needed for Cough.       ipratropium-albuterol 0.5-2.5 mg/3 ml nebulizer  Commonly known  as:  DUO-NEB      Take 3 mL by nebulization 3 (Three) Times a Day As Needed for Wheezing or Shortness of Air.       melatonin 5 MG tablet tablet      Take 1 tablet by mouth At Night As Needed (insomnia).          CHANGE how you take these medications      Instructions Last Dose Given Next Dose Due   polyethylene glycol pack packet  Commonly known as:  MIRALAX  What changed:    · when to take this  · reasons to take this      Take 17 g by mouth Daily.          CONTINUE taking these medications      Instructions Last Dose Given Next Dose Due   acetaminophen 500 MG tablet  Commonly known as:  TYLENOL      Take 500-1,000 mg by mouth Every Evening. PRN       apixaban 2.5 MG tablet tablet  Commonly known as:  ELIQUIS      Take 1 tablet by mouth every 12 (Twelve) hours.       aspirin 81 MG chewable tablet      Chew 81 mg Daily.       CENTRUM SILVER tablet      Take 1 tablet by mouth Daily.       digoxin 125 MCG tablet  Commonly known as:  LANOXIN      Take 1 tablet by mouth daily.       DULoxetine 60 MG capsule  Commonly known as:  CYMBALTA      Take 1 capsule by mouth Daily.       furosemide 20 MG tablet  Commonly known as:  LASIX      Take 1 tablet by mouth daily.       metoprolol tartrate 50 MG tablet  Commonly known as:  LOPRESSOR      Take 1 tablet by mouth every 12 (Twelve) hours.       omeprazole 20 MG capsule  Commonly known as:  priLOSEC      Take 1 capsule by mouth Daily With Breakfast.       potassium chloride 10 MEQ CR capsule  Commonly known as:  MICRO-K      Take 2 capsules by mouth daily.       pravastatin 40 MG tablet  Commonly known as:  PRAVACHOL      Take 40 mg by mouth Daily.       Vitamin D 2000 units tablet      Take 2,000 Units by mouth Daily.          STOP taking these medications    gabapentin 100 MG capsule  Commonly known as:  NEURONTIN        HYDROcodone-acetaminophen 7.5-325 MG per tablet  Commonly known as:  NORCO              Where to Get Your Medications      Information about where to get  these medications is not yet available    Ask your nurse or doctor about these medications  · amoxicillin 875 MG tablet  · calcium carbonate 500 MG chewable tablet  · doxycycline 100 MG capsule  · guaifenesin-dextromethorphan 100-10 MG/5ML syrup  · ipratropium-albuterol 0.5-2.5 mg/3 ml nebulizer  · melatonin 5 MG tablet tablet          Future Appointments   Date Time Provider Department Center   9/18/2019  1:00 PM Go Kim MD [facility] None     Additional Instructions for the Follow-ups that You Need to Schedule     Discharge Follow-up with PCP   As directed       Currently Documented PCP:    Brian Jacobs MD    PCP Phone Number:    [phone]     Follow Up Details:  after dc from rehab           Follow-up Information     Brian Jacobs MD .    Specialty:  Internal Medicine  Why:  after dc from rehab  Contact information:  [address] [facility] [address]  [phone]                   TEST  RESULTS PENDING AT DISCHARGE   Order Current Status    Blood Culture - Blood, Arm, Left Preliminary result    Blood Culture - Blood, Arm, Right Preliminary result             Vimal Moe MD  [facility]  04/09/19  1:05 PM      Time: greater than 32 minutes on discharge  It was a pleasure taking care of this patient while in the hospital.

## 2019-04-09 NOTE — THERAPY EVALUATION
Acute Care - Occupational Therapy Initial Evaluation  [facility]     Patient Name: Alexa Peace  : 1927  MRN: [id number]  Today's Date: 2019  Onset of Illness/Injury or Date of Surgery: 19  Date of Referral to OT: 19  Referring Physician: Dr. Moe    Admit Date: 2019       ICD-10-CM ICD-9-CM   1. Pneumonia of right lower lobe due to infectious organism (CMS/HCC) J18.1 486   2. Fever, unspecified fever cause R50.9 780.60   3. Difficulty walking R26.2 719.7     Patient Active Problem List   Diagnosis   • Bad memory   • Depression   • Diverticulosis of intestine   • Hyperlipidemia   • Macular degeneration   • Neuropathy, peripheral   • Osteopenia   • Atopic rhinitis   • Urge incontinence of urine   • Atherosclerosis of native coronary artery   • Essential hypertension   • Gastroesophageal reflux disease without esophagitis   • Impaired glucose tolerance   • History of coronary artery stent placement   • Chronic bilateral low back pain   • Acquired scoliosis   • Spinal stenosis of lumbar region with neurogenic claudication   • PAC (premature atrial contraction)   • Slow transit constipation   • Paroxysmal atrial fibrillation (CMS/HCC)   • Pleural effusion on left   • Valvular heart disease   • Non-rheumatic mitral regurgitation   • Pneumonia of right lower lobe due to infectious organism (CMS/HCC)   • Localized edema   • Hypoxemia   • Sepsis due to pneumonia (CMS/HCC)   • Chronic anticoagulation   • DNR (do not resuscitate)   • Chronic diastolic CHF (congestive heart failure) (CMS/HCC)     Past Medical History:   Diagnosis Date   • Anticoagulated     PLAVIX   • Arthritis    • Atrial premature complex    • CAD (coronary artery disease)    • Cancer (CMS/HCC)     skin   • Colon polyp    • Depression    • GERD (gastroesophageal reflux disease)    • Heart attack (CMS/HCC)    • Hyperlipidemia    • Hypertension    • Macular degeneration    • New onset atrial fibrillation (CMS/HCC) 2019    • Pre-diabetes    • Spinal stenosis      Past Surgical History:   Procedure Laterality Date   • ADENOIDECTOMY     • CATARACT EXTRACTION     • CORONARY ANGIOPLASTY WITH STENT PLACEMENT     • EPIDURAL BLOCK     • LUMBAR DISCECTOMY FUSION INSTRUMENTATION N/A 12/7/2017    Procedure: L2-3, L3-4, L4-5 laminectomy and fusion with local bone graft;  Surgeon: Andre Layne MD;  Location: [facility] OR;  Service:    • TONSILECTOMY, ADENOIDECTOMY, BILATERAL MYRINGOTOMY AND TUBES     • TONSILLECTOMY            OT ASSESSMENT FLOWSHEET (last 12 hours)      Occupational Therapy Evaluation     Row Name 04/09/19 0857                   OT Evaluation Time/Intention    Subjective Information  no complaints  -SK        Document Type  evaluation  -SK        Mode of Treatment  occupational therapy  -SK        Patient Effort  good  -SK        Symptoms Noted During/After Treatment  none  -SK           General Information    Patient Profile Reviewed?  yes  -SK        Onset of Illness/Injury or Date of Surgery  04/04/19  -SK        Referring Physician  Dr. Moe  -SK        Patient Observations  alert;cooperative;agree to therapy  -SK        Patient/Family Observations  Pt sitting up in chair when OT arrived, no acute signs of disterss   -SK        General Observations of Patient  Pt pleasant 89 yo female, lives with granddaughter but home alone during day.  Pt CGA/SBA for transfers to toilet and sit/stand, supervison to perform grooming at sink, set-up for UBD, Min A for LBD (assist needed with underwear in back)  -SK        Prior Level of Function  independent:  -SK        Equipment Currently Used at Home  grab bar;shower chair;walker, rolling  -SK        Existing Precautions/Restrictions  fall  -SK        Risks Reviewed  patient:  -SK        Benefits Reviewed  patient:  -SK        Barriers to Rehab  none identified  -SK           Cognitive Assessment/Intervention- PT/OT    Orientation Status (Cognition)  oriented x 4  -SK         Follows Commands (Cognition)  follows one step commands;follows two step commands;over 90% accuracy  -SK           Bed Mobility Assessment/Treatment    Bed Mobility Assessment/Treatment  supine-sit;sit-supine  -SK           Functional Mobility    Functional Mobility- Ind. Level  contact guard assist  -SK        Functional Mobility- Device  rolling walker  -SK        Functional Mobility-Distance (Feet)  20  -SK        Functional Mobility- Safety Issues  step length decreased  -SK        Functional Mobility- Comment  Walk to and from bathroom   -SK           Transfer Assessment/Treatment    Transfer Assessment/Treatment  toilet transfer;sit-stand transfer;stand-sit transfer  -SK           Sit-Stand Transfer    Sit-Stand [place] (Transfers)  contact guard  -SK        Assistive Device (Sit-Stand Transfers)  walker, front-wheeled  -SK           Stand-Sit Transfer    Stand-Sit [place] (Transfers)  contact guard  -SK        Assistive Device (Stand-Sit Transfers)  walker, front-wheeled  -SK           Toilet Transfer    Type (Toilet Transfer)  sit-stand;stand-sit  -SK        [place] Level (Toilet Transfer)  contact guard  -SK        Assistive Device (Toilet Transfer)  walker, front-wheeled  -SK           ADL Assessment/Intervention    BADL Assessment/Intervention  bathing;upper body dressing;lower body dressing;grooming;toileting  -SK           Lower Body Dressing Assessment/Training    Lower Body Dressing [place] Level  socks;doff;don;set up;supervision  -SK        Lower Body Dressing Position  unsupported sitting  -SK        Comment (Lower Body Dressing)  sitting in chair   -SK           Grooming Assessment/Training    [place] Level (Grooming)  oral care regimen;wash face, hands;set up  -SK        Grooming Position  supported standing  -SK        Comment (Grooming)  standing at sink, one UE used to support self on sink, pt bent over posutre during task  -SK           Toileting Assessment/Training     [place] Level (Toileting)  toileting skills;adjust/manage clothing;perform perineal hygiene;minimum assist (75% patient effort) assist for brief management   -SK        Assistive Devices (Toileting)  grab bar/safety frame  -SK        Toileting Position  unsupported sitting  -SK           General ROM    GENERAL ROM COMMENTS  WFL  -SK           MMT (Manual Muscle Testing)    General MMT Comments  Grossly 3+/4-/5  -SK           Motor Assessment/Interventions    Additional Documentation  Balance (Group);Balance Interventions (Group);Therapeutic Exercise (Group);Therapeutic Exercise Interventions (Group)  -SK           Balance    Balance  static standing balance  -SK           Static Standing Balance    Level of [place] (Supported Standing, Static Balance)  standby assist  -SK        Time Able to Maintain Position (Supported Standing, Static Balance)  3 to 4 minutes  -SK        Assistive Device Utilized (Supported Standing, Static Balance)  walker, rolling  -SK        Comment (Supported Standing, Static Balance)  stading sink-side   -SK           Sensory Assessment/Intervention    Sensory General Assessment  no sensation deficits identified  -SK           Positioning and Restraints    Pre-Treatment Position  sitting in chair/recliner  -SK        Post Treatment Position  chair  -SK        In Chair  call light within reach;encouraged to call for assist;exit alarm on chair alarm on   -SK           Pain Scale: Numbers Pre/Post-Treatment    Pain Scale: Numbers, Pretreatment  0/10 - no pain  -SK        Pain Scale: Numbers, Post-Treatment  0/10 - no pain  -SK           Plan of Care Review    Plan of Care Reviewed With  patient  -SK           Clinical Impression (OT)    Date of Referral to OT  04/08/19  -SK        OT Diagnosis  Generalized weakness due to PNA  -SK        Functional Level at Time of Evaluation (OT Eval)  CGA/Min A for all ADLs  -SK        Patient/Family Goals Statement (OT Eval)  To return home  -SK         Criteria for Skilled Therapeutic Interventions Met (OT Eval)  yes  -SK        Rehab Potential (OT Eval)  good, to achieve stated therapy goals  -SK        Therapy Frequency (OT Eval)  5 times/wk  -SK        Care Plan Review (OT)  evaluation/treatment results reviewed;care plan/treatment goals reviewed;patient/other agree to care plan  -SK        Anticipated Discharge Disposition (OT)  home with assist;home with home health;skilled nursing facility Depending on Pt progress and assist level during the day   -SK           Planned OT Interventions    Planned Therapy Interventions (OT Eval)  activity tolerance training;BADL retraining;strengthening exercise;transfer/mobility retraining  -SK           OT Goals    Transfer Goal Selection (OT)  transfer, OT goal 1  -SK        Bathing Goal Selection (OT)  bathing, OT goal 1  -SK        Endurance Goal Selection (OT)  endurance, OT goal 1  -SK        Additional Documentation  Endurance Goal Selection (OT) (Row)  -SK           Transfer Goal 1 (OT)    Activity/Assistive Device (Transfer Goal 1, OT)  shower chair  -SK        [place] Level/Cues Needed (Transfer Goal 1, OT)  supervision required  -SK        Time Frame (Transfer Goal 1, OT)  1 week  -SK        Progress/Outcome (Transfer Goal 1, OT)  goal ongoing  -SK           Bathing Goal 1 (OT)    Activity/Assistive Device (Bathing Goal 1, OT)  bathing skills, all;shower chair  -SK        [place] Level/Cues Needed (Bathing Goal 1, OT)  supervision required  -SK        Time Frame (Bathing Goal 1, OT)  1 week  -SK        Progress/Outcomes (Bathing Goal 1, OT)  goal ongoing  -SK            Endurance Goal 1 (OT)    Endurance Goal 1 (OT)  Pt will tolerate 5 min upright activity during ADLs to demonstrate increased endurance   -SK        Activity Level (Endurance Goal 1, OT)  endurance 2 fair +  -SK        Time Frame (Endurance Goal 1, OT)  1 week  -SK        Progress/Outcome (Endurance Goal 1, OT)  goal ongoing  -SK           User Key  (r) = Recorded By, (t) = Taken By, (c) = Cosigned By    Initials Name Effective Dates    SK Zenaida Donato OT 02/25/19 -          Occupational Therapy Education     Title: PT OT SLP Therapies (In Progress)     Topic: Occupational Therapy (In Progress)     Point: ADL training (Done)     Description: Instruct learner(s) on proper safety adaptation and remediation techniques during self care or transfers.   Instruct in proper use of assistive devices.    Learning Progress Summary           Patient Acceptance, E, VU,DU by SK at 4/9/2019  9:08 AM    Comment:  Discussed body mechanics when sink side and walking, to hold shoulders/head up, discussed safety with ADLS                   Point: Body mechanics (Done)     Description: Instruct learner(s) on proper positioning and spine alignment during self-care, functional mobility activities and/or exercises.    Learning Progress Summary           Patient Acceptance, E, VU,DU by SK at 4/9/2019  9:08 AM    Comment:  Discussed body mechanics when sink side and walking, to hold shoulders/head up, discussed safety with ADLS                               User Key     Initials Effective Dates Name Provider Type Discipline    SK 02/25/19 -  Zenaida Donato OT Occupational Therapist OT                  OT Recommendation and Plan  Outcome Summary/Treatment Plan (OT)  Anticipated Discharge Disposition (OT): home with assist, home with home health, skilled nursing facility(depending on level of assist at home )  Planned Therapy Interventions (OT Eval): activity tolerance training, BADL retraining, strengthening exercise, transfer/mobility retraining  Therapy Frequency (OT Eval): 5 times/wk  Plan of Care Review  Plan of Care Reviewed With: patient  Plan of Care Reviewed With: patient  Outcome Summary: Pt pleasant 89 yo female admitted with PNA, demonstrating generalized weakness with ADLs.  Pt lives at home with granddaughter, however is alone during the day  Pt CGA for  toilet transfer, min A with toileting due to assist needed with brief, CGA when standing sink-side to perform grooming activity.  Pt req vcs for posture when walking/performing tranfers.  Pt will benefit from skilled OT to address deficits, and either home with HH and assist at home or SNF for additional therapy before returning home     Outcome Measures     Row Name 04/09/19 0900 04/08/19 1600 04/07/19 1300       How much help from another person do you currently need...    Turning from your back to your side while in flat bed without using bedrails?  --  4  -EH  4  -SI    Moving from lying on back to sitting on the side of a flat bed without bedrails?  --  3  -EH  3  -SI    Moving to and from a bed to a chair (including a wheelchair)?  --  3  -EH  3  -SI    Standing up from a chair using your arms (e.g., wheelchair, bedside chair)?  --  3  -EH  3  -SI    Climbing 3-5 steps with a railing?  --  3  -EH  3  -SI    To walk in hospital room?  --  3  -EH  3  -SI    AM-[facility] 6 Clicks Score  --  19  -EH  19  -SI       How much help from another is currently needed...    Putting on and taking off regular lower body clothing?  3  -SK  --  --    Bathing (including washing, rinsing, and drying)  3  -SK  --  --    Toileting (which includes using toilet bed pan or urinal)  3  -SK  --  --    Putting on and taking off regular upper body clothing  3  -SK  --  --    Taking care of personal grooming (such as brushing teeth)  4  -SK  --  --    Eating meals  4  -SK  --  --    Score  20  -SK  --  --       Functional Assessment    Outcome Measure Options  AM-PAC 6 Clicks Daily Activity (OT)  -SK  --  AM-PAC 6 Clicks Basic Mobility (PT)  -SI    Row Name 04/06/19 1500             How much help from another person do you currently need...    Turning from your back to your side while in flat bed without using bedrails?  4  -SI      Moving from lying on back to sitting on the side of a flat bed without bedrails?  3  -SI      Moving to and from  a bed to a chair (including a wheelchair)?  3  -SI      Standing up from a chair using your arms (e.g., wheelchair, bedside chair)?  3  -SI      Climbing 3-5 steps with a railing?  3  -SI      To walk in hospital room?  3  -SI      AM-PAC 6 Clicks Score  19  -SI         Functional Assessment    Outcome Measure Options  AM-PAC 6 Clicks Basic Mobility (PT)  -SI        User Key  (r) = Recorded By, (t) = Taken By, (c) = Cosigned By    Initials Name Provider Type    SI Roxana Dickey, PTA Physical Therapy Assistant     Elaine Tracy PTA Physical Therapy Assistant    Zenaida Ramos OT Occupational Therapist          Time Calculation:   Time Calculation- OT     Row Name 04/09/19 0913             Time Calculation- OT    OT Start Time  0827  -SK      OT Stop Time  0855  -SK      OT Time Calculation (min)  28 min  -SK      Total Timed Code Minutes- OT  15 minute(s)  -SK        User Key  (r) = Recorded By, (t) = Taken By, (c) = Cosigned By    Initials Name Provider Type    SK Zenaida Donato OT Occupational Therapist        Therapy Charges for Today     Code Description Service Date Service Provider Modifiers Qty    [id number]  OT EVAL LOW COMPLEXITY 2 4/9/2019 Zenaida Donato OT GO 1    [id number]  OT SELF CARE/MGMT/TRAIN EA 15 MIN 4/9/2019 Zenaida Donato OT GO 1               Zenaida Donato OT  4/9/2019

## 2019-04-18 ENCOUNTER — TELEPHONE (OUTPATIENT)
Dept: FAMILY MEDICINE CLINIC | Facility: CLINIC | Age: 84
End: 2019-04-18

## 2019-04-24 ENCOUNTER — TELEPHONE (OUTPATIENT)
Dept: FAMILY MEDICINE CLINIC | Facility: CLINIC | Age: 84
End: 2019-04-24

## 2019-05-07 ENCOUNTER — OFFICE VISIT (OUTPATIENT)
Dept: FAMILY MEDICINE CLINIC | Facility: CLINIC | Age: 84
End: 2019-05-07

## 2019-05-07 VITALS
OXYGEN SATURATION: 99 % | BODY MASS INDEX: 19.84 KG/M2 | TEMPERATURE: 98 F | WEIGHT: 112 LBS | HEART RATE: 69 BPM | DIASTOLIC BLOOD PRESSURE: 60 MMHG | HEIGHT: 63 IN | SYSTOLIC BLOOD PRESSURE: 122 MMHG

## 2019-05-07 DIAGNOSIS — R82.90 ABNORMAL FINDING IN URINE: ICD-10-CM

## 2019-05-07 DIAGNOSIS — K21.9 GASTROESOPHAGEAL REFLUX DISEASE WITHOUT ESOPHAGITIS: ICD-10-CM

## 2019-05-07 DIAGNOSIS — I50.32 CHRONIC DIASTOLIC CHF (CONGESTIVE HEART FAILURE) (HCC): ICD-10-CM

## 2019-05-07 DIAGNOSIS — M54.40 BILATERAL LOW BACK PAIN WITH SCIATICA, SCIATICA LATERALITY UNSPECIFIED, UNSPECIFIED CHRONICITY: ICD-10-CM

## 2019-05-07 DIAGNOSIS — J18.9 PNEUMONIA OF RIGHT LOWER LOBE DUE TO INFECTIOUS ORGANISM: Primary | ICD-10-CM

## 2019-05-07 LAB
ANION GAP SERPL CALCULATED.3IONS-SCNC: 8.1 MMOL/L
BACTERIA UR QL AUTO: ABNORMAL /HPF
BILIRUB UR QL STRIP: NEGATIVE
BUN BLD-MCNC: 20 MG/DL (ref 8–23)
BUN/CREAT SERPL: 29.9 (ref 7–25)
CALCIUM SPEC-SCNC: 9.8 MG/DL (ref 8.2–9.6)
CHLORIDE SERPL-SCNC: 97 MMOL/L (ref 98–107)
CLARITY UR: CLEAR
CO2 SERPL-SCNC: 29.9 MMOL/L (ref 22–29)
COLOR UR: YELLOW
CREAT BLD-MCNC: 0.67 MG/DL (ref 0.57–1)
GFR SERPL CREATININE-BSD FRML MDRD: 82 ML/MIN/1.73
GLUCOSE BLD-MCNC: 111 MG/DL (ref 65–99)
GLUCOSE UR STRIP-MCNC: NEGATIVE MG/DL
HCT VFR BLD AUTO: 36.4 % (ref 34–46.6)
HGB BLD-MCNC: 12.2 G/DL (ref 12–15.9)
HGB UR QL STRIP.AUTO: ABNORMAL
KETONES UR QL STRIP: NEGATIVE
LEUKOCYTE ESTERASE UR QL STRIP.AUTO: ABNORMAL
NITRITE UR QL STRIP: NEGATIVE
NT-PROBNP SERPL-MCNC: 2106 PG/ML (ref 5–1800)
PH UR STRIP.AUTO: <=5 [PH] (ref 4.6–8)
POTASSIUM BLD-SCNC: 4.7 MMOL/L (ref 3.5–5.2)
PROT UR QL STRIP: ABNORMAL
RBC # UR: ABNORMAL /HPF
REF LAB TEST METHOD: ABNORMAL
SODIUM BLD-SCNC: 135 MMOL/L (ref 136–145)
SP GR UR STRIP: 1.01 (ref 1–1.03)
SQUAMOUS #/AREA URNS HPF: ABNORMAL /HPF
UNIDENT CRYS URNS QL MICRO: ABNORMAL /HPF
UROBILINOGEN UR QL STRIP: ABNORMAL
WBC UR QL AUTO: ABNORMAL /HPF

## 2019-05-07 PROCEDURE — 85018 HEMOGLOBIN: CPT | Performed by: INTERNAL MEDICINE

## 2019-05-07 PROCEDURE — 83880 ASSAY OF NATRIURETIC PEPTIDE: CPT | Performed by: INTERNAL MEDICINE

## 2019-05-07 PROCEDURE — 80048 BASIC METABOLIC PNL TOTAL CA: CPT | Performed by: INTERNAL MEDICINE

## 2019-05-07 PROCEDURE — 87086 URINE CULTURE/COLONY COUNT: CPT | Performed by: INTERNAL MEDICINE

## 2019-05-07 PROCEDURE — 85014 HEMATOCRIT: CPT | Performed by: INTERNAL MEDICINE

## 2019-05-07 PROCEDURE — 36415 COLL VENOUS BLD VENIPUNCTURE: CPT | Performed by: INTERNAL MEDICINE

## 2019-05-07 PROCEDURE — 81001 URINALYSIS AUTO W/SCOPE: CPT | Performed by: INTERNAL MEDICINE

## 2019-05-07 PROCEDURE — 99214 OFFICE O/P EST MOD 30 MIN: CPT | Performed by: INTERNAL MEDICINE

## 2019-05-07 RX ORDER — NITROFURANTOIN 25; 75 MG/1; MG/1
100 CAPSULE ORAL 2 TIMES DAILY
Qty: 14 CAPSULE | Refills: 0 | Status: SHIPPED | OUTPATIENT
Start: 2019-05-07 | End: 2019-05-14

## 2019-05-07 RX ORDER — TRAMADOL HYDROCHLORIDE 50 MG/1
50 TABLET ORAL EVERY 6 HOURS PRN
Qty: 90 TABLET | Refills: 2 | Status: SHIPPED | OUTPATIENT
Start: 2019-05-07 | End: 2019-08-26 | Stop reason: SDUPTHER

## 2019-05-07 NOTE — PROGRESS NOTES
Subjective   Alexa Peace is a 89 y.o. female.     History of Present Illness   Current outpatient and discharge medications have been reconciled for the patient.  Reviewed by: Brian Jacobs MD  Patient is seen for follow-up from [facility] for pneumonia.  Patient is being discharged from a rehab center after spending time in bed with seizures.  Patient has finished her antibiotic doing very well.  Congestive heart failure is been stabilized with her medication.  She does have bilateral low back pain also controlled with pain medicine.  Her gastroesophageal reflux stabilized with a PPI.    Dictated utilizing Dragon dictation. If there are questions or for further clarification, please contact me.  The following portions of the patient's history were reviewed and updated as appropriate: allergies, current medications, past family history, past medical history, past social history, past surgical history and problem list.    Review of Systems   Constitutional: Negative for fatigue and fever.   HENT: Positive for congestion. Negative for trouble swallowing.    Eyes: Negative for discharge and visual disturbance.   Respiratory: Negative for choking and shortness of breath.    Cardiovascular: Negative for chest pain and palpitations.   Gastrointestinal: Negative for abdominal pain and blood in stool.   Endocrine: Negative.    Genitourinary: Negative for genital sores and hematuria.   Musculoskeletal: Negative for gait problem and joint swelling.   Skin: Negative for color change, pallor, rash and wound.   Allergic/Immunologic: Positive for environmental allergies. Negative for immunocompromised state.   Neurological: Negative for facial asymmetry and speech difficulty.   Psychiatric/Behavioral: Negative for hallucinations and suicidal ideas.       Objective   Physical Exam   Constitutional: She is oriented to person, place, and time. She appears well-developed and well-nourished.   HENT:   Head: Normocephalic.    Eyes: Conjunctivae are normal. Pupils are equal, round, and reactive to light.   Neck: Normal range of motion. Neck supple.   Cardiovascular: Normal rate, regular rhythm and normal heart sounds.   Pulmonary/Chest: Effort normal and breath sounds normal.   Abdominal: Soft. Bowel sounds are normal.   Musculoskeletal: Normal range of motion.   Neurological: She is alert and oriented to person, place, and time.   Skin: Skin is warm and dry.   Psychiatric: She has a normal mood and affect. Her behavior is normal. Judgment and thought content normal.   Nursing note and vitals reviewed.      Assessment/Plan   Problems Addressed this Visit        Cardiovascular and Mediastinum    Chronic diastolic CHF (congestive heart failure) (CMS/[facility], located within [facility])    Relevant Orders    Urinalysis With Microscopic - Urine, Clean Catch (Completed)    Urine Culture - Urine, Urine, Clean Catch    Basic Metabolic Panel    Hemoglobin & Hematocrit, Blood (Completed)    proBNP    Urinalysis without microscopic (no culture) - Urine, Clean Catch (Completed)    Urinalysis, Microscopic Only - Urine, Clean Catch (Completed)       Respiratory    Pneumonia of right lower lobe due to infectious organism (CMS/[facility], located within [facility]) - Primary    Relevant Orders    Urinalysis With Microscopic - Urine, Clean Catch (Completed)    Urine Culture - Urine, Urine, Clean Catch    Basic Metabolic Panel    Hemoglobin & Hematocrit, Blood (Completed)    Urinalysis without microscopic (no culture) - Urine, Clean Catch (Completed)    Urinalysis, Microscopic Only - Urine, Clean Catch (Completed)       Digestive    Gastroesophageal reflux disease without esophagitis    Relevant Orders    Urinalysis With Microscopic - Urine, Clean Catch (Completed)    Urine Culture - Urine, Urine, Clean Catch    Basic Metabolic Panel    Hemoglobin & Hematocrit, Blood (Completed)    Urinalysis without microscopic (no culture) - Urine, Clean Catch (Completed)    Urinalysis, Microscopic Only - Urine, Clean Catch  (Completed)      Other Visit Diagnoses     Abnormal finding in urine         Relevant Orders    Urine Culture - Urine, Urine, Clean Catch    Bilateral low back pain with sciatica, sciatica laterality unspecified, unspecified chronicity        Relevant Orders    Ambulatory Referral to Physical Therapy Evaluate and treat, Ortho

## 2019-05-08 LAB — BACTERIA SPEC AEROBE CULT: NO GROWTH

## 2019-05-14 ENCOUNTER — OFFICE VISIT (OUTPATIENT)
Dept: FAMILY MEDICINE CLINIC | Facility: CLINIC | Age: 84
End: 2019-05-14

## 2019-05-14 DIAGNOSIS — J18.9 PNEUMONIA OF RIGHT LOWER LOBE DUE TO INFECTIOUS ORGANISM: ICD-10-CM

## 2019-05-14 DIAGNOSIS — R93.89 ABNORMAL CHEST X-RAY: Primary | ICD-10-CM

## 2019-05-14 PROCEDURE — 71046 X-RAY EXAM CHEST 2 VIEWS: CPT | Performed by: INTERNAL MEDICINE

## 2019-05-14 NOTE — PROGRESS NOTES
Patient was seen for checks x-ray for follow-up of pneumonia.    X-Ray  Interpretation report in house X-rays that I personally viewed    Relevant Clinical Issues/Diagnoses/Indications: Pneumonia chest x-ray        Clinical Findings: No acute disease          Comparative Data: No previous x-ray          Date of Previous X-ray:    Change on current X-ray:

## 2019-05-30 RX ORDER — CHOLECALCIFEROL (VITAMIN D3) 50 MCG
TABLET ORAL
Qty: 30 TABLET | Refills: 3 | Status: SHIPPED | OUTPATIENT
Start: 2019-05-30 | End: 2019-08-25 | Stop reason: SDUPTHER

## 2019-06-06 ENCOUNTER — TELEPHONE (OUTPATIENT)
Dept: FAMILY MEDICINE CLINIC | Facility: CLINIC | Age: 84
End: 2019-06-06

## 2019-06-06 DIAGNOSIS — E55.9 VITAMIN D DEFICIENCY: ICD-10-CM

## 2019-06-06 DIAGNOSIS — E78.2 MIXED HYPERLIPIDEMIA: ICD-10-CM

## 2019-06-06 DIAGNOSIS — I10 ESSENTIAL HYPERTENSION: Primary | ICD-10-CM

## 2019-06-06 RX ORDER — PRAVASTATIN SODIUM 40 MG
40 TABLET ORAL DAILY
Qty: 90 TABLET | Refills: 1 | Status: SHIPPED | OUTPATIENT
Start: 2019-06-06 | End: 2019-06-12 | Stop reason: SDUPTHER

## 2019-06-06 NOTE — TELEPHONE ENCOUNTER
Pharmacy requesting new script for 80 mg pravastatin is it 80 mg or 40 mg? Don t even see lipid blood work?

## 2019-06-12 ENCOUNTER — OFFICE VISIT (OUTPATIENT)
Dept: FAMILY MEDICINE CLINIC | Facility: CLINIC | Age: 84
End: 2019-06-12

## 2019-06-12 VITALS
HEIGHT: 63 IN | SYSTOLIC BLOOD PRESSURE: 140 MMHG | BODY MASS INDEX: 20.59 KG/M2 | DIASTOLIC BLOOD PRESSURE: 50 MMHG | OXYGEN SATURATION: 99 % | TEMPERATURE: 97.4 F | WEIGHT: 116.2 LBS | HEART RATE: 58 BPM

## 2019-06-12 DIAGNOSIS — E78.2 MIXED HYPERLIPIDEMIA: ICD-10-CM

## 2019-06-12 DIAGNOSIS — I10 ESSENTIAL HYPERTENSION: Primary | ICD-10-CM

## 2019-06-12 DIAGNOSIS — R60.9 EDEMA, UNSPECIFIED TYPE: ICD-10-CM

## 2019-06-12 DIAGNOSIS — I50.32 CHRONIC DIASTOLIC CHF (CONGESTIVE HEART FAILURE) (HCC): ICD-10-CM

## 2019-06-12 DIAGNOSIS — K21.9 GASTROESOPHAGEAL REFLUX DISEASE WITHOUT ESOPHAGITIS: ICD-10-CM

## 2019-06-12 LAB
ANION GAP SERPL CALCULATED.3IONS-SCNC: 11.5 MMOL/L
BUN BLD-MCNC: 16 MG/DL (ref 8–23)
BUN/CREAT SERPL: 21.3 (ref 7–25)
CALCIUM SPEC-SCNC: 9.8 MG/DL (ref 8.2–9.6)
CHLORIDE SERPL-SCNC: 101 MMOL/L (ref 98–107)
CO2 SERPL-SCNC: 28.5 MMOL/L (ref 22–29)
CREAT BLD-MCNC: 0.75 MG/DL (ref 0.57–1)
GFR SERPL CREATININE-BSD FRML MDRD: 72 ML/MIN/1.73
GLUCOSE BLD-MCNC: 147 MG/DL (ref 65–99)
HCT VFR BLD AUTO: 38.3 % (ref 34–46.6)
HGB BLD-MCNC: 12.5 G/DL (ref 12–15.9)
NT-PROBNP SERPL-MCNC: 2617 PG/ML (ref 5–1800)
POTASSIUM BLD-SCNC: 4.6 MMOL/L (ref 3.5–5.2)
SODIUM BLD-SCNC: 141 MMOL/L (ref 136–145)

## 2019-06-12 PROCEDURE — 83880 ASSAY OF NATRIURETIC PEPTIDE: CPT | Performed by: INTERNAL MEDICINE

## 2019-06-12 PROCEDURE — 85014 HEMATOCRIT: CPT | Performed by: INTERNAL MEDICINE

## 2019-06-12 PROCEDURE — 36415 COLL VENOUS BLD VENIPUNCTURE: CPT | Performed by: INTERNAL MEDICINE

## 2019-06-12 PROCEDURE — 80048 BASIC METABOLIC PNL TOTAL CA: CPT | Performed by: INTERNAL MEDICINE

## 2019-06-12 PROCEDURE — 99214 OFFICE O/P EST MOD 30 MIN: CPT | Performed by: INTERNAL MEDICINE

## 2019-06-12 PROCEDURE — 85018 HEMOGLOBIN: CPT | Performed by: INTERNAL MEDICINE

## 2019-06-12 RX ORDER — PRAVASTATIN SODIUM 40 MG
40 TABLET ORAL DAILY
Qty: 90 TABLET | Refills: 1 | Status: SHIPPED | OUTPATIENT
Start: 2019-06-12 | End: 2020-01-15

## 2019-06-12 NOTE — PROGRESS NOTES
Subjective   Alexa Peace is a 89 y.o. female.     History of Present Illness   Patient was seen for hypertension.  Pressures been running 130s over 80s.  Lipids been treated with diet and a statin.  Her reflux controlled with a PPI.  Patient does have congestive heart failure is being treated with a diuretic and digoxin.  She does have lower extremity edema but is not very significant.  Patient was placed in a Neo stocking and asked to elevate legs as needed.    Dictated utilizing Dragon dictation. If there are questions or for further clarification, please contact me.  The following portions of the patient's history were reviewed and updated as appropriate: allergies, current medications, past family history, past medical history, past social history, past surgical history and problem list.    Review of Systems   Constitutional: Negative for fatigue and fever.   HENT: Positive for congestion. Negative for trouble swallowing.    Eyes: Negative for discharge and visual disturbance.   Respiratory: Negative for choking and shortness of breath.    Cardiovascular: Positive for leg swelling. Negative for chest pain and palpitations.   Gastrointestinal: Negative for abdominal pain and blood in stool.   Endocrine: Negative.    Genitourinary: Negative for genital sores and hematuria.   Musculoskeletal: Negative for gait problem and joint swelling.   Skin: Negative for color change, pallor, rash and wound.   Allergic/Immunologic: Positive for environmental allergies. Negative for immunocompromised state.   Neurological: Negative for facial asymmetry and speech difficulty.   Psychiatric/Behavioral: Negative for hallucinations and suicidal ideas.       Objective   Physical Exam   Constitutional: She is oriented to person, place, and time. She appears well-developed and well-nourished.   HENT:   Head: Normocephalic.   Eyes: Conjunctivae are normal. Pupils are equal, round, and reactive to light.   Neck: Normal range of motion.  Neck supple.   Cardiovascular: Normal rate and regular rhythm. Exam reveals gallop. Exam reveals no friction rub.   No murmur heard.  Pulmonary/Chest: Effort normal. No stridor. No respiratory distress. She has no wheezes. She has rales. She exhibits no tenderness.   Abdominal: Soft. Bowel sounds are normal.   Musculoskeletal: Normal range of motion. She exhibits edema.   Neurological: She is alert and oriented to person, place, and time.   Skin: Skin is warm and dry.   Psychiatric: She has a normal mood and affect. Her behavior is normal. Judgment and thought content normal.   Nursing note and vitals reviewed.      Assessment/Plan   Problems Addressed this Visit        Cardiovascular and Mediastinum    Hyperlipidemia    Relevant Medications    pravastatin (PRAVACHOL) 40 MG tablet    Other Relevant Orders    Basic Metabolic Panel    proBNP    Hemoglobin & Hematocrit, Blood (Completed)    Essential hypertension - Primary    Relevant Orders    Basic Metabolic Panel    proBNP    Hemoglobin & Hematocrit, Blood (Completed)    Chronic diastolic CHF (congestive heart failure) (CMS/[facility])    Relevant Orders    Basic Metabolic Panel    proBNP    Hemoglobin & Hematocrit, Blood (Completed)    Stocking GÓMEZ Knee Long LG LF       Digestive    Gastroesophageal reflux disease without esophagitis    Relevant Orders    Basic Metabolic Panel    proBNP    Hemoglobin & Hematocrit, Blood (Completed)      Other Visit Diagnoses     Edema, unspecified type        Relevant Orders    Stocking GÓMEZ Knee Long LG LF

## 2019-08-16 RX ORDER — DULOXETIN HYDROCHLORIDE 60 MG/1
60 CAPSULE, DELAYED RELEASE ORAL DAILY
Qty: 90 CAPSULE | Refills: 1 | Status: SHIPPED | OUTPATIENT
Start: 2019-08-16 | End: 2019-11-25 | Stop reason: SDUPTHER

## 2019-08-26 RX ORDER — CHOLECALCIFEROL (VITAMIN D3) 50 MCG
TABLET ORAL
Qty: 90 TABLET | Refills: 1 | Status: SHIPPED | OUTPATIENT
Start: 2019-08-26 | End: 2020-02-25

## 2019-08-27 RX ORDER — TRAMADOL HYDROCHLORIDE 50 MG/1
TABLET ORAL
Qty: 90 TABLET | Refills: 1 | Status: SHIPPED | OUTPATIENT
Start: 2019-08-27 | End: 2019-10-25 | Stop reason: SDUPTHER

## 2019-09-20 RX ORDER — OMEPRAZOLE 20 MG/1
20 CAPSULE, DELAYED RELEASE ORAL
Qty: 90 CAPSULE | Refills: 1 | OUTPATIENT
Start: 2019-09-20

## 2019-10-02 ENCOUNTER — OFFICE VISIT (OUTPATIENT)
Dept: CARDIOLOGY | Facility: CLINIC | Age: 84
End: 2019-10-02

## 2019-10-02 VITALS
WEIGHT: 112.4 LBS | BODY MASS INDEX: 19.91 KG/M2 | HEIGHT: 63 IN | HEART RATE: 60 BPM | OXYGEN SATURATION: 98 % | SYSTOLIC BLOOD PRESSURE: 110 MMHG | RESPIRATION RATE: 16 BRPM | DIASTOLIC BLOOD PRESSURE: 68 MMHG

## 2019-10-02 DIAGNOSIS — I50.32 CHRONIC DIASTOLIC CHF (CONGESTIVE HEART FAILURE) (HCC): ICD-10-CM

## 2019-10-02 DIAGNOSIS — I10 ESSENTIAL HYPERTENSION: ICD-10-CM

## 2019-10-02 DIAGNOSIS — I25.10 ATHEROSCLEROSIS OF NATIVE CORONARY ARTERY OF NATIVE HEART WITHOUT ANGINA PECTORIS: Primary | ICD-10-CM

## 2019-10-02 DIAGNOSIS — E78.2 MIXED HYPERLIPIDEMIA: ICD-10-CM

## 2019-10-02 DIAGNOSIS — I48.0 PAROXYSMAL ATRIAL FIBRILLATION (HCC): ICD-10-CM

## 2019-10-02 PROCEDURE — 93000 ELECTROCARDIOGRAM COMPLETE: CPT | Performed by: INTERNAL MEDICINE

## 2019-10-02 PROCEDURE — 99214 OFFICE O/P EST MOD 30 MIN: CPT | Performed by: INTERNAL MEDICINE

## 2019-10-02 RX ORDER — PHENOL 1.4 %
600 AEROSOL, SPRAY (ML) MUCOUS MEMBRANE DAILY
COMMUNITY
End: 2021-04-12 | Stop reason: ALTCHOICE

## 2019-10-07 RX ORDER — OMEPRAZOLE 20 MG/1
20 CAPSULE, DELAYED RELEASE ORAL
Qty: 90 CAPSULE | Refills: 1 | OUTPATIENT
Start: 2019-10-07

## 2019-10-25 RX ORDER — TRAMADOL HYDROCHLORIDE 50 MG/1
TABLET ORAL
Qty: 90 TABLET | Refills: 1 | Status: SHIPPED | OUTPATIENT
Start: 2019-10-25 | End: 2019-11-27 | Stop reason: SDUPTHER

## 2019-11-04 NOTE — PROGRESS NOTES
"[facility]      Patient Name: Alexa Peace  :[id number]  Age: 89 y.o.  Encounter Provider:  Blake Brown Jr, MD      Chief Complaint: FU CAD, PAF, CHF, VHD      HPI  Alexa Peace is a 89 y.o. female PMH CAD s/p remote hx of PCI to LAD, chronic diastolic HF, PAF, mitral regurgitation presents for routine follow-up. Despite her advanced age and significant comorbidities she takes care of the majority of her ADLs without significant limitations. She does note some HOPKINS and some mild increase in LLE swelling. She denies CP/palp/syncope. No orth/pnd. She denies fatigue and overall is very optimistic about her quality of life.      The following portions of the patient's history were reviewed and updated as appropriate: allergies, current medications, past family history, past medical history, past social history, past surgical history and problem list.      Review of Systems   Constitution: Positive for malaise/fatigue. Negative for chills and fever.   HENT: Negative for hoarse voice and sore throat.    Eyes: Negative for double vision and photophobia.   Cardiovascular: Positive for dyspnea on exertion and leg swelling. Negative for chest pain, near-syncope, orthopnea, palpitations, paroxysmal nocturnal dyspnea and syncope.   Respiratory: Negative for cough and wheezing.    Skin: Negative for poor wound healing and rash.   Musculoskeletal: Negative for arthritis and joint swelling.   Gastrointestinal: Negative for bloating, abdominal pain, hematemesis and hematochezia.   Neurological: Negative for dizziness and focal weakness.   Psychiatric/Behavioral: Negative for depression and suicidal ideas.       OBJECTIVE:   Vital Signs  Vitals:    10/02/19 1559   BP: 110/68   Pulse: 60   Resp: 16   SpO2: 98%     Estimated body mass index is 19.91 kg/m² as calculated from the following:    Height as of this encounter: 160 cm (63\").    Weight as of this encounter: 51 kg (112 lb 6.4 oz).    Physical Exam "   Constitutional: She is oriented to person, place, and time. She appears well-developed and well-nourished.   HENT:   Head: Normocephalic and atraumatic.   Eyes: Conjunctivae are normal. Pupils are equal, round, and reactive to light.   Neck: No JVD present. No thyromegaly present.   Cardiovascular: Exam reveals no gallop and no friction rub.   No murmur heard.  Pulmonary/Chest: No respiratory distress. She exhibits no tenderness.   Decreased AE bibasilar zones   Abdominal: Bowel sounds are normal. She exhibits no distension.   Musculoskeletal: She exhibits edema. She exhibits no tenderness.   Neurological: She is alert and oriented to person, place, and time.   Skin: No rash noted. There is erythema.   Psychiatric: She has a normal mood and affect. Judgment normal.   Vitals reviewed.        ECG 12 Lead  Date/Time: 10/2/2019 1:00 PM  Performed by: Blake Brown Jr., MD  Authorized by: Blake Brown Jr., MD   Comparison: compared with previous ECG from 2/2/2019  Similar to previous ECG  Rhythm: sinus rhythm  Conduction: 1st degree AV block  ST segment depression noted on lead: ST depression possibly related to digoxin use.                  ASSESSMENT:      Diagnosis Plan   1. Atherosclerosis of native coronary artery of native heart without angina pectoris     2. Mixed hyperlipidemia     3. Essential hypertension     4. Paroxysmal atrial fibrillation (CMS/[facility])     5. Chronic diastolic CHF (congestive heart failure) (CMS/[facility])           PLAN OF CARE:     1. CAD s/p remote hx of PCI to LAD - she has not been on aspirin since being started on NOAC. Given her age I would not want her on both drugs despite being on lower dose apixaban. Cont on statin. Increase activity as tolerated.  2. Chronic diastolic HF - Currently euvolemic. NYHA 2-3 symptoms are stable. BP fairly well controlled. Periodic home monitroing, Na restricted diet.  3. PAF - cont BB and lower dose apixaban.   4. Mitral regurgitation - moderate-severe  with severe pulmonary HTN. Conservative mgmt.    RTC 6 months      Atrial Fibrillation and Atrial Flutter  Assessment  • The patient has paroxysmal atrial fibrillation  • This is non-valvular in etiology  • The patient's CHADS2-VASc score is 5  • A [id number] score of 2 or more is considered a high risk for a thromboembolic event  • Apixaban prescribed    Subjective - Objective  • The average duration of atrial fibrillation episodes is >=48 hours to 7 days             Discharge Medications           Accurate as of 10/2/19 11:59 PM. If you have any questions, ask your nurse or doctor.               Changes to Medications      Instructions Start Date   polyethylene glycol pack packet  Commonly known as:  MIRALAX  What changed:    · when to take this  · reasons to take this   17 g, Oral, Daily         Continue These Medications      Instructions Start Date   apixaban 2.5 MG tablet tablet  Commonly known as:  ELIQUIS   2.5 mg, Oral, Every 12 Hours Scheduled      CALCIUM 600 600 MG tablet  Generic drug:  calcium carbonate   600 mg, Oral, Daily      calcium carbonate 500 MG chewable tablet  Commonly known as:  TUMS   2 tablets, Oral, 2 Times Daily PRN      CENTRUM SILVER tablet   1 tablet, Oral, Daily      digoxin 125 MCG tablet  Commonly known as:  LANOXIN   125 mcg, Oral, Daily Digoxin      DULoxetine 60 MG capsule  Commonly known as:  CYMBALTA   60 mg, Oral, Daily      furosemide 20 MG tablet  Commonly known as:  LASIX   20 mg, Oral, Daily      guaiFENesin-dextromethorphan 100-10 MG/5ML syrup  Commonly known as:  ROBITUSSIN DM   5 mL, Oral, Every 4 Hours PRN      metoprolol tartrate 50 MG tablet  Commonly known as:  LOPRESSOR   50 mg, Oral, Every 12 Hours Scheduled      omeprazole 20 MG capsule  Commonly known as:  priLOSEC   20 mg, Oral, Daily With Breakfast      potassium chloride 10 MEQ CR capsule  Commonly known as:  MICRO-K   20 mEq, Oral, Daily      pravastatin 40 MG tablet  Commonly known as:  PRAVACHOL   40  mg, Oral, Daily      traMADol 50 MG tablet  Commonly known as:  ULTRAM   TAKE 1 TABLET BY MOUTH EVERY 6 HOURS AS NEEDED MODERATE PAIN      Vitamin D 50 MCG (2000 UT) tablet   TAKE 2,000 UNITS BY MOUTH DAILY. 1 CAPSULE DAILY         Stop These Medications    acetaminophen 500 MG tablet  Commonly known as:  TYLENOL  Stopped by:  Blake Brown Jr, MD     aspirin 81 MG chewable tablet  Stopped by:  Blake Brown Jr, MD     ipratropium-albuterol 0.5-2.5 mg/3 ml nebulizer  Commonly known as:  DUO-NEB  Stopped by:  Blake Brown Jr, MD     melatonin 5 MG tablet tablet  Stopped by:  Blake Brown Jr, MD            Thank you for allowing me to participate in the care of your patient,      Sincerely,   Blake Brown Jr, MD  [facility]  10/2/2019  4:37 PM

## 2019-11-11 ENCOUNTER — OFFICE VISIT (OUTPATIENT)
Dept: FAMILY MEDICINE CLINIC | Facility: CLINIC | Age: 84
End: 2019-11-11

## 2019-11-11 VITALS
HEART RATE: 81 BPM | HEIGHT: 63 IN | TEMPERATURE: 98.1 F | DIASTOLIC BLOOD PRESSURE: 60 MMHG | BODY MASS INDEX: 20.02 KG/M2 | SYSTOLIC BLOOD PRESSURE: 146 MMHG | OXYGEN SATURATION: 96 % | WEIGHT: 113 LBS

## 2019-11-11 DIAGNOSIS — G89.29 CHRONIC BILATERAL LOW BACK PAIN WITH RIGHT-SIDED SCIATICA: Primary | ICD-10-CM

## 2019-11-11 DIAGNOSIS — R41.3 MEMORY CHANGES: ICD-10-CM

## 2019-11-11 DIAGNOSIS — M54.41 CHRONIC BILATERAL LOW BACK PAIN WITH RIGHT-SIDED SCIATICA: Primary | ICD-10-CM

## 2019-11-11 DIAGNOSIS — R35.0 URINARY FREQUENCY: ICD-10-CM

## 2019-11-11 DIAGNOSIS — G89.29 OTHER CHRONIC PAIN: ICD-10-CM

## 2019-11-11 DIAGNOSIS — R35.1 NOCTURIA: ICD-10-CM

## 2019-11-11 LAB
ALBUMIN SERPL-MCNC: 4 G/DL (ref 3.5–5.2)
ALBUMIN/GLOB SERPL: 1.2 G/DL
ALP SERPL-CCNC: 73 U/L (ref 39–117)
ALT SERPL W P-5'-P-CCNC: 15 U/L (ref 1–33)
AMORPH URATE CRY URNS QL MICRO: ABNORMAL /HPF
ANION GAP SERPL CALCULATED.3IONS-SCNC: 3.3 MMOL/L (ref 5–15)
AST SERPL-CCNC: 16 U/L (ref 1–32)
BACTERIA UR QL AUTO: ABNORMAL /HPF
BILIRUB SERPL-MCNC: 0.2 MG/DL (ref 0.2–1.2)
BILIRUB UR QL STRIP: NEGATIVE
BUN BLD-MCNC: 20 MG/DL (ref 8–23)
BUN/CREAT SERPL: 29.4 (ref 7–25)
CALCIUM SPEC-SCNC: 9.4 MG/DL (ref 8.2–9.6)
CHLORIDE SERPL-SCNC: 102 MMOL/L (ref 98–107)
CLARITY UR: CLEAR
CO2 SERPL-SCNC: 36.7 MMOL/L (ref 22–29)
COLOR UR: YELLOW
CREAT BLD-MCNC: 0.68 MG/DL (ref 0.57–1)
ERYTHROCYTE [DISTWIDTH] IN BLOOD BY AUTOMATED COUNT: 15.1 % (ref 12.3–15.4)
GFR SERPL CREATININE-BSD FRML MDRD: 81 ML/MIN/1.73
GLOBULIN UR ELPH-MCNC: 3.3 GM/DL
GLUCOSE BLD-MCNC: 92 MG/DL (ref 65–99)
GLUCOSE UR STRIP-MCNC: NEGATIVE MG/DL
HCT VFR BLD AUTO: 36 % (ref 34–46.6)
HGB BLD-MCNC: 11.5 G/DL (ref 12–15.9)
HGB UR QL STRIP.AUTO: ABNORMAL
KETONES UR QL STRIP: NEGATIVE
LEUKOCYTE ESTERASE UR QL STRIP.AUTO: ABNORMAL
LYMPHOCYTES # BLD AUTO: 2.7 10*3/MM3 (ref 0.7–3.1)
LYMPHOCYTES NFR BLD AUTO: 37.5 % (ref 19.6–45.3)
MCH RBC QN AUTO: 33.2 PG (ref 26.6–33)
MCHC RBC AUTO-ENTMCNC: 31.9 G/DL (ref 31.5–35.7)
MCV RBC AUTO: 104.1 FL (ref 79–97)
MONOCYTES # BLD AUTO: 0.6 10*3/MM3 (ref 0.1–0.9)
MONOCYTES NFR BLD AUTO: 8.8 % (ref 5–12)
NEUTROPHILS # BLD AUTO: 3.8 10*3/MM3 (ref 1.7–7)
NEUTROPHILS NFR BLD AUTO: 53.7 % (ref 42.7–76)
NITRITE UR QL STRIP: NEGATIVE
PH UR STRIP.AUTO: 6.5 [PH] (ref 4.6–8)
PLATELET # BLD AUTO: 251 10*3/MM3 (ref 140–450)
PMV BLD AUTO: 8.1 FL (ref 6–12)
POTASSIUM BLD-SCNC: 4.4 MMOL/L (ref 3.5–5.2)
PROT SERPL-MCNC: 7.3 G/DL (ref 6–8.5)
PROT UR QL STRIP: NEGATIVE
RBC # BLD AUTO: 3.45 10*6/MM3 (ref 3.77–5.28)
RBC # UR: ABNORMAL /HPF
REF LAB TEST METHOD: ABNORMAL
SODIUM BLD-SCNC: 142 MMOL/L (ref 136–145)
SP GR UR STRIP: 1.01 (ref 1–1.03)
SQUAMOUS #/AREA URNS HPF: ABNORMAL /HPF
TSH SERPL DL<=0.05 MIU/L-ACNC: 1.51 UIU/ML (ref 0.27–4.2)
UROBILINOGEN UR QL STRIP: ABNORMAL
WBC NRBC COR # BLD: 7.1 10*3/MM3 (ref 3.4–10.8)
WBC UR QL AUTO: ABNORMAL /HPF

## 2019-11-11 PROCEDURE — 36415 COLL VENOUS BLD VENIPUNCTURE: CPT | Performed by: NURSE PRACTITIONER

## 2019-11-11 PROCEDURE — 99214 OFFICE O/P EST MOD 30 MIN: CPT | Performed by: NURSE PRACTITIONER

## 2019-11-11 PROCEDURE — 84443 ASSAY THYROID STIM HORMONE: CPT | Performed by: NURSE PRACTITIONER

## 2019-11-11 PROCEDURE — 80053 COMPREHEN METABOLIC PANEL: CPT | Performed by: NURSE PRACTITIONER

## 2019-11-11 PROCEDURE — 81001 URINALYSIS AUTO W/SCOPE: CPT | Performed by: NURSE PRACTITIONER

## 2019-11-11 PROCEDURE — 85025 COMPLETE CBC W/AUTO DIFF WBC: CPT | Performed by: NURSE PRACTITIONER

## 2019-11-11 NOTE — PATIENT INSTRUCTIONS
Refer to PT she will call for appt.   Refer to PM will call with appt.   Deferred neurology consult for memory changes today, recommended to taper off tramadol d/t memory changes since starting tramadol. Educated about possible over medication d/t age. Did not recommend other narcotics or pain medications as this could alter her mental state, falls precautions given, oriented x3 today. Advised Er with worsening symptoms, labs and UA today will call with results.   Refer to urogyn will call with appt.   Increase fluid intake, get plenty of rest.   Patient agrees with plan of care and understands instructions. Call if worsening symptoms or any problems or concerns.

## 2019-11-11 NOTE — PROGRESS NOTES
Subjective   Alexa Peace is a 89 y.o. female.     History of Present Illness   C/o memory changes, she has trouble remembering name, has to repeat things, thinks started when she startedf tramadol about 1 year ago, her daughter is in the room today, she does not drive, has live in help,   also c/o urinary incontinence. States she has had this for years, tried mybetriq but made BP elevated, tried botox. She has seen urology. She wears pad, she denies dysuria, does have nocturia.   With low back pain, prev taking tramadol states this is no longer helping her pain, prev saw  she was referred to PM Dr. Sampson for back pain, no longer sees PM she has also tried PT for back pain. Has prev tried epidural injections, not currently going to PT. She states back pain varies, comes and goes, she has pain in right leg at times. Hx of spinal stenosis. She wears brace at times. She states she stopped PM as it did not help.         The following portions of the patient's history were reviewed and updated as appropriate: allergies, current medications, past family history, past medical history, past social history, past surgical history and problem list.    Review of Systems   Constitutional: Negative for chills, diaphoresis and fever.   Respiratory: Negative for cough and shortness of breath.    Cardiovascular: Negative for chest pain.   Genitourinary: Positive for frequency. Negative for decreased urine volume, dysuria, hematuria, pelvic pressure, urgency and urinary incontinence.   Musculoskeletal: Positive for arthralgias, back pain and myalgias.   Neurological: Positive for memory problem. Negative for dizziness, tremors, seizures, syncope, facial asymmetry, speech difficulty, weakness, light-headedness, numbness, headache and confusion.   All other systems reviewed and are negative.      Objective   Physical Exam   Constitutional: She is oriented to person, place, and time. She appears well-developed and  well-nourished.   HENT:   Head: Normocephalic.   Eyes: Pupils are equal, round, and reactive to light.   Neck: Normal range of motion.   Cardiovascular: Normal rate, regular rhythm and normal heart sounds.   Pulmonary/Chest: Effort normal and breath sounds normal. She has no decreased breath sounds. She has no wheezes. She has no rhonchi.   Abdominal: There is no CVA tenderness.   Musculoskeletal:        Lumbar back: She exhibits decreased range of motion and tenderness.   Lymphadenopathy:     She has no cervical adenopathy.   Neurological: She is alert and oriented to person, place, and time. She has normal strength. No cranial nerve deficit or sensory deficit. She displays a negative Romberg sign.   Skin: Skin is warm and dry.   Psychiatric: She has a normal mood and affect. Her behavior is normal.   Nursing note and vitals reviewed.        Assessment/Plan   Alexa was seen today for memory loss, back pain, medication problem and urinary incontinence.    Diagnoses and all orders for this visit:    Chronic bilateral low back pain with right-sided sciatica  -     Ambulatory Referral to Pain Management    Urinary frequency  -     Urinalysis With Microscopic - Urine, Clean Catch  -     CBC & Differential  -     Comprehensive Metabolic Panel  -     TSH  -     Ambulatory Referral to Gynecologic Urology  -     Urinalysis without microscopic (no culture) - Urine, Clean Catch  -     Urinalysis, Microscopic Only - Urine, Clean Catch  -     CBC Auto Differential    Nocturia  -     Ambulatory Referral to Gynecologic Urology    Memory changes  -     Urinalysis With Microscopic - Urine, Clean Catch  -     CBC & Differential  -     Comprehensive Metabolic Panel  -     TSH  -     Urinalysis without microscopic (no culture) - Urine, Clean Catch  -     Urinalysis, Microscopic Only - Urine, Clean Catch  -     CBC Auto Differential    Other chronic pain  -     Ambulatory Referral to Pain Management      Refer to PT she will call for  appt.   Refer to PM will call with appt.   Deferred neurology consult for memory changes today, recommended to taper off tramadol d/t memory changes since starting tramadol. Educated about possible over medication d/t age. Did not recommend other narcotics or pain medications as this could alter her mental state, falls precautions given, oriented x3 today. Advised Er with worsening symptoms, labs and UA today will call with results.   Refer to urogyn will call with appt.   Increase fluid intake, get plenty of rest.   Patient agrees with plan of care and understands instructions. Call if worsening symptoms or any problems or concerns.

## 2019-11-13 RX ORDER — FUROSEMIDE 20 MG/1
TABLET ORAL
Qty: 90 TABLET | Refills: 3 | Status: SHIPPED | OUTPATIENT
Start: 2019-11-13 | End: 2020-07-13 | Stop reason: SDUPTHER

## 2019-11-22 RX ORDER — METOPROLOL TARTRATE 50 MG/1
TABLET, FILM COATED ORAL
Qty: 180 TABLET | Refills: 3 | Status: SHIPPED | OUTPATIENT
Start: 2019-11-22 | End: 2020-11-03 | Stop reason: SDUPTHER

## 2019-11-25 RX ORDER — DULOXETIN HYDROCHLORIDE 60 MG/1
60 CAPSULE, DELAYED RELEASE ORAL DAILY
Qty: 90 CAPSULE | Refills: 1 | Status: SHIPPED | OUTPATIENT
Start: 2019-11-25 | End: 2019-12-04 | Stop reason: SDUPTHER

## 2019-11-27 RX ORDER — TRAMADOL HYDROCHLORIDE 50 MG/1
TABLET ORAL
Qty: 90 TABLET | Refills: 1 | Status: SHIPPED | OUTPATIENT
Start: 2019-11-27 | End: 2020-01-02

## 2019-12-04 RX ORDER — DULOXETIN HYDROCHLORIDE 60 MG/1
60 CAPSULE, DELAYED RELEASE ORAL DAILY
Qty: 90 CAPSULE | Refills: 1 | Status: SHIPPED | OUTPATIENT
Start: 2019-12-04 | End: 2020-02-10

## 2019-12-05 ENCOUNTER — OFFICE VISIT (OUTPATIENT)
Dept: FAMILY MEDICINE CLINIC | Facility: CLINIC | Age: 84
End: 2019-12-05

## 2019-12-05 VITALS
OXYGEN SATURATION: 97 % | HEART RATE: 63 BPM | WEIGHT: 114 LBS | DIASTOLIC BLOOD PRESSURE: 58 MMHG | HEIGHT: 63 IN | TEMPERATURE: 98.2 F | BODY MASS INDEX: 20.2 KG/M2 | SYSTOLIC BLOOD PRESSURE: 136 MMHG

## 2019-12-05 DIAGNOSIS — M54.2 NECK PAIN: Primary | ICD-10-CM

## 2019-12-05 DIAGNOSIS — H61.22 LEFT EAR IMPACTED CERUMEN: ICD-10-CM

## 2019-12-05 PROCEDURE — 69209 REMOVE IMPACTED EAR WAX UNI: CPT | Performed by: NURSE PRACTITIONER

## 2019-12-05 PROCEDURE — 99213 OFFICE O/P EST LOW 20 MIN: CPT | Performed by: NURSE PRACTITIONER

## 2019-12-05 NOTE — PATIENT INSTRUCTIONS
Left ear lavage today,   May cont tramadol as needed for pain, may try tylenol OTC as needed for pain,   May try heat and ice alternating on 20 min off 1 hour as needed.   If symptoms persist call office   Increase fluid intake, get plenty of rest.   Patient agrees with plan of care and understands instructions. Call if worsening symptoms or any problems or concerns.

## 2019-12-05 NOTE — PROGRESS NOTES
Subjective   Alexa Peace is a 89 y.o. female.     History of Present Illness   C/o left sided neck pain, left ear pain, started yesterday denies any injury, woke up with neck pain, has pain with ROM, she tried biofreeze OTC also tried tramadol. She does have some left ear pain, denies fever. She denies sinus pressure or drainage.       The following portions of the patient's history were reviewed and updated as appropriate: allergies, current medications, past family history, past medical history, past social history, past surgical history and problem list.    Review of Systems   Constitutional: Negative for chills, diaphoresis and fever.   HENT: Positive for ear pain.    Respiratory: Negative for cough and shortness of breath.    Cardiovascular: Negative for chest pain.   Musculoskeletal: Positive for neck pain. Negative for arthralgias and myalgias.   Neurological: Negative for dizziness, light-headedness and headache.   All other systems reviewed and are negative.      Objective   Physical Exam   Constitutional: She is oriented to person, place, and time. She appears well-developed and well-nourished.   HENT:   Head: Normocephalic.   Right Ear: Tympanic membrane and ear canal normal.   Left Ear: Ear canal normal. An impacted cerumen is present.  Nose: Nose normal.   Mouth/Throat: Uvula is midline, oropharynx is clear and moist and mucous membranes are normal.   Eyes: Pupils are equal, round, and reactive to light.   Neck: Normal range of motion.   Cardiovascular: Normal rate, regular rhythm and normal heart sounds.   Pulmonary/Chest: Effort normal and breath sounds normal.   Musculoskeletal:        Cervical back: She exhibits decreased range of motion and tenderness. She exhibits no bony tenderness, no swelling, no pain and no spasm.   Lymphadenopathy:     She has no cervical adenopathy.   Neurological: She is alert and oriented to person, place, and time.   Skin: Skin is warm and dry.   Psychiatric: She has a  normal mood and affect. Her behavior is normal.   Nursing note and vitals reviewed.      Left TM WNL after lavage.     Assessment/Plan   Ada was seen today for neck pain.    Diagnoses and all orders for this visit:    Neck pain    Left ear impacted cerumen    Left ear lavage today, tolerated well.   May cont tramadol as needed for pain, may try tylenol OTC as needed for pain,   May try heat and ice alternating on 20 min off 1 hour as needed.   If symptoms persist call office   Increase fluid intake, get plenty of rest.   Patient agrees with plan of care and understands instructions. Call if worsening symptoms or any problems or concerns.

## 2020-01-02 RX ORDER — TRAMADOL HYDROCHLORIDE 50 MG/1
TABLET ORAL
Qty: 90 TABLET | Refills: 1 | Status: SHIPPED | OUTPATIENT
Start: 2020-01-02 | End: 2020-03-02

## 2020-01-15 RX ORDER — PRAVASTATIN SODIUM 40 MG
TABLET ORAL
Qty: 90 TABLET | Refills: 1 | Status: SHIPPED | OUTPATIENT
Start: 2020-01-15 | End: 2020-07-27

## 2020-01-20 ENCOUNTER — TELEPHONE (OUTPATIENT)
Dept: FAMILY MEDICINE CLINIC | Facility: CLINIC | Age: 85
End: 2020-01-20

## 2020-01-20 NOTE — TELEPHONE ENCOUNTER
PT'S NURSE CARE MANAGER FOR [employer] [facility] IS CALLING TO ASK ABOUT HER IMMUNIZATIONS AND WHICH ONES SHE NEEDS TO HAVE.    GINGER BERNAL'S EXTENSION IS [id number]  AND WANTS WHO EVER CALLS TO KNOW FOR PRIVACY REASONS SHE WILL BE ASKING FOR PT'S PCP'S TAX ID OR NPI NUMBER

## 2020-02-10 RX ORDER — POTASSIUM CHLORIDE 750 MG/1
20 CAPSULE, EXTENDED RELEASE ORAL DAILY
Qty: 60 CAPSULE | Refills: 2 | Status: SHIPPED | OUTPATIENT
Start: 2020-02-10 | End: 2020-02-11 | Stop reason: SDUPTHER

## 2020-02-10 RX ORDER — DULOXETIN HYDROCHLORIDE 60 MG/1
CAPSULE, DELAYED RELEASE ORAL
Qty: 90 CAPSULE | Refills: 1 | Status: SHIPPED | OUTPATIENT
Start: 2020-02-10 | End: 2020-09-14 | Stop reason: SDUPTHER

## 2020-02-10 RX ORDER — DIGOXIN 125 MCG
125 TABLET ORAL
Qty: 30 TABLET | Refills: 2 | Status: SHIPPED | OUTPATIENT
Start: 2020-02-10 | End: 2020-02-11 | Stop reason: SDUPTHER

## 2020-02-11 RX ORDER — POTASSIUM CHLORIDE 750 MG/1
20 CAPSULE, EXTENDED RELEASE ORAL DAILY
Qty: 60 CAPSULE | Refills: 2 | Status: SHIPPED | OUTPATIENT
Start: 2020-02-11 | End: 2020-03-16 | Stop reason: SDUPTHER

## 2020-02-11 RX ORDER — DIGOXIN 125 MCG
125 TABLET ORAL
Qty: 30 TABLET | Refills: 2 | Status: SHIPPED | OUTPATIENT
Start: 2020-02-11 | End: 2020-03-12 | Stop reason: SDUPTHER

## 2020-02-25 RX ORDER — CHOLECALCIFEROL (VITAMIN D3) 50 MCG
TABLET ORAL
Qty: 90 TABLET | Refills: 1 | Status: SHIPPED | OUTPATIENT
Start: 2020-02-25 | End: 2020-07-30

## 2020-03-02 RX ORDER — TRAMADOL HYDROCHLORIDE 50 MG/1
TABLET ORAL
Qty: 90 TABLET | Refills: 1 | Status: SHIPPED | OUTPATIENT
Start: 2020-03-02 | End: 2020-05-22

## 2020-03-12 RX ORDER — DIGOXIN 125 MCG
125 TABLET ORAL
Qty: 30 TABLET | Refills: 0 | Status: SHIPPED | OUTPATIENT
Start: 2020-03-12 | End: 2020-03-13 | Stop reason: SDUPTHER

## 2020-03-13 RX ORDER — DIGOXIN 125 MCG
125 TABLET ORAL
Qty: 30 TABLET | Refills: 0 | Status: SHIPPED | OUTPATIENT
Start: 2020-03-13 | End: 2021-03-05 | Stop reason: SDUPTHER

## 2020-03-16 RX ORDER — POTASSIUM CHLORIDE 750 MG/1
20 CAPSULE, EXTENDED RELEASE ORAL DAILY
Qty: 60 CAPSULE | Refills: 2 | Status: SHIPPED | OUTPATIENT
Start: 2020-03-16 | End: 2021-03-09 | Stop reason: HOSPADM

## 2020-03-30 ENCOUNTER — TELEPHONE (OUTPATIENT)
Dept: CARDIOLOGY | Facility: CLINIC | Age: 85
End: 2020-03-30

## 2020-03-30 NOTE — TELEPHONE ENCOUNTER
LVM offered telehealth visit via phone or video, to see provider covering clinic or reschedule 6 weeks. Await returned call.

## 2020-04-07 NOTE — TELEPHONE ENCOUNTER
Call return to the patient.  She is a little confused about her medication.  She had been taking tramadol for pain of her last office visit was given a prescription for Medrol Dosepak.  She wanted to make sure she can take both.  Sienna that yes it is okay for her to continue bake taking both medications I reiterated that the Medrol Dosepak is to take as directed and the tramadol is as needed   No

## 2020-04-08 ENCOUNTER — TELEMEDICINE - AUDIO (OUTPATIENT)
Dept: CARDIOLOGY | Facility: CLINIC | Age: 85
End: 2020-04-08

## 2020-04-08 VITALS
BODY MASS INDEX: 20.02 KG/M2 | SYSTOLIC BLOOD PRESSURE: 127 MMHG | HEART RATE: 57 BPM | HEIGHT: 63 IN | DIASTOLIC BLOOD PRESSURE: 53 MMHG | WEIGHT: 113 LBS

## 2020-04-08 DIAGNOSIS — I50.33 ACUTE ON CHRONIC DIASTOLIC CHF (CONGESTIVE HEART FAILURE) (HCC): Primary | ICD-10-CM

## 2020-04-08 DIAGNOSIS — I48.20 CHRONIC ATRIAL FIBRILLATION (HCC): ICD-10-CM

## 2020-04-08 PROCEDURE — 99213 OFFICE O/P EST LOW 20 MIN: CPT | Performed by: INTERNAL MEDICINE

## 2020-04-08 NOTE — PROGRESS NOTES
[facility]      Patient Name: Alexa Peace  :[id number]  Age: 89 y.o.  Encounter Provider:  Blake Brown Jr, MD      Chief Complaint: FU CAD, PAF, CHF, VHD      HPI  Alexa Peace is a 89 y.o. female PMH CAD s/p remote hx of PCI to LAD, chronic diastolic HF, PAF, mitral regurgitation presents for routine follow-up.     Last clinic visit: Despite her advanced age and significant comorbidities she takes care of the majority of her ADLs without significant limitations. She does note some HOPKINS and some mild increase in LLE swelling. She denies CP/palp/syncope. No orth/pnd. She denies fatigue and overall is very optimistic about her quality of life.    Over the last 2 weeks she notices increased swelling in her lower extremities.  She denies any orthopnea or PND.  She noticed some mild increase in shortness of air above baseline but this does not limit her activity.  She stays relatively active is able to ordonez fleeting chest discomfort which is not related to physical activity.  Family and social history reviewed and are not pertinent to this clinic visit.    The following portions of the patient's history were reviewed and updated as appropriate: allergies, current medications, past family history, past medical history, past social history, past surgical history and problem list.      Review of Systems   Constitution: Negative for chills and fever.   HENT: Negative for hoarse voice and sore throat.    Eyes: Negative for double vision and photophobia.   Cardiovascular: Positive for chest pain and leg swelling. Negative for near-syncope, orthopnea, palpitations, paroxysmal nocturnal dyspnea and syncope.   Respiratory: Positive for shortness of breath. Negative for cough and wheezing.    Skin: Negative for poor wound healing and rash.   Musculoskeletal: Negative for arthritis and joint swelling.   Gastrointestinal: Negative for bloating, abdominal pain, hematemesis and hematochezia.  "  Neurological: Negative for dizziness and focal weakness.   Psychiatric/Behavioral: Negative for depression and suicidal ideas.       OBJECTIVE:   Vital Signs  Vitals:    04/08/20 1338   BP: 127/53   Pulse: 57     Estimated body mass index is 20.02 kg/m² as calculated from the following:    Height as of this encounter: 160 cm (63\").    Weight as of this encounter: 51.3 kg (113 lb).    Physical Exam   Vitals reviewed.      Procedures          ASSESSMENT:     No diagnosis found.      PLAN OF CARE:     1. CAD s/p remote hx of PCI to LAD -complaining of very atypical and rarely occurring chest discomfort.  We will continue to monitor frequency of symptoms.  Patient is on low-dose aspirin over-the-counter and has been for many years.  She is not having any problems with bleeding so we will continue this.  Continue beta-blocker and statin as they are well-tolerated.  2. Chronic diastolic HF -increase Lasix to 40 mg daily until swelling is back down.  I told the patient to contact me in 1 to 2 weeks if she does not see any improvement of symptoms.. NYHA 2-3 symptoms are stable. BP fairly well controlled. Periodic home monitroing, Na restricted diet.  3. PAF - cont BB and lower dose apixaban.   4. Mitral regurgitation - moderate-severe with severe pulmonary HTN. Conservative mgmt.    RTC 6 months         Discharge Medications           Accurate as of April 8, 2020  2:25 PM. If you have any questions, ask your nurse or doctor.               Changes to Medications      Instructions Start Date   polyethylene glycol pack packet  Commonly known as:  MIRALAX  What changed:    · when to take this  · reasons to take this   17 g, Oral, Daily         Continue These Medications      Instructions Start Date   apixaban 2.5 MG tablet tablet  Commonly known as:  ELIQUIS   2.5 mg, Oral, Every 12 Hours Scheduled      Calcium 600 600 MG tablet  Generic drug:  calcium carbonate   600 mg, Oral, Daily      calcium carbonate 500 MG chewable " tablet  Commonly known as:  TUMS   2 tablets, Oral, 2 Times Daily PRN      Centrum Silver tablet   1 tablet, Oral, Daily      digoxin 125 MCG tablet  Commonly known as:  LANOXIN   125 mcg, Oral, Daily Digoxin      DULoxetine 60 MG capsule  Commonly known as:  CYMBALTA   TAKE 1 CAPSULE BY MOUTH EVERY DAY      furosemide 20 MG tablet  Commonly known as:  LASIX   TAKE 1 TABLET BY MOUTH EVERY DAY      guaiFENesin-dextromethorphan 100-10 MG/5ML syrup  Commonly known as:  ROBITUSSIN DM   5 mL, Oral, Every 4 Hours PRN      metoprolol tartrate 50 MG tablet  Commonly known as:  LOPRESSOR   TAKE 1 TABLET BY MOUTH EVERY 12 HOURS      omeprazole 20 MG capsule  Commonly known as:  priLOSEC   20 mg, Oral, Daily With Breakfast      potassium chloride 10 MEQ CR capsule  Commonly known as:  MICRO-K   20 mEq, Oral, Daily      pravastatin 40 MG tablet  Commonly known as:  PRAVACHOL   TAKE 1 TABLET EVERY DAY      traMADol 50 MG tablet  Commonly known as:  ULTRAM   TAKE 1 TABLET BY MOUTH EVERY 6 HOURS AS NEEDED MODERATE PAIN      Vitamin D 50 MCG (2000 UT) tablet   TAKE 2,000 UNITS BY MOUTH DAILY. 1 CAPSULE DAILY             Thank you for allowing me to participate in the care of your patient,      Sincerely,   Blake Brown Jr, MD  [facility]  10/2/2019  14:25

## 2020-05-22 RX ORDER — TRAMADOL HYDROCHLORIDE 50 MG/1
TABLET ORAL
Qty: 90 TABLET | Refills: 1 | Status: SHIPPED | OUTPATIENT
Start: 2020-05-22 | End: 2020-07-31

## 2020-06-03 ENCOUNTER — OFFICE VISIT (OUTPATIENT)
Dept: FAMILY MEDICINE CLINIC | Facility: CLINIC | Age: 85
End: 2020-06-03
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
## 2020-06-03 DIAGNOSIS — M54.50 ACUTE BILATERAL LOW BACK PAIN WITHOUT SCIATICA: ICD-10-CM
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
## 2020-06-03 DIAGNOSIS — R42 DIZZINESS: ICD-10-CM

## 2020-06-03 DIAGNOSIS — S01.01XA LACERATION OF SCALP WITHOUT FOREIGN BODY, INITIAL ENCOUNTER: ICD-10-CM

## 2020-06-03 PROCEDURE — 99442 PR PHYS/QHP TELEPHONE EVALUATION 11-20 MIN: CPT | Performed by: NURSE PRACTITIONER

## 2020-06-03 NOTE — PATIENT INSTRUCTIONS
Patient unsure of BP at home,   D/t fall, head lac and being on blood thinner, dizziness, and worsening pain advised ER today for evaluation. Her daughter was on the phone with her today and her and patient agree with plan and ER for eval today.   Falls precautions given, monitor BP at home, advised the need for labs for changes in lasix dose, cont f/u with cardiology as scheduled.   If any worsening symptoms, falls, confusion advised ER.   If work up negative can consider PM consult if needed.   Increase fluid intake, get plenty of rest.   Patient agrees with plan of care and understands instructions. Call if worsening symptoms or any problems or concerns.

## 2020-06-03 NOTE — PROGRESS NOTES
Subjective   Alexa Peace is a 89 y.o. female.     History of Present Illness   You have chosen to receive care through a telephone visit. Do you consent to use a telephone visit for your medical care today? Yes time spent during visit 15 minutes , she has difficulty hearing and her daughter is on the telephone visit with her today assisting with history.   C/o back pain, she has hx of back pain, states she fell about 2 weeks ago, she states she bent down to pick something up, felt dizzy when standing up, states she hit back on piece of furniture. She states she hit head, had laceration on head, denies syncope, she is taking eliquis for afib, she refused hospital at that time after fall, her daughter states she was told she was trying to close sliding door and fell back. She denies HA, does have confusion, denies any recent dizziness. She is a poor historian. She denies CP or SOA. She denies trouble breathing today. She denies cough, denies fever.   She has worsening pain, taking tramadol as needed for pain, also taking cymbalta.prev saw PM for back pain, prev had epidurals and PT, prev on norco for pain, changed back to tramadol d/t elevated BP d/t pain medications.   States BP at home usually with afib, CHF, swelling, sees cardiology Dr. Brown, had visit 2 months ago, increased lasix to 40mg daily, states swelling improved but not much. She denies urinary symptoms today. She does have pain with ambulation, she states she has pain across low back, she has pain at top of right side. She denies bruising. States head lac somewhat healed.     The following portions of the patient's history were reviewed and updated as appropriate: allergies, current medications, past family history, past medical history, past social history, past surgical history and problem list.    Review of Systems   Constitutional: Negative for chills, diaphoresis and fever.   Respiratory: Negative for cough and shortness of breath.     Cardiovascular: Negative for chest pain.   Musculoskeletal: Positive for arthralgias and back pain. Negative for myalgias.   Neurological: Positive for dizziness and confusion. Negative for syncope, weakness, light-headedness and headache.   All other systems reviewed and are negative.      Objective   Physical Exam   Constitutional: She is oriented to person, place, and time.   Pulmonary/Chest: Effort normal.   Neurological: She is alert and oriented to person, place, and time.     Physical exam limited d/t telephone visit.       Assessment/Plan   Ada was seen today for back pain.    Diagnoses and all orders for this visit:    Fall in home, initial encounter    Laceration of scalp without foreign body, initial encounter    Dizziness    Acute bilateral low back pain without sciatica          Patient unsure of BP at home,   D/t fall, head lac and being on blood thinner, dizziness, and worsening pain advised ER today for evaluation. Her daughter was on the phone with her today and her and patient agree with plan and ER for eval today.   Falls precautions given, monitor BP at home, advised the need for labs for changes in lasix dose, cont f/u with cardiology as scheduled.   If any worsening symptoms, falls, confusion advised ER.   If work up negative can consider PM consult if needed.   Increase fluid intake, get plenty of rest.   Patient agrees with plan of care and understands instructions. Call if worsening symptoms or any problems or concerns.

## 2020-07-13 RX ORDER — FUROSEMIDE 20 MG/1
20 TABLET ORAL DAILY
Qty: 90 TABLET | Refills: 1 | Status: SHIPPED | OUTPATIENT
Start: 2020-07-13 | End: 2020-07-20 | Stop reason: SDUPTHER

## 2020-07-20 ENCOUNTER — TELEPHONE (OUTPATIENT)
Dept: CARDIOLOGY | Facility: CLINIC | Age: 85
End: 2020-07-20

## 2020-07-20 RX ORDER — FUROSEMIDE 20 MG/1
40 TABLET ORAL DAILY
Qty: 180 TABLET | Refills: 1 | Status: SHIPPED | OUTPATIENT
Start: 2020-07-20 | End: 2020-11-16 | Stop reason: SDUPTHER

## 2020-07-20 NOTE — TELEPHONE ENCOUNTER
Lasix was increased from 20mg to 40mg at 4/8/2020 televisit.  Pt is asking for new rx.  She has been taking 40mg QD and no swelling.  Do you want her to continue at this dose?

## 2020-07-24 ENCOUNTER — TELEPHONE (OUTPATIENT)
Dept: CARDIOLOGY | Facility: CLINIC | Age: 85
End: 2020-07-24

## 2020-07-24 NOTE — TELEPHONE ENCOUNTER
Daughter called asking for a new rx for her lasix. I called back and LVM stating that a new RX reflecting the new dose was sent to patient's pharamcy on 7/20/2020 and that it should be ready. If she had any other questions she can call the office back.

## 2020-07-27 RX ORDER — PRAVASTATIN SODIUM 40 MG
TABLET ORAL
Qty: 90 TABLET | Refills: 1 | Status: SHIPPED | OUTPATIENT
Start: 2020-07-27 | End: 2020-10-27

## 2020-07-30 RX ORDER — CHOLECALCIFEROL (VITAMIN D3) 50 MCG
TABLET ORAL
Qty: 90 TABLET | Refills: 1 | Status: SHIPPED | OUTPATIENT
Start: 2020-07-30 | End: 2020-11-16

## 2020-07-31 RX ORDER — TRAMADOL HYDROCHLORIDE 50 MG/1
TABLET ORAL
Qty: 90 TABLET | Refills: 1 | Status: SHIPPED | OUTPATIENT
Start: 2020-07-31 | End: 2020-11-02

## 2020-09-08 DIAGNOSIS — I48.11 LONGSTANDING PERSISTENT ATRIAL FIBRILLATION (HCC): Primary | ICD-10-CM

## 2020-09-14 ENCOUNTER — TELEPHONE (OUTPATIENT)
Dept: FAMILY MEDICINE CLINIC | Facility: CLINIC | Age: 85
End: 2020-09-14

## 2020-09-14 RX ORDER — DULOXETIN HYDROCHLORIDE 60 MG/1
60 CAPSULE, DELAYED RELEASE ORAL DAILY
Qty: 90 CAPSULE | Refills: 1 | Status: SHIPPED | OUTPATIENT
Start: 2020-09-14 | End: 2021-01-18

## 2020-10-27 RX ORDER — PRAVASTATIN SODIUM 40 MG
TABLET ORAL
Qty: 90 TABLET | Refills: 1 | Status: SHIPPED | OUTPATIENT
Start: 2020-10-27 | End: 2021-03-05 | Stop reason: SDUPTHER

## 2020-11-02 RX ORDER — TRAMADOL HYDROCHLORIDE 50 MG/1
TABLET ORAL
Qty: 90 TABLET | Refills: 1 | Status: SHIPPED | OUTPATIENT
Start: 2020-11-02 | End: 2021-01-19

## 2020-11-03 RX ORDER — METOPROLOL TARTRATE 50 MG/1
50 TABLET, FILM COATED ORAL EVERY 12 HOURS
Qty: 180 TABLET | Refills: 3 | Status: SHIPPED | OUTPATIENT
Start: 2020-11-03 | End: 2021-03-09

## 2020-11-16 RX ORDER — FUROSEMIDE 20 MG/1
40 TABLET ORAL DAILY
Qty: 180 TABLET | Refills: 1 | Status: SHIPPED | OUTPATIENT
Start: 2020-11-16 | End: 2021-07-16 | Stop reason: SDUPTHER

## 2020-11-16 RX ORDER — CHOLECALCIFEROL (VITAMIN D3) 50 MCG
TABLET ORAL
Qty: 90 TABLET | Refills: 1 | Status: SHIPPED | OUTPATIENT
Start: 2020-11-16

## 2020-11-18 NOTE — TELEPHONE ENCOUNTER
impared Glucose intolerance-R73.02  Needs test strips [facility]    Called patient v/m full couldn't ask her which one uses. Called pharmacy they have  accu chek avia strips rls sent but no meter or lancets etc. Suggest we call patient and let them know.

## 2020-12-04 DIAGNOSIS — I48.11 LONGSTANDING PERSISTENT ATRIAL FIBRILLATION (HCC): ICD-10-CM

## 2020-12-04 RX ORDER — APIXABAN 2.5 MG/1
TABLET, FILM COATED ORAL
Qty: 60 TABLET | Refills: 2 | Status: SHIPPED | OUTPATIENT
Start: 2020-12-04 | End: 2021-03-05

## 2020-12-07 ENCOUNTER — TELEPHONE (OUTPATIENT)
Dept: CARDIOLOGY | Facility: CLINIC | Age: 85
End: 2020-12-07

## 2020-12-07 RX ORDER — POTASSIUM CHLORIDE 750 MG/1
20 CAPSULE, EXTENDED RELEASE ORAL DAILY
Qty: 60 CAPSULE | Refills: 2 | Status: CANCELLED | OUTPATIENT
Start: 2020-12-07

## 2020-12-07 NOTE — TELEPHONE ENCOUNTER
Left a message for patient to discuss the ability to get labs as we have nothing in the computer since November.

## 2020-12-07 NOTE — TELEPHONE ENCOUNTER
Spoke to Ms. Jacobs about lab work.  She is going to see Dr. Jacobs in office tomorrow.  I have sent him a message asking for his assistance in getting lab draw if possible while she is at the office.  Will follow diagnostic testing for further treatment recommendations.

## 2020-12-08 DIAGNOSIS — I50.33 ACUTE ON CHRONIC DIASTOLIC CHF (CONGESTIVE HEART FAILURE) (HCC): Primary | ICD-10-CM

## 2020-12-17 ENCOUNTER — TELEPHONE (OUTPATIENT)
Dept: CARDIOLOGY | Facility: CLINIC | Age: 85
End: 2020-12-17

## 2020-12-17 NOTE — TELEPHONE ENCOUNTER
Spoke to daughter.  Patient never had labs.  She is having cognitive dysfunction.  We will not make any changes or refill potassium until labs are done.  They will come to Dr. Jacobs's office for lab draw.

## 2020-12-18 ENCOUNTER — LAB (OUTPATIENT)
Dept: FAMILY MEDICINE CLINIC | Facility: CLINIC | Age: 85
End: 2020-12-18

## 2020-12-18 DIAGNOSIS — I50.33 ACUTE ON CHRONIC DIASTOLIC CHF (CONGESTIVE HEART FAILURE) (HCC): ICD-10-CM

## 2020-12-18 LAB
ANION GAP SERPL CALCULATED.3IONS-SCNC: 7.5 MMOL/L (ref 5–15)
BUN SERPL-MCNC: 19 MG/DL (ref 8–23)
BUN/CREAT SERPL: 34.5 (ref 7–25)
CALCIUM SPEC-SCNC: 9.4 MG/DL (ref 8.2–9.6)
CHLORIDE SERPL-SCNC: 101 MMOL/L (ref 98–107)
CO2 SERPL-SCNC: 31.5 MMOL/L (ref 22–29)
CREAT SERPL-MCNC: 0.55 MG/DL (ref 0.57–1)
GFR SERPL CREATININE-BSD FRML MDRD: 103 ML/MIN/1.73
GLUCOSE SERPL-MCNC: 98 MG/DL (ref 65–99)
NT-PROBNP SERPL-MCNC: 720.8 PG/ML (ref 0–1800)
POTASSIUM SERPL-SCNC: 4.1 MMOL/L (ref 3.5–5.2)
SODIUM SERPL-SCNC: 140 MMOL/L (ref 136–145)

## 2020-12-18 PROCEDURE — 80048 BASIC METABOLIC PNL TOTAL CA: CPT | Performed by: INTERNAL MEDICINE

## 2020-12-18 PROCEDURE — 83880 ASSAY OF NATRIURETIC PEPTIDE: CPT | Performed by: INTERNAL MEDICINE

## 2020-12-18 PROCEDURE — 36415 COLL VENOUS BLD VENIPUNCTURE: CPT | Performed by: INTERNAL MEDICINE

## 2020-12-22 NOTE — PROGRESS NOTES
Please let patient daughter know that potassium is normal and supplementation is not necessary so we will not be filling the potassium prescription.  BNP was within normal range and heart failure looks well controlled at this time.

## 2020-12-23 ENCOUNTER — TELEPHONE (OUTPATIENT)
Dept: CARDIOLOGY | Facility: CLINIC | Age: 85
End: 2020-12-23

## 2020-12-23 NOTE — TELEPHONE ENCOUNTER
Called and left a VM. Will continue to try to reach pt's daughter.    Chhaya Castelan RN  Triage [facility] [place]

## 2020-12-23 NOTE — TELEPHONE ENCOUNTER
----- Message from Blake Brown Jr., MD sent at 12/22/2020  5:28 PM EST -----  Please let patient daughter know that potassium is normal and supplementation is not necessary so we will not be filling the potassium prescription.  BNP was within normal range and heart failure looks well controlled at this time.

## 2020-12-23 NOTE — TELEPHONE ENCOUNTER
Results and recommendations reviewed with the patient. Patient verbalized understanding. Denied further questions at this time.    Olesya Fuentes RN  Triage [facility] [place]

## 2021-01-18 RX ORDER — DULOXETIN HYDROCHLORIDE 60 MG/1
CAPSULE, DELAYED RELEASE ORAL
Qty: 90 CAPSULE | Refills: 1 | Status: SHIPPED | OUTPATIENT
Start: 2021-01-18 | End: 2021-07-07

## 2021-01-19 RX ORDER — TRAMADOL HYDROCHLORIDE 50 MG/1
TABLET ORAL
Qty: 120 TABLET | Refills: 3 | Status: SHIPPED | OUTPATIENT
Start: 2021-01-19 | End: 2021-08-13

## 2021-03-02 DIAGNOSIS — Z23 IMMUNIZATION DUE: ICD-10-CM

## 2021-03-05 ENCOUNTER — OFFICE VISIT (OUTPATIENT)
Dept: FAMILY MEDICINE CLINIC | Facility: CLINIC | Age: 86
End: 2021-03-05

## 2021-03-05 VITALS
TEMPERATURE: 97.7 F | HEART RATE: 53 BPM | SYSTOLIC BLOOD PRESSURE: 130 MMHG | HEIGHT: 62 IN | OXYGEN SATURATION: 96 % | BODY MASS INDEX: 20.87 KG/M2 | DIASTOLIC BLOOD PRESSURE: 70 MMHG | WEIGHT: 113.4 LBS

## 2021-03-05 DIAGNOSIS — M79.89 LEG SWELLING: ICD-10-CM

## 2021-03-05 DIAGNOSIS — I50.32 CHRONIC DIASTOLIC CHF (CONGESTIVE HEART FAILURE) (HCC): Primary | ICD-10-CM

## 2021-03-05 DIAGNOSIS — E78.2 MIXED HYPERLIPIDEMIA: ICD-10-CM

## 2021-03-05 DIAGNOSIS — I10 ESSENTIAL HYPERTENSION: ICD-10-CM

## 2021-03-05 DIAGNOSIS — B35.1 TOENAIL FUNGUS: ICD-10-CM

## 2021-03-05 DIAGNOSIS — M54.50 CHRONIC LOW BACK PAIN, UNSPECIFIED BACK PAIN LATERALITY, UNSPECIFIED WHETHER SCIATICA PRESENT: ICD-10-CM

## 2021-03-05 DIAGNOSIS — R26.89 BALANCE PROBLEM: ICD-10-CM

## 2021-03-05 DIAGNOSIS — R41.3 MEMORY CHANGES: ICD-10-CM

## 2021-03-05 DIAGNOSIS — I48.11 LONGSTANDING PERSISTENT ATRIAL FIBRILLATION (HCC): ICD-10-CM

## 2021-03-05 DIAGNOSIS — G89.29 CHRONIC LOW BACK PAIN, UNSPECIFIED BACK PAIN LATERALITY, UNSPECIFIED WHETHER SCIATICA PRESENT: ICD-10-CM

## 2021-03-05 DIAGNOSIS — I48.0 PAROXYSMAL ATRIAL FIBRILLATION (HCC): ICD-10-CM

## 2021-03-05 LAB
ALBUMIN SERPL-MCNC: 4 G/DL (ref 3.5–5.2)
ALBUMIN/GLOB SERPL: 1.4 G/DL
ALP SERPL-CCNC: 67 U/L (ref 39–117)
ALT SERPL W P-5'-P-CCNC: 13 U/L (ref 1–33)
ANION GAP SERPL CALCULATED.3IONS-SCNC: 12 MMOL/L (ref 5–15)
AST SERPL-CCNC: 17 U/L (ref 1–32)
BACTERIA UR QL AUTO: ABNORMAL /HPF
BILIRUB SERPL-MCNC: 0.2 MG/DL (ref 0–1.2)
BILIRUB UR QL STRIP: NEGATIVE
BUN SERPL-MCNC: 23 MG/DL (ref 8–23)
BUN/CREAT SERPL: 34.8 (ref 7–25)
CALCIUM SPEC-SCNC: 9.3 MG/DL (ref 8.2–9.6)
CHLORIDE SERPL-SCNC: 100 MMOL/L (ref 98–107)
CHOLEST SERPL-MCNC: 109 MG/DL (ref 0–200)
CLARITY UR: CLEAR
CO2 SERPL-SCNC: 30 MMOL/L (ref 22–29)
COLOR UR: YELLOW
CREAT SERPL-MCNC: 0.66 MG/DL (ref 0.57–1)
GFR SERPL CREATININE-BSD FRML MDRD: 84 ML/MIN/1.73
GLOBULIN UR ELPH-MCNC: 2.8 GM/DL
GLUCOSE SERPL-MCNC: 103 MG/DL (ref 65–99)
GLUCOSE UR STRIP-MCNC: NEGATIVE MG/DL
HDLC SERPL-MCNC: 35 MG/DL (ref 40–60)
HGB UR QL STRIP.AUTO: ABNORMAL
KETONES UR QL STRIP: NEGATIVE
LDLC SERPL CALC-MCNC: 49 MG/DL (ref 0–100)
LDLC/HDLC SERPL: 1.3 {RATIO}
LEUKOCYTE ESTERASE UR QL STRIP.AUTO: ABNORMAL
NITRITE UR QL STRIP: NEGATIVE
PH UR STRIP.AUTO: 6.5 [PH] (ref 4.6–8)
POTASSIUM SERPL-SCNC: 4.3 MMOL/L (ref 3.5–5.2)
PROT SERPL-MCNC: 6.8 G/DL (ref 6–8.5)
PROT UR QL STRIP: NEGATIVE
RBC # UR: ABNORMAL /HPF
REF LAB TEST METHOD: ABNORMAL
SODIUM SERPL-SCNC: 142 MMOL/L (ref 136–145)
SP GR UR STRIP: 1.02 (ref 1–1.03)
SQUAMOUS #/AREA URNS HPF: ABNORMAL /HPF
TRIGL SERPL-MCNC: 143 MG/DL (ref 0–150)
TSH SERPL DL<=0.05 MIU/L-ACNC: 1.37 UIU/ML (ref 0.27–4.2)
UROBILINOGEN UR QL STRIP: ABNORMAL
VIT B12 BLD-MCNC: 957 PG/ML (ref 211–946)
VLDLC SERPL-MCNC: 25 MG/DL (ref 5–40)
WBC UR QL AUTO: ABNORMAL /HPF

## 2021-03-05 PROCEDURE — 80061 LIPID PANEL: CPT | Performed by: NURSE PRACTITIONER

## 2021-03-05 PROCEDURE — 81001 URINALYSIS AUTO W/SCOPE: CPT | Performed by: NURSE PRACTITIONER

## 2021-03-05 PROCEDURE — 82607 VITAMIN B-12: CPT | Performed by: NURSE PRACTITIONER

## 2021-03-05 PROCEDURE — 80053 COMPREHEN METABOLIC PANEL: CPT | Performed by: NURSE PRACTITIONER

## 2021-03-05 PROCEDURE — 36415 COLL VENOUS BLD VENIPUNCTURE: CPT | Performed by: NURSE PRACTITIONER

## 2021-03-05 PROCEDURE — 84443 ASSAY THYROID STIM HORMONE: CPT | Performed by: NURSE PRACTITIONER

## 2021-03-05 PROCEDURE — 99214 OFFICE O/P EST MOD 30 MIN: CPT | Performed by: NURSE PRACTITIONER

## 2021-03-05 RX ORDER — APIXABAN 2.5 MG/1
TABLET, FILM COATED ORAL
Qty: 60 TABLET | Refills: 2 | Status: SHIPPED | OUTPATIENT
Start: 2021-03-05 | End: 2021-06-02

## 2021-03-05 RX ORDER — DIGOXIN 125 MCG
125 TABLET ORAL
Qty: 30 TABLET | Refills: 5 | Status: SHIPPED | OUTPATIENT
Start: 2021-03-05 | End: 2021-03-09

## 2021-03-05 RX ORDER — PRAVASTATIN SODIUM 40 MG
40 TABLET ORAL DAILY
Qty: 90 TABLET | Refills: 1 | Status: SHIPPED | OUTPATIENT
Start: 2021-03-05 | End: 2021-10-05 | Stop reason: SDUPTHER

## 2021-03-05 NOTE — PATIENT INSTRUCTIONS
Labs today will call with results.   Refer to cardiology and podiatry will call with appt,   DME for back brace and compression stockings,   Discussed protein in diet, 3 meals per day to maintain weight,   Encouraged leg elevation, cont current meds,   Deferred med for nightmares today, she will monitor try eating earlier in evening. If symptoms persist call office   Refer to PT will call with appt.   Pending labs will consider neurology consult if needed,   Patient and family agrees with plan of care and understands instructions. Call if worsening symptoms or any problems or concerns.

## 2021-03-05 NOTE — PROGRESS NOTES
Chief Complaint  Memory Loss, Leg Swelling, Nail Problem, Nutrition Counseling, Med Refill (chol), Insomnia (nightmares), and order for back brace    Subjective          [place] NAM Peace presents to [facility]  History of Present Illness  C/o memory loss, insomnia, nightmares. Her daughter is in the room today, states she has trouble remembering things. Trouble remembering dates and appt times, processing of information, her daughter is helping with history today. Noticed in the past year. States she had nightmares after eating a certain type of bread, she states nightmares have become less frequent, denies sleep walking, states waking up and unsure if dream. States nightmares come and go. States sleep is otherwise ok, states she has fatigue but has had throughout her lifetime. Denies any changes in medications,she has not tried anything OTC, she does have more stress recently.   Also c/o leg swelling, states noticed swelling in ankles and lower legs, noticed about 2 weeks ago. Does have leg pain at times, denies CP, does have SOA at times, does have dizziness at times. States she lies on couch most of the day, she denies diet changes.   she does see cardiology, hx of afib, CHF, mitral regurgitaition, taking lasix 40mg daily, digoxin 125mcg daily, metoprolol 50mg bid, eliquis 2.5mg bid. She does check BP at home but unsure of readings.   With HLD, taking pravastatin 40mg daily, needs refill today. Tolerating well. She is not fasting today.   Also c/o fungus on toenails.   She would like order for back brace, hx of back surgery, hx of back pain, states having some pain. Denies dysuria, does have frequency at times, sees urology.   She states appetite is ok, daughter wondering about nutrition. States she has always followed low fat and chol diet. Wondering about diet now. She states she has lost weight, states gradual weight loss, states weighed 128lbs 1-2 years ago.      Objective   Vital  "Signs:   /70 (BP Location: Left arm, Patient Position: Sitting, Cuff Size: Adult)   Pulse 53   Temp 97.7 °F (36.5 °C) (Temporal)   Ht 157.5 cm (62\")   Wt 51.4 kg (113 lb 6.4 oz)   SpO2 96%   BMI 20.74 kg/m²     Physical Exam  Vitals signs and nursing note reviewed.   Constitutional:       Appearance: She is well-developed.   HENT:      Head: Normocephalic.   Eyes:      Pupils: Pupils are equal, round, and reactive to light.   Cardiovascular:      Rate and Rhythm: Normal rate and regular rhythm.      Heart sounds: Normal heart sounds.      Comments: Mild LE edema, varicose veins noted.   Pulmonary:      Effort: Pulmonary effort is normal.      Breath sounds: Normal breath sounds.   Musculoskeletal:      Right lower leg: Edema present.      Left lower leg: Edema present.   Feet:      Right foot:      Skin integrity: Skin integrity normal.      Toenail Condition: Right toenails are abnormally thick. Fungal disease present.     Left foot:      Skin integrity: Skin integrity normal.      Toenail Condition: Left toenails are abnormally thick. Fungal disease present.  Skin:     General: Skin is warm and dry.   Neurological:      Mental Status: She is alert and oriented to person, place, and time.      Cranial Nerves: Cranial nerves are intact.      Sensory: Sensation is intact.      Motor: Motor function is intact.      Coordination: Coordination is intact.   Psychiatric:         Behavior: Behavior normal.         Judgment: Judgment normal.        Result Review :                 Assessment and Plan    Diagnoses and all orders for this visit:    1. Chronic diastolic CHF (congestive heart failure) (CMS/[facility]) (Primary)  -     Comprehensive Metabolic Panel  -     Lipid Panel  -     Urinalysis With Microscopic - Urine, Clean Catch  -     TSH  -     Vitamin B12  -     Ambulatory Referral to Cardiology  -     Ambulatory Referral to Podiatry  -     Urinalysis without microscopic (no culture) - Urine, Clean Catch  -     " Urinalysis, Microscopic Only - Urine, Clean Catch    2. Essential hypertension  -     Comprehensive Metabolic Panel  -     Lipid Panel  -     Urinalysis With Microscopic - Urine, Clean Catch  -     TSH  -     Vitamin B12  -     Ambulatory Referral to Cardiology  -     Ambulatory Referral to Podiatry  -     Urinalysis without microscopic (no culture) - Urine, Clean Catch  -     Urinalysis, Microscopic Only - Urine, Clean Catch    3. Mixed hyperlipidemia  -     Comprehensive Metabolic Panel  -     Lipid Panel  -     Urinalysis With Microscopic - Urine, Clean Catch  -     TSH  -     Vitamin B12  -     Ambulatory Referral to Cardiology  -     Ambulatory Referral to Podiatry  -     Urinalysis without microscopic (no culture) - Urine, Clean Catch  -     Urinalysis, Microscopic Only - Urine, Clean Catch    4. Paroxysmal atrial fibrillation (CMS/HCC)  -     Comprehensive Metabolic Panel  -     Lipid Panel  -     Urinalysis With Microscopic - Urine, Clean Catch  -     TSH  -     Vitamin B12  -     Ambulatory Referral to Cardiology  -     Ambulatory Referral to Podiatry  -     Urinalysis without microscopic (no culture) - Urine, Clean Catch  -     Urinalysis, Microscopic Only - Urine, Clean Catch    5. Memory changes  -     Comprehensive Metabolic Panel  -     Lipid Panel  -     Urinalysis With Microscopic - Urine, Clean Catch  -     TSH  -     Vitamin B12  -     Ambulatory Referral to Cardiology  -     Ambulatory Referral to Podiatry  -     Urinalysis without microscopic (no culture) - Urine, Clean Catch  -     Urinalysis, Microscopic Only - Urine, Clean Catch  -     Ambulatory Referral to Home Health    6. Toenail fungus  -     Comprehensive Metabolic Panel  -     Lipid Panel  -     Urinalysis With Microscopic - Urine, Clean Catch  -     TSH  -     Vitamin B12  -     Ambulatory Referral to Cardiology  -     Ambulatory Referral to Podiatry  -     Urinalysis without microscopic (no culture) - Urine, Clean Catch  -      Urinalysis, Microscopic Only - Urine, Clean Catch    7. Leg swelling  -     Miscellaneous DME  -     Comprehensive Metabolic Panel  -     Lipid Panel  -     Urinalysis With Microscopic - Urine, Clean Catch  -     TSH  -     Vitamin B12  -     Ambulatory Referral to Cardiology  -     Ambulatory Referral to Podiatry  -     Miscellaneous DME  -     Urinalysis without microscopic (no culture) - Urine, Clean Catch  -     Urinalysis, Microscopic Only - Urine, Clean Catch  -     Ambulatory Referral to Home Health    8. Chronic low back pain, unspecified back pain laterality, unspecified whether sciatica present  -     Miscellaneous DME  -     Comprehensive Metabolic Panel  -     Lipid Panel  -     Urinalysis With Microscopic - Urine, Clean Catch  -     TSH  -     Vitamin B12  -     Ambulatory Referral to Cardiology  -     Ambulatory Referral to Podiatry  -     Miscellaneous DME  -     Urinalysis without microscopic (no culture) - Urine, Clean Catch  -     Urinalysis, Microscopic Only - Urine, Clean Catch  -     Ambulatory Referral to Home Health    9. Balance problem  -     Ambulatory Referral to Home Health    Other orders  -     pravastatin (PRAVACHOL) 40 MG tablet; Take 1 tablet by mouth Daily.  Dispense: 90 tablet; Refill: 1        Follow Up   Return in about 3 months (around 6/5/2021), or if symptoms worsen or fail to improve.  Patient was given instructions and counseling regarding her condition or for health maintenance advice. Please see specific information pulled into the AVS if appropriate.     Labs today will call with results.   Refer to cardiology and podiatry will call with appt,   DME for back brace and compression stockings,   Discussed protein in diet, 3 meals per day to maintain weight,   Encouraged leg elevation, cont current meds,   Deferred med for nightmares today, she will monitor try eating earlier in evening. If symptoms persist call office   Refer to PT will call with appt.   Pending labs will  consider neurology consult if needed,   Patient and family agrees with plan of care and understands instructions. Call if worsening symptoms or any problems or concerns.

## 2021-03-08 ENCOUNTER — TELEPHONE (OUTPATIENT)
Dept: FAMILY MEDICINE CLINIC | Facility: CLINIC | Age: 86
End: 2021-03-08

## 2021-03-08 DIAGNOSIS — R31.21 ASYMPTOMATIC MICROSCOPIC HEMATURIA: Primary | ICD-10-CM

## 2021-03-09 ENCOUNTER — OFFICE VISIT (OUTPATIENT)
Dept: CARDIOLOGY | Facility: CLINIC | Age: 86
End: 2021-03-09

## 2021-03-09 VITALS
WEIGHT: 112.8 LBS | SYSTOLIC BLOOD PRESSURE: 112 MMHG | DIASTOLIC BLOOD PRESSURE: 70 MMHG | BODY MASS INDEX: 20.76 KG/M2 | HEIGHT: 62 IN | HEART RATE: 44 BPM

## 2021-03-09 DIAGNOSIS — I73.9 PVD (PERIPHERAL VASCULAR DISEASE) WITH CLAUDICATION (HCC): ICD-10-CM

## 2021-03-09 DIAGNOSIS — R60.0 BILATERAL EDEMA OF LOWER EXTREMITY: Primary | ICD-10-CM

## 2021-03-09 DIAGNOSIS — R00.1 BRADYCARDIA, SINUS: ICD-10-CM

## 2021-03-09 PROCEDURE — 93000 ELECTROCARDIOGRAM COMPLETE: CPT | Performed by: INTERNAL MEDICINE

## 2021-03-09 PROCEDURE — 99214 OFFICE O/P EST MOD 30 MIN: CPT | Performed by: INTERNAL MEDICINE

## 2021-03-09 NOTE — PROGRESS NOTES
[facility]      Patient Name: Alexa Peace  :[id number]  Age: 89 y.o.  Encounter Provider:  Blake Brown Jr, MD      Chief Complaint: FU CAD, PAF, CHF, VHD      HPI  Alexa Peace is a 89 y.o. female PMH CAD s/p remote hx of PCI to LAD, chronic diastolic HF, PAF, mitral regurgitation presents for routine follow-up.     Last clinic visit: She notes chronic stable exertional dyspnea.  Still able to walk around the home without significant chest pain.  She is noted discoloration of bilateral lower extremities with some mild pain on ambulation.  No sensory deficits.  She denies angina.  She notes some difficulty with balance.  She denies palpitations.  Mild dizziness but no syncope.  Heart rate at Dr. Jacobs's office was 53 bpm and today is 42 bpm.  She denies orthopnea or PND.  Social and family history reviewed and not pertinent to this clinic visit.    The following portions of the patient's history were reviewed and updated as appropriate: allergies, current medications, past family history, past medical history, past social history, past surgical history and problem list.      Review of Systems   Constitutional: Negative for chills and fever.   HENT: Negative for hoarse voice and sore throat.    Eyes: Negative for double vision and photophobia.   Cardiovascular: Positive for claudication and leg swelling. Negative for chest pain, near-syncope, orthopnea, palpitations, paroxysmal nocturnal dyspnea and syncope.   Respiratory: Positive for shortness of breath. Negative for cough and wheezing.    Skin: Negative for poor wound healing and rash.   Musculoskeletal: Negative for arthritis and joint swelling.   Gastrointestinal: Negative for bloating, abdominal pain, hematemesis and hematochezia.   Neurological: Negative for dizziness and focal weakness.   Psychiatric/Behavioral: Negative for depression and suicidal ideas.       OBJECTIVE:   Vital Signs    Estimated body mass index is 20.63  "kg/m² as calculated from the following:    Height as of this encounter: 157.5 cm (62\").    Weight as of this encounter: 51.2 kg (112 lb 12.8 oz).    Physical Exam  Vitals reviewed.   Constitutional:       General: She is not in acute distress.  Cardiovascular:      Rate and Rhythm: Regular rhythm. Bradycardia present.      Heart sounds: Murmur present.   Pulmonary:      Effort: No respiratory distress.      Breath sounds: No wheezing or rales.   Abdominal:      General: Bowel sounds are normal. There is no distension.   Skin:     Comments: Bilateral lower extremities are erythematous and cool with decreased pulses.  Left greater than right.   Neurological:      General: No focal deficit present.      Mental Status: She is alert and oriented to person, place, and time.   Psychiatric:         Mood and Affect: Mood normal.         Behavior: Behavior normal.           ECG 12 Lead    Date/Time: 3/9/2021 11:20 AM  Performed by: Blake Brown Jr., MD  Authorized by: Blake Brown Jr., MD   Comparison: compared with previous ECG from 10/2/2019  Similar to previous ECG  Rhythm: sinus bradycardia  Conduction: 1st degree AV block  Q waves: V1 and V2    Other findings: left ventricular hypertrophy    Clinical impression: abnormal EKG                ASSESSMENT:     89-year-old female presents for follow-up of chronic medical illness with specific complaints of lower extremity pain and discoloration of skin.    PLAN OF CARE:     1. CAD s/p remote hx of PCI to LAD -complaining of very atypical and rarely occurring chest discomfort.  We will continue to monitor frequency of symptoms.  Patient is on low-dose aspirin over-the-counter and has been for many years.  She is not having any problems with bleeding so we will continue this.  Continue statin.  2. Chronic diastolic HF -continue current dose of furosemide.  Fairly euvolemic today on exam.  Chronic stable dyspnea on exertion. BP fairly well controlled. Periodic home " Jyothi carias restricted diet.  3. PAF -concern for beta bradycardia we will hold digoxin and metoprolol.  Check Holter monitor after 1 week.  Continue lower dose apixaban.   4. Mitral regurgitation - moderate-severe with severe pulmonary HTN.  Monitor symptoms.  5.  Lower extremity erythema -decreased pulses in bilateral lower extremities with erythema but no evidence for tissue compromise.  Intermittent lower extremity pain seems may be consistent with claudication.  No sensory deficits.  Check ABIs.    RTC 3 months         Discharge Medications          Accurate as of March 9, 2021 10:44 AM. If you have any questions, ask your nurse or doctor.            Changes to Medications      Instructions Start Date   polyethylene glycol pack packet  Commonly known as: MIRALAX  What changed:   · when to take this  · reasons to take this   17 g, Oral, Daily         Continue These Medications      Instructions Start Date   Calcium 600 600 MG tablet  Generic drug: calcium carbonate   600 mg, Oral, Daily      calcium carbonate 500 MG chewable tablet  Commonly known as: TUMS   2 tablets, Oral, 2 Times Daily PRN      Centrum Silver tablet tablet  Generic drug: multivitamin with minerals   1 tablet, Oral, Daily      digoxin 125 MCG tablet  Commonly known as: LANOXIN   Take 1 tablet by mouth daily.      DULoxetine 60 MG capsule  Commonly known as: CYMBALTA   TAKE 1 CAPSULE BY MOUTH EVERY DAY      Eliquis 2.5 MG tablet tablet  Generic drug: apixaban   TAKE 1 TABLET BY MOUTH EVERY 12 HOURS      furosemide 20 MG tablet  Commonly known as: LASIX   40 mg, Oral, Daily      glucose blood test strip   Daily e11.9      metoprolol tartrate 50 MG tablet  Commonly known as: LOPRESSOR   50 mg, Oral, Every 12 Hours      omeprazole 20 MG capsule  Commonly known as: priLOSEC   20 mg, Oral, Daily With Breakfast      pravastatin 40 MG tablet  Commonly known as: PRAVACHOL   40 mg, Oral, Daily      traMADol 50 MG tablet  Commonly known as: ULTRAM    TAKE 1 TABLET BY MOUTH EVERY 6 HOURS AS NEEDED FOR PAIN .      Vitamin D 50 MCG (2000 UT) tablet   TAKE 1 TABLET BY MOUTH EVERY DAY         Stop These Medications    guaiFENesin-dextromethorphan 100-10 MG/5ML syrup  Commonly known as: ROBITUSSIN DM  Stopped by: Blake Brown Jr, MD     potassium chloride 10 MEQ CR capsule  Commonly known as: MICRO-K  Stopped by: Blake Brown Jr, MD            Thank you for allowing me to participate in the care of your patient,      Sincerely,   Blake Brown MD  [facility]  10/2/2019  10:44 EST

## 2021-03-16 ENCOUNTER — TELEPHONE (OUTPATIENT)
Dept: CARDIOLOGY | Facility: CLINIC | Age: 86
End: 2021-03-16

## 2021-03-16 NOTE — TELEPHONE ENCOUNTER
Dr. Brown,    Ms. Peace is scheduled for her monitor on 4/1, that was Elaine's first available.  Her daughter wanted me to check with you to make sure that this was ok.      Thank you.  Rebecca

## 2021-03-17 ENCOUNTER — OUTSIDE FACILITY SERVICE (OUTPATIENT)
Dept: FAMILY MEDICINE CLINIC | Facility: CLINIC | Age: 86
End: 2021-03-17
Payer: MEDICARE

## 2021-03-17 PROCEDURE — G0180 MD CERTIFICATION HHA PATIENT: HCPCS | Performed by: NURSE PRACTITIONER

## 2021-03-18 ENCOUNTER — HOSPITAL ENCOUNTER (OUTPATIENT)
Dept: CARDIOLOGY | Facility: HOSPITAL | Age: 86
Discharge: HOME | End: 2021-03-18
Admitting: INTERNAL MEDICINE

## 2021-03-18 DIAGNOSIS — R00.1 BRADYCARDIA, SINUS: ICD-10-CM

## 2021-03-18 PROCEDURE — 93225 XTRNL ECG REC<48 HRS REC: CPT

## 2021-03-18 PROCEDURE — 93226 XTRNL ECG REC<48 HR SCAN A/R: CPT

## 2021-03-19 ENCOUNTER — LAB (OUTPATIENT)
Dept: FAMILY MEDICINE CLINIC | Facility: CLINIC | Age: 86
End: 2021-03-19

## 2021-03-19 DIAGNOSIS — R31.21 ASYMPTOMATIC MICROSCOPIC HEMATURIA: ICD-10-CM

## 2021-03-19 DIAGNOSIS — R82.81 PYURIA: ICD-10-CM

## 2021-03-19 DIAGNOSIS — R82.81 PYURIA: Primary | ICD-10-CM

## 2021-03-19 LAB
BACTERIA UR QL AUTO: ABNORMAL /HPF
BILIRUB UR QL STRIP: NEGATIVE
CLARITY UR: CLEAR
COLOR UR: YELLOW
GLUCOSE UR STRIP-MCNC: NEGATIVE MG/DL
HGB UR QL STRIP.AUTO: ABNORMAL
KETONES UR QL STRIP: NEGATIVE
LEUKOCYTE ESTERASE UR QL STRIP.AUTO: NEGATIVE
NITRITE UR QL STRIP: NEGATIVE
PH UR STRIP.AUTO: 5.5 [PH] (ref 4.6–8)
PROT UR QL STRIP: NEGATIVE
RBC # UR: ABNORMAL /HPF
REF LAB TEST METHOD: ABNORMAL
SP GR UR STRIP: 1.02 (ref 1–1.03)
SQUAMOUS #/AREA URNS HPF: ABNORMAL /HPF
UROBILINOGEN UR QL STRIP: ABNORMAL
WBC UR QL AUTO: ABNORMAL /HPF

## 2021-03-19 PROCEDURE — 81001 URINALYSIS AUTO W/SCOPE: CPT | Performed by: NURSE PRACTITIONER

## 2021-03-19 PROCEDURE — 87086 URINE CULTURE/COLONY COUNT: CPT | Performed by: NURSE PRACTITIONER

## 2021-03-20 LAB — BACTERIA SPEC AEROBE CULT: NO GROWTH

## 2021-03-22 ENCOUNTER — HOSPITAL ENCOUNTER (OUTPATIENT)
Dept: CARDIOLOGY | Facility: HOSPITAL | Age: 86
Discharge: HOME | End: 2021-03-22
Admitting: INTERNAL MEDICINE

## 2021-03-22 DIAGNOSIS — I73.9 PVD (PERIPHERAL VASCULAR DISEASE) WITH CLAUDICATION (HCC): ICD-10-CM

## 2021-03-22 PROCEDURE — 93923 UPR/LXTR ART STDY 3+ LVLS: CPT

## 2021-03-22 PROCEDURE — 93227 XTRNL ECG REC<48 HR R&I: CPT | Performed by: INTERNAL MEDICINE

## 2021-03-22 PROCEDURE — 93923 UPR/LXTR ART STDY 3+ LVLS: CPT | Performed by: INTERNAL MEDICINE

## 2021-03-23 ENCOUNTER — TELEPHONE (OUTPATIENT)
Dept: FAMILY MEDICINE CLINIC | Facility: CLINIC | Age: 86
End: 2021-03-23

## 2021-03-23 ENCOUNTER — TELEPHONE (OUTPATIENT)
Dept: CARDIOLOGY | Facility: CLINIC | Age: 86
End: 2021-03-23

## 2021-03-23 LAB
BH CV LOWER ARTERIAL LEFT ABI RATIO: 0.96
BH CV LOWER ARTERIAL LEFT CALF RATIO: 0.96
BH CV LOWER ARTERIAL LEFT GREAT TOE SYS MAX: 47 MMHG
BH CV LOWER ARTERIAL LEFT HIGH THIGH SYS MAX: 170 MMHG
BH CV LOWER ARTERIAL LEFT POPLITEAL SYS MAX: 160 MMHG
BH CV LOWER ARTERIAL LEFT POST TIBIAL SYS MAX: 160 MMHG
BH CV LOWER ARTERIAL LEFT TBI RATIO: 0.28
BH CV LOWER ARTERIAL RIGHT ABI RATIO: 0.93
BH CV LOWER ARTERIAL RIGHT CALF RATIO: 0.9
BH CV LOWER ARTERIAL RIGHT GREAT TOE SYS MAX: 77 MMHG
BH CV LOWER ARTERIAL RIGHT HIGH THIGH SYS MAX: 153 MMHG
BH CV LOWER ARTERIAL RIGHT POPLITEAL SYS MAX: 150 MMHG
BH CV LOWER ARTERIAL RIGHT POST TIBIAL SYS MAX: 155 MMHG
BH CV LOWER ARTERIAL RIGHT TBI RATIO: 0.46
UPPER ARTERIAL LEFT ARM BRACHIAL SYS MAX: 161 MMHG
UPPER ARTERIAL RIGHT ARM BRACHIAL SYS MAX: 166 MMHG

## 2021-03-23 NOTE — TELEPHONE ENCOUNTER
Can you ask Daniella what we can do.  Can you call patient daughter and let her know what we can or cannot do.

## 2021-03-30 ENCOUNTER — TELEPHONE (OUTPATIENT)
Dept: FAMILY MEDICINE CLINIC | Facility: CLINIC | Age: 86
End: 2021-03-30

## 2021-03-30 NOTE — TELEPHONE ENCOUNTER
Caller: ASH    Relationship to patient:[facility]     Best call back number: [phone]    Patient is needing: ASH IS CALLING TO STATE TODAY IS THE LAST DAY OF HOME HOME HEALTH VISITS.  SHE STATES THE PATIENT IS STATING SHE IS A LITTLE DEPRESSED, AND FEELING LOUSY.  SHE STATES PATIENT TOLD HER THAT SHE DOES NOT HAVE ANY SUICIDE IDEATION.    PLEASE ADVISE.    PATIENT' HER DAUGHTER   LUIS GAO, CAN BE REACHED AT. [phone]

## 2021-04-09 ENCOUNTER — TELEPHONE (OUTPATIENT)
Dept: FAMILY MEDICINE CLINIC | Facility: CLINIC | Age: 86
End: 2021-04-09

## 2021-04-09 NOTE — TELEPHONE ENCOUNTER
LUIS IS REQUESTING A DUPLICATE SCRIPT FOR THE PATIENT'S BACK BRACE. SHE STATES SHE MISPLACED IT    PLEASE ADVISE    LUIS CAN BE REACHED AT  [phone]

## 2021-04-10 ENCOUNTER — APPOINTMENT (OUTPATIENT)
Dept: CARDIOLOGY | Facility: HOSPITAL | Age: 86
End: 2021-04-10

## 2021-04-10 ENCOUNTER — HOSPITAL ENCOUNTER (EMERGENCY)
Facility: HOSPITAL | Age: 86
Discharge: HOME | End: 2021-04-10
Attending: EMERGENCY MEDICINE | Admitting: EMERGENCY MEDICINE

## 2021-04-10 ENCOUNTER — APPOINTMENT (OUTPATIENT)
Dept: GENERAL RADIOLOGY | Facility: HOSPITAL | Age: 86
End: 2021-04-10

## 2021-04-10 VITALS
OXYGEN SATURATION: 94 % | TEMPERATURE: 98 F | DIASTOLIC BLOOD PRESSURE: 70 MMHG | RESPIRATION RATE: 20 BRPM | HEART RATE: 91 BPM | SYSTOLIC BLOOD PRESSURE: 151 MMHG

## 2021-04-10 DIAGNOSIS — I48.92 ATRIAL FLUTTER WITH RAPID VENTRICULAR RESPONSE (HCC): ICD-10-CM

## 2021-04-10 DIAGNOSIS — Z79.01 CHRONIC ANTICOAGULATION: ICD-10-CM

## 2021-04-10 DIAGNOSIS — I48.92 CHRONIC ATRIAL FLUTTER (HCC): ICD-10-CM

## 2021-04-10 DIAGNOSIS — R22.41 SUBCUTANEOUS MASS OF RIGHT LOWER EXTREMITY: Primary | ICD-10-CM

## 2021-04-10 LAB
ALBUMIN SERPL-MCNC: 4 G/DL (ref 3.5–5.2)
ALBUMIN/GLOB SERPL: 1.6 G/DL
ALP SERPL-CCNC: 55 U/L (ref 39–117)
ALT SERPL W P-5'-P-CCNC: 11 U/L (ref 1–33)
ANION GAP SERPL CALCULATED.3IONS-SCNC: 8.7 MMOL/L (ref 5–15)
ANISOCYTOSIS BLD QL: ABNORMAL
APTT PPP: 29.6 SECONDS (ref 22.7–35.4)
AST SERPL-CCNC: 19 U/L (ref 1–32)
BASOPHILS # BLD MANUAL: 0.14 10*3/MM3 (ref 0–0.2)
BASOPHILS NFR BLD AUTO: 2 % (ref 0–1.5)
BH CV LOWER VASCULAR LEFT COMMON FEMORAL AUGMENT: NORMAL
BH CV LOWER VASCULAR LEFT COMMON FEMORAL COMPETENT: NORMAL
BH CV LOWER VASCULAR LEFT COMMON FEMORAL COMPRESS: NORMAL
BH CV LOWER VASCULAR LEFT COMMON FEMORAL PHASIC: NORMAL
BH CV LOWER VASCULAR LEFT COMMON FEMORAL SPONT: NORMAL
BH CV LOWER VASCULAR RIGHT COMMON FEMORAL AUGMENT: NORMAL
BH CV LOWER VASCULAR RIGHT COMMON FEMORAL COMPETENT: NORMAL
BH CV LOWER VASCULAR RIGHT COMMON FEMORAL COMPRESS: NORMAL
BH CV LOWER VASCULAR RIGHT COMMON FEMORAL PHASIC: NORMAL
BH CV LOWER VASCULAR RIGHT COMMON FEMORAL SPONT: NORMAL
BH CV LOWER VASCULAR RIGHT DISTAL FEMORAL COMPRESS: NORMAL
BH CV LOWER VASCULAR RIGHT GASTRONEMIUS COMPRESS: NORMAL
BH CV LOWER VASCULAR RIGHT GREATER SAPH AK COMPRESS: NORMAL
BH CV LOWER VASCULAR RIGHT GREATER SAPH BK COMPRESS: NORMAL
BH CV LOWER VASCULAR RIGHT LESSER SAPH COMPRESS: NORMAL
BH CV LOWER VASCULAR RIGHT MID FEMORAL AUGMENT: NORMAL
BH CV LOWER VASCULAR RIGHT MID FEMORAL COMPETENT: NORMAL
BH CV LOWER VASCULAR RIGHT MID FEMORAL COMPRESS: NORMAL
BH CV LOWER VASCULAR RIGHT MID FEMORAL PHASIC: NORMAL
BH CV LOWER VASCULAR RIGHT MID FEMORAL SPONT: NORMAL
BH CV LOWER VASCULAR RIGHT PERONEAL COMPRESS: NORMAL
BH CV LOWER VASCULAR RIGHT POPLITEAL AUGMENT: NORMAL
BH CV LOWER VASCULAR RIGHT POPLITEAL COMPETENT: NORMAL
BH CV LOWER VASCULAR RIGHT POPLITEAL COMPRESS: NORMAL
BH CV LOWER VASCULAR RIGHT POPLITEAL PHASIC: NORMAL
BH CV LOWER VASCULAR RIGHT POPLITEAL SPONT: NORMAL
BH CV LOWER VASCULAR RIGHT POSTERIOR TIBIAL COMPRESS: NORMAL
BH CV LOWER VASCULAR RIGHT PROFUNDA FEMORAL COMPRESS: NORMAL
BH CV LOWER VASCULAR RIGHT PROXIMAL FEMORAL COMPRESS: NORMAL
BH CV LOWER VASCULAR RIGHT SAPHENOFEMORAL JUNCTION COMPRESS: NORMAL
BILIRUB SERPL-MCNC: 0.4 MG/DL (ref 0–1.2)
BUN SERPL-MCNC: 17 MG/DL (ref 8–23)
BUN/CREAT SERPL: 28.8 (ref 7–25)
CALCIUM SPEC-SCNC: 8.8 MG/DL (ref 8.2–9.6)
CHLORIDE SERPL-SCNC: 101 MMOL/L (ref 98–107)
CO2 SERPL-SCNC: 30.3 MMOL/L (ref 22–29)
CREAT SERPL-MCNC: 0.59 MG/DL (ref 0.57–1)
D DIMER PPP FEU-MCNC: 0.35 MCGFEU/ML (ref 0–0.49)
DEPRECATED RDW RBC AUTO: 48.9 FL (ref 37–54)
EOSINOPHIL # BLD MANUAL: 0.14 10*3/MM3 (ref 0–0.4)
EOSINOPHIL NFR BLD MANUAL: 2 % (ref 0.3–6.2)
ERYTHROCYTE [DISTWIDTH] IN BLOOD BY AUTOMATED COUNT: 13 % (ref 12.3–15.4)
GFR SERPL CREATININE-BSD FRML MDRD: 95 ML/MIN/1.73
GLOBULIN UR ELPH-MCNC: 2.5 GM/DL
GLUCOSE SERPL-MCNC: 89 MG/DL (ref 65–99)
HCT VFR BLD AUTO: 33.1 % (ref 34–46.6)
HGB BLD-MCNC: 11.4 G/DL (ref 12–15.9)
INR PPP: 1.21 (ref 0.9–1.1)
LYMPHOCYTES # BLD MANUAL: 2.2 10*3/MM3 (ref 0.7–3.1)
LYMPHOCYTES NFR BLD MANUAL: 32 % (ref 19.6–45.3)
LYMPHOCYTES NFR BLD MANUAL: 8 % (ref 5–12)
MAGNESIUM SERPL-MCNC: 1.9 MG/DL (ref 1.7–2.3)
MCH RBC QN AUTO: 36.2 PG (ref 26.6–33)
MCHC RBC AUTO-ENTMCNC: 34.4 G/DL (ref 31.5–35.7)
MCV RBC AUTO: 105.1 FL (ref 79–97)
MONOCYTES # BLD AUTO: 0.55 10*3/MM3 (ref 0.1–0.9)
NEUTROPHILS # BLD AUTO: 3.86 10*3/MM3 (ref 1.7–7)
NEUTROPHILS NFR BLD MANUAL: 56 % (ref 42.7–76)
PLAT MORPH BLD: NORMAL
PLATELET # BLD AUTO: 247 10*3/MM3 (ref 140–450)
PMV BLD AUTO: 10.8 FL (ref 6–12)
POTASSIUM SERPL-SCNC: 3.6 MMOL/L (ref 3.5–5.2)
PROT SERPL-MCNC: 6.5 G/DL (ref 6–8.5)
PROTHROMBIN TIME: 15.1 SECONDS (ref 11.7–14.2)
RBC # BLD AUTO: 3.15 10*6/MM3 (ref 3.77–5.28)
SARS-COV-2 RNA RESP QL NAA+PROBE: NOT DETECTED
SODIUM SERPL-SCNC: 140 MMOL/L (ref 136–145)
TROPONIN T SERPL-MCNC: <0.01 NG/ML (ref 0–0.03)
TSH SERPL DL<=0.05 MIU/L-ACNC: 1.05 UIU/ML (ref 0.27–4.2)
WBC # BLD AUTO: 6.89 10*3/MM3 (ref 3.4–10.8)
WBC MORPH BLD: NORMAL

## 2021-04-10 PROCEDURE — 99285 EMERGENCY DEPT VISIT HI MDM: CPT

## 2021-04-10 PROCEDURE — 73590 X-RAY EXAM OF LOWER LEG: CPT

## 2021-04-10 PROCEDURE — 85025 COMPLETE CBC W/AUTO DIFF WBC: CPT | Performed by: EMERGENCY MEDICINE

## 2021-04-10 PROCEDURE — 83735 ASSAY OF MAGNESIUM: CPT | Performed by: EMERGENCY MEDICINE

## 2021-04-10 PROCEDURE — U0003 INFECTIOUS AGENT DETECTION BY NUCLEIC ACID (DNA OR RNA); SEVERE ACUTE RESPIRATORY SYNDROME CORONAVIRUS 2 (SARS-COV-2) (CORONAVIRUS DISEASE [COVID-19]), AMPLIFIED PROBE TECHNIQUE, MAKING USE OF HIGH THROUGHPUT TECHNOLOGIES AS DESCRIBED BY CMS-2020-01-R: HCPCS | Performed by: EMERGENCY MEDICINE

## 2021-04-10 PROCEDURE — 93010 ELECTROCARDIOGRAM REPORT: CPT | Performed by: INTERNAL MEDICINE

## 2021-04-10 PROCEDURE — 93005 ELECTROCARDIOGRAM TRACING: CPT | Performed by: EMERGENCY MEDICINE

## 2021-04-10 PROCEDURE — 85610 PROTHROMBIN TIME: CPT | Performed by: EMERGENCY MEDICINE

## 2021-04-10 PROCEDURE — 84484 ASSAY OF TROPONIN QUANT: CPT | Performed by: EMERGENCY MEDICINE

## 2021-04-10 PROCEDURE — 96374 THER/PROPH/DIAG INJ IV PUSH: CPT

## 2021-04-10 PROCEDURE — 84443 ASSAY THYROID STIM HORMONE: CPT | Performed by: EMERGENCY MEDICINE

## 2021-04-10 PROCEDURE — 93971 EXTREMITY STUDY: CPT

## 2021-04-10 PROCEDURE — 80053 COMPREHEN METABOLIC PANEL: CPT | Performed by: EMERGENCY MEDICINE

## 2021-04-10 PROCEDURE — 85007 BL SMEAR W/DIFF WBC COUNT: CPT | Performed by: EMERGENCY MEDICINE

## 2021-04-10 PROCEDURE — 85379 FIBRIN DEGRADATION QUANT: CPT | Performed by: EMERGENCY MEDICINE

## 2021-04-10 PROCEDURE — 85730 THROMBOPLASTIN TIME PARTIAL: CPT | Performed by: EMERGENCY MEDICINE

## 2021-04-10 RX ORDER — SODIUM CHLORIDE 0.9 % (FLUSH) 0.9 %
10 SYRINGE (ML) INJECTION AS NEEDED
Status: DISCONTINUED | OUTPATIENT
Start: 2021-04-10 | End: 2021-04-10 | Stop reason: HOSPADM

## 2021-04-10 RX ADMIN — METOROPROLOL TARTRATE 5 MG: 5 INJECTION, SOLUTION INTRAVENOUS at 14:16

## 2021-04-10 RX ADMIN — METOPROLOL TARTRATE 25 MG: 25 TABLET, FILM COATED ORAL at 15:40

## 2021-04-10 NOTE — ED TRIAGE NOTES
Patient to er from home per ems with c/o right lower leg pain that started two days ago. Reported she is on blood thinners. No injury reported to cause this pain. EMS found patient to have heart rate at 140's. Patient does have past hx of afib. Patient has mask on in triage along with staff. Patient alert x 3 at base line.

## 2021-04-10 NOTE — ED PROVIDER NOTES
EMERGENCY DEPARTMENT ENCOUNTER    Room Number:  42/42  Date of encounter:  4/10/2021  PCP: Brian Jacobs MD  Historian: Pt    Patient was placed in face mask during triage process. Patient was wearing facemask when I entered the room and throughout our encounter. I wore full protective equipment throughout this patient encounter including a face mask, eye protection, and gloves. Hand hygiene was performed before donning protective equipment and again following doffing of PPE after leaving the room.    HPI:  Chief Complaint: R lower extremity pain  A complete HPI/ROS/PMH/PSH/SH/FH are unobtainable due to: N/A   Context: Alexa Peace is a 89 y.o. female who presents to the ED c/o right medial calf hard lumps that she first noticed about 2 days ago.  They are not painful unless palpated significantly.  No known trauma.  Patient is chronically anticoagulated for history of paroxysmal atrial fibrillation.  Patient denies any palpitations, heart racing, chest pain or dyspnea that is new or changed from baseline.  Patient denies any change in appetite.  No exacerbating or relieving factors identified.  0 out of 10 pain at this time.    Patient's daughter who is at bedside notes that the digoxin and metoprolol were recently discontinued on this patient secondary to bradycardia.    MEDICAL HISTORY REVIEW  Date of Admission:  4/4/2019  Date of Discharge:  4/9/2019     PCP: Brian Jacobs MD    Active Hospital Problems     Diagnosis   POA   • **Pneumonia of right lower lobe due to infectious organism (CMS/HCC) [J18.1]   Yes   • DNR (do not resuscitate) [Z66]   Yes   • Chronic diastolic CHF (congestive heart failure) (CMS/HCC) [I50.32]   Yes   • Localized edema [R60.0]   Yes   • Sepsis due to pneumonia (CMS/HCC) [J18.9, A41.9]   Yes   • Chronic anticoagulation [Z79.01]   Not Applicable   • Non-rheumatic mitral regurgitation [I34.0]   Yes   • Paroxysmal atrial fibrillation (CMS/HCC) [I48.0]   Yes   • Essential  hypertension [I10]   Yes   • Gastroesophageal reflux disease without esophagitis [K21.9]           PAST MEDICAL HISTORY  Active Ambulatory Problems     Diagnosis Date Noted   • Bad memory 04/18/2016   • Depression 05/05/2017   • Diverticulosis of intestine 05/05/2017   • Hyperlipidemia 05/05/2017   • Macular degeneration 05/05/2017   • Neuropathy, peripheral 05/05/2017   • Osteopenia 05/05/2017   • Atopic rhinitis 05/05/2017   • Urge incontinence of urine 05/05/2017   • Atherosclerosis of native coronary artery 05/05/2017   • Essential hypertension 05/05/2017   • Gastroesophageal reflux disease without esophagitis 05/05/2017   • Impaired glucose tolerance 05/05/2017   • History of coronary artery stent placement 05/25/2017   • Chronic bilateral low back pain 07/21/2017   • Acquired scoliosis 11/21/2017   • Spinal stenosis of lumbar region with neurogenic claudication 11/21/2017   • PAC (premature atrial contraction) 12/11/2017   • Slow transit constipation 12/11/2017   • Paroxysmal atrial fibrillation (CMS/[facility]) 01/11/2019   • Pleural effusion on left 01/14/2019   • Valvular heart disease 01/14/2019   • Non-rheumatic mitral regurgitation 01/14/2019   • Pneumonia of right lower lobe due to infectious organism 04/04/2019   • Localized edema 04/05/2019   • Sepsis due to pneumonia (CMS/[facility]) 04/05/2019   • Chronic anticoagulation 04/05/2019   • DNR (do not resuscitate) 04/06/2019   • Chronic diastolic CHF (congestive heart failure) (CMS/[facility]) 04/06/2019     Resolved Ambulatory Problems     Diagnosis Date Noted   • Foot drop 08/04/2016   • Hyperglycemia 05/05/2017   • Shortness of breath 04/05/2019   • Hypoxemia 04/05/2019     Past Medical History:   Diagnosis Date   • Anticoagulated    • Arthritis    • Atrial premature complex    • CAD (coronary artery disease)    • Cancer (CMS/[facility])    • Colon polyp    • GERD (gastroesophageal reflux disease)    • Heart attack (CMS/[facility])    • Hypertension    • New onset atrial fibrillation  (CMS/[facility]) 01/11/2019   • Pre-diabetes    • Spinal stenosis          PAST SURGICAL HISTORY  Past Surgical History:   Procedure Laterality Date   • ADENOIDECTOMY     • CATARACT EXTRACTION     • CORONARY ANGIOPLASTY WITH STENT PLACEMENT     • EPIDURAL BLOCK     • LUMBAR DISCECTOMY FUSION INSTRUMENTATION N/A 12/7/2017    Procedure: L2-3, L3-4, L4-5 laminectomy and fusion with local bone graft;  Surgeon: Andre Layne MD;  Location: [facility] OR;  Service:    • TONSILECTOMY, ADENOIDECTOMY, BILATERAL MYRINGOTOMY AND TUBES     • TONSILLECTOMY           FAMILY HISTORY  Family History   Problem Relation Age of Onset   • Diabetes Mother    • Depression Mother    • Anxiety disorder Mother    • Heart disease Father    • Hypertension Father    • Diabetes Father    • Diabetes Brother    • No Known Problems Maternal Grandmother    • No Known Problems Maternal Grandfather    • No Known Problems Paternal Grandmother    • No Known Problems Paternal Grandfather    • Malig Hyperthermia Neg Hx          SOCIAL HISTORY  Social History     Socioeconomic History   • Marital status:      Spouse name: Not on file   • Number of children: Not on file   • Years of education: Not on file   • Highest education level: Not on file   Tobacco Use   • Smoking status: Never Smoker   • Smokeless tobacco: Never Used   • Tobacco comment: caffeine use-    Substance and Sexual Activity   • Alcohol use: No   • Drug use: No   • Sexual activity: Defer         ALLERGIES  Ciprofloxacin, Metronidazole, and Prednisone        REVIEW OF SYSTEMS  Review of Systems     All systems reviewed and negative except for those discussed in HPI.       PHYSICAL EXAM    I have reviewed the triage vital signs and nursing notes.    ED Triage Vitals [04/10/21 1227]   Temp Heart Rate Resp BP SpO2   98 °F (36.7 °C) (!) 140 20 146/86 98 %      Temp src Heart Rate Source Patient Position BP Location FiO2 (%)   Oral -- -- -- --       Physical Exam    Physical Exam    Constitutional: No distress.  Elderly and chronically ill-appearing though nontoxic.  HENT:  Head: Normocephalic and atraumatic.   Oropharynx: Mucous membranes are moist.   Eyes: No scleral icterus. No conjunctival pallor.  Neck: Painless range of motion noted. Neck supple.   Cardiovascular: Tachycardic rate, regular rhythm and intact distal pulses.  Pulmonary/Chest: No respiratory distress.  No tachypnea or increased work of breathing.   Musculoskeletal: Moves all extremities equally. There is no pedal edema.  There is firm area that appears to be in the superficial tissue over the right medial distal calf that is somewhat tender when palpated.  No erythema or cellulitic change identified.  No bony deformities identified.  Strong right dorsalis pedis pulse with normal sensation intact.  No popliteal discomfort on the right.  No right thigh discomfort with palpation.  Neurological: Alert.  Baseline strength and sensation noted.   Skin: Skin is pink, warm, and dry. No pallor.   Psychiatric: Mood and affect normal.   Nursing note and vitals reviewed.    LAB RESULTS  Recent Results (from the past 24 hour(s))   Comprehensive Metabolic Panel    Collection Time: 04/10/21 12:45 PM    Specimen: Arm, Left; Blood   Result Value Ref Range    Glucose 89 65 - 99 mg/dL    BUN 17 8 - 23 mg/dL    Creatinine 0.59 0.57 - 1.00 mg/dL    Sodium 140 136 - 145 mmol/L    Potassium 3.6 3.5 - 5.2 mmol/L    Chloride 101 98 - 107 mmol/L    CO2 30.3 (H) 22.0 - 29.0 mmol/L    Calcium 8.8 8.2 - 9.6 mg/dL    Total Protein 6.5 6.0 - 8.5 g/dL    Albumin 4.00 3.50 - 5.20 g/dL    ALT (SGPT) 11 1 - 33 U/L    AST (SGOT) 19 1 - 32 U/L    Alkaline Phosphatase 55 39 - 117 U/L    Total Bilirubin 0.4 0.0 - 1.2 mg/dL    eGFR Non African Amer 95 >60 mL/min/1.73    Globulin 2.5 gm/dL    A/G Ratio 1.6 g/dL    BUN/Creatinine Ratio 28.8 (H) 7.0 - 25.0    Anion Gap 8.7 5.0 - 15.0 mmol/L   Protime-INR    Collection Time: 04/10/21 12:45 PM    Specimen: Arm, Left;  Blood   Result Value Ref Range    Protime 15.1 (H) 11.7 - 14.2 Seconds    INR 1.21 (H) 0.90 - 1.10   aPTT    Collection Time: 04/10/21 12:45 PM    Specimen: Arm, Left; Blood   Result Value Ref Range    PTT 29.6 22.7 - 35.4 seconds   Troponin    Collection Time: 04/10/21 12:45 PM    Specimen: Arm, Left; Blood   Result Value Ref Range    Troponin T <0.010 0.000 - 0.030 ng/mL   D-dimer, Quantitative    Collection Time: 04/10/21 12:45 PM    Specimen: Arm, Left; Blood   Result Value Ref Range    D-Dimer, Quantitative 0.35 0.00 - 0.49 MCGFEU/mL   Magnesium    Collection Time: 04/10/21 12:45 PM    Specimen: Arm, Left; Blood   Result Value Ref Range    Magnesium 1.9 1.7 - 2.3 mg/dL   TSH    Collection Time: 04/10/21 12:45 PM    Specimen: Arm, Left; Blood   Result Value Ref Range    TSH 1.050 0.270 - 4.200 uIU/mL   CBC Auto Differential    Collection Time: 04/10/21 12:45 PM    Specimen: Arm, Left; Blood   Result Value Ref Range    WBC 6.89 3.40 - 10.80 10*3/mm3    RBC 3.15 (L) 3.77 - 5.28 10*6/mm3    Hemoglobin 11.4 (L) 12.0 - 15.9 g/dL    Hematocrit 33.1 (L) 34.0 - 46.6 %    .1 (H) 79.0 - 97.0 fL    MCH 36.2 (H) 26.6 - 33.0 pg    MCHC 34.4 31.5 - 35.7 g/dL    RDW 13.0 12.3 - 15.4 %    RDW-SD 48.9 37.0 - 54.0 fl    MPV 10.8 6.0 - 12.0 fL    Platelets 247 140 - 450 10*3/mm3   Manual Differential    Collection Time: 04/10/21 12:45 PM    Specimen: Arm, Left; Blood   Result Value Ref Range    Neutrophil % 56.0 42.7 - 76.0 %    Lymphocyte % 32.0 19.6 - 45.3 %    Monocyte % 8.0 5.0 - 12.0 %    Eosinophil % 2.0 0.3 - 6.2 %    Basophil % 2.0 (H) 0.0 - 1.5 %    Neutrophils Absolute 3.86 1.70 - 7.00 10*3/mm3    Lymphocytes Absolute 2.20 0.70 - 3.10 10*3/mm3    Monocytes Absolute 0.55 0.10 - 0.90 10*3/mm3    Eosinophils Absolute 0.14 0.00 - 0.40 10*3/mm3    Basophils Absolute 0.14 0.00 - 0.20 10*3/mm3    Anisocytosis Mod/2+ None Seen    WBC Morphology Normal Normal    Platelet Morphology Normal Normal   ECG 12 Lead    Collection  Time: 04/10/21 12:46 PM   Result Value Ref Range    QT Interval 315 ms   COVID-19,[facility] IN-HOUSE CEPHEID, NP SWAB IN TRANSPORT MEDIA 8-12 HR TAT - Swab, Nasopharynx    Collection Time: 04/10/21 12:51 PM    Specimen: Nasopharynx; Swab   Result Value Ref Range    COVID19 Not Detected Not Detected - Ref. Range   Duplex Venous Lower Extremity - Right    Collection Time: 04/10/21  1:33 PM   Result Value Ref Range    Right Common Femoral Spont Y     Right Common Femoral Phasic Y     Right Common Femoral Augment Y     Right Common Femoral Competent Y     Right Common Femoral Compress C     Right Saphenofemoral Junction Compress C     Right Profunda Femoral Compress C     Right Proximal Femoral Compress C     Right Mid Femoral Spont Y     Right Mid Femoral Phasic Y     Right Mid Femoral Augment Y     Right Mid Femoral Competent Y     Right Mid Femoral Compress C     Right Distal Femoral Compress C     Right Popliteal Spont Y     Right Popliteal Phasic Y     Right Popliteal Augment Y     Right Popliteal Competent Y     Right Popliteal Compress C     Right Posterior Tibial Compress C     Right Peroneal Compress C     Right GastronemiusSoleal Compress C     Right Greater Saph AK Compress C     Right Greater Saph BK Compress C     Right Lesser Saph Compress C     Left Common Femoral Spont Y     Left Common Femoral Phasic Y     Left Common Femoral Augment Y     Left Common Femoral Competent Y     Left Common Femoral Compress C    ECG 12 Lead    Collection Time: 04/10/21  3:16 PM   Result Value Ref Range    QT Interval 419 ms       Ordered the above labs and independently reviewed the results.        RADIOLOGY  XR Tibia Fibula 2 View Right    Result Date: 4/10/2021  XR TIBIA FIBULA 2 VW RIGHT-  INDICATIONS: Pain, possible fluid,  TECHNIQUE: 2 views of the right lower leg  COMPARISON: 06/24/2007  FINDINGS:  No acute fracture, erosion, or dislocation is identified. Fairly extensive dystrophic soft tissue calcifications are seen in  the lower leg, especially posterior medially, new from the prior exam, nonspecific, could for example be result of prior trauma and/or venous insufficiency. Seemingly less likely, metabolic, autoimmune diseases such as dermatomyositis, or tumor (soft tissue sarcoma) are other possibilities, correlate clinically, follow-up/further evaluation can be obtained as indicated.       As described.    This report was finalized on 4/10/2021 2:45 PM by Dr. Darren Brar M.D.        I ordered the above noted radiological studies. Reviewed by me and discussed with radiologist.  See dictation for official radiology interpretation.      PROCEDURES    Procedures        MEDICATIONS GIVEN IN ER    Medications   sodium chloride 0.9 % flush 10 mL (has no administration in time range)   metoprolol tartrate (LOPRESSOR) tablet 25 mg (has no administration in time range)   metoprolol tartrate (LOPRESSOR) injection 5 mg (5 mg Intravenous Given 4/10/21 1416)         PROGRESS, DATA ANALYSIS, CONSULTS, AND MEDICAL DECISION MAKING    My diagnosis for lower extremity pain and injury includes but is not limited to hip fracture, femur fracture, hip dislocation, hip contusion, hip sprain, hip strain, pelvic fracture, knee sprain, patella dislocation, knee dislocation, internal derangement of knee, fractures of the femur, tibia, fibula, ankle, foot and digits, ankle sprain, ankle dislocation, Lisfranc fracture, fracture dislocations of the digits, pulmonary embolism, claudication, peripheral vascular disease, gout, osteoarthritis, rheumatoid arthritis, bursitis, septic joint, poly-rheumatica, polyarthralgia and other inflammatory or infectious disease processes.      All labs have been independently reviewed by me.  All radiology studies have been reviewed by me and discussed with radiologist dictating the report.   EKG's independently viewed and interpreted by me.  Discussion below represents my analysis of pertinent findings related to  patient's condition, differential diagnosis, treatment plan and final disposition.      ED Course as of Apr 10 1523   Sat Apr 10, 2021   1251 EKG           EKG time: 1246  Rhythm/Rate: A flutter with 2-1 block; ventricular rate 140  P waves and MD: PHILIP within normal limits  QRS, axis: Narrow QRS complex  ST and T waves: Lateral ST depression but no STEMI; QTC within normal limits    Interpreted Contemporaneously by me, independently viewed  Comparison: Very similar appearing EKG changes on 4/4/2019      [RS]   1504 Sully - Vascular tech - Neg for DVT    [RS]   1518 Reviewed today's evaluation with patient and her daughter at length.  Patient's rate much improved along with blood pressure.  Recommend close outpatient follow-up with PCP for further evaluation of the right lower extremity complaint.  Patient has established cardiology follow-up scheduled in 2 days.  Repeat EKG at this time confirms diagnosis of atrial flutter with improved rate and no acute ischemic change.   I recommend the patient resume metoprolol at 25 mg p.o. twice daily.  Patient expressed understanding agreement discharge planning.    [RS]   1522 Late entry: Of note the patient does have a history of traumatic injury to the right lower extremity in 2007.    [RS]      ED Course User Index  [RS] Olivier Graves MD       AS OF 15:23 EDT VITALS:    BP - 131/81  HR - 91  TEMP - 98 °F (36.7 °C) (Oral)  O2 SATS - 94%        DIAGNOSIS  Final diagnoses:   Subcutaneous mass of right lower extremity   Chronic atrial flutter (CMS/HCC)   Atrial flutter with rapid ventricular response (CMS/HCC)   Chronic anticoagulation         DISPOSITION  DISCHARGE    Patient discharged in stable condition.    Reviewed implications of results, diagnosis, meds, responsibility to follow up, warning signs and symptoms of possible worsening, potential complications and reasons to return to ER.    Patient/Family voiced understanding of above instructions.    Discussed plan for  discharge, as there is no emergent indication for admission. Patient referred to primary care provider for regular health maintenance. Pt/family is agreeable and understands need for follow up and possible repeat testing.  Pt is aware that discharge does not mean that nothing is wrong but it indicates no emergency is present that requires admission and they must continue care with follow-up as given below or physician of their choice.     FOLLOW-UP  Brian Jacobs MD  [address] [place] [address]  [phone]    Schedule an appointment as soon as possible for a visit in 1 week  Reevaluation of right lower extremity complaint    Your cardiologist    Go in 2 days  As scheduled         Medication List      New Prescriptions    metoprolol tartrate 25 MG tablet  Commonly known as: LOPRESSOR  Take 1 tablet by mouth 2 (Two) Times a Day.        Changed    polyethylene glycol pack packet  Commonly known as: MIRALAX  Take 17 g by mouth Daily.  What changed:   · when to take this  · reasons to take this           Where to Get Your Medications      These medications were sent to [facility]/pharmacy #[id number] - [place], [place] - [address] [phone]  - [phone]   [address] [place] [address]    Hours: 24-hours Phone: [phone]   · metoprolol tartrate 25 MG tablet            Olivier Graves MD  04/10/21 8654
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
## 2021-04-12 ENCOUNTER — OFFICE VISIT (OUTPATIENT)
Dept: CARDIOLOGY | Facility: CLINIC | Age: 86
End: 2021-04-12

## 2021-04-12 VITALS
SYSTOLIC BLOOD PRESSURE: 120 MMHG | DIASTOLIC BLOOD PRESSURE: 58 MMHG | HEIGHT: 62 IN | WEIGHT: 113 LBS | BODY MASS INDEX: 20.8 KG/M2 | HEART RATE: 74 BPM

## 2021-04-12 DIAGNOSIS — I48.0 PAROXYSMAL ATRIAL FIBRILLATION (HCC): Primary | ICD-10-CM

## 2021-04-12 DIAGNOSIS — I50.32 CHRONIC DIASTOLIC CHF (CONGESTIVE HEART FAILURE) (HCC): ICD-10-CM

## 2021-04-12 DIAGNOSIS — Z79.01 CHRONIC ANTICOAGULATION: ICD-10-CM

## 2021-04-12 LAB
QT INTERVAL: 315 MS
QT INTERVAL: 419 MS

## 2021-04-12 PROCEDURE — 93000 ELECTROCARDIOGRAM COMPLETE: CPT | Performed by: NURSE PRACTITIONER

## 2021-04-12 PROCEDURE — 99214 OFFICE O/P EST MOD 30 MIN: CPT | Performed by: NURSE PRACTITIONER

## 2021-04-13 ENCOUNTER — DOCUMENTATION (OUTPATIENT)
Dept: FAMILY MEDICINE CLINIC | Facility: CLINIC | Age: 86
End: 2021-04-13

## 2021-04-13 NOTE — PROGRESS NOTES
4/10/2021 11:26 AM    Patient called stating she had a blood clot in her leg.  Patient was advised to go to the emergency room immediately.

## 2021-05-03 RX ORDER — METOPROLOL SUCCINATE 25 MG/1
25 TABLET, EXTENDED RELEASE ORAL DAILY
Qty: 30 TABLET | Refills: 0 | Status: SHIPPED | OUTPATIENT
Start: 2021-05-03 | End: 2021-05-28

## 2021-05-07 NOTE — TELEPHONE ENCOUNTER
Caller: priscila centeno    Relationship: Emergency Contact    Best call back number: [phone]    PATIENT'S DAUGHTER STATES THAT HER MOTHER IS SCHEDULING RANDOM APPOINTMENTS THAT SHE IS HAVING TO CALL AND CANCEL.  SHE SAYS THAT HER MOTHER IS HAVING MEMORY ISSUES.  SHE WOULD LIKE TO KNOW IF THERE IS SOMETHING WE CAN PUT IN HER INFORMATION SO THAT WE CAN KEEP HER FROM MAKING THESE APPOINTMENTS        ”   No

## 2021-05-28 RX ORDER — METOPROLOL SUCCINATE 25 MG/1
TABLET, EXTENDED RELEASE ORAL
Qty: 30 TABLET | Refills: 0 | Status: SHIPPED | OUTPATIENT
Start: 2021-05-28 | End: 2021-06-28

## 2021-06-01 DIAGNOSIS — I48.11 LONGSTANDING PERSISTENT ATRIAL FIBRILLATION (HCC): ICD-10-CM

## 2021-06-02 RX ORDER — APIXABAN 2.5 MG/1
TABLET, FILM COATED ORAL
Qty: 60 TABLET | Refills: 0 | Status: SHIPPED | OUTPATIENT
Start: 2021-06-02 | End: 2021-07-06

## 2021-06-16 ENCOUNTER — OFFICE VISIT (OUTPATIENT)
Dept: CARDIOLOGY | Facility: CLINIC | Age: 86
End: 2021-06-16

## 2021-06-16 VITALS
BODY MASS INDEX: 20.24 KG/M2 | HEIGHT: 62 IN | SYSTOLIC BLOOD PRESSURE: 118 MMHG | DIASTOLIC BLOOD PRESSURE: 58 MMHG | RESPIRATION RATE: 10 BRPM | WEIGHT: 110 LBS | HEART RATE: 64 BPM

## 2021-06-16 DIAGNOSIS — I34.0 NONRHEUMATIC MITRAL VALVE REGURGITATION: Primary | ICD-10-CM

## 2021-06-16 PROCEDURE — 99214 OFFICE O/P EST MOD 30 MIN: CPT | Performed by: INTERNAL MEDICINE

## 2021-06-16 NOTE — PROGRESS NOTES
[facility]      Patient Name: Alexa Peace  :[id number]  Age: 89 y.o.  Encounter Provider:  Blake Brown Jr, MD      Chief Complaint: FU CAD, PAF, CHF, VHD      HPI  Alexa Peace is a 89 y.o. female PMH CAD s/p remote hx of PCI to LAD, chronic diastolic HF, PAF, mitral regurgitation presents for routine follow-up.     Last clinic visit: She notes chronic stable exertional dyspnea.  Still able to walk around the home without significant chest pain.  She is noted discoloration of bilateral lower extremities with some mild pain on ambulation.  No sensory deficits.  She denies angina.  She notes some difficulty with balance.  She denies palpitations.  Mild dizziness but no syncope.  Heart rate at Dr. Jacobs's office was 53 bpm and today is 42 bpm.  She denies orthopnea or PND.  Social and family history reviewed and not pertinent to this clinic visit.    Patient presents today for routine follow-up.  She has been doing fairly well since last visit.  At home she gets around with a walker but uses a wheelchair for long distance.  She has mild chronic stable dyspnea on exertion.  She still has small amount of swelling left greater than right lower extremity.  She had ultrasound Doppler of bilateral extremities in April which showed no evidence for DVT.  She denies any chest pain with activity.  No palpitations, dizziness or syncope.  The remainder social and family history reviewed and not pertinent to this clinic visit.    The following portions of the patient's history were reviewed and updated as appropriate: allergies, current medications, past family history, past medical history, past social history, past surgical history and problem list.      Review of Systems   Constitutional: Negative for chills and fever.   HENT: Negative for hoarse voice and sore throat.    Eyes: Negative for double vision and photophobia.   Cardiovascular: Positive for dyspnea on exertion and leg swelling.  "Negative for chest pain, near-syncope, orthopnea, palpitations, paroxysmal nocturnal dyspnea and syncope.   Respiratory: Positive for shortness of breath. Negative for cough and wheezing.    Skin: Negative for poor wound healing and rash.   Musculoskeletal: Negative for arthritis and joint swelling.   Gastrointestinal: Negative for bloating, abdominal pain, hematemesis and hematochezia.   Neurological: Negative for dizziness and focal weakness.   Psychiatric/Behavioral: Negative for depression and suicidal ideas.       OBJECTIVE:   Vital Signs    Estimated body mass index is 20.12 kg/m² as calculated from the following:    Height as of this encounter: 157.5 cm (62\").    Weight as of this encounter: 49.9 kg (110 lb).    Physical Exam  Vitals reviewed.   Constitutional:       General: She is not in acute distress.  Cardiovascular:      Rate and Rhythm: Regular rhythm. Bradycardia present.      Heart sounds: Murmur heard.     Pulmonary:      Effort: No respiratory distress.      Breath sounds: No wheezing or rales.   Abdominal:      General: Bowel sounds are normal. There is no distension.   Skin:     Comments: Bilateral lower extremities are erythematous and cool with decreased pulses.  Left greater than right.   Neurological:      General: No focal deficit present.      Mental Status: She is alert and oriented to person, place, and time.   Psychiatric:         Mood and Affect: Mood normal.         Behavior: Behavior normal.     Physical exam was reviewed, updated and amended when necessary.    Procedures        ASSESSMENT:     89-year-old female presents for follow-up of chronic medical illness with specific complaints of lower extremity pain and discoloration of skin.    PLAN OF CARE:     1. CAD s/p remote hx of PCI to LAD -no chest pain complaints since last visit.  We will continue to monitor frequency of symptoms.  Patient is on low-dose aspirin over-the-counter and has been for many years.  She is not having any " problems with bleeding so we will continue this.  Continue statin.  2. Chronic diastolic HF -continue current dose of furosemide.  Fairly euvolemic today on exam.  Chronic stable dyspnea on exertion. BP fairly well controlled. Periodic home monitroing, Na restricted diet.  Moderate to severe MR is noted and will repeat echocardiogram.  3. PAF -tolerating current dosing of Toprol well.  Continue lower dose apixaban.   4. Mitral regurgitation - moderate-severe with severe pulmonary HTN.  Repeat echocardiogram.  5.  Lower extremity erythema -decreased pulses in bilateral lower extremities with erythema but no evidence for tissue compromise.  Doppler with no evidence for DVT and normal ABIs.    RTC 6 months         Discharge Medications          Accurate as of June 16, 2021 11:29 AM. If you have any questions, ask your nurse or doctor.            Changes to Medications      Instructions Start Date   polyethylene glycol pack packet  Commonly known as: MIRALAX  What changed:   · when to take this  · reasons to take this   17 g, Oral, Daily         Continue These Medications      Instructions Start Date   calcium carbonate 500 MG chewable tablet  Commonly known as: TUMS   2 tablets, Oral, 2 Times Daily PRN      DULoxetine 60 MG capsule  Commonly known as: CYMBALTA   TAKE 1 CAPSULE BY MOUTH EVERY DAY      Eliquis 2.5 MG tablet tablet  Generic drug: apixaban   TAKE 1 TABLET BY MOUTH EVERY 12 HOURS      furosemide 20 MG tablet  Commonly known as: LASIX   40 mg, Oral, Daily      glucose blood test strip   Daily e11.9      metoprolol succinate XL 25 MG 24 hr tablet  Commonly known as: TOPROL-XL   TAKE 1 TABLET BY MOUTH EVERY DAY      omeprazole 20 MG capsule  Commonly known as: priLOSEC   20 mg, Oral, Daily With Breakfast      pravastatin 40 MG tablet  Commonly known as: PRAVACHOL   40 mg, Oral, Daily      traMADol 50 MG tablet  Commonly known as: ULTRAM   TAKE 1 TABLET BY MOUTH EVERY 6 HOURS AS NEEDED FOR PAIN .      Vitamin D  50 MCG (2000 UT) tablet   TAKE 1 TABLET BY MOUTH EVERY DAY             Thank you for allowing me to participate in the care of your patient,      Sincerely,   Blake Brown MD  [facility]  10/2/2019  11:29 EDT

## 2021-06-28 RX ORDER — METOPROLOL SUCCINATE 25 MG/1
TABLET, EXTENDED RELEASE ORAL
Qty: 90 TABLET | Refills: 3 | Status: SHIPPED | OUTPATIENT
Start: 2021-06-28 | End: 2022-04-29

## 2021-07-03 DIAGNOSIS — I48.11 LONGSTANDING PERSISTENT ATRIAL FIBRILLATION (HCC): ICD-10-CM

## 2021-07-06 RX ORDER — APIXABAN 2.5 MG/1
TABLET, FILM COATED ORAL
Qty: 60 TABLET | Refills: 5 | Status: SHIPPED | OUTPATIENT
Start: 2021-07-06 | End: 2022-01-03

## 2021-07-07 RX ORDER — DULOXETIN HYDROCHLORIDE 60 MG/1
CAPSULE, DELAYED RELEASE ORAL
Qty: 90 CAPSULE | Refills: 1 | Status: SHIPPED | OUTPATIENT
Start: 2021-07-07 | End: 2022-01-17 | Stop reason: SDUPTHER

## 2021-07-09 ENCOUNTER — HOSPITAL ENCOUNTER (OUTPATIENT)
Dept: CARDIOLOGY | Facility: HOSPITAL | Age: 86
Discharge: HOME | End: 2021-07-09
Admitting: INTERNAL MEDICINE

## 2021-07-09 DIAGNOSIS — I34.0 NONRHEUMATIC MITRAL VALVE REGURGITATION: ICD-10-CM

## 2021-07-09 PROCEDURE — 93356 MYOCRD STRAIN IMG SPCKL TRCK: CPT | Performed by: INTERNAL MEDICINE

## 2021-07-09 PROCEDURE — 93306 TTE W/DOPPLER COMPLETE: CPT

## 2021-07-09 PROCEDURE — 93306 TTE W/DOPPLER COMPLETE: CPT | Performed by: INTERNAL MEDICINE

## 2021-07-09 PROCEDURE — 93356 MYOCRD STRAIN IMG SPCKL TRCK: CPT

## 2021-07-12 LAB
ASCENDING AORTA: 3.5 CM
BH CV ECHO MEAS - ACS: 1.3 CM
BH CV ECHO MEAS - AI MAX PG: 65.5 MMHG
BH CV ECHO MEAS - AI MAX VEL: 404.7 CM/SEC
BH CV ECHO MEAS - AO ARCH DIAM (PROXIMAL TRANS.): 2.3 CM
BH CV ECHO MEAS - AO MAX PG (FULL): 9.2 MMHG
BH CV ECHO MEAS - AO MAX PG: 12.2 MMHG
BH CV ECHO MEAS - AO MEAN PG (FULL): 4.7 MMHG
BH CV ECHO MEAS - AO MEAN PG: 6.1 MMHG
BH CV ECHO MEAS - AO ROOT AREA (BSA CORRECTED): 2.2
BH CV ECHO MEAS - AO ROOT AREA: 8.5 CM^2
BH CV ECHO MEAS - AO ROOT DIAM: 3.3 CM
BH CV ECHO MEAS - AO V2 MAX: 174.6 CM/SEC
BH CV ECHO MEAS - AO V2 MEAN: 116.2 CM/SEC
BH CV ECHO MEAS - AO V2 VTI: 40.5 CM
BH CV ECHO MEAS - ASC AORTA: 3.5 CM
BH CV ECHO MEAS - AVA(I,A): 1.5 CM^2
BH CV ECHO MEAS - AVA(I,D): 1.5 CM^2
BH CV ECHO MEAS - AVA(V,A): 1.6 CM^2
BH CV ECHO MEAS - AVA(V,D): 1.6 CM^2
BH CV ECHO MEAS - BSA(HAYCOCK): 1.5 M^2
BH CV ECHO MEAS - BSA: 1.5 M^2
BH CV ECHO MEAS - BZI_BMI: 20.1 KILOGRAMS/M^2
BH CV ECHO MEAS - BZI_METRIC_HEIGHT: 157.5 CM
BH CV ECHO MEAS - BZI_METRIC_WEIGHT: 49.9 KG
BH CV ECHO MEAS - EDV(MOD-SP4): 59 ML
BH CV ECHO MEAS - EDV(TEICH): 51 ML
BH CV ECHO MEAS - EF(MOD-BP): 58 %
BH CV ECHO MEAS - EF(MOD-SP4): 57.6 %
BH CV ECHO MEAS - ESV(MOD-SP4): 25 ML
BH CV ECHO MEAS - IVS/LVPW: 1.4
BH CV ECHO MEAS - IVSD: 1.6 CM
BH CV ECHO MEAS - LAT PEAK E' VEL: 10.3 CM/SEC
BH CV ECHO MEAS - LV DIASTOLIC VOL/BSA (35-75): 39.8 ML/M^2
BH CV ECHO MEAS - LV MASS(C)D: 164.1 GRAMS
BH CV ECHO MEAS - LV MASS(C)DI: 110.7 GRAMS/M^2
BH CV ECHO MEAS - LV MAX PG: 3 MMHG
BH CV ECHO MEAS - LV MEAN PG: 1.4 MMHG
BH CV ECHO MEAS - LV SYSTOLIC VOL/BSA (12-30): 16.9 ML/M^2
BH CV ECHO MEAS - LV V1 MAX: 86.1 CM/SEC
BH CV ECHO MEAS - LV V1 MEAN: 55.1 CM/SEC
BH CV ECHO MEAS - LV V1 VTI: 18.3 CM
BH CV ECHO MEAS - LVIDD: 3.5 CM
BH CV ECHO MEAS - LVIDS: 2.1 CM
BH CV ECHO MEAS - LVLD AP4: 6.5 CM
BH CV ECHO MEAS - LVLS AP4: 5.7 CM
BH CV ECHO MEAS - LVOT AREA (M): 3.1 CM^2
BH CV ECHO MEAS - LVOT AREA: 3.2 CM^2
BH CV ECHO MEAS - LVOT DIAM: 2 CM
BH CV ECHO MEAS - LVPWD: 1.1 CM
BH CV ECHO MEAS - MED PEAK E' VEL: 5.7 CM/SEC
BH CV ECHO MEAS - MR MAX PG: 61.1 MMHG
BH CV ECHO MEAS - MR MAX VEL: 390.9 CM/SEC
BH CV ECHO MEAS - MV A DUR: 0.13 SEC
BH CV ECHO MEAS - MV A MAX VEL: 56.1 CM/SEC
BH CV ECHO MEAS - MV DEC SLOPE: 746.3 CM/SEC^2
BH CV ECHO MEAS - MV DEC TIME: 0.16 SEC
BH CV ECHO MEAS - MV E MAX VEL: 94.7 CM/SEC
BH CV ECHO MEAS - MV E/A: 1.7
BH CV ECHO MEAS - MV MAX PG: 4.9 MMHG
BH CV ECHO MEAS - MV MEAN PG: 1.3 MMHG
BH CV ECHO MEAS - MV P1/2T MAX VEL: 106.6 CM/SEC
BH CV ECHO MEAS - MV P1/2T: 41.8 MSEC
BH CV ECHO MEAS - MV V2 MAX: 110.6 CM/SEC
BH CV ECHO MEAS - MV V2 MEAN: 52.5 CM/SEC
BH CV ECHO MEAS - MV V2 VTI: 28.3 CM
BH CV ECHO MEAS - MVA P1/2T LCG: 2.1 CM^2
BH CV ECHO MEAS - MVA(P1/2T): 5.3 CM^2
BH CV ECHO MEAS - MVA(VTI): 2.1 CM^2
BH CV ECHO MEAS - PA ACC TIME: 0.16 SEC
BH CV ECHO MEAS - PA MAX PG (FULL): 1.8 MMHG
BH CV ECHO MEAS - PA MAX PG: 4.3 MMHG
BH CV ECHO MEAS - PA PR(ACCEL): 5.3 MMHG
BH CV ECHO MEAS - PA V2 MAX: 103.2 CM/SEC
BH CV ECHO MEAS - PI END-D VEL: 85.5 CM/SEC
BH CV ECHO MEAS - PULM A REVS DUR: 0.11 SEC
BH CV ECHO MEAS - PULM A REVS VEL: 21.9 CM/SEC
BH CV ECHO MEAS - PULM DIAS VEL: 62.1 CM/SEC
BH CV ECHO MEAS - PULM S/D: 0.7
BH CV ECHO MEAS - PULM SYS VEL: 43.3 CM/SEC
BH CV ECHO MEAS - PVA(V,A): 2.5 CM^2
BH CV ECHO MEAS - PVA(V,D): 2.5 CM^2
BH CV ECHO MEAS - QP/QS: 0.92
BH CV ECHO MEAS - RAP SYSTOLE: 3 MMHG
BH CV ECHO MEAS - RV MAX PG: 2.4 MMHG
BH CV ECHO MEAS - RV MEAN PG: 1 MMHG
BH CV ECHO MEAS - RV V1 MAX: 77.7 CM/SEC
BH CV ECHO MEAS - RV V1 MEAN: 55.3 CM/SEC
BH CV ECHO MEAS - RV V1 VTI: 16.2 CM
BH CV ECHO MEAS - RVOT AREA: 3.4 CM^2
BH CV ECHO MEAS - RVOT DIAM: 2.1 CM
BH CV ECHO MEAS - RVSP: 41 MMHG
BH CV ECHO MEAS - SI(AO): 231.8 ML/M^2
BH CV ECHO MEAS - SI(LVOT): 40.1 ML/M^2
BH CV ECHO MEAS - SI(MOD-SP4): 22.9 ML/M^2
BH CV ECHO MEAS - SV(AO): 343.7 ML
BH CV ECHO MEAS - SV(LVOT): 59.4 ML
BH CV ECHO MEAS - SV(MOD-SP4): 34 ML
BH CV ECHO MEAS - SV(RVOT): 54.4 ML
BH CV ECHO MEAS - TAPSE (>1.6): 1.8 CM
BH CV ECHO MEAS - TR MAX VEL: 309.5 CM/SEC
BH CV ECHO MEASUREMENTS AVERAGE E/E' RATIO: 11.84
BH CV XLRA - RV BASE: 3.2 CM
BH CV XLRA - RV LENGTH: 5.3 CM
BH CV XLRA - RV MID: 2.3 CM
BH CV XLRA - TDI S': 13.9 CM/SEC
LEFT ATRIUM VOLUME INDEX: 40 ML/M2
MAXIMAL PREDICTED HEART RATE: 126 BPM
SINUS: 2.9 CM
STJ: 2.6 CM
STRESS TARGET HR: 107 BPM

## 2021-07-13 DIAGNOSIS — I34.0 NONRHEUMATIC MITRAL VALVE REGURGITATION: Primary | ICD-10-CM

## 2021-07-13 NOTE — PROGRESS NOTES
Please let the patient know that her leaky valve is much better because of the water pills.  We will continue the same and I will see her at next visit.

## 2021-07-14 ENCOUNTER — TELEPHONE (OUTPATIENT)
Dept: CARDIOLOGY | Facility: CLINIC | Age: 86
End: 2021-07-14

## 2021-07-14 NOTE — TELEPHONE ENCOUNTER
----- Message from Blake Brown Jr., MD sent at 7/13/2021  5:31 PM EDT -----  Please let the patient know that her leaky valve is much better because of the water pills.  We will continue the same and I will see her at next visit.

## 2021-07-16 RX ORDER — FUROSEMIDE 20 MG/1
40 TABLET ORAL DAILY
Qty: 180 TABLET | Refills: 1 | Status: SHIPPED | OUTPATIENT
Start: 2021-07-16 | End: 2022-01-18

## 2021-07-16 NOTE — TELEPHONE ENCOUNTER
Patient notified of results and verbalized understanding    dtr would like you to please send in a new rx for furosemide 40mg per day.  To [facility] pharmacy.  Pt is out of medication  Gema Neville RN  Triage nurse

## 2021-08-13 RX ORDER — TRAMADOL HYDROCHLORIDE 50 MG/1
TABLET ORAL
Qty: 120 TABLET | Refills: 3 | Status: SHIPPED | OUTPATIENT
Start: 2021-08-13 | End: 2022-03-04

## 2021-08-13 NOTE — TELEPHONE ENCOUNTER
PATIENT NEEDS THIS TO BE TAKEN CARE OF TODAY. OUT OF MEDICATION    Caller: priscila centeno    Relationship: Emergency Contact      Medication needed:   Requested Prescriptions     Pending Prescriptions Disp Refills   • traMADol (ULTRAM) 50 MG tablet [Pharmacy Med Name: TRAMADOL HCL 50 MG TABLET] 120 tablet      Sig: TAKE 1 TABLET BY MOUTH EVERY 6 HOURS AS NEEDED FOR PAIN     What is the patient's preferred pharmacy: [id number] - [place], [place] - [address] - [phone] [facility] [phone] FX

## 2021-08-20 ENCOUNTER — LAB (OUTPATIENT)
Dept: LAB | Facility: HOSPITAL | Age: 86
End: 2021-08-20

## 2021-08-20 DIAGNOSIS — I34.0 NONRHEUMATIC MITRAL VALVE REGURGITATION: ICD-10-CM

## 2021-08-20 LAB
ANION GAP SERPL CALCULATED.3IONS-SCNC: 8.9 MMOL/L (ref 5–15)
BUN SERPL-MCNC: 18 MG/DL (ref 8–23)
BUN/CREAT SERPL: 28.6 (ref 7–25)
CALCIUM SPEC-SCNC: 8.9 MG/DL (ref 8.2–9.6)
CHLORIDE SERPL-SCNC: 104 MMOL/L (ref 98–107)
CO2 SERPL-SCNC: 27.1 MMOL/L (ref 22–29)
CREAT SERPL-MCNC: 0.63 MG/DL (ref 0.57–1)
GFR SERPL CREATININE-BSD FRML MDRD: 88 ML/MIN/1.73
GLUCOSE SERPL-MCNC: 96 MG/DL (ref 65–99)
POTASSIUM SERPL-SCNC: 3.9 MMOL/L (ref 3.5–5.2)
SODIUM SERPL-SCNC: 140 MMOL/L (ref 136–145)

## 2021-08-20 PROCEDURE — 36415 COLL VENOUS BLD VENIPUNCTURE: CPT

## 2021-08-20 PROCEDURE — 80048 BASIC METABOLIC PNL TOTAL CA: CPT

## 2021-09-01 ENCOUNTER — TELEPHONE (OUTPATIENT)
Dept: CARDIOLOGY | Facility: CLINIC | Age: 86
End: 2021-09-01

## 2021-09-01 NOTE — TELEPHONE ENCOUNTER
Holding Eliquis 1 week is longer than I would like due to patient having atrial fibrillation and being higher risk of stroke off blood thinners.  I am okay with holding it 3 days. Have them call with update next week if bleeding is still an issue after holding 3 days

## 2021-09-01 NOTE — TELEPHONE ENCOUNTER
Corine(daughter)called.  She states her Mom had a tooth pulled last week and is having some break through bleeding.  Corine would like to know if her Mom can stop the Eliquis for 1 week while the tooth heals.  Thanks/jlf    Corine# [phone]

## 2021-09-01 NOTE — TELEPHONE ENCOUNTER
I called and s/w Corine.  She understands verbally and will have her mom hold the Eliquis for 3 days and call back with an update./saima

## 2021-10-05 RX ORDER — PRAVASTATIN SODIUM 40 MG
40 TABLET ORAL DAILY
Qty: 90 TABLET | Refills: 1 | Status: SHIPPED | OUTPATIENT
Start: 2021-10-05 | End: 2022-04-04 | Stop reason: SDUPTHER

## 2021-10-29 ENCOUNTER — TELEPHONE (OUTPATIENT)
Dept: FAMILY MEDICINE CLINIC | Facility: CLINIC | Age: 86
End: 2021-10-29

## 2021-10-29 RX ORDER — NITROFURANTOIN 25; 75 MG/1; MG/1
100 CAPSULE ORAL 2 TIMES DAILY
Qty: 14 CAPSULE | Refills: 0 | Status: SHIPPED | OUTPATIENT
Start: 2021-10-29 | End: 2021-12-27

## 2021-10-29 NOTE — TELEPHONE ENCOUNTER
Caller: priscila centeno pat    Relationship: Emergency Contact    Best call back number:[phone]    What medication are you requesting: ANTIBIOTIC    What are your current symptoms: PAIN AND BURNING WHEN SHE URINATES    How long have you been experiencing symptoms: STARTED YESTERDAY    Have you had these symptoms before:    [x] Yes  [] No    Have you been treated for these symptoms before:   [x] Yes  [] No    If a prescription is needed, what is your preferred pharmacy and phone number:  [id number] - [place], [place] - [address]  - [phone] [facility] [phone]   [phone]    Additional notes: PLEASE ADVISE          
Macrobid sent to pharmacy  
Patient informed.jeb  
Class II - visualization of the soft palate, fauces, and uvula

## 2021-12-27 ENCOUNTER — OFFICE VISIT (OUTPATIENT)
Dept: FAMILY MEDICINE CLINIC | Facility: CLINIC | Age: 86
End: 2021-12-27

## 2021-12-27 VITALS
SYSTOLIC BLOOD PRESSURE: 122 MMHG | HEART RATE: 66 BPM | BODY MASS INDEX: 20.87 KG/M2 | WEIGHT: 113.4 LBS | DIASTOLIC BLOOD PRESSURE: 64 MMHG | HEIGHT: 62 IN | TEMPERATURE: 98.6 F | OXYGEN SATURATION: 97 %

## 2021-12-27 DIAGNOSIS — R41.0 CONFUSION: Primary | ICD-10-CM

## 2021-12-27 DIAGNOSIS — E78.2 MIXED HYPERLIPIDEMIA: ICD-10-CM

## 2021-12-27 DIAGNOSIS — I48.0 PAROXYSMAL ATRIAL FIBRILLATION (HCC): ICD-10-CM

## 2021-12-27 DIAGNOSIS — I10 ESSENTIAL HYPERTENSION: ICD-10-CM

## 2021-12-27 DIAGNOSIS — G89.29 CHRONIC LOW BACK PAIN, UNSPECIFIED BACK PAIN LATERALITY, UNSPECIFIED WHETHER SCIATICA PRESENT: ICD-10-CM

## 2021-12-27 DIAGNOSIS — R35.0 URINARY FREQUENCY: ICD-10-CM

## 2021-12-27 DIAGNOSIS — N39.41 URGE INCONTINENCE OF URINE: ICD-10-CM

## 2021-12-27 DIAGNOSIS — I25.10 ATHEROSCLEROSIS OF NATIVE CORONARY ARTERY OF NATIVE HEART WITHOUT ANGINA PECTORIS: ICD-10-CM

## 2021-12-27 DIAGNOSIS — Z95.5 HISTORY OF CORONARY ARTERY STENT PLACEMENT: ICD-10-CM

## 2021-12-27 DIAGNOSIS — D64.9 ANEMIA, UNSPECIFIED TYPE: ICD-10-CM

## 2021-12-27 DIAGNOSIS — I50.32 CHRONIC DIASTOLIC CHF (CONGESTIVE HEART FAILURE) (HCC): ICD-10-CM

## 2021-12-27 DIAGNOSIS — M54.50 CHRONIC LOW BACK PAIN, UNSPECIFIED BACK PAIN LATERALITY, UNSPECIFIED WHETHER SCIATICA PRESENT: ICD-10-CM

## 2021-12-27 DIAGNOSIS — N39.0 URINARY TRACT INFECTION WITHOUT HEMATURIA, SITE UNSPECIFIED: ICD-10-CM

## 2021-12-27 DIAGNOSIS — Z00.00 ANNUAL PHYSICAL EXAM: ICD-10-CM

## 2021-12-27 DIAGNOSIS — Z66 DNR (DO NOT RESUSCITATE): ICD-10-CM

## 2021-12-27 LAB
ALBUMIN SERPL-MCNC: 4 G/DL (ref 3.5–5.2)
ALBUMIN/GLOB SERPL: 1.7 G/DL
ALP SERPL-CCNC: 66 U/L (ref 39–117)
ALT SERPL W P-5'-P-CCNC: 9 U/L (ref 1–33)
ANION GAP SERPL CALCULATED.3IONS-SCNC: 7.8 MMOL/L (ref 5–15)
AST SERPL-CCNC: 16 U/L (ref 1–32)
BACTERIA UR QL AUTO: ABNORMAL /HPF
BILIRUB SERPL-MCNC: 0.2 MG/DL (ref 0–1.2)
BILIRUB UR QL STRIP: NEGATIVE
BUN SERPL-MCNC: 19 MG/DL (ref 8–23)
BUN/CREAT SERPL: 28.8 (ref 7–25)
CALCIUM SPEC-SCNC: 9.2 MG/DL (ref 8.2–9.6)
CHLORIDE SERPL-SCNC: 103 MMOL/L (ref 98–107)
CHOLEST SERPL-MCNC: 101 MG/DL (ref 0–200)
CLARITY UR: CLEAR
CO2 SERPL-SCNC: 29.2 MMOL/L (ref 22–29)
COLOR UR: YELLOW
CREAT SERPL-MCNC: 0.66 MG/DL (ref 0.57–1)
ERYTHROCYTE [DISTWIDTH] IN BLOOD BY AUTOMATED COUNT: 15.3 % (ref 12.3–15.4)
GFR SERPL CREATININE-BSD FRML MDRD: 83 ML/MIN/1.73
GLOBULIN UR ELPH-MCNC: 2.4 GM/DL
GLUCOSE SERPL-MCNC: 106 MG/DL (ref 65–99)
GLUCOSE UR STRIP-MCNC: NEGATIVE MG/DL
HCT VFR BLD AUTO: 30.6 % (ref 34–46.6)
HDLC SERPL-MCNC: 46 MG/DL (ref 40–60)
HGB BLD-MCNC: 9.8 G/DL (ref 12–15.9)
HGB UR QL STRIP.AUTO: NEGATIVE
HYALINE CASTS UR QL AUTO: ABNORMAL /LPF
KETONES UR QL STRIP: NEGATIVE
LDLC SERPL CALC-MCNC: 37 MG/DL (ref 0–100)
LDLC/HDLC SERPL: 0.79 {RATIO}
LEUKOCYTE ESTERASE UR QL STRIP.AUTO: ABNORMAL
LYMPHOCYTES # BLD AUTO: 2.1 10*3/MM3 (ref 0.7–3.1)
LYMPHOCYTES NFR BLD AUTO: 38.3 % (ref 19.6–45.3)
MCH RBC QN AUTO: 34.4 PG (ref 26.6–33)
MCHC RBC AUTO-ENTMCNC: 32.1 G/DL (ref 31.5–35.7)
MCV RBC AUTO: 107.1 FL (ref 79–97)
MONOCYTES # BLD AUTO: 0.3 10*3/MM3 (ref 0.1–0.9)
MONOCYTES NFR BLD AUTO: 5.1 % (ref 5–12)
NEUTROPHILS NFR BLD AUTO: 3.1 10*3/MM3 (ref 1.7–7)
NEUTROPHILS NFR BLD AUTO: 56.6 % (ref 42.7–76)
NITRITE UR QL STRIP: NEGATIVE
PH UR STRIP.AUTO: 6 [PH] (ref 4.6–8)
PLATELET # BLD AUTO: 252 10*3/MM3 (ref 140–450)
PMV BLD AUTO: 7.6 FL (ref 6–12)
POTASSIUM SERPL-SCNC: 4.2 MMOL/L (ref 3.5–5.2)
PROT SERPL-MCNC: 6.4 G/DL (ref 6–8.5)
PROT UR QL STRIP: NEGATIVE
RBC # BLD AUTO: 2.86 10*6/MM3 (ref 3.77–5.28)
RBC # UR STRIP: ABNORMAL /HPF
REF LAB TEST METHOD: ABNORMAL
SODIUM SERPL-SCNC: 140 MMOL/L (ref 136–145)
SP GR UR STRIP: 1.01 (ref 1–1.03)
SQUAMOUS #/AREA URNS HPF: ABNORMAL /HPF
TRIGL SERPL-MCNC: 93 MG/DL (ref 0–150)
UROBILINOGEN UR QL STRIP: ABNORMAL
VLDLC SERPL-MCNC: 18 MG/DL (ref 5–40)
WBC # UR STRIP: ABNORMAL /HPF
WBC NRBC COR # BLD: 5.5 10*3/MM3 (ref 3.4–10.8)

## 2021-12-27 PROCEDURE — 80061 LIPID PANEL: CPT | Performed by: NURSE PRACTITIONER

## 2021-12-27 PROCEDURE — 85025 COMPLETE CBC W/AUTO DIFF WBC: CPT | Performed by: NURSE PRACTITIONER

## 2021-12-27 PROCEDURE — 36415 COLL VENOUS BLD VENIPUNCTURE: CPT | Performed by: NURSE PRACTITIONER

## 2021-12-27 PROCEDURE — 99214 OFFICE O/P EST MOD 30 MIN: CPT | Performed by: NURSE PRACTITIONER

## 2021-12-27 PROCEDURE — 81001 URINALYSIS AUTO W/SCOPE: CPT | Performed by: NURSE PRACTITIONER

## 2021-12-27 PROCEDURE — 80053 COMPREHEN METABOLIC PANEL: CPT | Performed by: NURSE PRACTITIONER

## 2021-12-27 RX ORDER — FERROUS SULFATE 325(65) MG
325 TABLET ORAL
Qty: 90 TABLET | Refills: 0 | Status: SHIPPED | OUTPATIENT
Start: 2021-12-27 | End: 2022-02-14

## 2021-12-27 RX ORDER — NITROFURANTOIN 25; 75 MG/1; MG/1
100 CAPSULE ORAL 2 TIMES DAILY
Qty: 10 CAPSULE | Refills: 0 | Status: SHIPPED | OUTPATIENT
Start: 2021-12-27 | End: 2021-12-28 | Stop reason: SDUPTHER

## 2021-12-27 NOTE — ASSESSMENT & PLAN NOTE
Discussed with patient to avoid fried fatty foods, eat a healthy well-balanced diet, frequent aerobic exercise, take medication as prescribed, and return to office for lab follow-up.

## 2021-12-27 NOTE — ASSESSMENT & PLAN NOTE
Started iron tablets; pt denies any bleeding episodes;  Advised to rto In one week to repeat CBC  If any s/s fatigue/weakness or bleeding go to ER

## 2021-12-27 NOTE — ASSESSMENT & PLAN NOTE
Disussed with patient the importance of maintaining a normal BMI.  Reviewed the Dash diet, dietary sodium restriction.  Encourage the patient to engage in 30 minutes of regular aerobic physical activity at least 3 days a week.  Limit alcohol consumption to no more than 2 drinks per day for men, 1 drink per day for women.  Patient voiced understanding all questions answered.

## 2021-12-27 NOTE — ASSESSMENT & PLAN NOTE
Counseling was provided on nutrition, physical activity, development, and injury prevention, dental health, patient verbalizes understanding no additional questions were asked.

## 2021-12-27 NOTE — ASSESSMENT & PLAN NOTE
Will treat for a UTI   Discussed with patient and caregiver about memory loss, any recent changes/events that can possibly be a culprit of increased memory loss

## 2021-12-27 NOTE — PROGRESS NOTES
"Chief Complaint  ankle/feet swelling and red, Memory Loss (worsen week or 2), Back Pain, and Shortness of Breath    Subjective          Ada NAM Peace presents to [facility]  Presents with her power of  for some increased confusion and memory loss that is gotten worse over the past few weeks.  Patient does report some increased back pain which she has some chronic back pain.  Patient denies any chest pain shortness of breath and during today's visit patient does report some ankle bilateral ankle and feet swelling patient does take furosemide as needed.  Patient reports some urinary frequency over the past few weeks she denies any dysuria however patient will have some intermittent episodes of incontinence.  Blood pressure is stable patient denies any elevated readings that she checks at home patient lives by herself her care giver has went out of town.  Patient denies any falls,denies any safety issues.      Objective   Vital Signs:   /64   Pulse 66   Temp 98.6 °F (37 °C)   Ht 157.5 cm (62\")   Wt 51.4 kg (113 lb 6.4 oz)   SpO2 97%   BMI 20.74 kg/m²     Physical Exam  Constitutional:       General: She is not in acute distress.     Appearance: Normal appearance. She is not ill-appearing, toxic-appearing or diaphoretic.   HENT:      Head: Normocephalic.      Right Ear: Tympanic membrane and external ear normal. There is no impacted cerumen.      Left Ear: Tympanic membrane and external ear normal. There is no impacted cerumen.      Nose: Nose normal. No congestion or rhinorrhea.      Mouth/Throat:      Mouth: Mucous membranes are moist.      Pharynx: Oropharynx is clear. No oropharyngeal exudate or posterior oropharyngeal erythema.   Eyes:      General:         Right eye: No discharge.         Left eye: No discharge.      Extraocular Movements: Extraocular movements intact.      Conjunctiva/sclera: Conjunctivae normal.      Pupils: Pupils are equal, round, and reactive " to light.   Neck:      Vascular: No carotid bruit.   Cardiovascular:      Rate and Rhythm: Normal rate and regular rhythm.      Pulses: Normal pulses.      Heart sounds: Normal heart sounds. No murmur heard.  No friction rub. No gallop.       Comments: Bilateral ankles  Pulmonary:      Effort: Pulmonary effort is normal. No respiratory distress.      Breath sounds: Normal breath sounds. No wheezing, rhonchi or rales.   Chest:      Chest wall: No tenderness.   Abdominal:      General: Abdomen is flat. Bowel sounds are normal. There is no distension.      Palpations: Abdomen is soft. There is no mass.      Tenderness: There is no abdominal tenderness. There is no guarding or rebound.      Hernia: No hernia is present.   Musculoskeletal:         General: Tenderness present. No swelling.      Cervical back: Normal range of motion and neck supple. No tenderness.      Lumbar back: Tenderness present. Decreased range of motion.      Right lower le+ Edema present.      Left lower le+ Edema present.   Skin:     General: Skin is warm and dry.      Coloration: Skin is not jaundiced or pale.      Findings: No erythema or rash.   Neurological:      Mental Status: She is alert and oriented to person, place, and time.      Sensory: No sensory deficit.      Motor: No weakness.      Gait: Gait normal.   Psychiatric:         Attention and Perception: Attention normal.         Mood and Affect: Mood normal. Mood is not anxious or depressed.         Behavior: Behavior normal. Behavior is not agitated, slowed, aggressive, withdrawn or combative.         Thought Content: Thought content normal. Thought content is not paranoid or delusional. Thought content does not include homicidal or suicidal ideation. Thought content does not include homicidal or suicidal plan.         Cognition and Memory: Memory is impaired. She exhibits impaired recent memory.         Judgment: Judgment normal.        Result Review :   The following data was  reviewed by: BIENVENIDO García on 12/27/2021:  Common labs    Common Labsle 4/10/21 4/10/21 8/20/21 12/27/21    1245 1245     Glucose  89 96    BUN  17 18    Creatinine  0.59 0.63    eGFR Non African Am  95 88    Sodium  140 140    Potassium  3.6 3.9    Chloride  101 104    Calcium  8.8 8.9    Albumin  4.00     Total Bilirubin  0.4     Alkaline Phosphatase  55     AST (SGOT)  19     ALT (SGPT)  11     WBC 6.89   5.50   Hemoglobin 11.4 (A)   9.8 (A)   Hematocrit 33.1 (A)   30.6 (A)   Platelets 247   252   (A) Abnormal value       Comments are available for some flowsheets but are not being displayed.                     Assessment and Plan    Diagnoses and all orders for this visit:    1. Confusion (Primary)  Assessment & Plan:  Will treat for a UTI   Discussed with patient and caregiver about memory loss, any recent changes/events that can possibly be a culprit of increased memory loss         2. Annual physical exam  Assessment & Plan:  Counseling was provided on nutrition, physical activity, development, and injury prevention, dental health, patient verbalizes understanding no additional questions were asked.        3. Urinary frequency  -     CBC & Differential  -     Urinalysis With Microscopic - Urine, Clean Catch    4. Essential hypertension  Assessment & Plan:  Disussed with patient the importance of maintaining a normal BMI.  Reviewed the Dash diet, dietary sodium restriction.  Encourage the patient to engage in 30 minutes of regular aerobic physical activity at least 3 days a week.  Limit alcohol consumption to no more than 2 drinks per day for men, 1 drink per day for women.  Patient voiced understanding all questions answered.        5. Mixed hyperlipidemia  Assessment & Plan:  Discussed with patient to avoid fried fatty foods, eat a healthy well-balanced diet, frequent aerobic exercise, take medication as prescribed, and return to office for lab follow-up.        6. Paroxysmal atrial fibrillation  ([facility])  -     Comprehensive Metabolic Panel    7. Chronic low back pain, unspecified back pain laterality, unspecified whether sciatica present  Assessment & Plan:  Stable       8. Chronic diastolic CHF (congestive heart failure) ([facility])  -     Lipid Panel    9. Urge incontinence of urine    10. Anemia, unspecified type  Assessment & Plan:  Started iron tablets; pt denies any bleeding episodes;  Advised to rto In one week to repeat CBC  If any s/s fatigue/weakness or bleeding go to ER     Orders:  -     ferrous sulfate (FerrouSul) 325 (65 FE) MG tablet; Take 1 tablet by mouth 3 (Three) Times a Day With Meals for 30 days.  Dispense: 90 tablet; Refill: 0    11. Urinary tract infection without hematuria, site unspecified  -     nitrofurantoin, macrocrystal-monohydrate, (Macrobid) 100 MG capsule; Take 1 capsule by mouth 2 (Two) Times a Day for 5 days.  Dispense: 10 capsule; Refill: 0    12. DNR (do not resuscitate)    13. Atherosclerosis of native coronary artery of native heart without angina pectoris    14. History of coronary artery stent placement      Follow Up   No follow-ups on file.  Patient was given instructions and counseling regarding her condition or for health maintenance advice. Please see specific information pulled into the AVS if appropriate.         QUICK REFERENCE INFORMATION:  The ABCs of the Annual Wellness Visit    Subsequent Medicare Wellness Visit    HEALTH RISK ASSESSMENT    4/13/1927    Recent Hospitalizations:  No hospitalization(s) within the last year..        Current Medical Providers:  Patient Care Team:  Brian Jacobs MD as PCP - General (Internal Medicine)        Smoking Status:  Social History     Tobacco Use   Smoking Status Never Smoker   Smokeless Tobacco Never Used   Tobacco Comment    caffeine use-        Alcohol Consumption:  Social History     Substance and Sexual Activity   Alcohol Use No       Depression Screen:   PHQ-2/PHQ-9 Depression Screening 3/1/2019   Little interest or  pleasure in doing things 0   Feeling down, depressed, or hopeless 0   Trouble falling or staying asleep, or sleeping too much 0   Feeling tired or having little energy 0   Poor appetite or overeating 0   Feeling bad about yourself - or that you are a failure or have let yourself or your family down 0   Trouble concentrating on things, such as reading the newspaper or watching television 0   Moving or speaking so slowly that other people could have noticed. Or the opposite - being so fidgety or restless that you have been moving around a lot more than usual 0   Thoughts that you would be better off dead, or of hurting yourself in some way -   Total Score 0   If you checked off any problems, how difficult have these problems made it for you to do your work, take care of things at home, or get along with other people? Not difficult at all       Health Habits and Functional and Cognitive Screening:  Functional & Cognitive Status 3/1/2019   Do you have difficulty preparing food and eating? No   Do you have difficulty bathing yourself, getting dressed or grooming yourself? No   Do you have difficulty using the toilet? No   Do you have difficulty moving around from place to place? No   Do you have trouble with steps or getting out of a bed or a chair? No   Current Diet Well Balanced Diet   Dental Exam Up to date   Eye Exam Up to date   Exercise (times per week) 7 times per week   Current Exercise Activities Include Aerobics   Do you need help using the phone?  No   Are you deaf or do you have serious difficulty hearing?  No   Do you need help with transportation? Yes   Do you need help shopping? Yes   Do you need help preparing meals?  Yes   Do you need help with housework?  Yes   Do you need help with laundry? Yes   Do you need help taking your medications? Yes   Do you need help managing money? No   Do you ever drive or ride in a car without wearing a seat belt? Yes   Have you felt unusual stress, anger or loneliness in  the last month? No   Who do you live with? Alone   If you need help, do you have trouble finding someone available to you? No   Have you been bothered in the last four weeks by sexual problems? No   Do you have difficulty concentrating, remembering or making decisions? No           Does the patient have evidence of cognitive impairment? Yes    Aspirin use counseling: takes eliquis      Recent Lab Results:  CMP:  Lab Results   Component Value Date    BUN 18 08/20/2021    CREATININE 0.63 08/20/2021    EGFRIFNONA 88 08/20/2021    EGFRIFAFRI 123 12/10/2018    BCR 28.6 (H) 08/20/2021     08/20/2021    K 3.9 08/20/2021    CO2 27.1 08/20/2021    CALCIUM 8.9 08/20/2021    PROTENTOTREF 6.4 12/10/2018    ALBUMIN 4.00 04/10/2021    LABGLOBREF 2.2 12/10/2018    LABIL2 1.9 12/10/2018    BILITOT 0.4 04/10/2021    ALKPHOS 55 04/10/2021    AST 19 04/10/2021    ALT 11 04/10/2021     Lipid Panel:  Lab Results   Component Value Date    CHOL 109 03/05/2021    TRIG 143 03/05/2021    HDL 35 (L) 03/05/2021    VLDL 25 03/05/2021    LDLHDL 1.30 03/05/2021     HbA1c:  Lab Results   Component Value Date    HGBA1C 5.70 (H) 03/01/2019       Visual Acuity:  No exam data present    Age-appropriate Screening Schedule:  Refer to the list below for future screening recommendations based on patient's age, sex and/or medical conditions. Orders for these recommended tests are listed in the plan section. The patient has been provided with a written plan.    Health Maintenance   Topic Date Due   • ZOSTER VACCINE (2 of 3) 09/01/2007   • MAMMOGRAM  04/18/2016   • DXA SCAN  06/23/2019   • TDAP/TD VACCINES (2 - Td or Tdap) 05/01/2021   • LIPID PANEL  03/05/2022   • INFLUENZA VACCINE  Completed        Subjective   History of Present Illness    Alexa Peace is a 89 y.o. female who presents for an Subsequent Wellness Visit.    The following portions of the patient's history were reviewed and updated as appropriate: past family history, past medical  history, past social history, past surgical history and problem list.    Outpatient Medications Prior to Visit   Medication Sig Dispense Refill   • calcium carbonate (TUMS) 500 MG chewable tablet Chew 1,000 mg 2 (Two) Times a Day As Needed for Indigestion or Heartburn.     • Cholecalciferol (Vitamin D) 50 MCG (2000 UT) tablet TAKE 1 TABLET BY MOUTH EVERY DAY 90 tablet 1   • DULoxetine (CYMBALTA) 60 MG capsule TAKE 1 CAPSULE BY MOUTH EVERY DAY 90 capsule 1   • Eliquis 2.5 MG tablet tablet TAKE 1 TABLET BY MOUTH EVERY 12 HOURS 60 tablet 5   • furosemide (LASIX) 20 MG tablet Take 2 tablets by mouth Daily. 180 tablet 1   • metoprolol succinate XL (TOPROL-XL) 25 MG 24 hr tablet TAKE 1 TABLET BY MOUTH EVERY DAY 90 tablet 3   • omeprazole (priLOSEC) 20 MG capsule Take 1 capsule by mouth Daily With Breakfast. 90 capsule 1   • polyethylene glycol (MIRALAX) pack packet Take 17 g by mouth Daily. (Patient taking differently: Take 17 g by mouth Daily As Needed.)     • pravastatin (PRAVACHOL) 40 MG tablet Take 1 tablet by mouth Daily. 90 tablet 1   • traMADol (ULTRAM) 50 MG tablet TAKE 1 TABLET BY MOUTH EVERY 6 HOURS AS NEEDED FOR PAIN 120 tablet 3   • glucose blood test strip Daily e11.9 100 each 12   • nitrofurantoin, macrocrystal-monohydrate, (Macrobid) 100 MG capsule Take 1 capsule by mouth 2 (Two) Times a Day. 14 capsule 0     No facility-administered medications prior to visit.       Patient Active Problem List   Diagnosis   • Bad memory   • Depression   • Diverticulosis of intestine   • Hyperlipidemia   • Macular degeneration   • Neuropathy, peripheral   • Osteopenia   • Atopic rhinitis   • Urge incontinence of urine   • Atherosclerosis of native coronary artery   • Essential hypertension   • Gastroesophageal reflux disease without esophagitis   • Impaired glucose tolerance   • History of coronary artery stent placement   • Chronic low back pain   • Acquired scoliosis   • Spinal stenosis of lumbar region with neurogenic  claudication   • PAC (premature atrial contraction)   • Slow transit constipation   • Paroxysmal atrial fibrillation (HCC)   • Pleural effusion on left   • Valvular heart disease   • Non-rheumatic mitral regurgitation   • Pneumonia of right lower lobe due to infectious organism   • Localized edema   • Sepsis due to pneumonia (HCC)   • Chronic anticoagulation   • DNR (do not resuscitate)   • Chronic diastolic CHF (congestive heart failure) (HCC)   • Annual physical exam   • Confusion   • Urinary frequency   • Anemia       Advance Care Planning:  ACP discussion was held with the patient during this visit. Patient has an advance directive (not in EMR), copy requested.    Identification of Risk Factors:  Risk factors include: Cardiovascular risk  Chronic Pain   Fall Risk.    Review of Systems   Constitutional: Positive for fatigue. Negative for activity change, appetite change, chills, diaphoresis and unexpected weight change.   HENT: Negative for congestion, dental problem, hearing loss and mouth sores.    Eyes: Negative for pain, discharge and itching.   Respiratory: Negative for apnea, shortness of breath, wheezing and stridor.    Cardiovascular: Negative for chest pain, palpitations and leg swelling.   Gastrointestinal: Negative for abdominal distention, abdominal pain, anal bleeding, constipation, diarrhea and nausea.   Endocrine: Negative for cold intolerance, heat intolerance, polydipsia, polyphagia and polyuria.   Genitourinary: Positive for frequency. Negative for dysuria and flank pain.   Musculoskeletal: Positive for back pain. Negative for arthralgias.   Skin: Negative for color change, pallor and rash.   Allergic/Immunologic: Positive for environmental allergies. Negative for food allergies and immunocompromised state.   Neurological: Negative for tremors and weakness.   Hematological: Negative for adenopathy. Does not bruise/bleed easily.   Psychiatric/Behavioral: Negative for agitation, self-injury and  "suicidal ideas. The patient is not nervous/anxious.        Compared to one year ago, the patient feels her physical health is the same.  Compared to one year ago, the patient feels her mental health is the same.    Objective     [unfilled]    Vitals:    12/27/21 1432   BP: 122/64   Pulse: 66   Temp: 98.6 °F (37 °C)   SpO2: 97%   Weight: 51.4 kg (113 lb 6.4 oz)   Height: 157.5 cm (62\")   PainSc:   8       Patient's Body mass index is 20.74 kg/m². indicating that she is within normal range (BMI 18.5-24.9). No BMI management plan needed..      Assessment/Plan   Patient Self-Management and Personalized Health Advice  The patient has been provided with information about: diet and preventive services including:   · Annual Wellness Visit (AWV).    Visit Diagnoses:    ICD-10-CM ICD-9-CM   1. Confusion  R41.0 298.9   2. Annual physical exam  Z00.00 V70.0   3. Urinary frequency  R35.0 788.41   4. Essential hypertension  I10 401.9   5. Mixed hyperlipidemia  E78.2 272.2   6. Paroxysmal atrial fibrillation (HCC)  I48.0 427.31   7. Chronic low back pain, unspecified back pain laterality, unspecified whether sciatica present  M54.50 724.2    G89.29 338.29   8. Chronic diastolic CHF (congestive heart failure) (HCC)  I50.32 428.32     428.0   9. Urge incontinence of urine  N39.41 788.31   10. Anemia, unspecified type  D64.9 285.9   11. Urinary tract infection without hematuria, site unspecified  N39.0 599.0   12. DNR (do not resuscitate)  Z66 V49.86   13. Atherosclerosis of native coronary artery of native heart without angina pectoris  I25.10 414.01   14. History of coronary artery stent placement  Z95.5 V45.82       Orders Placed This Encounter   Procedures   • Comprehensive Metabolic Panel     Order Specific Question:   Release to patient     Answer:   Immediate   • Lipid Panel     Order Specific Question:   Release to patient     Answer:   Immediate   • CBC Auto Differential   • Urinalysis without microscopic (no culture) - " Urine, Clean Catch     Order Specific Question:   Release to patient     Answer:   Immediate   • Urinalysis, Microscopic Only - Urine, Clean Catch     Order Specific Question:   Release to patient     Answer:   Immediate   • CBC & Differential     Order Specific Question:   Manual Differential     Answer:   No   • Urinalysis With Microscopic - Urine, Clean Catch     Order Specific Question:   Release to patient     Answer:   Immediate       Outpatient Encounter Medications as of 12/27/2021   Medication Sig Dispense Refill   • calcium carbonate (TUMS) 500 MG chewable tablet Chew 1,000 mg 2 (Two) Times a Day As Needed for Indigestion or Heartburn.     • Cholecalciferol (Vitamin D) 50 MCG (2000 UT) tablet TAKE 1 TABLET BY MOUTH EVERY DAY 90 tablet 1   • DULoxetine (CYMBALTA) 60 MG capsule TAKE 1 CAPSULE BY MOUTH EVERY DAY 90 capsule 1   • Eliquis 2.5 MG tablet tablet TAKE 1 TABLET BY MOUTH EVERY 12 HOURS 60 tablet 5   • furosemide (LASIX) 20 MG tablet Take 2 tablets by mouth Daily. 180 tablet 1   • metoprolol succinate XL (TOPROL-XL) 25 MG 24 hr tablet TAKE 1 TABLET BY MOUTH EVERY DAY 90 tablet 3   • omeprazole (priLOSEC) 20 MG capsule Take 1 capsule by mouth Daily With Breakfast. 90 capsule 1   • polyethylene glycol (MIRALAX) pack packet Take 17 g by mouth Daily. (Patient taking differently: Take 17 g by mouth Daily As Needed.)     • pravastatin (PRAVACHOL) 40 MG tablet Take 1 tablet by mouth Daily. 90 tablet 1   • traMADol (ULTRAM) 50 MG tablet TAKE 1 TABLET BY MOUTH EVERY 6 HOURS AS NEEDED FOR PAIN 120 tablet 3   • ferrous sulfate (FerrouSul) 325 (65 FE) MG tablet Take 1 tablet by mouth 3 (Three) Times a Day With Meals for 30 days. 90 tablet 0   • nitrofurantoin, macrocrystal-monohydrate, (Macrobid) 100 MG capsule Take 1 capsule by mouth 2 (Two) Times a Day for 5 days. 10 capsule 0   • [DISCONTINUED] glucose blood test strip Daily e11.9 100 each 12   • [DISCONTINUED] nitrofurantoin, macrocrystal-monohydrate,  (Macrobid) 100 MG capsule Take 1 capsule by mouth 2 (Two) Times a Day. 14 capsule 0     No facility-administered encounter medications on file as of 12/27/2021.     Patient follows cardiologist for CHF  Reviewed use of high risk medication in the elderly: yes  Reviewed for potential of harmful drug interactions in the elderly: yes    Follow Up:  No follow-ups on file.     An After Visit Summary and PPPS with all of these plans were given to the patient.

## 2021-12-28 DIAGNOSIS — N39.0 URINARY TRACT INFECTION WITHOUT HEMATURIA, SITE UNSPECIFIED: ICD-10-CM

## 2021-12-28 RX ORDER — NITROFURANTOIN 25; 75 MG/1; MG/1
100 CAPSULE ORAL 2 TIMES DAILY
Qty: 10 CAPSULE | Refills: 0 | Status: SHIPPED | OUTPATIENT
Start: 2021-12-28 | End: 2022-01-02

## 2021-12-28 NOTE — TELEPHONE ENCOUNTER
Caller: priscila centeno     Relationship to Patient: DAUGHTER    Pharmacy: [facility]   [address] [place] [address] [phone]    Phone Number: [phone]     Reason for Call: PATIENT'S DAUGHTER CALLED SAYING SHE WAS PRESCRIBED AN ANTI-BIOTIC YESTERDAY BUT HER PHARMACY DOESN'T HAVE IT IN STOCK. THEY ARE NEEDING THIS PRESCRIPTION SENT OVER TO THE [facility] INSTEAD.

## 2022-01-01 DIAGNOSIS — I48.11 LONGSTANDING PERSISTENT ATRIAL FIBRILLATION: ICD-10-CM

## 2022-01-03 RX ORDER — APIXABAN 2.5 MG/1
TABLET, FILM COATED ORAL
Qty: 60 TABLET | Refills: 5 | Status: SHIPPED | OUTPATIENT
Start: 2022-01-03 | End: 2022-06-24

## 2022-01-04 ENCOUNTER — OFFICE VISIT (OUTPATIENT)
Dept: CARDIOLOGY | Facility: CLINIC | Age: 87
End: 2022-01-04

## 2022-01-04 VITALS
DIASTOLIC BLOOD PRESSURE: 60 MMHG | BODY MASS INDEX: 20.61 KG/M2 | HEIGHT: 62 IN | SYSTOLIC BLOOD PRESSURE: 130 MMHG | HEART RATE: 75 BPM | WEIGHT: 112 LBS

## 2022-01-04 DIAGNOSIS — I34.0 NONRHEUMATIC MITRAL VALVE REGURGITATION: ICD-10-CM

## 2022-01-04 DIAGNOSIS — Z79.01 CHRONIC ANTICOAGULATION: ICD-10-CM

## 2022-01-04 DIAGNOSIS — I48.0 PAROXYSMAL ATRIAL FIBRILLATION: Primary | ICD-10-CM

## 2022-01-04 DIAGNOSIS — I73.9 PVD (PERIPHERAL VASCULAR DISEASE) WITH CLAUDICATION: ICD-10-CM

## 2022-01-04 DIAGNOSIS — I50.32 CHRONIC DIASTOLIC CHF (CONGESTIVE HEART FAILURE): ICD-10-CM

## 2022-01-04 PROCEDURE — 99214 OFFICE O/P EST MOD 30 MIN: CPT | Performed by: INTERNAL MEDICINE

## 2022-01-04 NOTE — PROGRESS NOTES
[facility]      Patient Name: Alexa Peace  :[id number]  Age: 89 y.o.  Encounter Provider:  Blake Brown Jr, MD      Chief Complaint: FU CAD, PAF, CHF, VHD      HPI  Alexa Peace is a 89 y.o. female PMH CAD s/p remote hx of PCI to LAD, chronic diastolic HF, PAF, mitral regurgitation presents for routine follow-up.     Last clinic visit: She notes chronic stable exertional dyspnea.  Still able to walk around the home without significant chest pain.  She is noted discoloration of bilateral lower extremities with some mild pain on ambulation.  No sensory deficits.  She denies angina.  She notes some difficulty with balance.  She denies palpitations.  Mild dizziness but no syncope.  Heart rate at Dr. Jacobs's office was 53 bpm and today is 42 bpm.  She denies orthopnea or PND.  Social and family history reviewed and not pertinent to this clinic visit.    Patient presents today for routine follow-up.  Echocardiogram performed after last visit showed lower severity of MR and pulmonary hypertension.  She has chronic stable dyspnea on exertion.  She is very atypical right-sided lower thoracic pain from time to time which does not have reliable relationship to exertional activity.  She was noted to have possible UTI and anemia at last visit with PCP.  She was started on antibiotics and iron supplementation.  No orthopnea or PND.  Swelling is much improved.    The following portions of the patient's history were reviewed and updated as appropriate: allergies, current medications, past family history, past medical history, past social history, past surgical history and problem list.      Review of Systems   Constitutional: Negative for chills and fever.   HENT: Negative for hoarse voice and sore throat.    Eyes: Negative for double vision and photophobia.   Cardiovascular: Positive for dyspnea on exertion and leg swelling. Negative for chest pain, near-syncope, orthopnea, palpitations, paroxysmal  "nocturnal dyspnea and syncope.   Respiratory: Positive for shortness of breath. Negative for cough and wheezing.    Skin: Negative for poor wound healing and rash.   Musculoskeletal: Negative for arthritis and joint swelling.   Gastrointestinal: Negative for bloating, abdominal pain, hematemesis and hematochezia.   Neurological: Negative for dizziness and focal weakness.   Psychiatric/Behavioral: Negative for depression and suicidal ideas.     ROS was reviewed, updated and amended when necessary.    OBJECTIVE:   Vital Signs    Estimated body mass index is 20.49 kg/m² as calculated from the following:    Height as of this encounter: 157.5 cm (62\").    Weight as of this encounter: 50.8 kg (112 lb).    Physical Exam  Vitals reviewed.   Constitutional:       General: She is not in acute distress.  Cardiovascular:      Rate and Rhythm: Regular rhythm. Bradycardia present.      Heart sounds: Murmur heard.       Pulmonary:      Effort: No respiratory distress.      Breath sounds: No wheezing or rales.   Abdominal:      General: Bowel sounds are normal. There is no distension.   Skin:     Comments: Bilateral lower extremities are erythematous and cool with decreased pulses.  Left greater than right.   Neurological:      General: No focal deficit present.      Mental Status: She is alert and oriented to person, place, and time.   Psychiatric:         Mood and Affect: Mood normal.         Behavior: Behavior normal.       Physical exam was reviewed, updated and amended when necessary.    Procedures        ASSESSMENT:     89-year-old female presents for follow-up of chronic medical illness with specific complaints of lower extremity pain and discoloration of skin.    PLAN OF CARE:     1. CAD s/p remote hx of PCI to LAD -no chest pain complaints since last visit.  We will continue to monitor frequency of symptoms.  Patient is on low-dose aspirin over-the-counter and has been for many years.  We will need to monitor closely with " recent diagnosis of anemia.  No hematuria or hematochezia noted.  Continue statin.  2. Chronic diastolic HF -continue current dose of furosemide.  Fairly euvolemic today on exam.  Chronic stable dyspnea on exertion. BP fairly well controlled. Periodic home monitroing, Na restricted diet.  Mild to moderate MR and mild pulmonary hypertension on last echocardiogram.  Continue same.  3. PAF -tolerating current dosing of Toprol well.  Continue lower dose apixaban.  Monitor closely given recent diagnosis of anemia.  4. Mitral regurgitation -as above.  5.  Lower extremity erythema -decreased pulses in bilateral lower extremities with erythema but no evidence for tissue compromise.  Doppler with no evidence for DVT and normal ABIs.    RTC 6 months         Discharge Medications          Accurate as of January 4, 2022  2:32 PM. If you have any questions, ask your nurse or doctor.            Changes to Medications      Instructions Start Date   polyethylene glycol pack packet  Commonly known as: MIRALAX  What changed:   · when to take this  · reasons to take this   17 g, Oral, Daily         Continue These Medications      Instructions Start Date   calcium carbonate 500 MG chewable tablet  Commonly known as: TUMS   2 tablets, Oral, 2 Times Daily PRN      DULoxetine 60 MG capsule  Commonly known as: CYMBALTA   TAKE 1 CAPSULE BY MOUTH EVERY DAY      Eliquis 2.5 MG tablet tablet  Generic drug: apixaban   TAKE 1 TABLET BY MOUTH EVERY 12 HOURS      ferrous sulfate 325 (65 FE) MG tablet  Commonly known as: FerrouSul   325 mg, Oral, 3 Times Daily With Meals      furosemide 20 MG tablet  Commonly known as: LASIX   40 mg, Oral, Daily      metoprolol succinate XL 25 MG 24 hr tablet  Commonly known as: TOPROL-XL   TAKE 1 TABLET BY MOUTH EVERY DAY      omeprazole 20 MG capsule  Commonly known as: priLOSEC   20 mg, Oral, Daily With Breakfast      pravastatin 40 MG tablet  Commonly known as: PRAVACHOL   40 mg, Oral, Daily      traMADol 50 MG  tablet  Commonly known as: ULTRAM   TAKE 1 TABLET BY MOUTH EVERY 6 HOURS AS NEEDED FOR PAIN      Vitamin D 50 MCG (2000 UT) tablet   TAKE 1 TABLET BY MOUTH EVERY DAY             Thank you for allowing me to participate in the care of your patient,      Sincerely,   Blake Brown MD  [facility]  10/2/2019  14:32 EST

## 2022-01-17 RX ORDER — DULOXETIN HYDROCHLORIDE 60 MG/1
60 CAPSULE, DELAYED RELEASE ORAL DAILY
Qty: 90 CAPSULE | Refills: 1 | Status: SHIPPED | OUTPATIENT
Start: 2022-01-17 | End: 2022-06-24

## 2022-01-18 RX ORDER — FUROSEMIDE 20 MG/1
TABLET ORAL
Qty: 180 TABLET | Refills: 1 | Status: SHIPPED | OUTPATIENT
Start: 2022-01-18 | End: 2022-04-29

## 2022-02-12 DIAGNOSIS — D64.9 ANEMIA, UNSPECIFIED TYPE: ICD-10-CM

## 2022-02-14 RX ORDER — FERROUS SULFATE 325(65) MG
325 TABLET ORAL
Qty: 90 TABLET | Refills: 0 | Status: SHIPPED | OUTPATIENT
Start: 2022-02-14 | End: 2022-03-04

## 2022-03-04 DIAGNOSIS — D64.9 ANEMIA, UNSPECIFIED TYPE: ICD-10-CM

## 2022-03-04 RX ORDER — TRAMADOL HYDROCHLORIDE 50 MG/1
TABLET ORAL
Qty: 120 TABLET | Refills: 3 | Status: SHIPPED | OUTPATIENT
Start: 2022-03-04 | End: 2022-06-28

## 2022-03-04 RX ORDER — FERROUS SULFATE 325(65) MG
325 TABLET ORAL
Qty: 90 TABLET | Refills: 0 | Status: SHIPPED | OUTPATIENT
Start: 2022-03-04 | End: 2022-04-01

## 2022-04-01 DIAGNOSIS — D64.9 ANEMIA, UNSPECIFIED TYPE: ICD-10-CM

## 2022-04-01 RX ORDER — FERROUS SULFATE 325(65) MG
325 TABLET ORAL
Qty: 90 TABLET | Refills: 0 | Status: SHIPPED | OUTPATIENT
Start: 2022-04-01 | End: 2022-04-25

## 2022-04-04 RX ORDER — PRAVASTATIN SODIUM 40 MG
40 TABLET ORAL DAILY
Qty: 90 TABLET | Refills: 1 | Status: SHIPPED | OUTPATIENT
Start: 2022-04-04 | End: 2022-08-19

## 2022-04-24 DIAGNOSIS — D64.9 ANEMIA, UNSPECIFIED TYPE: ICD-10-CM

## 2022-04-25 DIAGNOSIS — D64.9 ANEMIA, UNSPECIFIED TYPE: ICD-10-CM

## 2022-04-25 RX ORDER — FERROUS SULFATE 325(65) MG
325 TABLET ORAL
Qty: 90 TABLET | Refills: 0 | Status: SHIPPED | OUTPATIENT
Start: 2022-04-25 | End: 2022-05-26

## 2022-04-25 RX ORDER — FERROUS SULFATE 325(65) MG
325 TABLET ORAL
Qty: 90 TABLET | Refills: 0 | Status: SHIPPED | OUTPATIENT
Start: 2022-04-25 | End: 2022-04-25

## 2022-04-29 RX ORDER — METOPROLOL SUCCINATE 25 MG/1
TABLET, EXTENDED RELEASE ORAL
Qty: 90 TABLET | Refills: 3 | Status: SHIPPED | OUTPATIENT
Start: 2022-04-29

## 2022-04-29 RX ORDER — FUROSEMIDE 20 MG/1
TABLET ORAL
Qty: 180 TABLET | Refills: 1 | Status: SHIPPED | OUTPATIENT
Start: 2022-04-29 | End: 2022-07-28 | Stop reason: SDUPTHER

## 2022-05-04 ENCOUNTER — APPOINTMENT (OUTPATIENT)
Dept: GENERAL RADIOLOGY | Facility: HOSPITAL | Age: 87
End: 2022-05-04

## 2022-05-04 ENCOUNTER — HOSPITAL ENCOUNTER (EMERGENCY)
Facility: HOSPITAL | Age: 87
Discharge: HOME | End: 2022-05-04
Attending: EMERGENCY MEDICINE | Admitting: EMERGENCY MEDICINE

## 2022-05-04 VITALS
SYSTOLIC BLOOD PRESSURE: 170 MMHG | HEART RATE: 63 BPM | RESPIRATION RATE: 17 BRPM | TEMPERATURE: 98.2 F | OXYGEN SATURATION: 97 % | DIASTOLIC BLOOD PRESSURE: 59 MMHG

## 2022-05-04 DIAGNOSIS — I48.0 PAROXYSMAL ATRIAL FIBRILLATION: ICD-10-CM

## 2022-05-04 DIAGNOSIS — Z79.01 CHRONIC ANTICOAGULATION: ICD-10-CM

## 2022-05-04 DIAGNOSIS — R60.0 BILATERAL LOWER EXTREMITY EDEMA: Primary | ICD-10-CM

## 2022-05-04 DIAGNOSIS — I38 HEART VALVE DISEASE: ICD-10-CM

## 2022-05-04 LAB
ALBUMIN SERPL-MCNC: 3.9 G/DL (ref 3.5–5.2)
ALBUMIN/GLOB SERPL: 1.7 G/DL
ALP SERPL-CCNC: 63 U/L (ref 39–117)
ALT SERPL W P-5'-P-CCNC: 14 U/L (ref 1–33)
ANION GAP SERPL CALCULATED.3IONS-SCNC: 10.1 MMOL/L (ref 5–15)
AST SERPL-CCNC: 16 U/L (ref 1–32)
BASOPHILS # BLD AUTO: 0.11 10*3/MM3 (ref 0–0.2)
BASOPHILS NFR BLD AUTO: 2.2 % (ref 0–1.5)
BILIRUB SERPL-MCNC: 0.3 MG/DL (ref 0–1.2)
BUN SERPL-MCNC: 19 MG/DL (ref 8–23)
BUN/CREAT SERPL: 37.3 (ref 7–25)
CALCIUM SPEC-SCNC: 9.4 MG/DL (ref 8.2–9.6)
CHLORIDE SERPL-SCNC: 105 MMOL/L (ref 98–107)
CO2 SERPL-SCNC: 25.9 MMOL/L (ref 22–29)
CREAT SERPL-MCNC: 0.51 MG/DL (ref 0.57–1)
DEPRECATED RDW RBC AUTO: 51.1 FL (ref 37–54)
EGFRCR SERPLBLD CKD-EPI 2021: 86.1 ML/MIN/1.73
EOSINOPHIL # BLD AUTO: 0.32 10*3/MM3 (ref 0–0.4)
EOSINOPHIL NFR BLD AUTO: 6.4 % (ref 0.3–6.2)
ERYTHROCYTE [DISTWIDTH] IN BLOOD BY AUTOMATED COUNT: 12.6 % (ref 12.3–15.4)
GLOBULIN UR ELPH-MCNC: 2.3 GM/DL
GLUCOSE SERPL-MCNC: 109 MG/DL (ref 65–99)
HCT VFR BLD AUTO: 31.6 % (ref 34–46.6)
HGB BLD-MCNC: 10.5 G/DL (ref 12–15.9)
LYMPHOCYTES # BLD AUTO: 2.11 10*3/MM3 (ref 0.7–3.1)
LYMPHOCYTES NFR BLD AUTO: 41.9 % (ref 19.6–45.3)
MCH RBC QN AUTO: 36.7 PG (ref 26.6–33)
MCHC RBC AUTO-ENTMCNC: 33.2 G/DL (ref 31.5–35.7)
MCV RBC AUTO: 110.5 FL (ref 79–97)
MONOCYTES # BLD AUTO: 0.53 10*3/MM3 (ref 0.1–0.9)
MONOCYTES NFR BLD AUTO: 10.5 % (ref 5–12)
NEUTROPHILS NFR BLD AUTO: 1.95 10*3/MM3 (ref 1.7–7)
NEUTROPHILS NFR BLD AUTO: 38.8 % (ref 42.7–76)
NT-PROBNP SERPL-MCNC: 479 PG/ML (ref 0–1800)
PLATELET # BLD AUTO: 227 10*3/MM3 (ref 140–450)
PMV BLD AUTO: 10.4 FL (ref 6–12)
POTASSIUM SERPL-SCNC: 4.2 MMOL/L (ref 3.5–5.2)
PROT SERPL-MCNC: 6.2 G/DL (ref 6–8.5)
QT INTERVAL: 439 MS
RBC # BLD AUTO: 2.86 10*6/MM3 (ref 3.77–5.28)
SODIUM SERPL-SCNC: 141 MMOL/L (ref 136–145)
TROPONIN T SERPL-MCNC: <0.01 NG/ML (ref 0–0.03)
WBC NRBC COR # BLD: 5.03 10*3/MM3 (ref 3.4–10.8)

## 2022-05-04 PROCEDURE — 84484 ASSAY OF TROPONIN QUANT: CPT | Performed by: EMERGENCY MEDICINE

## 2022-05-04 PROCEDURE — 99284 EMERGENCY DEPT VISIT MOD MDM: CPT

## 2022-05-04 PROCEDURE — 71045 X-RAY EXAM CHEST 1 VIEW: CPT

## 2022-05-04 PROCEDURE — 83880 ASSAY OF NATRIURETIC PEPTIDE: CPT | Performed by: EMERGENCY MEDICINE

## 2022-05-04 PROCEDURE — 85025 COMPLETE CBC W/AUTO DIFF WBC: CPT | Performed by: EMERGENCY MEDICINE

## 2022-05-04 PROCEDURE — 36415 COLL VENOUS BLD VENIPUNCTURE: CPT

## 2022-05-04 PROCEDURE — 80053 COMPREHEN METABOLIC PANEL: CPT | Performed by: EMERGENCY MEDICINE

## 2022-05-04 PROCEDURE — 93005 ELECTROCARDIOGRAM TRACING: CPT | Performed by: EMERGENCY MEDICINE

## 2022-05-04 PROCEDURE — 93010 ELECTROCARDIOGRAM REPORT: CPT | Performed by: INTERNAL MEDICINE

## 2022-05-04 RX ORDER — FUROSEMIDE 20 MG/1
40 TABLET ORAL ONCE
Status: COMPLETED | OUTPATIENT
Start: 2022-05-04 | End: 2022-05-04

## 2022-05-04 RX ORDER — FUROSEMIDE 10 MG/ML
40 INJECTION INTRAMUSCULAR; INTRAVENOUS ONCE
Status: DISCONTINUED | OUTPATIENT
Start: 2022-05-04 | End: 2022-05-04

## 2022-05-04 RX ORDER — SODIUM CHLORIDE 0.9 % (FLUSH) 0.9 %
10 SYRINGE (ML) INJECTION AS NEEDED
Status: DISCONTINUED | OUTPATIENT
Start: 2022-05-04 | End: 2022-05-04 | Stop reason: HOSPADM

## 2022-05-04 RX ADMIN — FUROSEMIDE 40 MG: 20 TABLET ORAL at 03:31

## 2022-05-04 NOTE — DISCHARGE INSTRUCTIONS
Please take an extra 20 mg tablet of furosemide each day for the next 5 days then return to your prior dosing regimen.    Return to the emergency department with worsening symptoms, uncontrolled pain, inability to tolerate oral liquids, fever greater than 105°F not controlled by Tylenol and ibuprofen or as needed with emergent concerns.

## 2022-05-04 NOTE — ED TRIAGE NOTES
To ER via EMS from home.  C/o bilat leg pain and swelling x 3 days.  1-2+ edema bilat.    Pt in mask at time of triage.  Triage staff in appropriate PPE.

## 2022-05-04 NOTE — ED PROVIDER NOTES
EMERGENCY DEPARTMENT ENCOUNTER    Room Number:  15/15  Date of encounter:  5/4/2022  PCP: Brian Jacobs MD  Historian: Pt/family    Patient was placed in face mask during triage process. Patient was wearing facemask when I entered the room and throughout our encounter. I wore full protective equipment throughout this patient encounter including a face mask, eye protection, and gloves. Hand hygiene was performed before donning protective equipment and again following doffing of PPE after leaving the room.    HPI:  Chief Complaint: Lower extremity edema  A complete HPI/ROS/PMH/PSH/SH/FH are unobtainable due to: Patient does seem to have some memory deficits that the family helps to fill in  Context: Alexa Peace is a 89 y.o. female who presents to the ED c/o bilateral lower extremity edema has been generally developing over days to a week.  Patient became concerned this morning and called EMS before consulting with her family.  Family tells me that lower extremity edema has actually been an ongoing problem for some time and that she takes furosemide twice daily for this.  Patient denies significant discomfort from his edema.  She has no chest pain or shortness of breath.  She denies any abdominal pain, nausea, vomiting, black or bloody stools.      MEDICAL HISTORY REVIEW  EMR reviewed:    TTE 7/19/2021:  Interpretation Summary    · Estimated right ventricular systolic pressure from tricuspid regurgitation is mildly elevated (35-45 mmHg). Calculated right ventricular systolic pressure from tricuspid regurgitation is 41 mmHg.  · Mild pulmonary hypertension is present.  · Left ventricular wall thickness is consistent with mild to moderate concentric hypertrophy. Sigmoid-shaped ventricular septum is present.  · Calculated left ventricular EF = 58% Estimated left ventricular EF was in agreement with the calculated left ventricular EF. Left ventricular systolic function is normal.  · Left atrial volume is moderately  increased.  · The right atrial cavity is mildly dilated.  · Mild dilation of the ascending aorta is present.  · Mild to moderate aortic valve regurgitation is present.  · Mild to moderate mitral valve regurgitation is present with an eccentric jet noted.  ===================================================  Date of Admission:  4/4/2019  Date of Discharge:  4/9/2019  PCP: Brian Jacobs MD  CHIEF COMPLAINT  Fever and Syncope  DISCHARGE DIAGNOSIS        Active Hospital Problems     Diagnosis   POA   • **Pneumonia of right lower lobe due to infectious organism (CMS/[facility]) [J18.1]   Yes   • DNR (do not resuscitate) [Z66]   Yes   • Chronic diastolic CHF (congestive heart failure) (CMS/[facility]) [I50.32]   Yes   • Localized edema [R60.0]   Yes   • Sepsis due to pneumonia (CMS/[facility]) [J18.9, A41.9]   Yes   • Chronic anticoagulation [Z79.01]   Not Applicable   • Non-rheumatic mitral regurgitation [I34.0]   Yes   • Paroxysmal atrial fibrillation (CMS/[facility]) [I48.0]   Yes   • Essential hypertension [I10]   Yes   • Gastroesophageal reflux disease without esophagitis [K21.9]           PAST MEDICAL HISTORY  Active Ambulatory Problems     Diagnosis Date Noted   • Bad memory 04/18/2016   • Depression 05/05/2017   • Diverticulosis of intestine 05/05/2017   • Hyperlipidemia 05/05/2017   • Macular degeneration 05/05/2017   • Neuropathy, peripheral 05/05/2017   • Osteopenia 05/05/2017   • Atopic rhinitis 05/05/2017   • Urge incontinence of urine 05/05/2017   • Atherosclerosis of native coronary artery 05/05/2017   • Essential hypertension 05/05/2017   • Gastroesophageal reflux disease without esophagitis 05/05/2017   • Impaired glucose tolerance 05/05/2017   • History of coronary artery stent placement 05/25/2017   • Chronic low back pain 07/21/2017   • Acquired scoliosis 11/21/2017   • Spinal stenosis of lumbar region with neurogenic claudication 11/21/2017   • PAC (premature atrial contraction) 12/11/2017   • Slow transit constipation  12/11/2017   • Paroxysmal atrial fibrillation (HCC) 01/11/2019   • Pleural effusion on left 01/14/2019   • Valvular heart disease 01/14/2019   • Non-rheumatic mitral regurgitation 01/14/2019   • Pneumonia of right lower lobe due to infectious organism 04/04/2019   • Localized edema 04/05/2019   • Sepsis due to pneumonia (HCC) 04/05/2019   • Chronic anticoagulation 04/05/2019   • DNR (do not resuscitate) 04/06/2019   • Chronic diastolic CHF (congestive heart failure) (HCC) 04/06/2019   • Annual physical exam 12/27/2021   • Confusion 12/27/2021   • Urinary frequency 12/27/2021   • Anemia 12/27/2021     Resolved Ambulatory Problems     Diagnosis Date Noted   • Foot drop 08/04/2016   • Hyperglycemia 05/05/2017   • Shortness of breath 04/05/2019   • Hypoxemia 04/05/2019     Past Medical History:   Diagnosis Date   • Anticoagulated    • Arthritis    • Atrial premature complex    • CAD (coronary artery disease)    • Cancer ([facility])    • Colon polyp    • GERD (gastroesophageal reflux disease)    • Heart attack (HCC)    • Hypertension    • New onset atrial fibrillation (HCC) 01/11/2019   • Pre-diabetes    • Spinal stenosis          PAST SURGICAL HISTORY  Past Surgical History:   Procedure Laterality Date   • ADENOIDECTOMY     • CATARACT EXTRACTION     • CORONARY ANGIOPLASTY WITH STENT PLACEMENT     • EPIDURAL BLOCK     • LUMBAR DISCECTOMY FUSION INSTRUMENTATION N/A 12/7/2017    Procedure: L2-3, L3-4, L4-5 laminectomy and fusion with local bone graft;  Surgeon: Andre Layne MD;  Location: [facility];  Service:    • TONSILECTOMY, ADENOIDECTOMY, BILATERAL MYRINGOTOMY AND TUBES     • TONSILLECTOMY           FAMILY HISTORY  Family History   Problem Relation Age of Onset   • Diabetes Mother    • Depression Mother    • Anxiety disorder Mother    • Heart disease Father    • Hypertension Father    • Diabetes Father    • Diabetes Brother    • No Known Problems Maternal Grandmother    • No Known Problems Maternal Grandfather    •  No Known Problems Paternal Grandmother    • No Known Problems Paternal Grandfather    • Malig Hyperthermia Neg Hx          SOCIAL HISTORY  Social History     Socioeconomic History   • Marital status:    Tobacco Use   • Smoking status: Never Smoker   • Smokeless tobacco: Never Used   • Tobacco comment: caffeine use- coffee   Substance and Sexual Activity   • Alcohol use: No   • Drug use: No   • Sexual activity: Defer         ALLERGIES  Ciprofloxacin, Metronidazole, and Prednisone        REVIEW OF SYSTEMS  Review of Systems     All systems reviewed and negative except for those discussed in HPI.       PHYSICAL EXAM    I have reviewed the triage vital signs and nursing notes.    ED Triage Vitals [05/04/22 0151]   Temp Heart Rate Resp BP SpO2   98.2 °F (36.8 °C) 66 16 170/60 97 %      Temp src Heart Rate Source Patient Position BP Location FiO2 (%)   -- -- -- -- --       Physical Exam    Physical Exam   Constitutional: No distress.  Pleasant and nontoxic.  HENT:  Head: Normocephalic and atraumatic.   Oropharynx: Mucous membranes are moist.   Eyes: No scleral icterus. No conjunctival pallor.  Neck: Painless range of motion noted. Neck supple.   Cardiovascular: Normal rate, regular rhythm and intact distal pulses.  3 out of 6 systolic murmur right heart border  Pulmonary/Chest: No respiratory distress. There are no wheezes, no rhonchi, and no rales.   Abdominal: Soft. There is no tenderness. There is no rebound and no guarding.   Musculoskeletal: Moves all extremities equally.  1-2+ pitting edema bilateral shins with no calf tenderness or erythema.    Neurological: Alert.  Baseline strength and sensation noted.   Skin: Skin is pink, warm, and dry. No pallor.   Psychiatric: Mood and affect normal.   Nursing note and vitals reviewed.    LAB RESULTS  Recent Results (from the past 24 hour(s))   Comprehensive Metabolic Panel    Collection Time: 05/04/22  2:13 AM    Specimen: Blood   Result Value Ref Range    Glucose 109  (H) 65 - 99 mg/dL    BUN 19 8 - 23 mg/dL    Creatinine 0.51 (L) 0.57 - 1.00 mg/dL    Sodium 141 136 - 145 mmol/L    Potassium 4.2 3.5 - 5.2 mmol/L    Chloride 105 98 - 107 mmol/L    CO2 25.9 22.0 - 29.0 mmol/L    Calcium 9.4 8.2 - 9.6 mg/dL    Total Protein 6.2 6.0 - 8.5 g/dL    Albumin 3.90 3.50 - 5.20 g/dL    ALT (SGPT) 14 1 - 33 U/L    AST (SGOT) 16 1 - 32 U/L    Alkaline Phosphatase 63 39 - 117 U/L    Total Bilirubin 0.3 0.0 - 1.2 mg/dL    Globulin 2.3 gm/dL    A/G Ratio 1.7 g/dL    BUN/Creatinine Ratio 37.3 (H) 7.0 - 25.0    Anion Gap 10.1 5.0 - 15.0 mmol/L    eGFR 86.1 >60.0 mL/min/1.73   BNP    Collection Time: 05/04/22  2:13 AM    Specimen: Blood   Result Value Ref Range    proBNP 479.0 0.0 - 1,800.0 pg/mL   Troponin    Collection Time: 05/04/22  2:13 AM    Specimen: Blood   Result Value Ref Range    Troponin T <0.010 0.000 - 0.030 ng/mL   CBC Auto Differential    Collection Time: 05/04/22  2:13 AM    Specimen: Blood   Result Value Ref Range    WBC 5.03 3.40 - 10.80 10*3/mm3    RBC 2.86 (L) 3.77 - 5.28 10*6/mm3    Hemoglobin 10.5 (L) 12.0 - 15.9 g/dL    Hematocrit 31.6 (L) 34.0 - 46.6 %    .5 (H) 79.0 - 97.0 fL    MCH 36.7 (H) 26.6 - 33.0 pg    MCHC 33.2 31.5 - 35.7 g/dL    RDW 12.6 12.3 - 15.4 %    RDW-SD 51.1 37.0 - 54.0 fl    MPV 10.4 6.0 - 12.0 fL    Platelets 227 140 - 450 10*3/mm3    Neutrophil % 38.8 (L) 42.7 - 76.0 %    Lymphocyte % 41.9 19.6 - 45.3 %    Monocyte % 10.5 5.0 - 12.0 %    Eosinophil % 6.4 (H) 0.3 - 6.2 %    Basophil % 2.2 (H) 0.0 - 1.5 %    Neutrophils, Absolute 1.95 1.70 - 7.00 10*3/mm3    Lymphocytes, Absolute 2.11 0.70 - 3.10 10*3/mm3    Monocytes, Absolute 0.53 0.10 - 0.90 10*3/mm3    Eosinophils, Absolute 0.32 0.00 - 0.40 10*3/mm3    Basophils, Absolute 0.11 0.00 - 0.20 10*3/mm3   ECG 12 Lead    Collection Time: 05/04/22  2:28 AM   Result Value Ref Range    QT Interval 439 ms       Ordered the above labs and independently reviewed the results.        RADIOLOGY  XR Chest 1  View    Result Date: 5/4/2022  SINGLE VIEW OF THE CHEST  HISTORY: Lower extremity edema  COMPARISON: 04/04/2019  FINDINGS: Cardiomegaly is present. There is mild vascular congestion. No pneumothorax is seen. There does appear to be a trace left pleural effusion. No definite effusion is seen on the right. There is also some scarring at the left lung base.      Cardiomegaly with mild vascular congestion.  This report was finalized on 5/4/2022 2:38 AM by Dr. Nuvia Mahan M.D.        I ordered the above noted radiological studies. Reviewed by me and discussed with radiologist.  See dictation for official radiology interpretation.      PROCEDURES    Procedures        MEDICATIONS GIVEN IN ER    Medications   sodium chloride 0.9 % flush 10 mL (has no administration in time range)   furosemide (LASIX) tablet 40 mg (has no administration in time range)         PROGRESS, DATA ANALYSIS, CONSULTS, AND MEDICAL DECISION MAKING    My diagnosis for lower extremity pain and injury includes but is not limited to hip fracture, femur fracture, hip dislocation, hip contusion, hip sprain, hip strain, pelvic fracture, ischio-tibial band pain, ischio-tibial band bursitis, knee sprain, patella dislocation, knee dislocation, internal derangement of knee, fractures of the femur, tibia, fibula, ankle, foot and digits, ankle sprain, ankle dislocation, Lisfranc fracture, fracture dislocations of the digits, pulmonary embolism, claudication, peripheral vascular disease, gout, osteoarthritis, rheumatoid arthritis, bursitis, septic joint, poly-rheumatica, polyarthralgia and other inflammatory or infectious disease processes.      All labs have been independently reviewed by me.  All radiology studies have been reviewed by me and discussed with radiologist dictating the report.   EKG's independently viewed and interpreted by me.  Discussion below represents my analysis of pertinent findings related to patient's condition, differential  diagnosis, treatment plan and final disposition.      ED Course as of 05/04/22 0308   Wed May 04, 2022   0234 EKG           EKG time: 0228  Rhythm/Rate: Sinus, 60  P waves and ND: First-degree AV block  QRS, axis: LVH  ST and T waves: No STEMI; QTC within normal limits    Interpreted Contemporaneously by me, independently viewed  Comparison: 4/10/2021   [RS]   0258 Hemoglobin(!): 10.5  Chronic anemia improved from prior [RS]   0258 BUN: 19 [RS]   0258 Creatinine(!): 0.51  Reasonable renal function such that we can give her an extra dose of furosemide today and have her increase her dose for the next 5 days to improve her edema then return to normal and follow-up with primary care/cardiology as an outpatient. [RS]   0308 Reviewed findings with patient and family.  Agree with plan for discharge and outpatient follow-up. [RS]      ED Course User Index  [RS] Olivier Graves MD       AS OF 03:08 EDT VITALS:    BP - 174/63  HR - 63  TEMP - 98.2 °F (36.8 °C)  O2 SATS - 97%        DIAGNOSIS  Final diagnoses:   Bilateral lower extremity edema   Heart valve disease   Paroxysmal atrial fibrillation (HCC)   Chronic anticoagulation         DISPOSITION  DISCHARGE    Patient discharged in stable condition.    Reviewed implications of results, diagnosis, meds, responsibility to follow up, warning signs and symptoms of possible worsening, potential complications and reasons to return to ER.    Patient/Family voiced understanding of above instructions.    Discussed plan for discharge, as there is no emergent indication for admission. Patient referred to primary care provider for regular health maintenance. Pt/family is agreeable and understands need for follow up and possible repeat testing.  Pt is aware that discharge does not mean that nothing is wrong but it indicates no emergency is present that requires admission and they must continue care with follow-up as given below or physician of their choice.     FOLLOW-UP  Brian Jacobs  MD OZZIE  [address] [facility] [id number] [phone]    Schedule an appointment as soon as possible for a visit in 2 days  If symptoms fail to improve    Blake Brown Jr., MD  [address] [place] [id number] [phone]    Schedule an appointment as soon as possible for a visit in 1 week           Medication List      Changed    polyethylene glycol pack packet  Commonly known as: MIRALAX  Take 17 g by mouth Daily.  What changed:   · when to take this  · reasons to take this               Olivier Graves MD  05/04/22 [id number]

## 2022-05-26 DIAGNOSIS — D64.9 ANEMIA, UNSPECIFIED TYPE: ICD-10-CM

## 2022-05-26 RX ORDER — FERROUS SULFATE 325(65) MG
TABLET ORAL
Qty: 90 TABLET | Refills: 0 | Status: SHIPPED | OUTPATIENT
Start: 2022-05-26 | End: 2022-06-24

## 2022-06-10 ENCOUNTER — TELEPHONE (OUTPATIENT)
Dept: FAMILY MEDICINE CLINIC | Facility: CLINIC | Age: 87
End: 2022-06-10

## 2022-06-10 NOTE — TELEPHONE ENCOUNTER
Caller: priscila centeno    Relationship: Emergency Contact    Best call back number: [phone]    What was the call regarding: THEY ARE NEEDING TO KNOW THE LAST DATES FOR HEP A VACCINE, SHINGLES, AND FLU.    Do you require a callback: NO THEY PREFER [facility]

## 2022-06-17 ENCOUNTER — CLINICAL SUPPORT (OUTPATIENT)
Dept: FAMILY MEDICINE CLINIC | Facility: CLINIC | Age: 87
End: 2022-06-17

## 2022-06-17 PROCEDURE — 90632 HEPA VACCINE ADULT IM: CPT | Performed by: INTERNAL MEDICINE

## 2022-06-17 PROCEDURE — 90471 IMMUNIZATION ADMIN: CPT | Performed by: INTERNAL MEDICINE

## 2022-06-24 DIAGNOSIS — D64.9 ANEMIA, UNSPECIFIED TYPE: ICD-10-CM

## 2022-06-24 DIAGNOSIS — I48.11 LONGSTANDING PERSISTENT ATRIAL FIBRILLATION: ICD-10-CM

## 2022-06-24 RX ORDER — APIXABAN 2.5 MG/1
TABLET, FILM COATED ORAL
Qty: 60 TABLET | Refills: 5 | Status: SHIPPED | OUTPATIENT
Start: 2022-06-24 | End: 2023-01-04

## 2022-06-24 RX ORDER — FERROUS SULFATE 325(65) MG
TABLET ORAL
Qty: 90 TABLET | Refills: 0 | Status: SHIPPED | OUTPATIENT
Start: 2022-06-24 | End: 2022-06-28

## 2022-06-24 RX ORDER — DULOXETIN HYDROCHLORIDE 60 MG/1
CAPSULE, DELAYED RELEASE ORAL
Qty: 90 CAPSULE | Refills: 1 | Status: SHIPPED | OUTPATIENT
Start: 2022-06-24

## 2022-06-27 DIAGNOSIS — D64.9 ANEMIA, UNSPECIFIED TYPE: ICD-10-CM

## 2022-06-28 RX ORDER — FERROUS SULFATE 325(65) MG
TABLET ORAL
Qty: 90 TABLET | Refills: 0 | Status: SHIPPED | OUTPATIENT
Start: 2022-06-28 | End: 2022-08-01

## 2022-06-28 RX ORDER — TRAMADOL HYDROCHLORIDE 50 MG/1
TABLET ORAL
Qty: 120 TABLET | Refills: 3 | Status: SHIPPED | OUTPATIENT
Start: 2022-06-28 | End: 2023-01-25 | Stop reason: SDUPTHER

## 2022-07-28 ENCOUNTER — OFFICE VISIT (OUTPATIENT)
Dept: CARDIOLOGY | Facility: CLINIC | Age: 87
End: 2022-07-28

## 2022-07-28 VITALS
BODY MASS INDEX: 21.35 KG/M2 | WEIGHT: 116 LBS | HEART RATE: 72 BPM | DIASTOLIC BLOOD PRESSURE: 70 MMHG | SYSTOLIC BLOOD PRESSURE: 130 MMHG | HEIGHT: 62 IN

## 2022-07-28 DIAGNOSIS — I50.32 CHRONIC DIASTOLIC CHF (CONGESTIVE HEART FAILURE): ICD-10-CM

## 2022-07-28 DIAGNOSIS — I48.11 LONGSTANDING PERSISTENT ATRIAL FIBRILLATION: Primary | ICD-10-CM

## 2022-07-28 DIAGNOSIS — I34.0 NONRHEUMATIC MITRAL VALVE REGURGITATION: ICD-10-CM

## 2022-07-28 DIAGNOSIS — I35.1 NONRHEUMATIC AORTIC VALVE INSUFFICIENCY: ICD-10-CM

## 2022-07-28 PROCEDURE — 99214 OFFICE O/P EST MOD 30 MIN: CPT | Performed by: NURSE PRACTITIONER

## 2022-07-28 RX ORDER — FUROSEMIDE 20 MG/1
40 TABLET ORAL DAILY
Qty: 180 TABLET | Refills: 3 | Status: SHIPPED | OUTPATIENT
Start: 2022-07-28

## 2022-07-28 NOTE — PROGRESS NOTES
"Date of Office Visit: 22  Encounter Provider: BIENVENIDO Freeman  Place of Service: [facility]  Patient Name: Alexa Peace  :1927    Chief Complaint   Patient presents with   • Atrial Fibrillation   :     HPI: Alexa Peace is a 89 y.o. female  with hyperlipidemia, coronary artery disease status post PCI to the LAD, paroxysmal atrial fibrillation, chronic diastolic heart failure, mitral regurgitation, anemia.  She is followed by Dr. Brown.  I will visit with her for the first time today and have reviewed her medical record.      She has been treated with Eliquis and Toprol-XL for atrial fibrillation.  She was stable on furosemide 20 mg daily until she presented to the ER in May of this year with some increased swelling and chest x-ray revealed mild pulmonary vascular congestion.  She was given 1 additional dose of furosemide 40 mg and then she was discharged to take furosemide 40 mg daily.  She now presents accompanied by her daughter.  She has had no increased shortness of breath and no lower extremity edema on higher dose furosemide.  She denies blood in the urine or stool.  She denies chest pain, dizziness or palpitations.      Allergies   Allergen Reactions   • Ciprofloxacin      UNKNOWN   • Metronidazole      UNKWOWN   • Prednisone Other (See Comments)     Elevated blood pressure           Family and social history reviewed.     ROS  All other systems were reviewed and are negative          Objective:     Vitals:    22 1133   BP: 130/70   Pulse: 72   Weight: 52.6 kg (116 lb)   Height: 157.5 cm (62\")     Body mass index is 21.22 kg/m².    PHYSICAL EXAM:  Pulmonary:      Breath sounds: Examination of the right-lower field reveals decreased breath sounds and rales. Examination of the left-lower field reveals decreased breath sounds. Decreased breath sounds present. Rales present.   Cardiovascular:      Normal rate. Irregularly irregular rhythm.      " Murmurs: There is a grade 2/6 systolic murmur at the ULSB.   Edema:     Ankle: trace edema of the left ankle.        Procedures      Current Outpatient Medications   Medication Sig Dispense Refill   • calcium carbonate (TUMS) 500 MG chewable tablet Chew 1,000 mg 2 (Two) Times a Day As Needed for Indigestion or Heartburn.     • Cholecalciferol (Vitamin D) 50 MCG (2000 UT) tablet TAKE 1 TABLET BY MOUTH EVERY DAY 90 tablet 1   • DULoxetine (CYMBALTA) 60 MG capsule TAKE 1 CAPSULE BY MOUTH EVERY DAY 90 capsule 1   • Eliquis 2.5 MG tablet tablet TAKE 1 TABLET BY MOUTH EVERY 12 HOURS 60 tablet 5   • ferrous sulfate 325 (65 FE) MG tablet TAKE 1 TABLET BY MOUTH 3 (THREE) TIMES A DAY WITH MEALS FOR 30 DAYS. 90 tablet 0   • furosemide (LASIX) 20 MG tablet Take 2 tablets by mouth Daily. 180 tablet 3   • metoprolol succinate XL (TOPROL-XL) 25 MG 24 hr tablet TAKE 1 TABLET BY MOUTH EVERY DAY 90 tablet 3   • omeprazole (priLOSEC) 20 MG capsule Take 1 capsule by mouth Daily With Breakfast. 90 capsule 1   • polyethylene glycol (MIRALAX) pack packet Take 17 g by mouth Daily. (Patient taking differently: Take 17 g by mouth Daily As Needed.)     • pravastatin (PRAVACHOL) 40 MG tablet Take 1 tablet by mouth Daily. 90 tablet 1   • traMADol (ULTRAM) 50 MG tablet TAKE 1 TABLET BY MOUTH EVERY 6 HOURS AS NEEDED FOR PAIN (Patient taking differently: 50 mg. Take 2-3 po daily) 120 tablet 3     No current facility-administered medications for this visit.     Assessment:       Diagnosis Plan   1. Longstanding persistent atrial fibrillation (HCC)     2. Chronic diastolic CHF (congestive heart failure) (HCC)     3. Nonrheumatic mitral valve regurgitation     4. Nonrheumatic aortic valve insufficiency          No orders of the defined types were placed in this encounter.        Plan:       1.  89-year-old female with coronary artery disease status post remote history of PCI to the LAD.  She is on pravastatin in addition to low-dose Eliquis.    2.  Chronic diastolic heart failure.  Last ejection fraction was 58% in July 2021 with Mild to moderate MR and mild pulmonary hypertension.  She appears euvolemic on current dose frusemide 40 mg daily  3.  Paroxysmal atrial fibrillation.  CHADS2 Vascor of 5.  High risk for embolic event.  Continue low-dose apixaban. Tolerating current dosing of Toprol.   4. Mitral regurgitation -as above.  5.  Lower extremity erythema -decreased pulses in bilateral lower extremities with erythema but no evidence for tissue compromise.  Doppler with no evidence for DVT and normal ABIs March 2021  6.  History of PACs and PVCs that were rare on Holter monitoring March 2021.  Average heart rate was 84  7.  Mild to moderate aortic valve regurgitation and mild to moderate mitral regurgitation on echo July 2021  8.  History of anemia      Follow-up in 6 months call questions or concerns            It has been a pleasure to participate in this patient's care.      Thank you,  BIENVENIDO Freeman      **I used Dragon to dictate this note:**

## 2022-07-30 DIAGNOSIS — D64.9 ANEMIA, UNSPECIFIED TYPE: ICD-10-CM

## 2022-08-01 RX ORDER — FERROUS SULFATE 325(65) MG
TABLET ORAL
Qty: 90 TABLET | Refills: 0 | Status: SHIPPED | OUTPATIENT
Start: 2022-08-01 | End: 2022-08-19

## 2022-08-19 DIAGNOSIS — D64.9 ANEMIA, UNSPECIFIED TYPE: ICD-10-CM

## 2022-08-19 RX ORDER — PRAVASTATIN SODIUM 40 MG
TABLET ORAL
Qty: 90 TABLET | Refills: 1 | Status: SHIPPED | OUTPATIENT
Start: 2022-08-19

## 2022-08-19 RX ORDER — FERROUS SULFATE 325(65) MG
TABLET ORAL
Qty: 90 TABLET | Refills: 0 | Status: SHIPPED | OUTPATIENT
Start: 2022-08-19 | End: 2022-10-03

## 2022-08-22 NOTE — PROGRESS NOTES
Called and spoke with the patient.    Scheduled for 8/25/22 at 11:30am.   Subjective   Alexa Peace is a 89 y.o. female.   10/22/2018  Wt Readings from Last 1 Encounters:   12/10/18 54.2 kg (119 lb 6.4 oz)   She was last seen in September 2018 neck, weight 121 then, 119 now.    She returns for follow-up of atherosclerotic heart disease, hypertension, hyperlipidemia, gastroesophageal reflux, chronic back pain, urinary frequency    History of Present Illness   She has chronic back pain and had lumbar spinal stenosis surgery in December 2017, but that did not fully resolve her pain.  She has been followed by pain management.  Duloxetine has been added to her regimen and was increased to 60 mg daily in September.  She is also treated with hydrocodone when necessary for pain.    She has atherosclerotic heart disease and had stent to the LAD artery in the past.  She continues on aspirin 81 mg daily.  No recent chest pain.    She has hypertension treated with metoprolol tartrate 25 mg, one half twice a day in the past but discontinued because of bradycardia or other problems.    She has esophageal reflux and was changed from ranitidine to omeprazole in September.  She still has occasional reflux which is relieved by taking Tums.    She has hyperlipidemia treated with pravastatin 40 mg each evening.  No problems tolerating the medication.    She has symptoms of urinary frequency and occasionally has some dysuria.  She wonders if she could have infection but she has been diagnosed with overactive bladder in the past and is been tried on several medications.  She does not recall taking Myrbetriq previously.  The following portions of the patient's history were reviewed and updated as appropriate: allergies, current medications, past family history, past medical history, past social history, past surgical history and problem list.    Review of Systems   Constitutional: Negative.    HENT: Negative.    Eyes: Negative.    Respiratory: Negative.    Cardiovascular: Negative.    Endocrine: Negative.     Genitourinary: Negative.    Musculoskeletal: Positive for back pain.   Skin: Negative.    Neurological: Negative.    Hematological: Negative.    Psychiatric/Behavioral: Negative.        Objective   Physical Exam   Constitutional: She appears well-developed and well-nourished.   HENT:   Head: Normocephalic and atraumatic.   Cardiovascular: Normal rate and regular rhythm. Exam reveals no gallop and no friction rub.   No murmur heard.  Pulmonary/Chest: Effort normal and breath sounds normal. No respiratory distress. She has no wheezes. She has no rales.   Abdominal: Soft. Bowel sounds are normal. She exhibits no distension and no mass. There is no tenderness.   Musculoskeletal:   Thoracolumbar scoliosis   Neurological: She is alert.   Skin: Skin is warm.   Psychiatric: Her behavior is normal.   Vitals reviewed.      Assessment/Plan   Ada was seen today for coronary artery disease, hypertension, hyperlipidemia, heartburn and back pain.    Diagnoses and all orders for this visit:    Atherosclerosis of native coronary artery of native heart without angina pectoris  Comments:  Continue aspirin 81 mg daily    Essential hypertension  -     Comprehensive Metabolic Panel    Hyperlipidemia, unspecified hyperlipidemia type  Comments:  Continue pravastatin 40 mg each evening, we will check chemistries and lipids  Orders:  -     Lipid Panel    Gastroesophageal reflux disease without esophagitis  Comments:  Continue omeprazole 20 mg daily before breakfast    Impaired glucose tolerance    Urge incontinence of urine  Comments:  We will check urinalysis and urine culture.  If not infected, then we'll prescribe Myrbetriq  Orders:  -     Urine Culture - Urine, Urine, Clean Catch  -     Urinalysis With Microscopic - Urine, Clean Catch    Chronic bilateral low back pain with right-sided sciatica  Comments:  Under the care of pain management specialist                               EMR Dragon/Transcription disclaimer:      Much of this  encounter note is an electronic transcription/translation of spoken language to printed text. The electronic translation of spoken language may permit erroneous, or at times, nonsensical words or phrases to be inadvertently transcribed; Although I have reviewed the note for such errors, some may still exist.    all other ROS negative except as per HPI

## 2022-10-02 DIAGNOSIS — D64.9 ANEMIA, UNSPECIFIED TYPE: ICD-10-CM

## 2022-10-03 RX ORDER — FERROUS SULFATE 325(65) MG
TABLET ORAL
Qty: 90 TABLET | Refills: 0 | Status: SHIPPED | OUTPATIENT
Start: 2022-10-03 | End: 2023-01-16

## 2022-12-16 ENCOUNTER — CLINICAL SUPPORT (OUTPATIENT)
Dept: FAMILY MEDICINE CLINIC | Facility: CLINIC | Age: 87
End: 2022-12-16

## 2022-12-16 PROCEDURE — 90471 IMMUNIZATION ADMIN: CPT | Performed by: INTERNAL MEDICINE

## 2022-12-16 PROCEDURE — 90632 HEPA VACCINE ADULT IM: CPT | Performed by: INTERNAL MEDICINE

## 2023-01-04 DIAGNOSIS — I48.11 LONGSTANDING PERSISTENT ATRIAL FIBRILLATION: ICD-10-CM

## 2023-01-04 RX ORDER — APIXABAN 2.5 MG/1
TABLET, FILM COATED ORAL
Qty: 60 TABLET | Refills: 5 | Status: SHIPPED | OUTPATIENT
Start: 2023-01-04

## 2023-01-15 DIAGNOSIS — D64.9 ANEMIA, UNSPECIFIED TYPE: ICD-10-CM

## 2023-01-16 RX ORDER — FERROUS SULFATE 325(65) MG
TABLET ORAL
Qty: 90 TABLET | Refills: 1 | Status: SHIPPED | OUTPATIENT
Start: 2023-01-16

## 2023-01-25 RX ORDER — TRAMADOL HYDROCHLORIDE 50 MG/1
50 TABLET ORAL EVERY 6 HOURS PRN
Qty: 120 TABLET | Refills: 3 | Status: SHIPPED | OUTPATIENT
Start: 2023-01-25

## 2023-04-14 RX ORDER — PRAVASTATIN SODIUM 40 MG
TABLET ORAL
Qty: 90 TABLET | Refills: 1 | Status: SHIPPED | OUTPATIENT
Start: 2023-04-14

## 2023-04-19 ENCOUNTER — TELEPHONE (OUTPATIENT)
Dept: CARDIOLOGY | Facility: CLINIC | Age: 88
End: 2023-04-19

## 2023-04-19 NOTE — TELEPHONE ENCOUNTER
Spoke to daughter. Patient take tramadol around the clock already so they are more comfortable with coming in for an EKG check. I scheduled them for today at 2:30.    Corine Malagon RN  Triage [facility]

## 2023-04-19 NOTE — TELEPHONE ENCOUNTER
Spoke to daughter. Pain is only in her arms between elbow and shoulder. Daughter said some days it is just the left arm, other days its just the right arm, and some days it is both arms. She denies any SOA or chest pain. The pain is intermittent and nothing seems to worsen it. She did say when this started several weeks ago the pain was relieved with elevation and Advil. She has no idea what her HR and BP have been running. I said to me this does not sound cardiac in nature, but I would make you aware and see what you think and give her a call back.    Corine Malagon RN  Triage [facility]

## 2023-04-19 NOTE — TELEPHONE ENCOUNTER
Pt daughter(Corine Martinez) called regarding her Mom.  Corine Martinez states her Mom has been c/o arm pain.  This pain started 3-4 weeks ago in one arm and now she is noticing it in both arms.  Corine Martinez thought her Mom may have pulled a muscle but now feels it could be her heart.  The arm pain woke her Mom up this morning and she is rating the pain a 6 out of 10.  The pain is in between her shoulder and elbow.  Can you please call back to triage?  Thanks/jlf    Corine Martinez# [phone]

## 2023-04-19 NOTE — TELEPHONE ENCOUNTER
Agree, it does not sound cardiac. However, if this persist after taking pain relief for around the clock for a day or two then they may come in for an ekg check if they would feel more com

## 2023-04-20 RX ORDER — DULOXETIN HYDROCHLORIDE 60 MG/1
CAPSULE, DELAYED RELEASE ORAL
Qty: 90 CAPSULE | Refills: 0 | Status: SHIPPED | OUTPATIENT
Start: 2023-04-20

## 2023-04-24 ENCOUNTER — TELEPHONE (OUTPATIENT)
Dept: CARDIOLOGY | Facility: CLINIC | Age: 88
End: 2023-04-24

## 2023-04-24 NOTE — TELEPHONE ENCOUNTER
Notified patient's daughter of recommendations.She verbalized understanding.    Corine Malagon RN  Triage [facility]

## 2023-04-24 NOTE — TELEPHONE ENCOUNTER
Pt's daughter(Corine Martinez) called stating her Mom is having some edema in her ankles(more than usual), and she is having trouble getting her pants buttoned due to some swelling in her belly.  Ms. Peace denies any SOA.  I asked to see if Ms. Peace had a weight gain.  She states she gets weighted monthly at the [facility].  She is about 7-8lbs heavier this month.  Ms. Peace is taking Lasix 20mg 2 tabs in the AM.  Would you like to make any changes?  Also, pt's arm pain has resolved so she cancelled her ekg only appointment for today.  Thanks/jlf    Corine Martinez# [phone]

## 2023-04-24 NOTE — TELEPHONE ENCOUNTER
Please call and discuss- Let's have her take 3 lasix 20 mg tablets for 3 days in a row then go back to 2 tablets daily. She should take 2 in the am and then the 3rd tablet approximately 6 hours later.

## 2023-05-04 RX ORDER — METOPROLOL SUCCINATE 25 MG/1
TABLET, EXTENDED RELEASE ORAL
Qty: 90 TABLET | Refills: 3 | Status: SHIPPED | OUTPATIENT
Start: 2023-05-04

## 2023-06-08 ENCOUNTER — OFFICE VISIT (OUTPATIENT)
Dept: CARDIOLOGY | Facility: CLINIC | Age: 88
End: 2023-06-08
Payer: MEDICARE

## 2023-06-08 VITALS
SYSTOLIC BLOOD PRESSURE: 140 MMHG | DIASTOLIC BLOOD PRESSURE: 70 MMHG | WEIGHT: 123.2 LBS | HEART RATE: 69 BPM | BODY MASS INDEX: 21.83 KG/M2 | HEIGHT: 63 IN

## 2023-06-08 DIAGNOSIS — I34.0 NONRHEUMATIC MITRAL VALVE REGURGITATION: ICD-10-CM

## 2023-06-08 DIAGNOSIS — I50.32 CHRONIC DIASTOLIC CHF (CONGESTIVE HEART FAILURE): ICD-10-CM

## 2023-06-08 DIAGNOSIS — I48.11 LONGSTANDING PERSISTENT ATRIAL FIBRILLATION: Primary | ICD-10-CM

## 2023-06-08 DIAGNOSIS — I35.1 NONRHEUMATIC AORTIC VALVE INSUFFICIENCY: ICD-10-CM

## 2023-06-08 PROCEDURE — 1160F RVW MEDS BY RX/DR IN RCRD: CPT | Performed by: INTERNAL MEDICINE

## 2023-06-08 PROCEDURE — 1159F MED LIST DOCD IN RCRD: CPT | Performed by: INTERNAL MEDICINE

## 2023-06-08 PROCEDURE — 99214 OFFICE O/P EST MOD 30 MIN: CPT | Performed by: INTERNAL MEDICINE

## 2023-06-08 NOTE — PROGRESS NOTES
[facility]      Patient Name: Alexa Peace  :1927  Age: 89 y.o.  Encounter Provider:  Blake Brown Jr, MD      Chief Complaint: FU CAD, PAF, CHF, VHD      Atrial Fibrillation  Symptoms include shortness of breath. Symptoms are negative for chest pain, dizziness, palpitations and syncope. Past medical history includes atrial fibrillation.   Alexa Peace is a 89 y.o. female PMH CAD s/p remote hx of PCI to LAD, chronic diastolic HF, PAF, mitral regurgitation presents for routine follow-up.     Last clinic visit: She notes chronic stable exertional dyspnea.  Still able to walk around the home without significant chest pain.  She is noted discoloration of bilateral lower extremities with some mild pain on ambulation.  No sensory deficits.  She denies angina.  She notes some difficulty with balance.  She denies palpitations.  Mild dizziness but no syncope.  Heart rate at Dr. Jacobs's office was 53 bpm and today is 42 bpm.  She denies orthopnea or PND.  Social and family history reviewed and not pertinent to this clinic visit.    Patient presents today for routine follow-up.  She has been doing fairly well since last visit.  At home she gets around with a walker but uses a wheelchair for long distance.  She has mild chronic stable dyspnea on exertion.  She still has small amount of swelling left greater than right lower extremity.  She had ultrasound Doppler of bilateral extremities in April which showed no evidence for DVT.  She denies any chest pain with activity.  No palpitations, dizziness or syncope.  The remainder social and family history reviewed and not pertinent to this clinic visit.    She is doing fairly well since last visit.  She notes some increasing lower extremity edema but is successfully utilizing an extra dose of diuretic when necessary.  She has trouble putting on the compression stockings but has no major complaints.  She still lives at home alone and has a great  "deal of family support.  No chest pain.  No palpitations, dizziness or syncope.  Her daughter accompanies her today in clinic.    The following portions of the patient's history were reviewed and updated as appropriate: allergies, current medications, past family history, past medical history, past social history, past surgical history and problem list.      Review of Systems   Constitutional: Negative for chills and fever.   HENT:  Negative for hoarse voice and sore throat.    Eyes:  Negative for double vision and photophobia.   Cardiovascular:  Positive for dyspnea on exertion and leg swelling. Negative for chest pain, near-syncope, orthopnea, palpitations, paroxysmal nocturnal dyspnea and syncope.   Respiratory:  Positive for shortness of breath. Negative for cough and wheezing.    Skin:  Negative for poor wound healing and rash.   Musculoskeletal:  Negative for arthritis and joint swelling.   Gastrointestinal:  Negative for bloating, abdominal pain, hematemesis and hematochezia.   Neurological:  Negative for dizziness and focal weakness.   Psychiatric/Behavioral:  Negative for depression and suicidal ideas.        OBJECTIVE:   Vital Signs    Estimated body mass index is 22.17 kg/m² as calculated from the following:    Height as of this encounter: 158.8 cm (62.5\").    Weight as of this encounter: 55.9 kg (123 lb 3.2 oz).    Physical Exam  Vitals reviewed.   Constitutional:       General: She is not in acute distress.  Cardiovascular:      Rate and Rhythm: Regular rhythm. Bradycardia present.      Heart sounds: Murmur heard.   Pulmonary:      Effort: No respiratory distress.      Breath sounds: No wheezing or rales.   Abdominal:      General: Bowel sounds are normal. There is no distension.   Skin:     Comments: Bilateral lower extremities are erythematous and cool with decreased pulses.  Left greater than right.   Neurological:      General: No focal deficit present.      Mental Status: She is alert and oriented " to person, place, and time.   Psychiatric:         Mood and Affect: Mood normal.         Behavior: Behavior normal.   Physical exam was reviewed, updated and amended when necessary.    Procedures        ASSESSMENT:     89-year-old female presents for follow-up of chronic medical illness with specific complaints of lower extremity pain and discoloration of skin.    PLAN OF CARE:     1. CAD s/p remote hx of PCI to LAD -no chest pain complaints since last visit.  We will continue to monitor frequency of symptoms.  Patient is no longer on low-dose aspirin over-the-counter and at her age we will not consider restarting the medication given her need for Eliquis.  Continue statin.  2. Chronic diastolic HF -continue current dose of furosemide.  Fairly euvolemic today on exam.  Chronic stable dyspnea on exertion and lower extremity edema. BP fairly well controlled. Periodic home monitroing, Na restricted diet.  Last echocardiogram was in 2021 showing mild to moderate aortic and mitral regurgitation.  Monitor clinical progress.  3. PAF -tolerating current dosing of Toprol well.  Continue lower dose apixaban.   4. Mitral regurgitation -mild to moderate on last exam with moderate pulmonary hypertension.  Continue diuretic regimen.  5.  Lower extremity erythema -decreased pulses in bilateral lower extremities with erythema but no evidence for tissue compromise.  Doppler with no evidence for DVT and normal ABIs.    RTC 6 months         Discharge Medications            Accurate as of June 8, 2023  1:46 PM. If you have any questions, ask your nurse or doctor.                Changes to Medications        Instructions Start Date   polyethylene glycol pack packet  Commonly known as: MIRALAX  What changed:   when to take this  reasons to take this   17 g, Oral, Daily             Continue These Medications        Instructions Start Date   calcium carbonate 500 MG chewable tablet  Commonly known as: TUMS   2 tablets, Oral, 2 Times Daily  PRN      DULoxetine 60 MG capsule  Commonly known as: CYMBALTA   TAKE 1 CAPSULE BY MOUTH EVERY DAY      Eliquis 2.5 MG tablet tablet  Generic drug: apixaban   TAKE 1 TABLET BY MOUTH EVERY 12 HOURS      ferrous sulfate 325 (65 FE) MG tablet   TAKE 1 TABLET BY MOUTH DAILY WITH BREAKFAST FOR 90 DAYS.      furosemide 20 MG tablet  Commonly known as: LASIX   40 mg, Oral, Daily      metoprolol succinate XL 25 MG 24 hr tablet  Commonly known as: TOPROL-XL   TAKE 1 TABLET BY MOUTH EVERY DAY      omeprazole 20 MG capsule  Commonly known as: priLOSEC   20 mg, Oral, Daily With Breakfast      pravastatin 40 MG tablet  Commonly known as: PRAVACHOL   TAKE 1 TABLET BY MOUTH EVERY DAY      traMADol 50 MG tablet  Commonly known as: ULTRAM   50 mg, Oral, Every 6 Hours PRN, for pain      Vitamin D 50 MCG (2000 UT) tablet   TAKE 1 TABLET BY MOUTH EVERY DAY               Thank you for allowing me to participate in the care of your patient,      Sincerely,   Blake Brown MD  [facility]  10/2/2019  13:46 EDT

## 2023-08-16 ENCOUNTER — OFFICE VISIT (OUTPATIENT)
Dept: INTERNAL MEDICINE | Age: 88
End: 2023-08-16
Payer: MEDICARE

## 2023-08-16 VITALS
DIASTOLIC BLOOD PRESSURE: 60 MMHG | WEIGHT: 123.4 LBS | BODY MASS INDEX: 21.86 KG/M2 | HEIGHT: 63 IN | SYSTOLIC BLOOD PRESSURE: 122 MMHG | HEART RATE: 98 BPM | TEMPERATURE: 98.2 F | OXYGEN SATURATION: 98 %

## 2023-08-16 DIAGNOSIS — D50.9 IRON DEFICIENCY ANEMIA, UNSPECIFIED IRON DEFICIENCY ANEMIA TYPE: ICD-10-CM

## 2023-08-16 DIAGNOSIS — Z51.81 THERAPEUTIC DRUG MONITORING: ICD-10-CM

## 2023-08-16 DIAGNOSIS — D64.9 ANEMIA, UNSPECIFIED TYPE: ICD-10-CM

## 2023-08-16 DIAGNOSIS — I10 ESSENTIAL HYPERTENSION: Primary | ICD-10-CM

## 2023-08-16 DIAGNOSIS — E78.2 MIXED HYPERLIPIDEMIA: ICD-10-CM

## 2023-08-16 DIAGNOSIS — R73.01 IMPAIRED FASTING BLOOD SUGAR: ICD-10-CM

## 2023-08-16 DIAGNOSIS — F34.1 PERSISTENT DEPRESSIVE DISORDER: ICD-10-CM

## 2023-08-16 DIAGNOSIS — I34.0 NON-RHEUMATIC MITRAL REGURGITATION: ICD-10-CM

## 2023-08-16 DIAGNOSIS — I48.0 PAROXYSMAL ATRIAL FIBRILLATION: ICD-10-CM

## 2023-08-16 DIAGNOSIS — R60.0 LOCALIZED EDEMA: ICD-10-CM

## 2023-08-16 DIAGNOSIS — G62.9 PERIPHERAL POLYNEUROPATHY: ICD-10-CM

## 2023-08-16 DIAGNOSIS — M85.80 OSTEOPENIA, UNSPECIFIED LOCATION: ICD-10-CM

## 2023-08-16 DIAGNOSIS — Z79.01 CHRONIC ANTICOAGULATION: ICD-10-CM

## 2023-08-16 DIAGNOSIS — E55.9 VITAMIN D DEFICIENCY: ICD-10-CM

## 2023-08-16 DIAGNOSIS — M54.50 CHRONIC LOW BACK PAIN, UNSPECIFIED BACK PAIN LATERALITY, UNSPECIFIED WHETHER SCIATICA PRESENT: ICD-10-CM

## 2023-08-16 DIAGNOSIS — N39.41 URGE INCONTINENCE OF URINE: ICD-10-CM

## 2023-08-16 DIAGNOSIS — K21.9 GASTROESOPHAGEAL REFLUX DISEASE WITHOUT ESOPHAGITIS: ICD-10-CM

## 2023-08-16 DIAGNOSIS — G89.29 CHRONIC LOW BACK PAIN, UNSPECIFIED BACK PAIN LATERALITY, UNSPECIFIED WHETHER SCIATICA PRESENT: ICD-10-CM

## 2023-08-16 RX ORDER — OMEPRAZOLE 20 MG/1
20 CAPSULE, DELAYED RELEASE ORAL
Qty: 90 CAPSULE | Refills: 3 | Status: SHIPPED | OUTPATIENT
Start: 2023-08-16

## 2023-08-16 RX ORDER — DULOXETIN HYDROCHLORIDE 60 MG/1
60 CAPSULE, DELAYED RELEASE ORAL DAILY
Qty: 90 CAPSULE | Refills: 3 | Status: SHIPPED | OUTPATIENT
Start: 2023-08-16

## 2023-08-16 RX ORDER — FERROUS SULFATE 325(65) MG
TABLET ORAL
Qty: 90 TABLET | Refills: 3 | Status: SHIPPED | OUTPATIENT
Start: 2023-08-16

## 2023-08-16 RX ORDER — TRAMADOL HYDROCHLORIDE 50 MG/1
50 TABLET ORAL EVERY 8 HOURS PRN
Qty: 270 TABLET | Refills: 1 | Status: SHIPPED | OUTPATIENT
Start: 2023-08-16

## 2023-08-16 RX ORDER — PRAVASTATIN SODIUM 40 MG
40 TABLET ORAL DAILY
Qty: 90 TABLET | Refills: 3 | Status: SHIPPED | OUTPATIENT
Start: 2023-08-16

## 2023-08-16 NOTE — PROGRESS NOTES
I N T E R N A L  M E D I C I N E  BIENVENIDO MATSON      ENCOUNTER DATE:  08/16/2023    Ada H Traeger / 89 y.o. / female      CHIEF COMPLAINT / REASON FOR OFFICE VISIT     Establish Care, Hypertension, Hyperlipidemia, Anemia, and Back Pain      ASSESSMENT & PLAN     Problem List Items Addressed This Visit          Cardiac and Vasculature    Hyperlipidemia    Relevant Medications    pravastatin (PRAVACHOL) 40 MG tablet    Other Relevant Orders    Lipid Panel With / Chol / HDL Ratio    Essential hypertension - Primary    Relevant Medications    furosemide (LASIX) 20 MG tablet    metoprolol succinate XL (TOPROL-XL) 25 MG 24 hr tablet    Other Relevant Orders    Comprehensive Metabolic Panel    CBC w AUTO Differential    Urinalysis With Culture If Indicated - Urine, Clean Catch    TSH+Free T4    Paroxysmal atrial fibrillation    Relevant Medications    metoprolol succinate XL (TOPROL-XL) 25 MG 24 hr tablet    Non-rheumatic mitral regurgitation    Relevant Medications    metoprolol succinate XL (TOPROL-XL) 25 MG 24 hr tablet       Coag and Thromboembolic    Chronic anticoagulation       Gastrointestinal Abdominal     Gastroesophageal reflux disease without esophagitis    Relevant Medications    calcium carbonate (TUMS) 500 MG chewable tablet    omeprazole (priLOSEC) 20 MG capsule       Genitourinary and Reproductive     Urge incontinence of urine       Hematology and Neoplasia    Anemia    Relevant Medications    ferrous sulfate 325 (65 FE) MG tablet    Other Relevant Orders    Iron and TIBC    Ferritin    Vitamin B12       Mental Health    Depression    Relevant Medications    DULoxetine (CYMBALTA) 60 MG capsule       Musculoskeletal and Injuries    Osteopenia    Relevant Orders    Vitamin D,25-Hydroxy    Chronic low back pain    Relevant Medications    DULoxetine (CYMBALTA) 60 MG capsule    traMADol (ULTRAM) 50 MG tablet    Other Relevant Orders    Drug Screen 11 w/Conf, Serum       Neuro    Neuropathy, peripheral     Relevant Medications    DULoxetine (CYMBALTA) 60 MG capsule       Symptoms and Signs    Localized edema     Other Visit Diagnoses       Impaired fasting blood sugar        Relevant Orders    Hemoglobin A1c    Vitamin D deficiency        Relevant Orders    Vitamin D,25-Hydroxy    Therapeutic drug monitoring        Relevant Orders    Drug Screen 11 w/Conf, Serum          Orders Placed This Encounter   Procedures    Comprehensive Metabolic Panel    Urinalysis With Culture If Indicated - Urine, Clean Catch    Drug Screen 11 w/Conf, Serum    Lipid Panel With / Chol / HDL Ratio    TSH+Free T4    Iron and TIBC    Ferritin    Vitamin B12    Vitamin D,25-Hydroxy    Hemoglobin A1c    CBC w AUTO Differential     New Medications Ordered This Visit   Medications    ferrous sulfate 325 (65 FE) MG tablet     Sig: Take one tab po daily     Dispense:  90 tablet     Refill:  3    DULoxetine (CYMBALTA) 60 MG capsule     Sig: Take 1 capsule by mouth Daily.     Dispense:  90 capsule     Refill:  3     Needs new pcp    omeprazole (priLOSEC) 20 MG capsule     Sig: Take 1 capsule by mouth Daily With Breakfast.     Dispense:  90 capsule     Refill:  3    pravastatin (PRAVACHOL) 40 MG tablet     Sig: Take 1 tablet by mouth Daily.     Dispense:  90 tablet     Refill:  3    traMADol (ULTRAM) 50 MG tablet     Sig: Take 1 tablet by mouth Every 8 (Eight) Hours As Needed (Running pain medicine for low back pain). for pain     Dispense:  270 tablet     Refill:  1     This request is for a new prescription for a controlled substance as required by Federal/State law..       SUMMARY/DISCUSSION  Controlled substance contract up-to-date, Raghav reviewed, drug screen ordered for tramadol.  Discussed with this.  A 6-month follow-up for drug compliance.  Continue management with all other specialty providers.    Next Appointment with me: Visit date not found    Return in about 6 months (around 2/16/2024) for Next scheduled follow up.    VITAL SIGNS  "    Visit Vitals  /60   Pulse 98   Temp 98.2 øF (36.8 øC) (Oral)   Ht 158.8 cm (62.5\")   Wt 56 kg (123 lb 6.4 oz)   LMP  (LMP Unknown)   SpO2 98%   BMI 22.21 kg/mý     Wt Readings from Last 3 Encounters:   08/16/23 56 kg (123 lb 6.4 oz)   06/08/23 55.9 kg (123 lb 3.2 oz)   07/28/22 52.6 kg (116 lb)     Body mass index is 22.21 kg/mý.    MEDICATIONS AT THE TIME OF OFFICE VISIT     Current Outpatient Medications on File Prior to Visit   Medication Sig    calcium carbonate (TUMS) 500 MG chewable tablet Chew 1,000 mg 2 (Two) Times a Day As Needed for Indigestion or Heartburn.    Eliquis 2.5 MG tablet tablet TAKE 1 TABLET BY MOUTH EVERY 12 HOURS    furosemide (LASIX) 20 MG tablet Take 2 tablets by mouth Daily.    metoprolol succinate XL (TOPROL-XL) 25 MG 24 hr tablet TAKE 1 TABLET BY MOUTH EVERY DAY    polyethylene glycol (MIRALAX) pack packet Take 17 g by mouth Daily. (Patient taking differently: Take 17 g by mouth Daily As Needed.)    [DISCONTINUED] DULoxetine (CYMBALTA) 60 MG capsule TAKE 1 CAPSULE BY MOUTH EVERY DAY    [DISCONTINUED] omeprazole (priLOSEC) 20 MG capsule Take 1 capsule by mouth Daily With Breakfast.    [DISCONTINUED] pravastatin (PRAVACHOL) 40 MG tablet TAKE 1 TABLET BY MOUTH EVERY DAY    [DISCONTINUED] traMADol (ULTRAM) 50 MG tablet Take 1 tablet by mouth Every 6 (Six) Hours As Needed (Running pain medicine for low back pain). for pain    Cholecalciferol (Vitamin D) 50 MCG (2000 UT) tablet TAKE 1 TABLET BY MOUTH EVERY DAY (Patient not taking: Reported on 6/8/2023)    [DISCONTINUED] ferrous sulfate 325 (65 FE) MG tablet TAKE 1 TABLET BY MOUTH DAILY WITH BREAKFAST FOR 90 DAYS. (Patient not taking: Reported on 8/16/2023)     No current facility-administered medications on file prior to visit.          HISTORY OF PRESENT ILLNESS     Patient presents to establish care with new provider in office.  Previous patient of Dr. Jacobs for primary care.  It appears that she was last seen in his office June " 2019.    Hyperlipidemia on pravastatin 40 mg daily with last LDL of 37.  No myalgias with medication or elevation in liver enzymes.    Hypertension controlled with metoprolol.     GERD controlled on omeprazole 20 mg daily along with Tums as needed.    Iron deficiency anemia on iron 65 mg daily.    Patient uses a walker but wheelchair for longer distances.  Lives at home alone.  Great deal of family support.  Daughter often accompanies her to office visits.    Continued management by cardiology by Dr. Brown, last seen June 2023 with follow-up for CAD, PAF, CHF, VHD.  Chronic stable exertional dyspnea.  Still able to walk around the home without significant chest pain.  Discoloration of bilateral lower extremities with some mild pain on ambulation.  No sensory deficits.  No angina.  Some difficulty with balance.  No palpitations, mild dizziness but no syncope.  Some lower extremity edema but uses extra dose of diuretic when necessary.  Trouble with placing compression hose.  Patient had arterial and venous Dopplers performed in 2021 with normal ABIs although large vessel ABIs are normal there is evidence of mild to moderate decreased perfusion in the digits.  Mitral regurg mild to moderate with moderate pulmonary hypertension.  On diuretic.  PAF tolerating Toprol.  On Eliquis for anticoagulation.  Chronic diastolic heart failure typically euvolemic, sodium restricted diet.  Last echo 2021.  CAD with remote history of PCI to LAD.  On statin.  No longer on low-dose aspirin.    Follows dermatology, [facility] Dr. Moe.  Followed for seborrheic keratosis, history of melanoma.     History of right foot drop, seen by neurology in the past.  One-time event.  No longer following    Previous ophthalmologist Dr. Cooper.  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    Seen by Dr. Layne in the past for L2-5 decompression and uninstrumented fusion,  completed in 2018.  She has been prescribed tramadol 50 mg up to 3 times daily as needed by her primary care provider in the past but she has had no controlled substance contract or drug screen.    Seen by otolaryngology in the past, last seen in 2014 for subjective tinnitus, vertigo, sensory hearing bilateral.    Pain medicine in the past, Dr. Sampson  for injections for sacroiliitis.  Last seen in 2019.    Seen by urology in April 2022 for urge incontinence and nocturnal enuresis, Dr. Haywood.     She would like to decline all further health maintenance screenings.    65 minutes were spent assessment plan and reviewing history and documentation.    REVIEW OF SYSTEMS     Constitutional neg except per HPI   Resp baseline dyspnea on exertion  CV neg     PHYSICAL EXAMINATION     Physical Exam  Constitutional  No distress  Cardiovascular Rate  irvin . Rhythm: regular . Heart sounds:  murmur   Pulmonary/Chest  Effort normal. Breath sounds:  normal  Psychiatric  Alert. Judgment and thought content normal. Mood normal      REVIEWED DATA     Labs:   Lab Results   Component Value Date    GLUCOSE 109 (H) 05/04/2022    BUN 19 05/04/2022    CREATININE 0.51 (L) 05/04/2022    EGFR 86.1 05/04/2022    BCR 37.3 (H) 05/04/2022    K 4.2 05/04/2022    CO2 25.9 05/04/2022    CALCIUM 9.4 05/04/2022    PROTENTOTREF 6.4 12/10/2018    ALBUMIN 3.90 05/04/2022    BILITOT 0.3 05/04/2022    AST 16 05/04/2022    ALT 14 05/04/2022      Lab Results   Component Value Date    TSH 1.050 04/10/2021      Lab Results   Component Value Date    CHOL 101 12/27/2021    CHLPL 101 12/10/2018    TRIG 93 12/27/2021    HDL 46 12/27/2021    LDL 37 12/27/2021      Lab Results   Component Value Date    HGBA1C 5.70 (H) 03/01/2019      Lab Results   Component Value Date    WBC 5.03 05/04/2022    HGB 10.5 (L) 05/04/2022    HCT 31.6 (L) 05/04/2022    .5 (H) 05/04/2022     05/04/2022      Lab Results   Component Value Date    IRON 38 04/06/2019     TIBC 259 (L) 04/06/2019    FERRITIN 243.00 (H) 04/06/2019      Lab Results   Component Value Date    DVQYSOTL73 957 (H) 03/05/2021      Imaging:           Medical Tests:           Summary of old records / correspondence / consultant report:           Request outside records:           *Dragon dictation used for documentation.

## 2023-08-18 ENCOUNTER — PATIENT ROUNDING (BHMG ONLY) (OUTPATIENT)
Dept: INTERNAL MEDICINE | Age: 88
End: 2023-08-18
Payer: MEDICARE

## 2023-08-18 LAB
25(OH)D3+25(OH)D2 SERPL-MCNC: 39.2 NG/ML (ref 30–100)
ALBUMIN SERPL-MCNC: 4.8 G/DL (ref 3.5–5.2)
ALBUMIN/GLOB SERPL: 2.3 G/DL
ALP SERPL-CCNC: 64 U/L (ref 39–117)
ALT SERPL-CCNC: 14 U/L (ref 1–33)
AMPHETAMINES SERPL QL SCN: NEGATIVE NG/ML
APPEARANCE UR: CLEAR
AST SERPL-CCNC: 13 U/L (ref 1–32)
BACTERIA #/AREA URNS HPF: NORMAL /HPF
BACTERIA UR CULT: NORMAL
BACTERIA UR CULT: NORMAL
BARBITURATES SERPL QL SCN: NEGATIVE UG/ML
BASOPHILS # BLD MANUAL: 0.14 10*3/MM3 (ref 0–0.2)
BASOPHILS NFR BLD MANUAL: 2 % (ref 0–1.5)
BENZODIAZ SERPL QL SCN: NEGATIVE NG/ML
BILIRUB SERPL-MCNC: 0.4 MG/DL (ref 0–1.2)
BILIRUB UR QL STRIP: NEGATIVE
BUN SERPL-MCNC: 19 MG/DL (ref 8–23)
BUN/CREAT SERPL: 28.8 (ref 7–25)
CALCIUM SERPL-MCNC: 10.2 MG/DL (ref 8.2–9.6)
CANNABINOIDS SERPL QL SCN: NEGATIVE NG/ML
CASTS URNS QL MICRO: NORMAL /LPF
CHLORIDE SERPL-SCNC: 101 MMOL/L (ref 98–107)
CHOLEST SERPL-MCNC: 132 MG/DL (ref 0–200)
CHOLEST/HDLC SERPL: 2.75 {RATIO}
CO2 SERPL-SCNC: 26.7 MMOL/L (ref 22–29)
COCAINE+BZE SERPL QL SCN: NEGATIVE NG/ML
COLOR UR: YELLOW
CREAT SERPL-MCNC: 0.66 MG/DL (ref 0.57–1)
DIFFERENTIAL COMMENT: ABNORMAL
EGFRCR SERPLBLD CKD-EPI 2021: 80.4 ML/MIN/1.73
EOSINOPHIL # BLD MANUAL: 0.07 10*3/MM3 (ref 0–0.4)
EOSINOPHIL NFR BLD MANUAL: 1 % (ref 0.3–6.2)
EPI CELLS #/AREA URNS HPF: NORMAL /HPF (ref 0–10)
ERYTHROCYTE [DISTWIDTH] IN BLOOD BY AUTOMATED COUNT: 12.5 % (ref 12.3–15.4)
ETHANOL SERPL-MCNC: NEGATIVE GM/DL
FERRITIN SERPL-MCNC: ABNORMAL NG/ML (ref 13–150)
GLOBULIN SER CALC-MCNC: 2.1 GM/DL
GLUCOSE SERPL-MCNC: 63 MG/DL (ref 65–99)
GLUCOSE UR QL STRIP: NEGATIVE
HBA1C MFR BLD: 5.7 % (ref 4.8–5.6)
HCT VFR BLD AUTO: 36.4 % (ref 34–46.6)
HDLC SERPL-MCNC: 48 MG/DL (ref 40–60)
HGB BLD-MCNC: 12.4 G/DL (ref 12–15.9)
HGB UR QL STRIP: NEGATIVE
IRON SATN MFR SERPL: 69 % (ref 20–50)
IRON SERPL-MCNC: 216 MCG/DL (ref 37–145)
KETONES UR QL STRIP: NEGATIVE
LDLC SERPL CALC-MCNC: 63 MG/DL (ref 0–100)
LEUKOCYTE ESTERASE UR QL STRIP: ABNORMAL
LYMPHOCYTES # BLD MANUAL: 1.91 10*3/MM3 (ref 0.7–3.1)
LYMPHOCYTES NFR BLD MANUAL: 28.3 % (ref 19.6–45.3)
MCH RBC QN AUTO: 36.6 PG (ref 26.6–33)
MCHC RBC AUTO-ENTMCNC: 34.1 G/DL (ref 31.5–35.7)
MCV RBC AUTO: 107.4 FL (ref 79–97)
METHADONE SERPL QL SCN: NEGATIVE NG/ML
MICRO URNS: ABNORMAL
MONOCYTES # BLD MANUAL: 0.55 10*3/MM3 (ref 0.1–0.9)
MONOCYTES NFR BLD MANUAL: 8.1 % (ref 5–12)
NEUTROPHILS # BLD MANUAL: 4.09 10*3/MM3 (ref 1.7–7)
NEUTROPHILS NFR BLD MANUAL: 60.6 % (ref 42.7–76)
NITRITE UR QL STRIP: NEGATIVE
NRBC BLD AUTO-RTO: 0.3 /100 WBC (ref 0–0.2)
OPIATES SERPL QL SCN: NEGATIVE NG/ML
OXYCODONE+OXYMORPHONE SERPLBLD QL SCN: NEGATIVE NG/ML
PCP SERPL QL SCN: NEGATIVE NG/ML
PH UR STRIP: 6.5 [PH] (ref 5–7.5)
PLATELET # BLD AUTO: 266 10*3/MM3 (ref 140–450)
PLATELET BLD QL SMEAR: ABNORMAL
POTASSIUM SERPL-SCNC: 3.7 MMOL/L (ref 3.5–5.2)
PROPOXYPH SERPL QL SCN: NEGATIVE NG/ML
PROT SERPL-MCNC: 6.9 G/DL (ref 6–8.5)
PROT UR QL STRIP: NEGATIVE
RBC # BLD AUTO: 3.39 10*6/MM3 (ref 3.77–5.28)
RBC #/AREA URNS HPF: NORMAL /HPF (ref 0–2)
RBC MORPH BLD: ABNORMAL
SODIUM SERPL-SCNC: 142 MMOL/L (ref 136–145)
SP GR UR STRIP: 1.01 (ref 1–1.03)
T4 FREE SERPL-MCNC: 1.01 NG/DL (ref 0.93–1.7)
TIBC SERPL-MCNC: 314 MCG/DL
TRIGL SERPL-MCNC: 117 MG/DL (ref 0–150)
TSH SERPL DL<=0.005 MIU/L-ACNC: 1.15 UIU/ML (ref 0.27–4.2)
UIBC SERPL-MCNC: 98 MCG/DL (ref 112–346)
URINALYSIS REFLEX: ABNORMAL
UROBILINOGEN UR STRIP-MCNC: 0.2 MG/DL (ref 0.2–1)
VIT B12 SERPL-MCNC: 1076 PG/ML (ref 211–946)
VLDLC SERPL CALC-MCNC: 21 MG/DL (ref 5–40)
WBC # BLD AUTO: 6.75 10*3/MM3 (ref 3.4–10.8)
WBC #/AREA URNS HPF: NORMAL /HPF (ref 0–5)

## 2023-08-18 NOTE — PROGRESS NOTES
A [employer] message has been sent to the patient for PATIENT ROUNDING with [facility].

## 2023-08-21 RX ORDER — FUROSEMIDE 20 MG/1
40 TABLET ORAL DAILY
Qty: 180 TABLET | Refills: 1 | Status: SHIPPED | OUTPATIENT
Start: 2023-08-21

## 2024-01-12 ENCOUNTER — OFFICE VISIT (OUTPATIENT)
Age: 89
End: 2024-01-12
Payer: MEDICARE

## 2024-01-12 VITALS
SYSTOLIC BLOOD PRESSURE: 122 MMHG | BODY MASS INDEX: 21.97 KG/M2 | WEIGHT: 124 LBS | OXYGEN SATURATION: 96 % | HEIGHT: 63 IN | DIASTOLIC BLOOD PRESSURE: 50 MMHG | HEART RATE: 80 BPM

## 2024-01-12 DIAGNOSIS — I50.32 CHRONIC DIASTOLIC CHF (CONGESTIVE HEART FAILURE): ICD-10-CM

## 2024-01-12 DIAGNOSIS — I48.0 PAF (PAROXYSMAL ATRIAL FIBRILLATION): ICD-10-CM

## 2024-01-12 DIAGNOSIS — I25.10 CORONARY ARTERY DISEASE INVOLVING NATIVE CORONARY ARTERY OF NATIVE HEART WITHOUT ANGINA PECTORIS: ICD-10-CM

## 2024-01-12 DIAGNOSIS — I48.11 LONGSTANDING PERSISTENT ATRIAL FIBRILLATION: Primary | ICD-10-CM

## 2024-01-12 PROCEDURE — 1160F RVW MEDS BY RX/DR IN RCRD: CPT | Performed by: NURSE PRACTITIONER

## 2024-01-12 PROCEDURE — 93000 ELECTROCARDIOGRAM COMPLETE: CPT | Performed by: NURSE PRACTITIONER

## 2024-01-12 PROCEDURE — 1159F MED LIST DOCD IN RCRD: CPT | Performed by: NURSE PRACTITIONER

## 2024-01-12 PROCEDURE — 99214 OFFICE O/P EST MOD 30 MIN: CPT | Performed by: NURSE PRACTITIONER

## 2024-01-12 NOTE — PROGRESS NOTES
"Date of Office Visit: 24  Encounter Provider: IBENVENIDO Freeman  Place of Service: [facility] CARDIOLOGY  Patient Name: Alexa Peace  :1927    Chief Complaint   Patient presents with    Longstanding persistent atrial fibrillation    Hyperlipidemia    Hypertension    Coronary Artery Disease    Nonrheumatic mitral valve regurgitatio    Congestive Heart Failure    Follow-up   :     HPI: Alexa Peace is a 89 y.o. female  with hyperlipidemia, coronary artery disease status post PCI to the LAD, paroxysmal atrial fibrillation, chronic diastolic heart failure, mitral regurgitation, anemia.    She is followed by Dr. Brown.  I will visit with her in follow-up and have reviewed her medical record.       She has been treated with Eliquis and Toprol-XL for atrial fibrillation.  She was stable on furosemide 20 mg daily until she presented to the ER in May 2022 with some increased swelling and chest x-ray revealed mild pulmonary vascular congestion.  She was given 1 additional dose of furosemide 40 mg and then she was discharged to take furosemide 40 mg daily.     She presents today for reassessment and has lower extremity swelling with the left little greater than the right but that is not worse.  She elevates occasionally but it is too hard to get compression socks on.  She denies chest pain or palpitations or dizziness.  She has some bilateral upper arm discomfort but believes this is arthritis related.  Biofreeze helps.  She has some mild shortness of breath with exertion but this \"does not really bother her\".  No falls.      Allergies   Allergen Reactions    Ciprofloxacin      UNKNOWN    Metronidazole      UNKWOWN    Prednisone Other (See Comments)     Elevated blood pressure           Family and social history reviewed.     ROS  All other systems were reviewed and are negative          Objective:     Vitals:    24 1435   BP: 122/50   BP Location: Left arm   Patient " "Position: Sitting   Pulse: 80   SpO2: 96%   Weight: 56.2 kg (124 lb)   Height: 158.8 cm (62.5\")     Body mass index is 22.32 kg/m².    PHYSICAL EXAM:  Pulmonary:      Effort: Pulmonary effort is normal.      Breath sounds: Normal breath sounds.   Cardiovascular:      Normal rate. Regular rhythm.      Murmurs: There is a systolic murmur.   Edema:     Pretibial: bilateral 1+ edema of the pretibial area.     Ankle: bilateral 1+ edema of the ankle.          ECG 12 Lead    Date/Time: 1/12/2024 3:20 PM  Performed by: Justina Beal APRN    Authorized by: Justina Beal APRN  Comparison: compared with previous ECG   Similar to previous ECG  Rhythm: sinus rhythm  Rate: normal  QRS axis: left  Comments: No change, artifact present             Current Outpatient Medications   Medication Sig Dispense Refill    calcium carbonate (TUMS) 500 MG chewable tablet Chew 1,000 mg 2 (Two) Times a Day As Needed for Indigestion or Heartburn.      DULoxetine (CYMBALTA) 60 MG capsule Take 1 capsule by mouth Daily. 90 capsule 3    Eliquis 2.5 MG tablet tablet TAKE 1 TABLET BY MOUTH EVERY 12 HOURS 60 tablet 5    ferrous sulfate 325 (65 FE) MG tablet Take one tab po daily 90 tablet 3    furosemide (LASIX) 20 MG tablet TAKE 2 TABLETS BY MOUTH DAILY 180 tablet 1    metoprolol succinate XL (TOPROL-XL) 25 MG 24 hr tablet TAKE 1 TABLET BY MOUTH EVERY DAY 90 tablet 3    omeprazole (priLOSEC) 20 MG capsule Take 1 capsule by mouth Daily With Breakfast. 90 capsule 3    polyethylene glycol (MIRALAX) pack packet Take 17 g by mouth Daily. (Patient taking differently: Take 17 g by mouth Daily As Needed.)      pravastatin (PRAVACHOL) 40 MG tablet Take 1 tablet by mouth Daily. 90 tablet 3    traMADol (ULTRAM) 50 MG tablet Take 1 tablet by mouth Every 8 (Eight) Hours As Needed (Running pain medicine for low back pain). for pain 270 tablet 1    Diclofenac Sodium (VOLTAREN) 1 % gel gel Apply 4 g topically to the appropriate area as directed 4 (Four) Times a Day " As Needed (to affect areas with arthritis pain). 50 g 1     No current facility-administered medications for this visit.     Assessment:      No diagnosis found.     No orders of the defined types were placed in this encounter.        Plan:       1.  89-year-old female with coronary artery disease status post remote history of PCI to the LAD.  She is on pravastatin in addition to low-dose Eliquis.  She has some arm pain but no chest pain tightness or pressure.  Mild dyspnea on exertion is unchanged.  EKG today shows no ischemic changes  2. Chronic diastolic heart failure.  Last ejection fraction was 58% in July 2021 with Mild to moderate MR and mild pulmonary hypertension.  She has mild lower extremity edema, but this is overall stable.  She will continue furosemide 40 mg daily  3.  Paroxysmal atrial fibrillation.  CHADS2 Vascor of 5.  High risk for embolic event.  Continue low-dose apixaban 2.5 mg twice daily. Tolerating current dosing of Toprol.   4. Mitral regurgitation -as above.  5.  Lower extremity erythema -unchanged  6.  History of PACs and PVCs that were rare on Holter monitoring March 2021.  Average heart rate was 84  7.  Mild to moderate aortic valve regurgitation and mild to moderate mitral regurgitation on echo July 2021  8.  History of anemia  9.  Arthritis-she is using Biofreeze which helps her pain but I will also give Voltaren gel  Follow-up in 6 months.  Call with questions or concerns          It has been a pleasure to participate in this patient's care.      Thank you,  BIENVENIDO Freeman      **I used Dragon to dictate this note:**

## 2024-01-31 RX ORDER — FUROSEMIDE 20 MG/1
40 TABLET ORAL DAILY
Qty: 180 TABLET | Refills: 1 | Status: SHIPPED | OUTPATIENT
Start: 2024-01-31

## 2024-02-16 ENCOUNTER — OFFICE VISIT (OUTPATIENT)
Dept: INTERNAL MEDICINE | Age: 89
End: 2024-02-16
Payer: MEDICARE

## 2024-02-16 VITALS
SYSTOLIC BLOOD PRESSURE: 126 MMHG | OXYGEN SATURATION: 97 % | HEART RATE: 68 BPM | DIASTOLIC BLOOD PRESSURE: 58 MMHG | BODY MASS INDEX: 21.72 KG/M2 | TEMPERATURE: 97.9 F | WEIGHT: 122.6 LBS | HEIGHT: 63 IN

## 2024-02-16 DIAGNOSIS — Z79.899 HIGH RISK MEDICATION USE: ICD-10-CM

## 2024-02-16 DIAGNOSIS — D64.9 ANEMIA, UNSPECIFIED TYPE: ICD-10-CM

## 2024-02-16 DIAGNOSIS — I48.0 PAROXYSMAL ATRIAL FIBRILLATION: ICD-10-CM

## 2024-02-16 DIAGNOSIS — I10 ESSENTIAL HYPERTENSION: Primary | ICD-10-CM

## 2024-02-16 DIAGNOSIS — E78.2 MIXED HYPERLIPIDEMIA: ICD-10-CM

## 2024-02-16 DIAGNOSIS — Z29.11 NEED FOR RSV IMMUNIZATION: ICD-10-CM

## 2024-02-16 DIAGNOSIS — G89.29 CHRONIC LOW BACK PAIN, UNSPECIFIED BACK PAIN LATERALITY, UNSPECIFIED WHETHER SCIATICA PRESENT: ICD-10-CM

## 2024-02-16 DIAGNOSIS — M85.80 OSTEOPENIA, UNSPECIFIED LOCATION: ICD-10-CM

## 2024-02-16 DIAGNOSIS — I48.11 LONGSTANDING PERSISTENT ATRIAL FIBRILLATION: ICD-10-CM

## 2024-02-16 DIAGNOSIS — M54.50 CHRONIC LOW BACK PAIN, UNSPECIFIED BACK PAIN LATERALITY, UNSPECIFIED WHETHER SCIATICA PRESENT: ICD-10-CM

## 2024-02-16 DIAGNOSIS — G62.9 PERIPHERAL POLYNEUROPATHY: ICD-10-CM

## 2024-02-16 DIAGNOSIS — K21.9 GASTROESOPHAGEAL REFLUX DISEASE WITHOUT ESOPHAGITIS: ICD-10-CM

## 2024-02-20 LAB
ALBUMIN SERPL-MCNC: 4.3 G/DL (ref 3.6–4.6)
ALBUMIN/GLOB SERPL: 1.7 {RATIO} (ref 1.2–2.2)
ALP SERPL-CCNC: 70 IU/L (ref 44–121)
ALT SERPL-CCNC: 11 IU/L (ref 0–32)
AMPHETAMINES SERPL QL SCN: NEGATIVE NG/ML
AST SERPL-CCNC: 14 IU/L (ref 0–40)
BARBITURATES SERPL QL SCN: NEGATIVE UG/ML
BASOPHILS # BLD AUTO: 0.1 X10E3/UL (ref 0–0.2)
BASOPHILS NFR BLD AUTO: 2 %
BENZODIAZ SERPL QL SCN: NEGATIVE NG/ML
BILIRUB SERPL-MCNC: 0.3 MG/DL (ref 0–1.2)
BUN SERPL-MCNC: 16 MG/DL (ref 10–36)
BUN/CREAT SERPL: 18 (ref 12–28)
CALCIUM SERPL-MCNC: 9 MG/DL (ref 8.7–10.3)
CANNABINOIDS SERPL QL SCN: NEGATIVE NG/ML
CHLORIDE SERPL-SCNC: 100 MMOL/L (ref 96–106)
CHOLEST SERPL-MCNC: 132 MG/DL (ref 100–199)
CHOLEST/HDLC SERPL: 2.9 RATIO (ref 0–4.4)
CO2 SERPL-SCNC: 27 MMOL/L (ref 20–29)
COCAINE+BZE SERPL QL SCN: NEGATIVE NG/ML
CREAT SERPL-MCNC: 0.87 MG/DL (ref 0.57–1)
EGFRCR SERPLBLD CKD-EPI 2021: 61 ML/MIN/1.73
EOSINOPHIL # BLD AUTO: 0.3 X10E3/UL (ref 0–0.4)
EOSINOPHIL NFR BLD AUTO: 5 %
ERYTHROCYTE [DISTWIDTH] IN BLOOD BY AUTOMATED COUNT: 13.8 % (ref 11.7–15.4)
ETHANOL SERPL-MCNC: NEGATIVE GM/DL
FERRITIN SERPL-MCNC: 714 NG/ML (ref 15–150)
GLOBULIN SER CALC-MCNC: 2.6 G/DL (ref 1.5–4.5)
GLUCOSE SERPL-MCNC: 105 MG/DL (ref 70–99)
HCT VFR BLD AUTO: 32.4 % (ref 34–46.6)
HDLC SERPL-MCNC: 45 MG/DL
HGB BLD-MCNC: 10.9 G/DL (ref 11.1–15.9)
IMM GRANULOCYTES # BLD AUTO: 0 X10E3/UL (ref 0–0.1)
IMM GRANULOCYTES NFR BLD AUTO: 0 %
IRON SATN MFR SERPL: 35 % (ref 15–55)
IRON SERPL-MCNC: 91 UG/DL (ref 27–139)
LDLC SERPL CALC-MCNC: 57 MG/DL (ref 0–99)
LYMPHOCYTES # BLD AUTO: 2.2 X10E3/UL (ref 0.7–3.1)
LYMPHOCYTES NFR BLD AUTO: 35 %
MCH RBC QN AUTO: 36.6 PG (ref 26.6–33)
MCHC RBC AUTO-ENTMCNC: 33.6 G/DL (ref 31.5–35.7)
MCV RBC AUTO: 109 FL (ref 79–97)
METHADONE SERPL QL SCN: NEGATIVE NG/ML
MONOCYTES # BLD AUTO: 0.6 X10E3/UL (ref 0.1–0.9)
MONOCYTES NFR BLD AUTO: 10 %
NEUTROPHILS # BLD AUTO: 3.2 X10E3/UL (ref 1.4–7)
NEUTROPHILS NFR BLD AUTO: 48 %
OPIATES SERPL QL SCN: NEGATIVE NG/ML
OXYCODONE+OXYMORPHONE SERPLBLD QL SCN: NEGATIVE NG/ML
PCP SERPL QL SCN: NEGATIVE NG/ML
PLATELET # BLD AUTO: 276 X10E3/UL (ref 150–450)
POTASSIUM SERPL-SCNC: 3.5 MMOL/L (ref 3.5–5.2)
PROPOXYPH SERPL QL SCN: NEGATIVE NG/ML
PROT SERPL-MCNC: 6.9 G/DL (ref 6–8.5)
RBC # BLD AUTO: 2.98 X10E6/UL (ref 3.77–5.28)
SODIUM SERPL-SCNC: 141 MMOL/L (ref 134–144)
T4 FREE SERPL-MCNC: 0.85 NG/DL (ref 0.82–1.77)
TIBC SERPL-MCNC: 257 UG/DL (ref 250–450)
TRIGL SERPL-MCNC: 180 MG/DL (ref 0–149)
TSH SERPL DL<=0.005 MIU/L-ACNC: 0.78 UIU/ML (ref 0.45–4.5)
UIBC SERPL-MCNC: 166 UG/DL (ref 118–369)
VIT B12 SERPL-MCNC: 978 PG/ML (ref 232–1245)
VLDLC SERPL CALC-MCNC: 30 MG/DL (ref 5–40)
WBC # BLD AUTO: 6.5 X10E3/UL (ref 3.4–10.8)

## 2024-02-23 RX ORDER — METOPROLOL SUCCINATE 25 MG/1
TABLET, EXTENDED RELEASE ORAL
Qty: 90 TABLET | Refills: 1 | Status: SHIPPED | OUTPATIENT
Start: 2024-02-23

## 2024-03-15 DIAGNOSIS — M54.50 CHRONIC LOW BACK PAIN, UNSPECIFIED BACK PAIN LATERALITY, UNSPECIFIED WHETHER SCIATICA PRESENT: ICD-10-CM

## 2024-03-15 DIAGNOSIS — G89.29 CHRONIC LOW BACK PAIN, UNSPECIFIED BACK PAIN LATERALITY, UNSPECIFIED WHETHER SCIATICA PRESENT: ICD-10-CM

## 2024-03-15 RX ORDER — TRAMADOL HYDROCHLORIDE 50 MG/1
50 TABLET ORAL EVERY 8 HOURS PRN
Qty: 270 TABLET | Refills: 0 | Status: SHIPPED | OUTPATIENT
Start: 2024-03-15

## 2024-06-21 DIAGNOSIS — K21.9 GASTROESOPHAGEAL REFLUX DISEASE WITHOUT ESOPHAGITIS: ICD-10-CM

## 2024-06-21 DIAGNOSIS — G62.9 PERIPHERAL POLYNEUROPATHY: ICD-10-CM

## 2024-06-21 DIAGNOSIS — M54.50 CHRONIC LOW BACK PAIN, UNSPECIFIED BACK PAIN LATERALITY, UNSPECIFIED WHETHER SCIATICA PRESENT: ICD-10-CM

## 2024-06-21 DIAGNOSIS — G89.29 CHRONIC LOW BACK PAIN, UNSPECIFIED BACK PAIN LATERALITY, UNSPECIFIED WHETHER SCIATICA PRESENT: ICD-10-CM

## 2024-06-21 DIAGNOSIS — I48.11 LONGSTANDING PERSISTENT ATRIAL FIBRILLATION: ICD-10-CM

## 2024-06-21 NOTE — TELEPHONE ENCOUNTER
Ok to refill????          Rx Refill Note  Requested Prescriptions     Pending Prescriptions Disp Refills    Diclofenac Sodium (VOLTAREN) 1 % gel gel [Pharmacy Med Name: DICLOFENAC SODIUM 1% GEL] 100 g 1     Sig: Apply 4 g topically to the appropriate area as directed 4 (Four) Times a Day As Needed (to affect areas with arthritis pain).      Last office visit with prescribing clinician: 1/12/2024   Last telemedicine visit with prescribing clinician: Visit date not found   Next office visit with prescribing clinician: Visit date not found                         Would you like a call back once the refill request has been completed: [] Yes [] No    If the office needs to give you a call back, can they leave a voicemail: [] Yes [] No    Ana Cintron CMA  06/21/24, 14:44 EDT

## 2024-06-21 NOTE — TELEPHONE ENCOUNTER
Rx Refill Note  Requested Prescriptions     Pending Prescriptions Disp Refills    apixaban (Eliquis) 2.5 MG tablet tablet 180 tablet 0     Sig: Take 1 tablet by mouth Every 12 (Twelve) Hours.      Last office visit with prescribing clinician: 6/8/2023   Last telemedicine visit with prescribing clinician: Visit date not found   Next office visit with prescribing clinician: 7/16/2024                         Would you like a call back once the refill request has been completed: [] Yes [] No    If the office needs to give you a call back, can they leave a voicemail: [] Yes [] No    Ana Cintron CMA  06/21/24, 13:47 EDT

## 2024-06-23 RX ORDER — DULOXETIN HYDROCHLORIDE 60 MG/1
60 CAPSULE, DELAYED RELEASE ORAL DAILY
Qty: 90 CAPSULE | Refills: 3 | Status: SHIPPED | OUTPATIENT
Start: 2024-06-23

## 2024-06-23 RX ORDER — OMEPRAZOLE 20 MG/1
20 CAPSULE, DELAYED RELEASE ORAL
Qty: 90 CAPSULE | Refills: 3 | Status: SHIPPED | OUTPATIENT
Start: 2024-06-23

## 2024-07-16 ENCOUNTER — OFFICE VISIT (OUTPATIENT)
Dept: CARDIOLOGY | Facility: CLINIC | Age: 89
End: 2024-07-16
Payer: MEDICARE

## 2024-07-16 VITALS
WEIGHT: 115.8 LBS | BODY MASS INDEX: 21.31 KG/M2 | OXYGEN SATURATION: 97 % | HEIGHT: 62 IN | HEART RATE: 92 BPM | SYSTOLIC BLOOD PRESSURE: 134 MMHG | DIASTOLIC BLOOD PRESSURE: 62 MMHG

## 2024-07-16 DIAGNOSIS — I25.10 CORONARY ARTERY DISEASE INVOLVING NATIVE CORONARY ARTERY OF NATIVE HEART WITHOUT ANGINA PECTORIS: ICD-10-CM

## 2024-07-16 DIAGNOSIS — I50.32 CHRONIC DIASTOLIC CHF (CONGESTIVE HEART FAILURE): ICD-10-CM

## 2024-07-16 DIAGNOSIS — I48.11 LONGSTANDING PERSISTENT ATRIAL FIBRILLATION: Primary | ICD-10-CM

## 2024-07-16 PROCEDURE — 99214 OFFICE O/P EST MOD 30 MIN: CPT | Performed by: INTERNAL MEDICINE

## 2024-07-16 PROCEDURE — 1159F MED LIST DOCD IN RCRD: CPT | Performed by: INTERNAL MEDICINE

## 2024-07-16 PROCEDURE — 1160F RVW MEDS BY RX/DR IN RCRD: CPT | Performed by: INTERNAL MEDICINE

## 2024-07-16 NOTE — PROGRESS NOTES
[facility]      Patient Name: Alexa Peace  :1927  Age: 89 y.o.  Encounter Provider:  Blake Brown Jr, MD      Chief Complaint: FU CAD, PAF, CHF, VHD      Atrial Fibrillation  Symptoms include shortness of breath. Symptoms are negative for chest pain, dizziness, palpitations and syncope.   Alexa Peace is a 89 y.o. female PMH CAD s/p remote hx of PCI to LAD, chronic diastolic HF, PAF, mitral regurgitation presents for routine follow-up.     Last clinic visit: She notes chronic stable exertional dyspnea.  Still able to walk around the home without significant chest pain.  She is noted discoloration of bilateral lower extremities with some mild pain on ambulation.  No sensory deficits.  She denies angina.  She notes some difficulty with balance.  She denies palpitations.  Mild dizziness but no syncope.  Heart rate at Dr. Jacobs's office was 53 bpm and today is 42 bpm.  She denies orthopnea or PND.  Social and family history reviewed and not pertinent to this clinic visit.    Patient presents today for routine follow-up.  She has been doing fairly well since last visit.  At home she gets around with a walker but uses a wheelchair for long distance.  She has mild chronic stable dyspnea on exertion.  She still has small amount of swelling left greater than right lower extremity.  She had ultrasound Doppler of bilateral extremities in April which showed no evidence for DVT.  She denies any chest pain with activity.  No palpitations, dizziness or syncope.  The remainder social and family history reviewed and not pertinent to this clinic visit.    She is doing fairly well since last visit.  Stable lower extremity edema.  Blood pressure and heart rate are well-controlled today in clinic.  No angina or heart failure with activity around the house.  No cardiac complaints at time of interview.    The following portions of the patient's history were reviewed and updated as appropriate:  "allergies, current medications, past family history, past medical history, past social history, past surgical history and problem list.      Review of Systems   Constitutional: Negative for chills and fever.   HENT:  Negative for hoarse voice and sore throat.    Eyes:  Negative for double vision and photophobia.   Cardiovascular:  Positive for dyspnea on exertion and leg swelling. Negative for chest pain, near-syncope, orthopnea, palpitations, paroxysmal nocturnal dyspnea and syncope.   Respiratory:  Positive for shortness of breath. Negative for cough and wheezing.    Skin:  Negative for poor wound healing and rash.   Musculoskeletal:  Negative for arthritis and joint swelling.   Gastrointestinal:  Negative for bloating, abdominal pain, hematemesis and hematochezia.   Neurological:  Negative for dizziness and focal weakness.   Psychiatric/Behavioral:  Negative for depression and suicidal ideas.        OBJECTIVE:   Vital Signs    Estimated body mass index is 21.18 kg/m² as calculated from the following:    Height as of this encounter: 157.5 cm (62\").    Weight as of this encounter: 52.5 kg (115 lb 12.8 oz).    Physical Exam  Vitals reviewed.   Constitutional:       General: She is not in acute distress.  Cardiovascular:      Rate and Rhythm: Regular rhythm. Bradycardia present.      Heart sounds: Murmur heard.   Pulmonary:      Effort: No respiratory distress.      Breath sounds: No wheezing or rales.   Abdominal:      General: Bowel sounds are normal. There is no distension.   Skin:     Comments: Bilateral lower extremities are erythematous and cool with decreased pulses.  Left greater than right.   Neurological:      General: No focal deficit present.      Mental Status: She is alert and oriented to person, place, and time.   Psychiatric:         Mood and Affect: Mood normal.         Behavior: Behavior normal.   Physical exam was reviewed, updated and amended when necessary.    Procedures        ASSESSMENT: "     89-year-old female presents for follow-up of chronic medical illness with specific complaints of lower extremity pain and discoloration of skin.    PLAN OF CARE:     1. CAD s/p remote hx of PCI to LAD -no chest pain complaints since last visit.  We will continue to monitor frequency of symptoms.  Patient is no longer on low-dose aspirin over-the-counter and at her age we will not consider restarting the medication given her need for Eliquis.  Continue statin.  2. Chronic diastolic HF -continue current dose of furosemide.  Fairly euvolemic today on exam.  Chronic stable dyspnea on exertion and lower extremity edema. BP fairly well controlled. Periodic home monitroing, Na restricted diet.  Last echocardiogram was in 2021 showing mild to moderate aortic and mitral regurgitation.  Monitor clinical progress.  3. PAF -tolerating current dosing of Toprol well.  Continue lower dose apixaban.   4. Mitral regurgitation -mild to moderate on last exam with moderate pulmonary hypertension.  Continue diuretic regimen.  5.  Lower extremity erythema -decreased pulses in bilateral lower extremities with erythema but no evidence for tissue compromise.  Doppler with no evidence for DVT and normal ABIs.    RTC 12 months         Discharge Medications            Accurate as of July 16, 2024 12:52 PM. If you have any questions, ask your nurse or doctor.                Changes to Medications        Instructions Start Date   polyethylene glycol pack packet  Commonly known as: MIRALAX  What changed:   when to take this  reasons to take this   17 g, Oral, Daily             Continue These Medications        Instructions Start Date   apixaban 2.5 MG tablet tablet  Commonly known as: Eliquis   2.5 mg, Oral, Every 12 Hours      calcium carbonate 500 MG chewable tablet  Commonly known as: TUMS   2 tablets, Oral, 2 Times Daily PRN      Diclofenac Sodium 1 % gel gel  Commonly known as: VOLTAREN   4 g, Topical, 4 Times Daily PRN      DULoxetine  60 MG capsule  Commonly known as: CYMBALTA   60 mg, Oral, Daily      ferrous sulfate 325 (65 FE) MG tablet   Take one tab po daily      furosemide 20 MG tablet  Commonly known as: LASIX   40 mg, Oral, Daily      metoprolol succinate XL 25 MG 24 hr tablet  Commonly known as: TOPROL-XL   TAKE 1 TABLET BY MOUTH EVERY DAY      omeprazole 20 MG capsule  Commonly known as: priLOSEC   20 mg, Oral, Daily With Breakfast      pravastatin 40 MG tablet  Commonly known as: PRAVACHOL   40 mg, Oral, Daily      traMADol 50 MG tablet  Commonly known as: ULTRAM   50 mg, Oral, Every 8 Hours PRN, for pain               Thank you for allowing me to participate in the care of your patient,      Sincerely,   Blake Brown MD  [facility]  10/2/2019  12:52 EDT

## 2024-08-09 ENCOUNTER — OFFICE VISIT (OUTPATIENT)
Dept: INTERNAL MEDICINE | Age: 89
End: 2024-08-09
Payer: MEDICARE

## 2024-08-09 VITALS
HEART RATE: 93 BPM | TEMPERATURE: 97.9 F | SYSTOLIC BLOOD PRESSURE: 126 MMHG | OXYGEN SATURATION: 100 % | WEIGHT: 117 LBS | BODY MASS INDEX: 21.53 KG/M2 | HEIGHT: 62 IN | DIASTOLIC BLOOD PRESSURE: 64 MMHG

## 2024-08-09 DIAGNOSIS — I48.0 PAROXYSMAL ATRIAL FIBRILLATION: Primary | ICD-10-CM

## 2024-08-09 DIAGNOSIS — G62.9 PERIPHERAL POLYNEUROPATHY: ICD-10-CM

## 2024-08-09 DIAGNOSIS — D50.9 IRON DEFICIENCY ANEMIA, UNSPECIFIED IRON DEFICIENCY ANEMIA TYPE: ICD-10-CM

## 2024-08-09 DIAGNOSIS — M54.50 CHRONIC LOW BACK PAIN, UNSPECIFIED BACK PAIN LATERALITY, UNSPECIFIED WHETHER SCIATICA PRESENT: ICD-10-CM

## 2024-08-09 DIAGNOSIS — G89.29 CHRONIC LOW BACK PAIN, UNSPECIFIED BACK PAIN LATERALITY, UNSPECIFIED WHETHER SCIATICA PRESENT: ICD-10-CM

## 2024-08-09 DIAGNOSIS — Z79.899 HIGH RISK MEDICATION USE: ICD-10-CM

## 2024-08-09 DIAGNOSIS — I10 ESSENTIAL HYPERTENSION: ICD-10-CM

## 2024-08-09 DIAGNOSIS — E78.2 MIXED HYPERLIPIDEMIA: ICD-10-CM

## 2024-08-09 NOTE — PROGRESS NOTES
I N T E R N A L  M E D I C I N E    S K Y L E R   F R Y E  D N P ,  A P R N      ENCOUNTER DATE:  08/09/2024    Alexa Peace / [age] y.o. / female      MEDICARE ANNUAL WELLNESS VISIT       Chief Complaint:    Chief Complaint   Patient presents with    Medicare Wellness-subsequent    Hyperlipidemia    Hypertension    Back Pain     Patient's general assessment of her health since a year ago:     - Compared to one year ago, she feels her physical health is:   STABLE/SAME    - Compared to one year ago, she feels her mental health is:  STABLE/SAME    Recent Hospitalization (within past 365 days) (NO unless indicated)  No      Patient Care Team:    Patient Care Team:  Tomas Nix, ALONSO, APRN as PCP - General (Internal Medicine)    Allergies:  Ciprofloxacin, Metronidazole, and Prednisone    Medications:  Current Outpatient Medications on File Prior to Visit   Medication Sig Dispense Refill    apixaban (Eliquis) 2.5 MG tablet tablet Take 1 tablet by mouth Every 12 (Twelve) Hours. 180 tablet 0    calcium carbonate (TUMS) 500 MG chewable tablet Chew 1,000 mg 2 (Two) Times a Day As Needed for Indigestion or Heartburn.      Diclofenac Sodium (VOLTAREN) 1 % gel gel APPLY 4 G TOPICALLY TO THE APPROPRIATE AREA AS DIRECTED 4 (FOUR) TIMES A DAY AS NEEDED (TO AFFECT AREAS WITH ARTHRITIS PAIN). 100 g 1    DULoxetine (CYMBALTA) 60 MG capsule TAKE 1 CAPSULE BY MOUTH EVERY DAY 90 capsule 3    ferrous sulfate 325 (65 FE) MG tablet Take one tab po daily 90 tablet 3    furosemide (LASIX) 20 MG tablet TAKE 2 TABLETS BY MOUTH EVERY  tablet 1    metoprolol succinate XL (TOPROL-XL) 25 MG 24 hr tablet TAKE 1 TABLET BY MOUTH EVERY DAY 90 tablet 1    omeprazole (priLOSEC) 20 MG capsule TAKE 1 CAPSULE BY MOUTH DAILY WITH BREAKFAST. 90 capsule 3    polyethylene glycol (MIRALAX) pack packet Take 17 g by mouth Daily. (Patient taking differently: Take 17 g by mouth Daily As Needed.)      pravastatin (PRAVACHOL) 40 MG tablet Take 1 tablet by  mouth Daily. 90 tablet 3    traMADol (ULTRAM) 50 MG tablet Take 1 tablet by mouth Every 8 (Eight) Hours As Needed for Moderate Pain. for pain 270 tablet 0     No current facility-administered medications on file prior to visit.          Opioid medication/s are on active medication list.  and I have evaluated her active treatment plan and pain score trends (see table).  Vitals:    08/09/24 1536   PainSc:   3   PainLoc: Back     I have reviewed the chart for potential of high risk medication and harmful drug interactions in the elderly.         Opioid Pain Assessment: Pain Severity / Control: Adequately controlled with medication       HPI for other active medical problems:     Hyperlipidemia on pravastatin 40 mg daily with last LDL of 63.  No myalgias with medication or elevation in liver enzymes.    Lab Results   Component Value Date    CHOL 101 12/27/2021    CHLPL 132 02/16/2024    TRIG 180 (H) 02/16/2024    HDL 45 02/16/2024    LDL 57 02/16/2024          Hypertension controlled with metoprolol.     Lab Results   Component Value Date    GLUCOSE 105 (H) 02/16/2024    BUN 16 02/16/2024    CREATININE 0.87 02/16/2024     02/16/2024    K 3.5 02/16/2024     02/16/2024    CALCIUM 9.0 02/16/2024    PROTEINTOT 6.2 05/04/2022    ALBUMIN 4.3 02/16/2024    ALT 11 02/16/2024    AST 14 02/16/2024    ALKPHOS 70 02/16/2024    BILITOT 0.3 02/16/2024    GLOB 2.3 05/04/2022    AGRATIO 1.7 05/04/2022    BCR 18 02/16/2024    ANIONGAP 10.1 05/04/2022    EGFR 86.1 05/04/2022          GERD controlled on omeprazole 20 mg daily along with Tums as needed.  Creatinine and GFR within normal.    Lab Results   Component Value Date    WBC 6.5 02/16/2024    HGB 10.9 (L) 02/16/2024    HCT 32.4 (L) 02/16/2024     (H) 02/16/2024     02/16/2024     Lab Results   Component Value Date    IRON 38 04/06/2019    LABIRON 35 02/16/2024    TRANSFERRIN 174 (L) 04/06/2019     02/16/2024    FERRITIN 714 (H) 02/16/2024        Lab  Results   Component Value Date    GVAQQQEP22 978 02/16/2024       Iron deficiency anemia on iron 65 mg daily.  Last hemoglobin 12.4.  Her iron however was in greased and asked her to decrease this to every other day dosing, but she believes she has continued with the same.     Seen by Dr. Layne in the past for L2-5 decompression and uninstrumented fusion, completed in 2018.  She has been prescribed tramadol 50 mg up to 3 times daily as needed by her primary care provider.  Left shoulder pain but no numbness or tingling or weakness.  Controlled with Voltaren gel as needed.     She would like to decline all further health maintenance screenings.     Continued management with cardiology last seen January 12, 2024 with history of coronary artery disease status post history of PCI to the LAD.  On pravastatin and low-dose Eliquis.  EKG showed no ischemic changes most recently with mild dyspnea.  Chronic diastolic heart failure, last ejection fraction 58% in July 2021 with mild to moderate MR and mild pulmonary hypertension.  Mild lower extremity edema but stable.  On furosemide 40 mg daily.  A-fib on Eliquis 2.5 mg daily along with Toprol.    Dr. Brown cardiology saw her July 16, 2024 for atrial fibrillation, chronic diastolic CHF and coronary artery disease.  Known murmur, bradycardia, dyspnea on exertion and leg swelling.  CAD status post remote history of PCI to LAD no chest pain complaints since last visit.  Low-dose aspirin over-the-counter was discontinued at her age given the need for Eliquis.  Continued on statin.  Chronic diastolic CHF BP well-controlled.  Periodic home monitoring with no restricted diet.  Last echocardiogram 2021 mild to moderate aortic and mitral regurg.  On furosemide.  At last cardiology visit she was euvolemic.  PAF tolerating current dose of Toprol.  Low-dose apixaban.  Mitral regurg mild to moderate on last exam with moderate pulmonary hypertension.  Chronic diuretics.  Lower extremity  erythema with decreased pulses in bilateral lower extremities but no evidence for tissue compromise.  Doppler with no evidence for DVT and normal ABIs.  Following yearly.      HISTORY     PFSH:     The following portions of the patient's history were reviewed and updated as appropriate: Allergies / Current Medications / Past Medical History / Surgical History / Social History / Family History    Problem List:  Patient Active Problem List   Diagnosis    Bad memory    Depression    Diverticulosis of intestine    Hyperlipidemia    Macular degeneration    Neuropathy, peripheral    Osteopenia    Atopic rhinitis    Urge incontinence of urine    Atherosclerosis of native coronary artery    Essential hypertension    Gastroesophageal reflux disease without esophagitis    Impaired glucose tolerance    History of coronary artery stent placement    Chronic low back pain    Acquired scoliosis    Spinal stenosis of lumbar region with neurogenic claudication    PAC (premature atrial contraction)    Slow transit constipation    Paroxysmal atrial fibrillation    Pleural effusion on left    Valvular heart disease    Non-rheumatic mitral regurgitation    Pneumonia of right lower lobe due to infectious organism    Localized edema    Sepsis due to pneumonia    Chronic anticoagulation    DNR (do not resuscitate)    Chronic diastolic CHF (congestive heart failure)    Annual physical exam    Confusion    Urinary frequency    Anemia       Past Medical History:  Past Medical History:   Diagnosis Date    Anticoagulated     PLAVIX    Arthritis     Atrial premature complex     CAD (coronary artery disease)     Cancer     skin    Colon polyp     Depression     GERD (gastroesophageal reflux disease)     Heart attack     Hyperlipidemia     Hypertension     Macular degeneration     New onset atrial fibrillation 01/11/2019    Pre-diabetes     Spinal stenosis        Past Surgical History:  Past Surgical History:   Procedure Laterality Date     "ADENOIDECTOMY      CATARACT EXTRACTION      CORONARY ANGIOPLASTY WITH STENT PLACEMENT      EPIDURAL BLOCK      LUMBAR DISCECTOMY FUSION INSTRUMENTATION N/A 12/7/2017    Procedure: L2-3, L3-4, L4-5 laminectomy and fusion with local bone graft;  Surgeon: Andre Layne MD;  Location: [facility];  Service:     TONSILECTOMY, ADENOIDECTOMY, BILATERAL MYRINGOTOMY AND TUBES      TONSILLECTOMY         Social History:  Social History     Socioeconomic History    Marital status:    Tobacco Use    Smoking status: Never     Passive exposure: Past (father smoked)    Smokeless tobacco: Never    Tobacco comments:     caffeine use- coffee   Vaping Use    Vaping status: Never Used   Substance and Sexual Activity    Alcohol use: No    Drug use: No    Sexual activity: Defer       Family History:  Family History   Problem Relation Age of Onset    Diabetes Mother     Depression Mother     Anxiety disorder Mother     Heart disease Father     Hypertension Father     Diabetes Father     Diabetes Brother     No Known Problems Maternal Grandmother     No Known Problems Maternal Grandfather     No Known Problems Paternal Grandmother     No Known Problems Paternal Grandfather     Malig Hyperthermia Neg Hx          PATIENT ASSESSMENT     Vitals:  Vitals:    08/09/24 1536   BP: 126/64   BP Location: Left arm   Patient Position: Sitting   Cuff Size: Small Adult   Pulse: 93   Temp: 97.9 °F (36.6 °C)   TempSrc: Temporal   SpO2: 100%   Weight: 53.1 kg (117 lb)   Height: 157.5 cm (62\")       BP Readings from Last 3 Encounters:   08/09/24 126/64   07/16/24 134/62   02/16/24 126/58     Wt Readings from Last 3 Encounters:   08/09/24 53.1 kg (117 lb)   07/16/24 52.5 kg (115 lb 12.8 oz)   02/16/24 55.6 kg (122 lb 9.6 oz)      Body mass index is 21.4 kg/m².    [unfilled]        Review of Systems:    Review of Systems  Constitutional neg except per HPI   Resp baseline dyspnea on exertion  CV neg   Musc chronic back pain    Physical " "Exam:    Physical Exam  Constitutional  No distress  Cardiovascular Rate  irvin . Rhythm: regular . Heart sounds:  murmur   Pulmonary/Chest  Effort normal. Breath sounds:  normal  Psychiatric  Alert. Judgment and thought content normal. Mood normal      Reviewed Data:    Labs:   Lab Results   Component Value Date     02/16/2024    K 3.5 02/16/2024    CALCIUM 9.0 02/16/2024    AST 14 02/16/2024    ALT 11 02/16/2024    BUN 16 02/16/2024    CREATININE 0.87 02/16/2024    CREATININE 0.66 08/16/2023    CREATININE 0.51 (L) 05/04/2022    EGFRIFNONA 83 12/27/2021    EGFRIFAFRI 123 12/10/2018       Lab Results   Component Value Date    HGBA1C 5.70 (H) 08/16/2023    HGBA1C 5.70 (H) 03/01/2019    HGBA1C 5.20 07/06/2018       Lab Results   Component Value Date    LDL 57 02/16/2024    LDL 63 08/16/2023    LDL 37 12/27/2021    HDL 45 02/16/2024    TRIG 180 (H) 02/16/2024    CHOLHDLRATIO 2.9 02/16/2024       Lab Results   Component Value Date    TSH 0.779 02/16/2024    FREET4 0.85 02/16/2024          Lab Results   Component Value Date    WBC 6.5 02/16/2024    HGB 10.9 (L) 02/16/2024    HGB 12.4 08/16/2023    HGB 10.5 (L) 05/04/2022     02/16/2024                 No results found for: \"PSA\"    Imaging:                Medical Tests:              Summary of old records / correspondence / consultant report:             Request outside records:           SCREENING ASSESSMENT      Screening for Glaucoma:  Previous screening for glaucoma?: Yes    Hearing Loss Screen:  Finger Rub Hearing Test (right ear): Passed  Finger Rub Hearing Test (left ear): Passed    Urinary Incontinence Screen:  Episodes of urinary incontinence? : No    Depression Screen:      8/9/2024     3:17 PM   PHQ-2/PHQ-9 Depression Screening   Little Interest or Pleasure in Doing Things 0-->not at all   Feeling Down, Depressed or Hopeless 0-->not at all   PHQ-9: Brief Depression Severity Measure Score 0        PHQ-2: 0 (Not depressed)    PHQ-9: 0 (Negative " screening for depression)         FUNCTIONAL, FALL RISK, & COGNITIVE SCREENING (components below):     A) Functional and cognitive status based on patient responses:        8/9/2024     3:31 PM   Functional & Cognitive Status   Do you have difficulty preparing food and eating? No   Do you have difficulty bathing yourself, getting dressed or grooming yourself? No   Do you have difficulty using the toilet? No   Do you have difficulty moving around from place to place? Yes   Do you have trouble with steps or getting out of a bed or a chair? Yes   Current Diet Limited Junk Food   Dental Exam Up to date   Eye Exam Up to date   Exercise (times per week) 0 times per week   Current Exercises Include No Regular Exercise   Do you need help using the phone?  No   Are you deaf or do you have serious difficulty hearing?  Yes   Do you need help to go to places out of walking distance? Yes   Do you need help shopping? Yes   Do you need help preparing meals?  Yes   Do you need help with housework?  Yes   Do you need help with laundry? Yes   Do you need help taking your medications? Yes   Do you need help managing money? Yes   Do you ever drive or ride in a car without wearing a seat belt? No   Have you felt unusual stress, anger or loneliness in the last month? No   Who do you live with? Alone   If you need help, do you have trouble finding someone available to you? No   Have you been bothered in the last four weeks by sexual problems? No   Do you have difficulty concentrating, remembering or making decisions? No       B) Assessment of Fall Risk:    Fall Risk Assessment was completed, and patient is at moderate risk for falls.    Need for further evaluation of gait, strength, and balance? : NO    Timed Up and Go (TUG): Wheelchair  (>= 12 seconds indicates high risk for falling)    Observable abnormalities included:   Walker or wheelchair    C. Assessment of Cognitive Function:    Mini-Cog Test:     1) Registration (3 objects):  YES   2) Clock Draw: Passed? : Yes   3) Number of objects recalled: 3 (MA)     Further evaluation required? : No    **OVERALL ASSESSMENT OF FUNCTIONAL ABILITY**  (Assessment of ability to perform ADL's (showering/bathing, using toilet, dressing, feeding self, moving self around) and IADL's (use telephone, shop, prepare food, housekeep, do laundry, transport independently, take medications independently, and handle finances)    DEGREE OF FUNCTIONAL IMPAIRMENT:   MODERATE (based on assessment noted above)    ABILITY TO LIVE INDEPENDENTLY:    Capable of living with spouse/partner/family       COUNSELING       A. Identification of Health Risk Factors:    Risk factors include: cardiovascular risk factors      B. Age-Appropriate Screening Schedule:  (Refer to the list below for future screening recommendations based on patient's age, sex and/or medical conditions. Orders for these recommended tests are listed in the plan section. The patient has been provided with a written plan)    Health Maintenance Topics  Health Maintenance   Topic Date Due    ZOSTER VACCINE (2 of 3) 09/01/2007    TDAP/TD VACCINES (2 - Td or Tdap) 05/01/2021    COVID-19 Vaccine (7 - 2023-24 season) 02/24/2024    INFLUENZA VACCINE  08/01/2024    DXA SCAN  08/09/2024 (Originally 6/23/2019)    LIPID PANEL  02/16/2025    ANNUAL WELLNESS VISIT  08/09/2025    RSV Vaccine - Adults  Completed    Pneumococcal Vaccine 65+  Completed       Health Maintenance Topics Due or Over-Due  Health Maintenance Due   Topic Date Due    ZOSTER VACCINE (2 of 3) 09/01/2007    TDAP/TD VACCINES (2 - Td or Tdap) 05/01/2021    COVID-19 Vaccine (7 - 2023-24 season) 02/24/2024    INFLUENZA VACCINE  08/01/2024         C. Advanced Care Planning:    Advance Care Planning   ACP discussion was held with the patient during this visit. Patient has an advance directive (not in EMR), copy requested.       D. Patient Self-Management and Personalized Health Advice:    She has been provided  with PERSONALIZED COUNSELING/INFORMATION (AVS educational information) about:     -- optimizing diet/nutrition plans, improving exercise / conditioning, and reducing risk for cardiovascular disease (heart, stroke, vascular)      She has been recommended for the following PREVENTATIVE SERVICES which has been performed today, will be ordered today or ordered/performed on upcoming follow-up visit:     LIFESTYLE PREVENTATIVE MEASURES   NUTRITION counseling provided, EXERCISE counseling provided    CARDIOVASCULAR SCREENING   Lipid panel, followed by cardiology    CANCER SCREENING     MISC SCREENING  N/A    VACCINATION/IMMUNIZATION   vaccination for INFLUENZA administered/recommended/discussed , COVID-19 vaccination discussed/recommended      E. Miscellaneous Items: [id number]    Aspirin use counseling: Does not need ASA (and currently is not on it)    Discussed BMI with her. The BMI is in the acceptable range    Reviewed use of high risk medication in the elderly: YES    Reviewed for potential of harmful drug interactions in the elderly: YES        WRAP UP       Assessment & Plan:    1) MEDICARE ANNUAL WELLNESS VISIT    2) OTHER MEDICAL CONDITIONS ADDRESSED TODAY:            Problem List Items Addressed This Visit       Hyperlipidemia    Relevant Medications    pravastatin (PRAVACHOL) 40 MG tablet    Other Relevant Orders    Lipid Panel With / Chol / HDL Ratio    Comprehensive Metabolic Panel    Neuropathy, peripheral    Relevant Medications    DULoxetine (CYMBALTA) 60 MG capsule    Other Relevant Orders    Compliance Drug Analysis, Ur - Urine, Clean Catch    Essential hypertension    Relevant Medications    furosemide (LASIX) 20 MG tablet    metoprolol succinate XL (TOPROL-XL) 25 MG 24 hr tablet    Other Relevant Orders    Comprehensive Metabolic Panel    TSH+Free T4    Chronic low back pain    Relevant Medications    traMADol (ULTRAM) 50 MG tablet    DULoxetine (CYMBALTA) 60 MG capsule    Other Relevant Orders     Compliance Drug Analysis, Ur - Urine, Clean Catch    Paroxysmal atrial fibrillation - Primary    Relevant Medications    metoprolol succinate XL (TOPROL-XL) 25 MG 24 hr tablet    Other Relevant Orders    Comprehensive Metabolic Panel    Anemia    Relevant Medications    ferrous sulfate 325 (65 FE) MG tablet    Other Relevant Orders    Iron and TIBC    Ferritin    CBC w AUTO Differential     Other Visit Diagnoses       High risk medication use        Relevant Orders    Compliance Drug Analysis, Ur - Urine, Clean Catch                      Orders Placed This Encounter   Procedures    Iron and TIBC    Ferritin    Lipid Panel With / Chol / HDL Ratio    Compliance Drug Analysis, Ur - Urine, Clean Catch    Comprehensive Metabolic Panel    TSH+Free T4    CBC w AUTO Differential       Discussion / Summary:  Raghav reviewed, controlled substance contract signed and drug screen ordered for tramadol with no significant side effects and controlling her pain well.  Continue all chronic medications  Influenza and COVID-19 vaccine in the fall  Declines all health maintenance  Follow-up in 6 months for chronic medical, 1 year Medicare wellness  Medications as of TODAY:              Current Outpatient Medications   Medication Sig Dispense Refill    apixaban (Eliquis) 2.5 MG tablet tablet Take 1 tablet by mouth Every 12 (Twelve) Hours. 180 tablet 0    calcium carbonate (TUMS) 500 MG chewable tablet Chew 1,000 mg 2 (Two) Times a Day As Needed for Indigestion or Heartburn.      Diclofenac Sodium (VOLTAREN) 1 % gel gel APPLY 4 G TOPICALLY TO THE APPROPRIATE AREA AS DIRECTED 4 (FOUR) TIMES A DAY AS NEEDED (TO AFFECT AREAS WITH ARTHRITIS PAIN). 100 g 1    DULoxetine (CYMBALTA) 60 MG capsule TAKE 1 CAPSULE BY MOUTH EVERY DAY 90 capsule 3    ferrous sulfate 325 (65 FE) MG tablet Take one tab po daily 90 tablet 3    furosemide (LASIX) 20 MG tablet TAKE 2 TABLETS BY MOUTH EVERY  tablet 1    metoprolol succinate XL (TOPROL-XL) 25 MG 24  hr tablet TAKE 1 TABLET BY MOUTH EVERY DAY 90 tablet 1    omeprazole (priLOSEC) 20 MG capsule TAKE 1 CAPSULE BY MOUTH DAILY WITH BREAKFAST. 90 capsule 3    polyethylene glycol (MIRALAX) pack packet Take 17 g by mouth Daily. (Patient taking differently: Take 17 g by mouth Daily As Needed.)      pravastatin (PRAVACHOL) 40 MG tablet Take 1 tablet by mouth Daily. 90 tablet 3    traMADol (ULTRAM) 50 MG tablet Take 1 tablet by mouth Every 8 (Eight) Hours As Needed for Moderate Pain. for pain 270 tablet 0     No current facility-administered medications for this visit.         FOLLOW-UP:            Return in about 6 months (around 2/9/2025) for Next scheduled follow up.              Future Appointments   Date Time Provider Department Center   2/21/2025  2:45 PM Tomas Nix DNP, APRN [facility]   7/17/2025  1:30 PM Justina Beal APRN [facility]           After Visit Summary (AVS) including the Personalized Prevention  Plan Services (PPPS) was either printed and given to the patient at check-out today and/or sent to [facility] for review.

## 2024-08-10 LAB
ALBUMIN SERPL-MCNC: 4.5 G/DL (ref 3.6–4.6)
ALP SERPL-CCNC: 67 IU/L (ref 44–121)
ALT SERPL-CCNC: 14 IU/L (ref 0–32)
AST SERPL-CCNC: 16 IU/L (ref 0–40)
BASOPHILS # BLD AUTO: 0.2 X10E3/UL (ref 0–0.2)
BASOPHILS NFR BLD AUTO: 2 %
BILIRUB SERPL-MCNC: 0.3 MG/DL (ref 0–1.2)
BUN SERPL-MCNC: 17 MG/DL (ref 10–36)
BUN/CREAT SERPL: 20 (ref 12–28)
CALCIUM SERPL-MCNC: 9.4 MG/DL (ref 8.7–10.3)
CHLORIDE SERPL-SCNC: 100 MMOL/L (ref 96–106)
CHOLEST SERPL-MCNC: 117 MG/DL (ref 100–199)
CHOLEST/HDLC SERPL: 2.3 RATIO (ref 0–4.4)
CO2 SERPL-SCNC: 27 MMOL/L (ref 20–29)
CREAT SERPL-MCNC: 0.86 MG/DL (ref 0.57–1)
EGFRCR SERPLBLD CKD-EPI 2021: 61 ML/MIN/1.73
EOSINOPHIL # BLD AUTO: 0.2 X10E3/UL (ref 0–0.4)
EOSINOPHIL NFR BLD AUTO: 3 %
ERYTHROCYTE [DISTWIDTH] IN BLOOD BY AUTOMATED COUNT: 13.4 % (ref 11.7–15.4)
FERRITIN SERPL-MCNC: 732 NG/ML (ref 15–150)
GLOBULIN SER CALC-MCNC: 2.3 G/DL (ref 1.5–4.5)
GLUCOSE SERPL-MCNC: 103 MG/DL (ref 70–99)
HCT VFR BLD AUTO: 33.3 % (ref 34–46.6)
HDLC SERPL-MCNC: 51 MG/DL
HGB BLD-MCNC: 11.1 G/DL (ref 11.1–15.9)
IMM GRANULOCYTES # BLD AUTO: 0 X10E3/UL (ref 0–0.1)
IMM GRANULOCYTES NFR BLD AUTO: 0 %
IRON SATN MFR SERPL: 44 % (ref 15–55)
IRON SERPL-MCNC: 109 UG/DL (ref 27–139)
LDLC SERPL CALC-MCNC: 44 MG/DL (ref 0–99)
LYMPHOCYTES # BLD AUTO: 1.7 X10E3/UL (ref 0.7–3.1)
LYMPHOCYTES NFR BLD AUTO: 24 %
MCH RBC QN AUTO: 37.5 PG (ref 26.6–33)
MCHC RBC AUTO-ENTMCNC: 33.3 G/DL (ref 31.5–35.7)
MCV RBC AUTO: 113 FL (ref 79–97)
MONOCYTES # BLD AUTO: 0.7 X10E3/UL (ref 0.1–0.9)
MONOCYTES NFR BLD AUTO: 9 %
NEUTROPHILS # BLD AUTO: 4.4 X10E3/UL (ref 1.4–7)
NEUTROPHILS NFR BLD AUTO: 62 %
PLATELET # BLD AUTO: 327 X10E3/UL (ref 150–450)
POTASSIUM SERPL-SCNC: 3.6 MMOL/L (ref 3.5–5.2)
PROT SERPL-MCNC: 6.8 G/DL (ref 6–8.5)
RBC # BLD AUTO: 2.96 X10E6/UL (ref 3.77–5.28)
SODIUM SERPL-SCNC: 140 MMOL/L (ref 134–144)
T4 FREE SERPL-MCNC: 1.06 NG/DL (ref 0.82–1.77)
TIBC SERPL-MCNC: 246 UG/DL (ref 250–450)
TRIGL SERPL-MCNC: 127 MG/DL (ref 0–149)
TSH SERPL DL<=0.005 MIU/L-ACNC: 0.76 UIU/ML (ref 0.45–4.5)
UIBC SERPL-MCNC: 137 UG/DL (ref 118–369)
VLDLC SERPL CALC-MCNC: 22 MG/DL (ref 5–40)
WBC # BLD AUTO: 7.2 X10E3/UL (ref 3.4–10.8)

## 2024-08-16 LAB — DRUGS UR: NORMAL

## 2024-08-18 DIAGNOSIS — D64.9 ANEMIA, UNSPECIFIED TYPE: ICD-10-CM

## 2024-08-18 RX ORDER — FERROUS SULFATE 325(65) MG
TABLET ORAL
Qty: 90 TABLET | Refills: 3 | Status: SHIPPED | OUTPATIENT
Start: 2024-08-18

## 2024-08-19 DIAGNOSIS — E78.2 MIXED HYPERLIPIDEMIA: ICD-10-CM

## 2024-08-19 DIAGNOSIS — M54.50 CHRONIC LOW BACK PAIN, UNSPECIFIED BACK PAIN LATERALITY, UNSPECIFIED WHETHER SCIATICA PRESENT: ICD-10-CM

## 2024-08-19 DIAGNOSIS — G89.29 CHRONIC LOW BACK PAIN, UNSPECIFIED BACK PAIN LATERALITY, UNSPECIFIED WHETHER SCIATICA PRESENT: ICD-10-CM

## 2024-08-19 DIAGNOSIS — I48.11 LONGSTANDING PERSISTENT ATRIAL FIBRILLATION: ICD-10-CM

## 2024-08-19 RX ORDER — FUROSEMIDE 20 MG/1
40 TABLET ORAL DAILY
Qty: 180 TABLET | Refills: 1 | Status: SHIPPED | OUTPATIENT
Start: 2024-08-19

## 2024-08-19 RX ORDER — METOPROLOL SUCCINATE 25 MG/1
TABLET, EXTENDED RELEASE ORAL
Qty: 90 TABLET | Refills: 1 | Status: SHIPPED | OUTPATIENT
Start: 2024-08-19

## 2024-08-19 RX ORDER — APIXABAN 2.5 MG/1
2.5 TABLET, FILM COATED ORAL EVERY 12 HOURS
Qty: 180 TABLET | Refills: 0 | Status: SHIPPED | OUTPATIENT
Start: 2024-08-19

## 2024-08-20 RX ORDER — PRAVASTATIN SODIUM 40 MG
40 TABLET ORAL DAILY
Qty: 90 TABLET | Refills: 1 | Status: SHIPPED | OUTPATIENT
Start: 2024-08-20

## 2024-08-20 RX ORDER — TRAMADOL HYDROCHLORIDE 50 MG/1
50 TABLET ORAL EVERY 8 HOURS PRN
Qty: 90 TABLET | Refills: 2 | Status: SHIPPED | OUTPATIENT
Start: 2024-08-20

## 2024-10-11 DIAGNOSIS — I48.11 LONGSTANDING PERSISTENT ATRIAL FIBRILLATION: ICD-10-CM

## 2024-10-11 RX ORDER — APIXABAN 2.5 MG/1
2.5 TABLET, FILM COATED ORAL EVERY 12 HOURS
Qty: 180 TABLET | Refills: 0 | Status: SHIPPED | OUTPATIENT
Start: 2024-10-11

## 2024-12-05 DIAGNOSIS — M54.50 CHRONIC LOW BACK PAIN, UNSPECIFIED BACK PAIN LATERALITY, UNSPECIFIED WHETHER SCIATICA PRESENT: ICD-10-CM

## 2024-12-05 DIAGNOSIS — E78.2 MIXED HYPERLIPIDEMIA: ICD-10-CM

## 2024-12-05 DIAGNOSIS — I48.11 LONGSTANDING PERSISTENT ATRIAL FIBRILLATION: ICD-10-CM

## 2024-12-05 DIAGNOSIS — G89.29 CHRONIC LOW BACK PAIN, UNSPECIFIED BACK PAIN LATERALITY, UNSPECIFIED WHETHER SCIATICA PRESENT: ICD-10-CM

## 2024-12-05 DIAGNOSIS — G62.9 PERIPHERAL POLYNEUROPATHY: ICD-10-CM

## 2024-12-05 DIAGNOSIS — K21.9 GASTROESOPHAGEAL REFLUX DISEASE WITHOUT ESOPHAGITIS: ICD-10-CM

## 2024-12-05 RX ORDER — DULOXETIN HYDROCHLORIDE 60 MG/1
60 CAPSULE, DELAYED RELEASE ORAL DAILY
Qty: 90 CAPSULE | Refills: 3 | Status: SHIPPED | OUTPATIENT
Start: 2024-12-05

## 2024-12-05 RX ORDER — PRAVASTATIN SODIUM 40 MG
40 TABLET ORAL DAILY
Qty: 90 TABLET | Refills: 1 | Status: SHIPPED | OUTPATIENT
Start: 2024-12-05

## 2024-12-05 RX ORDER — METOPROLOL SUCCINATE 25 MG/1
25 TABLET, EXTENDED RELEASE ORAL DAILY
Qty: 90 TABLET | Refills: 1 | Status: SHIPPED | OUTPATIENT
Start: 2024-12-05

## 2024-12-05 RX ORDER — FUROSEMIDE 20 MG/1
40 TABLET ORAL DAILY
Qty: 180 TABLET | Refills: 1 | Status: SHIPPED | OUTPATIENT
Start: 2024-12-05

## 2024-12-05 RX ORDER — TRAMADOL HYDROCHLORIDE 50 MG/1
50 TABLET ORAL EVERY 8 HOURS PRN
Qty: 90 TABLET | Refills: 2 | Status: SHIPPED | OUTPATIENT
Start: 2024-12-05

## 2025-02-07 ENCOUNTER — TELEPHONE (OUTPATIENT)
Dept: CARDIOLOGY | Facility: CLINIC | Age: OVER 89
End: 2025-02-07
Payer: MEDICARE

## 2025-02-07 ENCOUNTER — HOSPITAL ENCOUNTER (INPATIENT)
Facility: HOSPITAL | Age: OVER 89
LOS: 4 days | Discharge: HOME HEALTH | DRG: 291 | End: 2025-02-11
Attending: EMERGENCY MEDICINE | Admitting: INTERNAL MEDICINE
Payer: MEDICARE

## 2025-02-07 ENCOUNTER — APPOINTMENT (OUTPATIENT)
Dept: GENERAL RADIOLOGY | Facility: HOSPITAL | Age: OVER 89
DRG: 291 | End: 2025-02-07
Payer: MEDICARE

## 2025-02-07 DIAGNOSIS — J18.9 PNEUMONIA OF BOTH LUNGS DUE TO INFECTIOUS ORGANISM, UNSPECIFIED PART OF LUNG: ICD-10-CM

## 2025-02-07 DIAGNOSIS — M48.062 SPINAL STENOSIS OF LUMBAR REGION WITH NEUROGENIC CLAUDICATION: ICD-10-CM

## 2025-02-07 DIAGNOSIS — J96.01 ACUTE HYPOXEMIC RESPIRATORY FAILURE: Primary | ICD-10-CM

## 2025-02-07 DIAGNOSIS — I50.9 ACUTE ON CHRONIC CONGESTIVE HEART FAILURE, UNSPECIFIED HEART FAILURE TYPE: ICD-10-CM

## 2025-02-07 DIAGNOSIS — I48.91 ATRIAL FIBRILLATION WITH RAPID VENTRICULAR RESPONSE: ICD-10-CM

## 2025-02-07 DIAGNOSIS — M41.9 ACQUIRED SCOLIOSIS: ICD-10-CM

## 2025-02-07 LAB
ALBUMIN SERPL-MCNC: 3.8 G/DL (ref 3.5–5.2)
ALBUMIN/GLOB SERPL: 1.2 G/DL
ALP SERPL-CCNC: 70 U/L (ref 39–117)
ALT SERPL W P-5'-P-CCNC: 25 U/L (ref 1–33)
ANION GAP SERPL CALCULATED.3IONS-SCNC: 17.2 MMOL/L (ref 5–15)
APTT PPP: 26.3 SECONDS (ref 22.7–35.4)
ARTERIAL PATENCY WRIST A: POSITIVE
AST SERPL-CCNC: 50 U/L (ref 1–32)
ATMOSPHERIC PRESS: 755 MMHG
B PARAPERT DNA SPEC QL NAA+PROBE: NOT DETECTED
B PERT DNA SPEC QL NAA+PROBE: NOT DETECTED
BACTERIA UR QL AUTO: ABNORMAL /HPF
BASE EXCESS BLDA CALC-SCNC: -0.2 MMOL/L (ref 0–2)
BASOPHILS # BLD MANUAL: 0 10*3/MM3 (ref 0–0.2)
BASOPHILS NFR BLD MANUAL: 0 % (ref 0–1.5)
BDY SITE: ABNORMAL
BILIRUB SERPL-MCNC: 0.7 MG/DL (ref 0–1.2)
BILIRUB UR QL STRIP: NEGATIVE
BUN SERPL-MCNC: 20 MG/DL (ref 8–23)
BUN/CREAT SERPL: 22.7 (ref 7–25)
C PNEUM DNA NPH QL NAA+NON-PROBE: NOT DETECTED
CALCIUM SPEC-SCNC: 9 MG/DL (ref 8.2–9.6)
CHLORIDE SERPL-SCNC: 101 MMOL/L (ref 98–107)
CLARITY UR: ABNORMAL
CO2 BLDA-SCNC: 26.5 MMOL/L (ref 23–27)
CO2 SERPL-SCNC: 19.8 MMOL/L (ref 22–29)
COLOR UR: YELLOW
CREAT SERPL-MCNC: 0.88 MG/DL (ref 0.57–1)
D-LACTATE SERPL-SCNC: 2.4 MMOL/L (ref 0.5–2)
D-LACTATE SERPL-SCNC: 4.9 MMOL/L (ref 0.5–2)
D-LACTATE SERPL-SCNC: 7.1 MMOL/L (ref 0.5–2)
DEPRECATED RDW RBC AUTO: 56.3 FL (ref 37–54)
DEVICE COMMENT: ABNORMAL
EGFRCR SERPLBLD CKD-EPI 2021: 59.9 ML/MIN/1.73
EOSINOPHIL # BLD MANUAL: 0.18 10*3/MM3 (ref 0–0.4)
EOSINOPHIL NFR BLD MANUAL: 1 % (ref 0.3–6.2)
ERYTHROCYTE [DISTWIDTH] IN BLOOD BY AUTOMATED COUNT: 14.4 % (ref 12.3–15.4)
FLUAV SUBTYP SPEC NAA+PROBE: NOT DETECTED
FLUBV RNA ISLT QL NAA+PROBE: NOT DETECTED
GEN 5 1HR TROPONIN T REFLEX: 46 NG/L
GLOBULIN UR ELPH-MCNC: 3.1 GM/DL
GLUCOSE BLDC GLUCOMTR-MCNC: 126 MG/DL (ref 70–130)
GLUCOSE SERPL-MCNC: 333 MG/DL (ref 65–99)
GLUCOSE UR STRIP-MCNC: ABNORMAL MG/DL
HADV DNA SPEC NAA+PROBE: NOT DETECTED
HCO3 BLDA-SCNC: 25.2 MMOL/L (ref 22–28)
HCOV 229E RNA SPEC QL NAA+PROBE: NOT DETECTED
HCOV HKU1 RNA SPEC QL NAA+PROBE: NOT DETECTED
HCOV NL63 RNA SPEC QL NAA+PROBE: NOT DETECTED
HCOV OC43 RNA SPEC QL NAA+PROBE: NOT DETECTED
HCT VFR BLD AUTO: 33.3 % (ref 34–46.6)
HEMODILUTION: NO
HGB BLD-MCNC: 11.3 G/DL (ref 12–15.9)
HGB UR QL STRIP.AUTO: ABNORMAL
HMPV RNA NPH QL NAA+NON-PROBE: NOT DETECTED
HPIV1 RNA ISLT QL NAA+PROBE: NOT DETECTED
HPIV2 RNA SPEC QL NAA+PROBE: NOT DETECTED
HPIV3 RNA NPH QL NAA+PROBE: NOT DETECTED
HPIV4 P GENE NPH QL NAA+PROBE: NOT DETECTED
HYALINE CASTS UR QL AUTO: ABNORMAL /LPF
INHALED O2 CONCENTRATION: 40 %
INR PPP: 1.41 (ref 0.9–1.1)
INSPIRATORY TIME: 1
KETONES UR QL STRIP: NEGATIVE
LEUKOCYTE ESTERASE UR QL STRIP.AUTO: ABNORMAL
LYMPHOCYTES # BLD MANUAL: 0.88 10*3/MM3 (ref 0.7–3.1)
LYMPHOCYTES NFR BLD MANUAL: 1 % (ref 5–12)
M PNEUMO IGG SER IA-ACNC: NOT DETECTED
MCH RBC QN AUTO: 38.3 PG (ref 26.6–33)
MCHC RBC AUTO-ENTMCNC: 33.9 G/DL (ref 31.5–35.7)
MCV RBC AUTO: 112.9 FL (ref 79–97)
MODALITY: ABNORMAL
MONOCYTES # BLD: 0.18 10*3/MM3 (ref 0.1–0.9)
NEUTROPHILS # BLD AUTO: 16.31 10*3/MM3 (ref 1.7–7)
NEUTROPHILS NFR BLD MANUAL: 93 % (ref 42.7–76)
NITRITE UR QL STRIP: NEGATIVE
NRBC BLD AUTO-RTO: 1.1 /100 WBC (ref 0–0.2)
NRBC SPEC MANUAL: 1 /100 WBC (ref 0–0.2)
NT-PROBNP SERPL-MCNC: 2971 PG/ML (ref 0–1800)
O2 A-A PPRESDIFF RESPIRATORY: 0.4 MMHG
PCO2 BLDA: 42.9 MM HG (ref 35–45)
PEEP RESPIRATORY: 6 CM[H2O]
PH BLDA: 7.38 PH UNITS (ref 7.35–7.45)
PH UR STRIP.AUTO: 6.5 [PH] (ref 5–8)
PLAT MORPH BLD: NORMAL
PLATELET # BLD AUTO: 362 10*3/MM3 (ref 140–450)
PMV BLD AUTO: 11.6 FL (ref 6–12)
PO2 BLD: 235 MM[HG] (ref 0–500)
PO2 BLDA: 93.9 MM HG (ref 80–100)
POLYCHROMASIA BLD QL SMEAR: ABNORMAL
POTASSIUM SERPL-SCNC: 4.7 MMOL/L (ref 3.5–5.2)
PROCALCITONIN SERPL-MCNC: <0.02 NG/ML (ref 0–0.25)
PROT SERPL-MCNC: 6.9 G/DL (ref 6–8.5)
PROT UR QL STRIP: NEGATIVE
PROTHROMBIN TIME: 17.3 SECONDS (ref 11.7–14.2)
QT INTERVAL: 366 MS
QTC INTERVAL: 475 MS
RBC # BLD AUTO: 2.95 10*6/MM3 (ref 3.77–5.28)
RBC # UR STRIP: ABNORMAL /HPF
REF LAB TEST METHOD: ABNORMAL
RHINOVIRUS RNA SPEC NAA+PROBE: NOT DETECTED
RSV RNA NPH QL NAA+NON-PROBE: NOT DETECTED
SAO2 % BLDCOA: 97.1 % (ref 92–98.5)
SARS-COV-2 RNA NPH QL NAA+NON-PROBE: NOT DETECTED
SET MECH RESP RATE: 20
SODIUM SERPL-SCNC: 138 MMOL/L (ref 136–145)
SP GR UR STRIP: 1.01 (ref 1–1.03)
SQUAMOUS #/AREA URNS HPF: ABNORMAL /HPF
TOTAL RATE: 23 BREATHS/MINUTE
TROPONIN T % DELTA: 64
TROPONIN T NUMERIC DELTA: 18 NG/L
TROPONIN T SERPL HS-MCNC: 28 NG/L
UROBILINOGEN UR QL STRIP: ABNORMAL
VARIANT LYMPHS NFR BLD MANUAL: 5 % (ref 19.6–45.3)
VT ON VENT VENT: 643 ML
WBC # UR STRIP: ABNORMAL /HPF
WBC MORPH BLD: NORMAL
WBC NRBC COR # BLD AUTO: 17.54 10*3/MM3 (ref 3.4–10.8)

## 2025-02-07 PROCEDURE — 93005 ELECTROCARDIOGRAM TRACING: CPT | Performed by: EMERGENCY MEDICINE

## 2025-02-07 PROCEDURE — 71045 X-RAY EXAM CHEST 1 VIEW: CPT

## 2025-02-07 PROCEDURE — 85610 PROTHROMBIN TIME: CPT | Performed by: EMERGENCY MEDICINE

## 2025-02-07 PROCEDURE — 83605 ASSAY OF LACTIC ACID: CPT | Performed by: EMERGENCY MEDICINE

## 2025-02-07 PROCEDURE — 0202U NFCT DS 22 TRGT SARS-COV-2: CPT | Performed by: EMERGENCY MEDICINE

## 2025-02-07 PROCEDURE — 25010000002 CEFTRIAXONE PER 250 MG: Performed by: EMERGENCY MEDICINE

## 2025-02-07 PROCEDURE — 94799 UNLISTED PULMONARY SVC/PX: CPT

## 2025-02-07 PROCEDURE — 82803 BLOOD GASES ANY COMBINATION: CPT | Performed by: EMERGENCY MEDICINE

## 2025-02-07 PROCEDURE — 25010000002 AZITHROMYCIN PER 500 MG: Performed by: EMERGENCY MEDICINE

## 2025-02-07 PROCEDURE — 81001 URINALYSIS AUTO W/SCOPE: CPT | Performed by: EMERGENCY MEDICINE

## 2025-02-07 PROCEDURE — 94660 CPAP INITIATION&MGMT: CPT

## 2025-02-07 PROCEDURE — 83880 ASSAY OF NATRIURETIC PEPTIDE: CPT | Performed by: EMERGENCY MEDICINE

## 2025-02-07 PROCEDURE — 94761 N-INVAS EAR/PLS OXIMETRY MLT: CPT

## 2025-02-07 PROCEDURE — 25010000002 FUROSEMIDE PER 20 MG: Performed by: EMERGENCY MEDICINE

## 2025-02-07 PROCEDURE — 85025 COMPLETE CBC W/AUTO DIFF WBC: CPT | Performed by: EMERGENCY MEDICINE

## 2025-02-07 PROCEDURE — 85730 THROMBOPLASTIN TIME PARTIAL: CPT | Performed by: EMERGENCY MEDICINE

## 2025-02-07 PROCEDURE — 36600 WITHDRAWAL OF ARTERIAL BLOOD: CPT | Performed by: EMERGENCY MEDICINE

## 2025-02-07 PROCEDURE — 82948 REAGENT STRIP/BLOOD GLUCOSE: CPT

## 2025-02-07 PROCEDURE — 80053 COMPREHEN METABOLIC PANEL: CPT | Performed by: EMERGENCY MEDICINE

## 2025-02-07 PROCEDURE — 99291 CRITICAL CARE FIRST HOUR: CPT

## 2025-02-07 PROCEDURE — 36415 COLL VENOUS BLD VENIPUNCTURE: CPT | Performed by: EMERGENCY MEDICINE

## 2025-02-07 PROCEDURE — 25810000003 SODIUM CHLORIDE 0.9 % SOLUTION 250 ML FLEX CONT: Performed by: EMERGENCY MEDICINE

## 2025-02-07 PROCEDURE — 85007 BL SMEAR W/DIFF WBC COUNT: CPT | Performed by: EMERGENCY MEDICINE

## 2025-02-07 PROCEDURE — 87040 BLOOD CULTURE FOR BACTERIA: CPT | Performed by: EMERGENCY MEDICINE

## 2025-02-07 PROCEDURE — 84484 ASSAY OF TROPONIN QUANT: CPT | Performed by: EMERGENCY MEDICINE

## 2025-02-07 PROCEDURE — 84145 PROCALCITONIN (PCT): CPT | Performed by: EMERGENCY MEDICINE

## 2025-02-07 PROCEDURE — 93010 ELECTROCARDIOGRAM REPORT: CPT | Performed by: INTERNAL MEDICINE

## 2025-02-07 RX ORDER — FUROSEMIDE 10 MG/ML
40 INJECTION INTRAMUSCULAR; INTRAVENOUS ONCE
Status: COMPLETED | OUTPATIENT
Start: 2025-02-07 | End: 2025-02-07

## 2025-02-07 RX ORDER — SODIUM CHLORIDE 0.9 % (FLUSH) 0.9 %
10 SYRINGE (ML) INJECTION AS NEEDED
Status: DISCONTINUED | OUTPATIENT
Start: 2025-02-07 | End: 2025-02-11 | Stop reason: HOSPADM

## 2025-02-07 RX ADMIN — AZITHROMYCIN MONOHYDRATE 500 MG: 500 INJECTION, POWDER, LYOPHILIZED, FOR SOLUTION INTRAVENOUS at 18:51

## 2025-02-07 RX ADMIN — FUROSEMIDE 40 MG: 10 INJECTION, SOLUTION INTRAMUSCULAR; INTRAVENOUS at 16:48

## 2025-02-07 RX ADMIN — METOPROLOL TARTRATE 5 MG: 1 INJECTION, SOLUTION INTRAVENOUS at 16:46

## 2025-02-07 RX ADMIN — CEFTRIAXONE SODIUM 1000 MG: 1 INJECTION, POWDER, FOR SOLUTION INTRAMUSCULAR; INTRAVENOUS at 17:42

## 2025-02-07 NOTE — Clinical Note
Level of Care: Critical Care [6]   Diagnosis: Acute hypoxic respiratory failure [1975116]   Admitting Physician: BRIAN TRAYLOR [[id number]]   Attending Physician: BRIAN TRAYLOR [[id number]]   Isolate for COVID?: No [0]   Certification: I Certify That Inpatient Hospital Services Are Medically Necessary For Greater Than 2 Midnights

## 2025-02-07 NOTE — ED NOTES
Nursing report ED to floor  Alexa Peace  89 y.o.  female    HPI :  HPI  Stated Reason for Visit: shortness of breath  History Obtained From: EMS    Chief Complaint  Chief Complaint   Patient presents with    Shortness of Breath       Admitting doctor:   Hieu Callejas MD    Admitting diagnosis:   The primary encounter diagnosis was Acute hypoxemic respiratory failure. Diagnoses of Atrial fibrillation with rapid ventricular response, Acute on chronic congestive heart failure, unspecified heart failure type, and Pneumonia of both lungs due to infectious organism, unspecified part of lung were also pertinent to this visit.    Code status:   Current Code Status       Date Active Code Status Order ID Comments User Context       Prior            Allergies:   Ciprofloxacin, Metronidazole, and Prednisone    Isolation:   No active isolations    Intake and Output    Intake/Output Summary (Last 24 hours) at 2/7/2025 1838  Last data filed at 2/7/2025 1819  Gross per 24 hour   Intake 100 ml   Output --   Net 100 ml       Weight:   There were no vitals filed for this visit.    Most recent vitals:   Vitals:    02/07/25 1652 02/07/25 1700 02/07/25 1801 02/07/25 1831   BP:   137/81 117/66   Pulse:  110 98 112   Resp: 24 24     Temp: 97.9 °F (36.6 °C)      TempSrc: Tympanic      SpO2:  93% 100% 99%       Active LDAs/IV Access:   Lines, Drains & Airways       Active LDAs       Name Placement date Placement time Site Days    Peripheral IV 02/07/25 1640 Left Antecubital 02/07/25  1640  Antecubital  less than 1    External Urinary Catheter 02/07/25  1703  --  less than 1                    Labs (abnormal labs have a star):   Labs Reviewed   TROPONIN - Abnormal; Notable for the following components:       Result Value    HS Troponin T 28 (*)     All other components within normal limits    Narrative:     High Sensitive Troponin T Reference Range:  <14.0 ng/L- Negative Female for AMI  <22.0 ng/L- Negative Male for AMI  >=14 - Abnormal  Female indicating possible myocardial injury.  >=22 - Abnormal Male indicating possible myocardial injury.   Clinicians would have to utilize clinical acumen, EKG, Troponin, and serial changes to determine if it is an Acute Myocardial Infarction or myocardial injury due to an underlying chronic condition.        COMPREHENSIVE METABOLIC PANEL - Abnormal; Notable for the following components:    Glucose 333 (*)     CO2 19.8 (*)     AST (SGOT) 50 (*)     Anion Gap 17.2 (*)     eGFR 59.9 (*)     All other components within normal limits    Narrative:     GFR Categories in Chronic Kidney Disease (CKD)      GFR Category          GFR (mL/min/1.73)    Interpretation  G1                     90 or greater         Normal or high (1)  G2                      60-89                Mild decrease (1)  G3a                   45-59                Mild to moderate decrease  G3b                   30-44                Moderate to severe decrease  G4                    15-29                Severe decrease  G5                    14 or less           Kidney failure          (1)In the absence of evidence of kidney disease, neither GFR category G1 or G2 fulfill the criteria for CKD.    eGFR calculation 2021 CKD-EPI creatinine equation, which does not include race as a factor   PROTIME-INR - Abnormal; Notable for the following components:    Protime 17.3 (*)     INR 1.41 (*)     All other components within normal limits   BNP (IN-HOUSE) - Abnormal; Notable for the following components:    proBNP 2,971.0 (*)     All other components within normal limits    Narrative:     This assay is used as an aid in the diagnosis of individuals suspected of having heart failure. It can be used as an aid in the diagnosis of acute decompensated heart failure (ADHF) in patients presenting with signs and symptoms of ADHF to the emergency department (ED). In addition, NT-proBNP of <300 pg/mL indicates ADHF is not likely.    Age Range Result Interpretation   NT-proBNP Concentration (pg/mL:      <50             Positive            >450                   Gray                 300-450                    Negative             <300    50-75           Positive            >900                  Gray                300-900                  Negative            <300      >75             Positive            >1800                  Gray                300-1800                  Negative            <300   LACTIC ACID, PLASMA - Abnormal; Notable for the following components:    Lactate 7.1 (*)     All other components within normal limits   CBC WITH AUTO DIFFERENTIAL - Abnormal; Notable for the following components:    WBC 17.54 (*)     RBC 2.95 (*)     Hemoglobin 11.3 (*)     Hematocrit 33.3 (*)     .9 (*)     MCH 38.3 (*)     RDW-SD 56.3 (*)     nRBC 1.1 (*)     All other components within normal limits   MANUAL DIFFERENTIAL - Abnormal; Notable for the following components:    Neutrophil % 93.0 (*)     Lymphocyte % 5.0 (*)     Monocyte % 1.0 (*)     Neutrophils Absolute 16.31 (*)     nRBC 1.0 (*)     All other components within normal limits   BLOOD GAS, ARTERIAL - Abnormal; Notable for the following components:    Base Excess, Arterial -0.2 (*)     All other components within normal limits   LACTIC ACID, REFLEX - Abnormal; Notable for the following components:    Lactate 4.9 (*)     All other components within normal limits   HIGH SENSITIVITIY TROPONIN T 1HR - Abnormal; Notable for the following components:    HS Troponin T 46 (*)     Troponin T Numeric Delta 18 (*)     Troponin T % Delta 64 (*)     All other components within normal limits    Narrative:     High Sensitive Troponin T Reference Range:  <14.0 ng/L- Negative Female for AMI  <22.0 ng/L- Negative Male for AMI  >=14 - Abnormal Female indicating possible myocardial injury.  >=22 - Abnormal Male indicating possible myocardial injury.   Clinicians would have to utilize clinical acumen, EKG, Troponin, and serial changes  "to determine if it is an Acute Myocardial Infarction or myocardial injury due to an underlying chronic condition.        RESPIRATORY PANEL PCR W/ COVID-19 (SARS-COV-2), NP SWAB IN UTM/VTP, 2 HR TAT - Normal    Narrative:     In the setting of a positive respiratory panel with a viral infection PLUS a negative procalcitonin without other underlying concern for bacterial infection, consider observing off antibiotics or discontinuation of antibiotics and continue supportive care. If the respiratory panel is positive for atypical bacterial infection (Bordetella pertussis, Chlamydophila pneumoniae, or Mycoplasma pneumoniae), consider antibiotic de-escalation to target atypical bacterial infection.   APTT - Normal   PROCALCITONIN - Normal    Narrative:     As a Marker for Sepsis (Non-Neonates):    1. <0.5 ng/mL represents a low risk of severe sepsis and/or septic shock.  2. >2 ng/mL represents a high risk of severe sepsis and/or septic shock.    As a Marker for Lower Respiratory Tract Infections that require antibiotic therapy:    PCT on Admission    Antibiotic Therapy       6-12 Hrs later    >0.5                Strongly Recommended  >0.25 - <0.5        Recommended   0.1 - 0.25          Discouraged              Remeasure/reassess PCT  <0.1                Strongly Discouraged     Remeasure/reassess PCT    As 28 day mortality risk marker: \"Change in Procalcitonin Result\" (>80% or <=80%) if Day 0 (or Day 1) and Day 4 values are available. Refer to http://www.[facility]-pct-calculator.com    Change in PCT <=80%  A decrease of PCT levels below or equal to 80% defines a positive change in PCT test result representing a higher risk for 28-day all-cause mortality of patients diagnosed with severe sepsis for septic shock.    Change in PCT >80%  A decrease of PCT levels of more than 80% defines a negative change in PCT result representing a lower risk for 28-day all-cause mortality of patients diagnosed with severe sepsis or septic " shock.      BLOOD CULTURE   BLOOD CULTURE   URINALYSIS W/ CULTURE IF INDICATED   LACTIC ACID, REFLEX   CBC AND DIFFERENTIAL    Narrative:     The following orders were created for panel order CBC & Differential.  Procedure                               Abnormality         Status                     ---------                               -----------         ------                     CBC Auto Differential[[id number]]        Abnormal            Final result                 Please view results for these tests on the individual orders.       EKG:   ECG 12 Lead Dyspnea   Final Result   HEART SIUO=658  bpm   RR Urzenqru=761  ms   MI Interval=  ms   P Horizontal Axis=  deg   P Front Axis=  deg   QRSD Mgeksbxn=106  ms   QT Kurenzvz=183  ms   VTmZ=105  ms   QRS Axis=-40  deg   T Wave Axis=92  deg   - ABNORMAL ECG -   Atrial fibrillation   LVH with secondary repolarization abnormality   Repolarization abnormalities   Anterior  Q waves, possibly due to LVH   When compared with ECG of 04-May-2022 02:28:03,   Atrial fibrillation has replaced sinus rhythm   Electronically Signed By: Fernando Martins ([facility]) 2025-02-07 18:11:18   Date and Time of Study:2025-02-07 16:50:13          Meds given in ED:   Medications   sodium chloride 0.9 % flush 10 mL (has no administration in time range)   azithromycin (ZITHROMAX) 500 mg in sodium chloride 0.9 % 250 mL IVPB-VTB (has no administration in time range)   metoprolol tartrate (LOPRESSOR) injection 5 mg (5 mg Intravenous Given 2/7/25 1646)   furosemide (LASIX) injection 40 mg (40 mg Intravenous Given 2/7/25 1648)   cefTRIAXone (ROCEPHIN) 1,000 mg in sodium chloride 0.9 % 100 mL MBP (0 mg Intravenous Stopped 2/7/25 1819)       Imaging results:  XR Chest 1 View    Result Date: 2/7/2025   There is mild cardiomegaly.  There is left greater than right fairly extensive interstitial infiltrate, question superimposed left lower lobe superior segment alveolar infiltrate.  Probable small bilateral  effusions, no pneumothorax.  This report was finalized on 2/7/2025 5:30 PM by Dr. Go Claudio M.D on Workstation: [id number]       Ambulatory status:   - bedrest    Social issues:   Social History     Socioeconomic History    Marital status:    Tobacco Use    Smoking status: Never     Passive exposure: Past (father smoked)    Smokeless tobacco: Never    Tobacco comments:     caffeine use- coffee   Vaping Use    Vaping status: Never Used   Substance and Sexual Activity    Alcohol use: No    Drug use: No    Sexual activity: Defer       Peripheral Neurovascular  Peripheral Neurovascular (Adult)  Peripheral Neurovascular WDL: WDL    Neuro Cognitive  Neuro Cognitive (Adult)  Cognitive/Neuro/Behavioral WDL: WDL, all  Level of Consciousness: Alert  Orientation: oriented x 4  Additional Documentation: [place] Coma Scale (Group)  [place] Coma Scale  Best Eye Response: 4-->(E4) spontaneous  Best Motor Response: 6-->(M6) obeys commands  Best Verbal Response: 5-->(V5) oriented  [place] Coma Scale Score: 15    Learning  Learning Assessment  Learning Readiness and Ability: no barriers identified    Respiratory  Respiratory  Airway WDL: WDL  Respiratory WDL  Respiratory WDL: .WDL except, all  Rhythm/Pattern, Respiratory: shortness of breath, tachypneic, shallow  Breath Sounds  All Lung Fields Breath Sounds: All Fields  All Lung Fields Breath Sounds: Anterior:, Lateral:, diminished    Abdominal Pain  Safety Interventions  Safety Precautions/Falls Reduction: assistive device/personal items within reach, fall reduction program maintained, family at bedside, toileting offered    Pain Assessments  Pain (Adult)  (0-10) Pain Rating: Rest: 0  (0-10) Pain Rating: Activity: 0    NIH Stroke Scale       Brian Graves RN  02/07/25 18:38 EST

## 2025-02-07 NOTE — ED PROVIDER NOTES
EMERGENCY DEPARTMENT ENCOUNTER    Room Number:  16/16  PCP: Tomas Nix, ALONSO, APRN  Independent Historians: Patient    HPI:  Chief Complaint: had concerns including Shortness of Breath.      A complete HPI/ROS/PMH/PSH/SH/FH are unobtainable due to: Poor historian and Hard of hearing    Chronic or social conditions impacting patient care (Social Determinants of Health): None      Context: Alexa Peace is a 89 y.o. female with a medical history of atrial fibrillation (on eliquis), CHF, CAD, HTN, valvular heart disease, hld, chronic back pain, anemia who presents to the ED c/o acute shortness of breath. Pt fatigued today but no cough or fevers.  Sudden worsening of shortness of breath with tachypnea in 30s and oxygen sat 60% upon EMS arrival. Pt placed on cpap enroute with improvement. They did give solumedrol and noted afib with RVR 140s.        Review of prior external notes (non-ED) -and- Review of prior external test results outside of this encounter:   Pt followed by Dr. Brown with cardiology--last seen in office 07/2024  Echo 07/2021  Estimated right ventricular systolic pressure from tricuspid regurgitation is mildly elevated (35-45 mmHg). Calculated right ventricular systolic pressure from tricuspid regurgitation is 41 mmHg.  Mild pulmonary hypertension is present.  Left ventricular wall thickness is consistent with mild to moderate concentric hypertrophy. Sigmoid-shaped ventricular septum is present.  Calculated left ventricular EF = 58% Estimated left ventricular EF was in agreement with the calculated left ventricular EF. Left ventricular systolic function is normal.  Left atrial volume is moderately increased.  The right atrial cavity is mildly dilated.  Mild dilation of the ascending aorta is present.  Mild to moderate aortic valve regurgitation is present.  Mild to moderate mitral valve regurgitation is present with an eccentric jet noted.       Prescription drug monitoring program review:      N/A    PAST MEDICAL HISTORY  Active Ambulatory Problems     Diagnosis Date Noted   • Bad memory 04/18/2016   • Depression 05/05/2017   • Diverticulosis of intestine 05/05/2017   • Hyperlipidemia 05/05/2017   • Macular degeneration 05/05/2017   • Neuropathy, peripheral 05/05/2017   • Osteopenia 05/05/2017   • Atopic rhinitis 05/05/2017   • Urge incontinence of urine 05/05/2017   • Atherosclerosis of native coronary artery 05/05/2017   • Essential hypertension 05/05/2017   • Gastroesophageal reflux disease without esophagitis 05/05/2017   • Impaired glucose tolerance 05/05/2017   • History of coronary artery stent placement 05/25/2017   • Chronic low back pain 07/21/2017   • Acquired scoliosis 11/21/2017   • Spinal stenosis of lumbar region with neurogenic claudication 11/21/2017   • PAC (premature atrial contraction) 12/11/2017   • Slow transit constipation 12/11/2017   • Paroxysmal atrial fibrillation 01/11/2019   • Pleural effusion on left 01/14/2019   • Valvular heart disease 01/14/2019   • Non-rheumatic mitral regurgitation 01/14/2019   • Pneumonia of right lower lobe due to infectious organism 04/04/2019   • Localized edema 04/05/2019   • Sepsis due to pneumonia 04/05/2019   • Chronic anticoagulation 04/05/2019   • DNR (do not resuscitate) 04/06/2019   • Chronic diastolic CHF (congestive heart failure) 04/06/2019   • Annual physical exam 12/27/2021   • Confusion 12/27/2021   • Urinary frequency 12/27/2021   • Anemia 12/27/2021     Resolved Ambulatory Problems     Diagnosis Date Noted   • Foot drop 08/04/2016   • Hyperglycemia 05/05/2017   • Shortness of breath 04/05/2019   • Hypoxemia 04/05/2019     Past Medical History:   Diagnosis Date   • Anticoagulated    • Arthritis    • Atrial premature complex    • CAD (coronary artery disease)    • Cancer    • Colon polyp    • GERD (gastroesophageal reflux disease)    • Heart attack    • Hypertension    • New onset atrial fibrillation 01/11/2019   • Pre-diabetes    •  Spinal stenosis          PAST SURGICAL HISTORY  Past Surgical History:   Procedure Laterality Date   • ADENOIDECTOMY     • CATARACT EXTRACTION     • CORONARY ANGIOPLASTY WITH STENT PLACEMENT     • EPIDURAL BLOCK     • LUMBAR DISCECTOMY FUSION INSTRUMENTATION N/A 12/7/2017    Procedure: L2-3, L3-4, L4-5 laminectomy and fusion with local bone graft;  Surgeon: Andre Layne MD;  Location: [facility];  Service:    • TONSILECTOMY, ADENOIDECTOMY, BILATERAL MYRINGOTOMY AND TUBES     • TONSILLECTOMY           FAMILY HISTORY  Family History   Problem Relation Age of Onset   • Diabetes Mother    • Depression Mother    • Anxiety disorder Mother    • Heart disease Father    • Hypertension Father    • Diabetes Father    • Diabetes Brother    • No Known Problems Maternal Grandmother    • No Known Problems Maternal Grandfather    • No Known Problems Paternal Grandmother    • No Known Problems Paternal Grandfather    • Malig Hyperthermia Neg Hx          SOCIAL HISTORY  Social History     Socioeconomic History   • Marital status:    Tobacco Use   • Smoking status: Never     Passive exposure: Past (father smoked)   • Smokeless tobacco: Never   • Tobacco comments:     caffeine use- coffee   Vaping Use   • Vaping status: Never Used   Substance and Sexual Activity   • Alcohol use: No   • Drug use: No   • Sexual activity: Defer         ALLERGIES  Ciprofloxacin, Metronidazole, and Prednisone        REVIEW OF SYSTEMS  Review of Systems  Included in HPI  All systems reviewed and negative except for those discussed in HPI.      PHYSICAL EXAM    I have reviewed the triage vital signs and nursing notes.    ED Triage Vitals [02/07/25 1638]   Temp Heart Rate Resp BP SpO2   -- (!) 151 -- -- (!) 73 %      Temp src Heart Rate Source Patient Position BP Location FiO2 (%)   -- -- -- -- --       Physical Exam  GENERAL: thin frail F of advanced age, tachypneic with immediate drop in oxygen sat to 70% while transitioned from ems cpap to  nasal cannula oxygen, alert, tachypneic  SKIN: Warm, dry  HENT: Normocephalic, atraumatic  EYES: no scleral icterus  CV: irregular rhythm, accelerated rate 140s  RESPIRATORY: tachypnea, rales bilaterally throughout  ABDOMEN: soft, nontender, nondistended  MUSCULOSKELETAL: 2+ ble edema  NEURO: alert, moves all extremities, follows most commands                                                                   LAB RESULTS  Recent Results (from the past 24 hours)   Respiratory Panel PCR w/COVID-19(SARS-CoV-2) NGUYEN/YULY/KALLIE/PAD/COR/LUIS M In-House, NP Swab in UTM/VTM, 2 HR TAT - Swab, Nasopharynx    Collection Time: 02/07/25  4:42 PM    Specimen: Nasopharynx; Swab   Result Value Ref Range    ADENOVIRUS, PCR Not Detected Not Detected    Coronavirus 229E Not Detected Not Detected    Coronavirus HKU1 Not Detected Not Detected    Coronavirus NL63 Not Detected Not Detected    Coronavirus OC43 Not Detected Not Detected    COVID19 Not Detected Not Detected - Ref. Range    Human Metapneumovirus Not Detected Not Detected    Human Rhinovirus/Enterovirus Not Detected Not Detected    Influenza A PCR Not Detected Not Detected    Influenza B PCR Not Detected Not Detected    Parainfluenza Virus 1 Not Detected Not Detected    Parainfluenza Virus 2 Not Detected Not Detected    Parainfluenza Virus 3 Not Detected Not Detected    Parainfluenza Virus 4 Not Detected Not Detected    RSV, PCR Not Detected Not Detected    Bordetella pertussis pcr Not Detected Not Detected    Bordetella parapertussis PCR Not Detected Not Detected    Chlamydophila pneumoniae PCR Not Detected Not Detected    Mycoplasma pneumo by PCR Not Detected Not Detected   High Sensitivity Troponin T    Collection Time: 02/07/25  4:43 PM    Specimen: Blood   Result Value Ref Range    HS Troponin T 28 (H) <14 ng/L   Comprehensive Metabolic Panel    Collection Time: 02/07/25  4:43 PM    Specimen: Blood   Result Value Ref Range    Glucose 333 (H) 65 - 99 mg/dL    BUN 20 8 - 23 mg/dL     Creatinine 0.88 0.57 - 1.00 mg/dL    Sodium 138 136 - 145 mmol/L    Potassium 4.7 3.5 - 5.2 mmol/L    Chloride 101 98 - 107 mmol/L    CO2 19.8 (L) 22.0 - 29.0 mmol/L    Calcium 9.0 8.2 - 9.6 mg/dL    Total Protein 6.9 6.0 - 8.5 g/dL    Albumin 3.8 3.5 - 5.2 g/dL    ALT (SGPT) 25 1 - 33 U/L    AST (SGOT) 50 (H) 1 - 32 U/L    Alkaline Phosphatase 70 39 - 117 U/L    Total Bilirubin 0.7 0.0 - 1.2 mg/dL    Globulin 3.1 gm/dL    A/G Ratio 1.2 g/dL    BUN/Creatinine Ratio 22.7 7.0 - 25.0    Anion Gap 17.2 (H) 5.0 - 15.0 mmol/L    eGFR 59.9 (L) >60.0 mL/min/1.73   Protime-INR    Collection Time: 02/07/25  4:43 PM    Specimen: Blood   Result Value Ref Range    Protime 17.3 (H) 11.7 - 14.2 Seconds    INR 1.41 (H) 0.90 - 1.10   aPTT    Collection Time: 02/07/25  4:43 PM    Specimen: Blood   Result Value Ref Range    PTT 26.3 22.7 - 35.4 seconds   BNP    Collection Time: 02/07/25  4:43 PM    Specimen: Blood   Result Value Ref Range    proBNP 2,971.0 (H) 0.0 - 1,800.0 pg/mL   Lactic Acid, Plasma    Collection Time: 02/07/25  4:43 PM    Specimen: Blood   Result Value Ref Range    Lactate 7.1 (C) 0.5 - 2.0 mmol/L   Procalcitonin    Collection Time: 02/07/25  4:43 PM    Specimen: Blood   Result Value Ref Range    Procalcitonin <0.02 0.00 - 0.25 ng/mL   CBC Auto Differential    Collection Time: 02/07/25  4:43 PM    Specimen: Blood   Result Value Ref Range    WBC 17.54 (H) 3.40 - 10.80 10*3/mm3    RBC 2.95 (L) 3.77 - 5.28 10*6/mm3    Hemoglobin 11.3 (L) 12.0 - 15.9 g/dL    Hematocrit 33.3 (L) 34.0 - 46.6 %    .9 (H) 79.0 - 97.0 fL    MCH 38.3 (H) 26.6 - 33.0 pg    MCHC 33.9 31.5 - 35.7 g/dL    RDW 14.4 12.3 - 15.4 %    RDW-SD 56.3 (H) 37.0 - 54.0 fl    MPV 11.6 6.0 - 12.0 fL    Platelets 362 140 - 450 10*3/mm3    nRBC 1.1 (H) 0.0 - 0.2 /100 WBC   Manual Differential    Collection Time: 02/07/25  4:43 PM    Specimen: Blood   Result Value Ref Range    Neutrophil % 93.0 (H) 42.7 - 76.0 %    Lymphocyte % 5.0 (L) 19.6 - 45.3 %     Monocyte % 1.0 (L) 5.0 - 12.0 %    Eosinophil % 1.0 0.3 - 6.2 %    Basophil % 0.0 0.0 - 1.5 %    Neutrophils Absolute 16.31 (H) 1.70 - 7.00 10*3/mm3    Lymphocytes Absolute 0.88 0.70 - 3.10 10*3/mm3    Monocytes Absolute 0.18 0.10 - 0.90 10*3/mm3    Eosinophils Absolute 0.18 0.00 - 0.40 10*3/mm3    Basophils Absolute 0.00 0.00 - 0.20 10*3/mm3    nRBC 1.0 (H) 0.0 - 0.2 /100 WBC    Polychromasia Large/3+ None Seen    WBC Morphology Normal Normal    Platelet Morphology Normal Normal   ECG 12 Lead Dyspnea    Collection Time: 02/07/25  4:50 PM   Result Value Ref Range    QT Interval 366 ms    QTC Interval 475 ms   Blood Gas, Arterial -    Collection Time: 02/07/25  6:13 PM    Specimen: Arterial Blood   Result Value Ref Range    Site Right Radial     Juve's Test Positive     pH, Arterial 7.377 7.350 - 7.450 pH units    pCO2, Arterial 42.9 35.0 - 45.0 mm Hg    pO2, Arterial 93.9 80.0 - 100.0 mm Hg    HCO3, Arterial 25.2 22.0 - 28.0 mmol/L    Base Excess, Arterial -0.2 (L) 0.0 - 2.0 mmol/L    O2 Saturation, Arterial 97.1 92.0 - 98.5 %    A-a DO2 0.4 mmHg    CO2 Content 26.5 23 - 27 mmol/L    Barometric Pressure for Blood Gas 755.0000 mmHg    Modality BiPap     FIO2 40 %    Set Tidal Volume 643     Set Mech Resp Rate 20     Rate 23 Breaths/minute    PEEP 6     Hemodilution No     Inspiratory Time 1     Device Comment Avaps 20/10 sats 98%     PO2/FIO2 235 0 - 500         RADIOLOGY  XR Chest 1 View    Result Date: 2/7/2025  CXR ONE VIEW  HISTORY: short of breath  COMPARISON: 5/4/2022  TECHNIQUE: single portable AP       There is mild cardiomegaly.  There is left greater than right fairly extensive interstitial infiltrate, question superimposed left lower lobe superior segment alveolar infiltrate.  Probable small bilateral effusions, no pneumothorax.  This report was finalized on 2/7/2025 5:30 PM by Dr. Go Claudio M.D on Workstation: [id number]         MEDICATIONS GIVEN IN ER  Medications   sodium chloride 0.9 %  flush 10 mL (has no administration in time range)   azithromycin (ZITHROMAX) 500 mg in sodium chloride 0.9 % 250 mL IVPB-VTB (has no administration in time range)   metoprolol tartrate (LOPRESSOR) injection 5 mg (5 mg Intravenous Given 2/7/25 1646)   furosemide (LASIX) injection 40 mg (40 mg Intravenous Given 2/7/25 1648)   cefTRIAXone (ROCEPHIN) 1,000 mg in sodium chloride 0.9 % 100 mL MBP (0 mg Intravenous Stopped 2/7/25 1819)         ORDERS PLACED DURING THIS VISIT:  Orders Placed This Encounter   Procedures   • Respiratory Panel PCR w/COVID-19(SARS-CoV-2) NGUYEN/YULY/KALLIE/PAD/COR/LUIS M In-House, NP Swab in UTM/VTM, 2 HR TAT - Swab, Nasopharynx   • Blood Culture - Blood,   • Blood Culture - Blood,   • XR Chest 1 View   • High Sensitivity Troponin T   • Comprehensive Metabolic Panel   • Protime-INR   • aPTT   • BNP   • Lactic Acid, Plasma   • Procalcitonin   • CBC Auto Differential   • Manual Differential   • Blood Gas, Arterial -   • STAT Lactic Acid, Reflex   • High Sensitivity Troponin T 1Hr   • Urinalysis With Culture If Indicated - Urine, Clean Catch   • Notify Provider - NPPV   • Continuous Pulse Oximetry   • Pulmonology (on-call MD unless specified)   • NPPV Settings AVAPS; 6 mL/kg PBW; 90% or Greater; Initial IPAP 12 cmH2O, Max IPAP 25 cmH2O; Initial EPAP 6 cmH2O; Maintain Minimum PS of 6 mmH2O; Back Up RR 10-14   • ECG 12 Lead Dyspnea   • Insert Peripheral IV   • Inpatient Admission   • CBC & Differential         OUTPATIENT MEDICATION MANAGEMENT:  Current Facility-Administered Medications Ordered in Epic   Medication Dose Route Frequency Provider Last Rate Last Admin   • azithromycin (ZITHROMAX) 500 mg in sodium chloride 0.9 % 250 mL IVPB-VTB  500 mg Intravenous Once Flori Lucas MD       • sodium chloride 0.9 % flush 10 mL  10 mL Intravenous PRN Flori Lucas MD         Current Outpatient Medications Ordered in Epic   Medication Sig Dispense Refill   • apixaban (Eliquis) 2.5 MG tablet tablet  Take 1 tablet by mouth Every 12 (Twelve) Hours. 180 tablet 0   • calcium carbonate (TUMS) 500 MG chewable tablet Chew 1,000 mg 2 (Two) Times a Day As Needed for Indigestion or Heartburn.     • Diclofenac Sodium (VOLTAREN) 1 % gel gel APPLY 4 G TOPICALLY TO THE APPROPRIATE AREA AS DIRECTED 4 (FOUR) TIMES A DAY AS NEEDED (TO AFFECT AREAS WITH ARTHRITIS PAIN). 100 g 1   • DULoxetine (CYMBALTA) 60 MG capsule Take 1 capsule by mouth Daily. 90 capsule 3   • ferrous sulfate 325 (65 FE) MG tablet TAKE 1 TABLET BY MOUTH EVERY DAY 90 tablet 3   • furosemide (LASIX) 20 MG tablet Take 2 tablets by mouth Daily. 180 tablet 1   • metoprolol succinate XL (TOPROL-XL) 25 MG 24 hr tablet Take 1 tablet by mouth Daily. 90 tablet 1   • omeprazole (priLOSEC) 20 MG capsule Take 1 capsule by mouth Daily With Breakfast. 90 capsule 3   • polyethylene glycol (MIRALAX) pack packet Take 17 g by mouth Daily. (Patient taking differently: Take 17 g by mouth Daily As Needed.)     • pravastatin (PRAVACHOL) 40 MG tablet Take 1 tablet by mouth Daily. 90 tablet 1   • traMADol (ULTRAM) 50 MG tablet Take 1 tablet by mouth Every 8 (Eight) Hours As Needed for Moderate Pain. for pain 90 tablet 2         PROCEDURES  Procedures      Critical care provider statement:    Critical care time (minutes): 43.   Critical care time was exclusive of:  Separately billable procedures and treating other patients   Critical care was necessary to treat or prevent imminent or life-threatening deterioration of the following conditions:  Cardiac Failure, Circulatory Failure, and Respiratory Failure   Critical care was time spent personally by me on the following activities:  Development of treatment plan with patient or surrogate, discussions with consultants, evaluation of patient's response to treatment, examination of patient, obtaining history from patient or surrogate, ordering and performing treatments and interventions, ordering and review of laboratory studies, ordering  and review of radiographic studies, pulse oximetry, re-evaluation of patient's condition and review of old charts. Critical Care indicators: Atrial Fibrillation with Tachycardia, COPD/CHF Severe Exacerbation, and Hypoxia / hypoxemia       PROGRESS, DATA ANALYSIS, CONSULTS, AND MEDICAL DECISION MAKING  All labs have been independently interpreted by me.  All radiology studies have been reviewed by me. All EKG's have been independently viewed and interpreted by me.  Discussion below represents my analysis of pertinent findings related to patient's condition, differential diagnosis, treatment plan and final disposition.    This patient presents with dyspnea, most likely secondary to chf with pulm edema and afib with RVR.  The differential diagnosis list includes but is not limited to:   - acute cardiac etiologies like ACS, CHF, pericardial effusion or even tamponade  - acute respiratory etiologies like acute PE, pneumothorax , asthma, COPD exacerbation, allergic etiologies, or infectious etiologies such as PNA   - non-cardiopulmonary causes like toxidromes, metabolic etiologies such as acidemia or electrolyte derangements or even DKA, sepsis, neurologic causes including GBS and myasthenia gravis  Plan EKG, CXR, Labs incl bnp and trop as well as sepsis screening labs, ABG,  and +/- viral swab    ED Course as of 02/07/25 1956 Fri Feb 07, 2025   1642 Pt presents on EMS CPAP with rales throughout on auscultation and  systolic with sig hypoxia when transitioned off EMS cpap (73%). Pt placed on high flow nasal cannula at 15L with humidity. Plan for metoprolol for afib with rvr and lasix for diuresis. [AR]   0473 EKG ER MD interpretation   Time: 16: 50  Rhythm and rate: Atrial fibrillation rate 101  Axis: Normal  P waves: Normal  QRS complexes: LVH with repol abnl  Comparison EKG is from May 4, 2022 where patient had similar findings except that she was in a sinus rhythm at that time   [AR]   1720 I viewed patient's  chest x-ray and on my interpretation patient has borderline heart size with probably small pleural effusions and bilateral infiltrates      In addition to diuresis and BiPAP, plan for antibiotics as well and blood cultures [AR]   1758 I discussed results with patient and daughter at bedside--plan to cont bipap and iv abx and admit for further care. [AR]   1823 Discussed patient's case with Tisha Nelson with pulmonary critical care who is amenable to admitting the patient to the ICU. [AR]   1843 Tisha Nelson has seen patient at bedside.  Patient able to be weaned off of BiPAP and currently on 2 L via nasal cannula with reassuring oxygen saturations after diuresis.  Initial plan for ICU bed but now plan for floor bed.  Will discuss with A oncall. [AR]   1906 Discussed patient's case with Salome lin for A who is amenable to admitting the patient for further care to Dr. Ngo [AR]      ED Course User Index  [AR] Flori Lucas MD     Recommended 30 mL/kg bolus not received because it would be harmful or detrimental to the patient.  Reason: Concern for Fluid Overload.   Ordered 0 mL of IV Fluid as bolus. Instead received IV lasix.        AS OF 18:23 EST VITALS:    BP - 137/81  HR - 98  TEMP - 97.9 °F (36.6 °C) (Tympanic)  O2 SATS - 100%      COMPLEXITY OF CARE  The patient requires admission.    DIAGNOSIS  Final diagnoses:   Acute hypoxemic respiratory failure   Atrial fibrillation with rapid ventricular response   Acute on chronic congestive heart failure, unspecified heart failure type   Pneumonia of both lungs due to infectious organism, unspecified part of lung         DISPOSITION  ED Disposition       ED Disposition   Decision to Admit    Condition   --    Comment   Level of Care: Critical Care [6]   Diagnosis: Acute hypoxic respiratory failure [1975116]   Admitting Physician: BRIAN TRAYLOR [[id number]]   Attending Physician: BRIAN TRAYLOR [[id number]]   Isolate for COVID?: No [0]   Certification: I Certify That Inpatient  Hospital Services Are Medically Necessary For Greater Than 2 Midnights                  Please note that portions of this document were completed with a voice recognition program.    Note Disclaimer: At [facility], we believe that sharing information builds trust and better relationships. You are receiving this note because you recently visited [facility]. It is possible you will see health information before a provider has talked with you about it. This kind of information can be easy to misunderstand. To help you fully understand what it means for your health, we urge you to discuss this note with your provider.         Flori Lucas MD  02/07/25 7314       Flori Lucas MD  02/07/25 0985

## 2025-02-07 NOTE — ED TRIAGE NOTES
Patient to ED via EMS from home. Daughter called EMS for shortness of breath and rapid breathing.Patient A&O x 4 on ED arrival. Patient on CPAP on arrival  with O2 saturation of 90%. Patient reports she does not normally wear oxygen.

## 2025-02-07 NOTE — TELEPHONE ENCOUNTER
Spoke with Dr. Brown. Advised for pt to go to the ER. Notified pt's daughter of recommendations from Dr. Brown. She verbalized understanding.    Thank you,    Chhaya Castelan, RN  Triage [facility]  02/07/25 15:48 EST

## 2025-02-07 NOTE — TELEPHONE ENCOUNTER
Dr. Brown,    Pt's daughter called the office this afternoon. Pt is pale, sweating, SOA and her fingers are dusky in color. Pt's daughter said that you asked them not to bring her to ER, but I told her that ER is the best place for these specific symptoms.    Any recommendations? She is planning on calling 911, but I can call her back if you recommend something different.    Thank you,    Chhaya Castelan RN  Triage [facility]  02/07/25 15:41 EST

## 2025-02-08 ENCOUNTER — APPOINTMENT (OUTPATIENT)
Dept: CARDIOLOGY | Facility: HOSPITAL | Age: OVER 89
DRG: 291 | End: 2025-02-08
Payer: MEDICARE

## 2025-02-08 ENCOUNTER — APPOINTMENT (OUTPATIENT)
Dept: GENERAL RADIOLOGY | Facility: HOSPITAL | Age: OVER 89
DRG: 291 | End: 2025-02-08
Payer: MEDICARE

## 2025-02-08 PROBLEM — I50.33 ACUTE ON CHRONIC DIASTOLIC HEART FAILURE: Status: ACTIVE | Noted: 2019-04-06

## 2025-02-08 LAB
ALBUMIN SERPL-MCNC: 3.6 G/DL (ref 3.5–5.2)
ANION GAP SERPL CALCULATED.3IONS-SCNC: 10.9 MMOL/L (ref 5–15)
AORTIC DIMENSIONLESS INDEX: 0.7 (DI)
AV MEAN PRESS GRAD SYS DOP V1V2: 5.8 MMHG
AV VMAX SYS DOP: 174.9 CM/SEC
BH CV ECHO MEAS - AI P1/2T: 378.5 MSEC
BH CV ECHO MEAS - AO MAX PG: 12.2 MMHG
BH CV ECHO MEAS - AO V2 VTI: 30.9 CM
BH CV ECHO MEAS - AVA(I,D): 2.39 CM2
BH CV ECHO MEAS - EDV(CUBED): 107 ML
BH CV ECHO MEAS - EDV(MOD-SP2): 59 ML
BH CV ECHO MEAS - EDV(MOD-SP4): 55 ML
BH CV ECHO MEAS - EF(MOD-SP2): 67.8 %
BH CV ECHO MEAS - EF(MOD-SP4): 54.5 %
BH CV ECHO MEAS - ESV(CUBED): 40.9 ML
BH CV ECHO MEAS - ESV(MOD-SP2): 19 ML
BH CV ECHO MEAS - ESV(MOD-SP4): 25 ML
BH CV ECHO MEAS - FS: 27.4 %
BH CV ECHO MEAS - IVS/LVPW: 1.08 CM
BH CV ECHO MEAS - IVSD: 0.82 CM
BH CV ECHO MEAS - LAT PEAK E' VEL: 12.1 CM/SEC
BH CV ECHO MEAS - LV MASS(C)D: 122.7 GRAMS
BH CV ECHO MEAS - LV MAX PG: 3.8 MMHG
BH CV ECHO MEAS - LV MEAN PG: 1.84 MMHG
BH CV ECHO MEAS - LV V1 MAX: 97.3 CM/SEC
BH CV ECHO MEAS - LV V1 VTI: 21.2 CM
BH CV ECHO MEAS - LVIDD: 4.7 CM
BH CV ECHO MEAS - LVIDS: 3.4 CM
BH CV ECHO MEAS - LVOT AREA: 3.5 CM2
BH CV ECHO MEAS - LVOT DIAM: 2.1 CM
BH CV ECHO MEAS - LVPWD: 0.76 CM
BH CV ECHO MEAS - MED PEAK E' VEL: 7.8 CM/SEC
BH CV ECHO MEAS - MR MAX PG: 75.5 MMHG
BH CV ECHO MEAS - MR MAX VEL: 434.4 CM/SEC
BH CV ECHO MEAS - MV DEC SLOPE: 1090 CM/SEC2
BH CV ECHO MEAS - MV DEC TIME: 0.15 SEC
BH CV ECHO MEAS - MV E MAX VEL: 144 CM/SEC
BH CV ECHO MEAS - MV MAX PG: 12.5 MMHG
BH CV ECHO MEAS - MV MEAN PG: 3 MMHG
BH CV ECHO MEAS - MV P1/2T: 46.7 MSEC
BH CV ECHO MEAS - MV V2 VTI: 32.5 CM
BH CV ECHO MEAS - MVA(P1/2T): 4.7 CM2
BH CV ECHO MEAS - MVA(VTI): 2.28 CM2
BH CV ECHO MEAS - PA ACC TIME: 0.09 SEC
BH CV ECHO MEAS - PA V2 MAX: 141.2 CM/SEC
BH CV ECHO MEAS - PI END-D VEL: 100.4 CM/SEC
BH CV ECHO MEAS - PULM DIAS VEL: 61.1 CM/SEC
BH CV ECHO MEAS - PULM S/D: 0.64
BH CV ECHO MEAS - PULM SYS VEL: 38.8 CM/SEC
BH CV ECHO MEAS - QP/QS: 0.47
BH CV ECHO MEAS - RAP SYSTOLE: 8 MMHG
BH CV ECHO MEAS - RV MAX PG: 2.6 MMHG
BH CV ECHO MEAS - RV V1 MAX: 81 CM/SEC
BH CV ECHO MEAS - RV V1 VTI: 15.2 CM
BH CV ECHO MEAS - RVOT DIAM: 1.71 CM
BH CV ECHO MEAS - RVSP: 55.5 MMHG
BH CV ECHO MEAS - SV(LVOT): 73.8 ML
BH CV ECHO MEAS - SV(MOD-SP2): 40 ML
BH CV ECHO MEAS - SV(MOD-SP4): 30 ML
BH CV ECHO MEAS - SV(RVOT): 34.9 ML
BH CV ECHO MEAS - TAPSE (>1.6): 1.4 CM
BH CV ECHO MEAS - TR MAX PG: 47.5 MMHG
BH CV ECHO MEAS - TR MAX VEL: 344.8 CM/SEC
BH CV ECHO MEASUREMENTS AVERAGE E/E' RATIO: 14.47
BH CV XLRA - RV BASE: 3.3 CM
BH CV XLRA - RV LENGTH: 6.1 CM
BH CV XLRA - RV MID: 2.25 CM
BH CV XLRA - TDI S': 12.5 CM/SEC
BUN SERPL-MCNC: 25 MG/DL (ref 8–23)
BUN/CREAT SERPL: 34.7 (ref 7–25)
CALCIUM SPEC-SCNC: 8.6 MG/DL (ref 8.2–9.6)
CHLORIDE SERPL-SCNC: 105 MMOL/L (ref 98–107)
CO2 SERPL-SCNC: 25.1 MMOL/L (ref 22–29)
CREAT SERPL-MCNC: 0.72 MG/DL (ref 0.57–1)
D DIMER PPP FEU-MCNC: 0.83 MCGFEU/ML (ref 0–0.97)
D-LACTATE SERPL-SCNC: 2.5 MMOL/L (ref 0.5–2)
D-LACTATE SERPL-SCNC: 2.7 MMOL/L (ref 0.5–2)
D-LACTATE SERPL-SCNC: 3.3 MMOL/L (ref 0.5–2)
DEPRECATED RDW RBC AUTO: 57.9 FL (ref 37–54)
EGFRCR SERPLBLD CKD-EPI 2021: 76.2 ML/MIN/1.73
ERYTHROCYTE [DISTWIDTH] IN BLOOD BY AUTOMATED COUNT: 14.7 % (ref 12.3–15.4)
GLUCOSE SERPL-MCNC: 205 MG/DL (ref 65–99)
HCT VFR BLD AUTO: 28.6 % (ref 34–46.6)
HGB BLD-MCNC: 9.9 G/DL (ref 12–15.9)
LEFT ATRIUM VOLUME INDEX: 45.1 ML/M2
LEFT ATRIUM VOLUME: 67 ML
LV EF BIPLANE MOD: 61.8 %
MCH RBC QN AUTO: 38.7 PG (ref 26.6–33)
MCHC RBC AUTO-ENTMCNC: 34.6 G/DL (ref 31.5–35.7)
MCV RBC AUTO: 111.7 FL (ref 79–97)
PHOSPHATE SERPL-MCNC: 2.9 MG/DL (ref 2.5–4.5)
PLATELET # BLD AUTO: 259 10*3/MM3 (ref 140–450)
PMV BLD AUTO: 11.5 FL (ref 6–12)
POTASSIUM SERPL-SCNC: 3.5 MMOL/L (ref 3.5–5.2)
RBC # BLD AUTO: 2.56 10*6/MM3 (ref 3.77–5.28)
SINUS: 3 CM
SODIUM SERPL-SCNC: 141 MMOL/L (ref 136–145)
WBC NRBC COR # BLD AUTO: 11.14 10*3/MM3 (ref 3.4–10.8)

## 2025-02-08 PROCEDURE — 25810000003 SODIUM CHLORIDE 0.9 % SOLUTION 250 ML FLEX CONT

## 2025-02-08 PROCEDURE — 94664 DEMO&/EVAL PT USE INHALER: CPT

## 2025-02-08 PROCEDURE — 93306 TTE W/DOPPLER COMPLETE: CPT

## 2025-02-08 PROCEDURE — 25010000002 AZITHROMYCIN PER 500 MG

## 2025-02-08 PROCEDURE — 94760 N-INVAS EAR/PLS OXIMETRY 1: CPT

## 2025-02-08 PROCEDURE — 94799 UNLISTED PULMONARY SVC/PX: CPT

## 2025-02-08 PROCEDURE — 83605 ASSAY OF LACTIC ACID: CPT | Performed by: EMERGENCY MEDICINE

## 2025-02-08 PROCEDURE — 85379 FIBRIN DEGRADATION QUANT: CPT

## 2025-02-08 PROCEDURE — 71046 X-RAY EXAM CHEST 2 VIEWS: CPT

## 2025-02-08 PROCEDURE — 93306 TTE W/DOPPLER COMPLETE: CPT | Performed by: INTERNAL MEDICINE

## 2025-02-08 PROCEDURE — 85027 COMPLETE CBC AUTOMATED: CPT

## 2025-02-08 PROCEDURE — 25010000002 CEFTRIAXONE PER 250 MG

## 2025-02-08 PROCEDURE — 80069 RENAL FUNCTION PANEL: CPT | Performed by: INTERNAL MEDICINE

## 2025-02-08 PROCEDURE — 94640 AIRWAY INHALATION TREATMENT: CPT

## 2025-02-08 PROCEDURE — 99222 1ST HOSP IP/OBS MODERATE 55: CPT | Performed by: INTERNAL MEDICINE

## 2025-02-08 PROCEDURE — 25010000002 FUROSEMIDE PER 20 MG: Performed by: INTERNAL MEDICINE

## 2025-02-08 RX ORDER — PRAVASTATIN SODIUM 40 MG
40 TABLET ORAL DAILY
Status: DISCONTINUED | OUTPATIENT
Start: 2025-02-08 | End: 2025-02-11 | Stop reason: HOSPADM

## 2025-02-08 RX ORDER — ONDANSETRON 4 MG/1
4 TABLET, ORALLY DISINTEGRATING ORAL EVERY 6 HOURS PRN
Status: DISCONTINUED | OUTPATIENT
Start: 2025-02-08 | End: 2025-02-11 | Stop reason: HOSPADM

## 2025-02-08 RX ORDER — METOPROLOL SUCCINATE 25 MG/1
25 TABLET, EXTENDED RELEASE ORAL ONCE
Status: COMPLETED | OUTPATIENT
Start: 2025-02-08 | End: 2025-02-08

## 2025-02-08 RX ORDER — IPRATROPIUM BROMIDE AND ALBUTEROL SULFATE 2.5; .5 MG/3ML; MG/3ML
3 SOLUTION RESPIRATORY (INHALATION) EVERY 6 HOURS PRN
Status: DISCONTINUED | OUTPATIENT
Start: 2025-02-08 | End: 2025-02-11 | Stop reason: HOSPADM

## 2025-02-08 RX ORDER — FUROSEMIDE 10 MG/ML
40 INJECTION INTRAMUSCULAR; INTRAVENOUS ONCE
Status: COMPLETED | OUTPATIENT
Start: 2025-02-08 | End: 2025-02-08

## 2025-02-08 RX ORDER — AMOXICILLIN 250 MG
2 CAPSULE ORAL 2 TIMES DAILY PRN
Status: DISCONTINUED | OUTPATIENT
Start: 2025-02-08 | End: 2025-02-11 | Stop reason: HOSPADM

## 2025-02-08 RX ORDER — BISACODYL 5 MG/1
5 TABLET, DELAYED RELEASE ORAL DAILY PRN
Status: DISCONTINUED | OUTPATIENT
Start: 2025-02-08 | End: 2025-02-11 | Stop reason: HOSPADM

## 2025-02-08 RX ORDER — PANTOPRAZOLE SODIUM 40 MG/1
40 TABLET, DELAYED RELEASE ORAL
Status: DISCONTINUED | OUTPATIENT
Start: 2025-02-08 | End: 2025-02-11 | Stop reason: HOSPADM

## 2025-02-08 RX ORDER — TRAMADOL HYDROCHLORIDE 50 MG/1
50 TABLET ORAL EVERY 8 HOURS PRN
Status: DISCONTINUED | OUTPATIENT
Start: 2025-02-08 | End: 2025-02-11 | Stop reason: HOSPADM

## 2025-02-08 RX ORDER — METOPROLOL SUCCINATE 25 MG/1
25 TABLET, EXTENDED RELEASE ORAL DAILY
Status: DISCONTINUED | OUTPATIENT
Start: 2025-02-08 | End: 2025-02-08

## 2025-02-08 RX ORDER — CALCIUM CARBONATE 500 MG/1
2 TABLET, CHEWABLE ORAL 2 TIMES DAILY PRN
Status: DISCONTINUED | OUTPATIENT
Start: 2025-02-08 | End: 2025-02-11 | Stop reason: HOSPADM

## 2025-02-08 RX ORDER — IPRATROPIUM BROMIDE AND ALBUTEROL SULFATE 2.5; .5 MG/3ML; MG/3ML
3 SOLUTION RESPIRATORY (INHALATION)
Status: DISCONTINUED | OUTPATIENT
Start: 2025-02-08 | End: 2025-02-11 | Stop reason: HOSPADM

## 2025-02-08 RX ORDER — ONDANSETRON 2 MG/ML
4 INJECTION INTRAMUSCULAR; INTRAVENOUS EVERY 6 HOURS PRN
Status: DISCONTINUED | OUTPATIENT
Start: 2025-02-08 | End: 2025-02-11 | Stop reason: HOSPADM

## 2025-02-08 RX ORDER — ACETAMINOPHEN 325 MG/1
650 TABLET ORAL EVERY 4 HOURS PRN
Status: DISCONTINUED | OUTPATIENT
Start: 2025-02-08 | End: 2025-02-11 | Stop reason: HOSPADM

## 2025-02-08 RX ORDER — NITROGLYCERIN 0.4 MG/1
0.4 TABLET SUBLINGUAL
Status: DISCONTINUED | OUTPATIENT
Start: 2025-02-08 | End: 2025-02-11 | Stop reason: HOSPADM

## 2025-02-08 RX ORDER — POLYETHYLENE GLYCOL 3350 17 G/17G
17 POWDER, FOR SOLUTION ORAL DAILY PRN
Status: DISCONTINUED | OUTPATIENT
Start: 2025-02-08 | End: 2025-02-11 | Stop reason: HOSPADM

## 2025-02-08 RX ORDER — FERROUS SULFATE 325(65) MG
325 TABLET ORAL DAILY
Status: DISCONTINUED | OUTPATIENT
Start: 2025-02-08 | End: 2025-02-11 | Stop reason: HOSPADM

## 2025-02-08 RX ORDER — BISACODYL 10 MG
10 SUPPOSITORY, RECTAL RECTAL DAILY PRN
Status: DISCONTINUED | OUTPATIENT
Start: 2025-02-08 | End: 2025-02-11 | Stop reason: HOSPADM

## 2025-02-08 RX ORDER — DULOXETIN HYDROCHLORIDE 30 MG/1
60 CAPSULE, DELAYED RELEASE ORAL DAILY
Status: DISCONTINUED | OUTPATIENT
Start: 2025-02-08 | End: 2025-02-11 | Stop reason: HOSPADM

## 2025-02-08 RX ORDER — FUROSEMIDE 40 MG/1
40 TABLET ORAL DAILY
Status: DISCONTINUED | OUTPATIENT
Start: 2025-02-08 | End: 2025-02-11 | Stop reason: HOSPADM

## 2025-02-08 RX ORDER — METOPROLOL SUCCINATE 50 MG/1
50 TABLET, EXTENDED RELEASE ORAL DAILY
Status: DISCONTINUED | OUTPATIENT
Start: 2025-02-09 | End: 2025-02-11 | Stop reason: HOSPADM

## 2025-02-08 RX ADMIN — IPRATROPIUM BROMIDE AND ALBUTEROL SULFATE 3 ML: .5; 3 SOLUTION RESPIRATORY (INHALATION) at 12:58

## 2025-02-08 RX ADMIN — APIXABAN 2.5 MG: 2.5 TABLET, FILM COATED ORAL at 20:46

## 2025-02-08 RX ADMIN — FUROSEMIDE 40 MG: 40 TABLET ORAL at 08:09

## 2025-02-08 RX ADMIN — AZITHROMYCIN MONOHYDRATE 500 MG: 500 INJECTION, POWDER, LYOPHILIZED, FOR SOLUTION INTRAVENOUS at 17:10

## 2025-02-08 RX ADMIN — FUROSEMIDE 40 MG: 10 INJECTION, SOLUTION INTRAMUSCULAR; INTRAVENOUS at 13:19

## 2025-02-08 RX ADMIN — PANTOPRAZOLE SODIUM 40 MG: 40 TABLET, DELAYED RELEASE ORAL at 07:07

## 2025-02-08 RX ADMIN — CEFTRIAXONE SODIUM 1000 MG: 1 INJECTION, POWDER, FOR SOLUTION INTRAMUSCULAR; INTRAVENOUS at 16:32

## 2025-02-08 RX ADMIN — FERROUS SULFATE TAB 325 MG (65 MG ELEMENTAL FE) 325 MG: 325 (65 FE) TAB at 08:09

## 2025-02-08 RX ADMIN — METOPROLOL SUCCINATE 25 MG: 25 TABLET, EXTENDED RELEASE ORAL at 08:09

## 2025-02-08 RX ADMIN — PRAVASTATIN SODIUM 40 MG: 40 TABLET ORAL at 08:09

## 2025-02-08 RX ADMIN — METOPROLOL SUCCINATE 25 MG: 25 TABLET, EXTENDED RELEASE ORAL at 12:10

## 2025-02-08 RX ADMIN — TRAMADOL HYDROCHLORIDE 50 MG: 50 TABLET, COATED ORAL at 20:46

## 2025-02-08 RX ADMIN — APIXABAN 2.5 MG: 2.5 TABLET, FILM COATED ORAL at 08:11

## 2025-02-08 RX ADMIN — DULOXETINE 60 MG: 30 CAPSULE, DELAYED RELEASE ORAL at 08:09

## 2025-02-08 RX ADMIN — ACETAMINOPHEN 650 MG: 325 TABLET, FILM COATED ORAL at 13:19

## 2025-02-08 NOTE — H&P
Patient Name:  Alexa Peace  YOB: 1927  MRN:  [id number]  Admit Date:  2/7/2025  Patient Care Team:  Tomas Nix, ALONSO, APRN as PCP - General (Internal Medicine)      Subjective   History Present Illness     Chief Complaint   Patient presents with    Shortness of Breath       Ms. Peace is a 89 y.o. non-smoker with a history of HTN, HLD, PAF, CAD, and GERD that presents to [facility] complaining of severe shortness of breath. She was found to have afib with RVR and volume overload with hypoxemia. She has responded well to metoprolol and diuretics. She reports feeling much better today, denies significant cough and shortness of breath. She had not been ill prior to yesterday. Her daughters are at bedside, they express concerns that she is not regularly taking her diuretics as she has an automatic pull dispenser at home and they have found loose pills periodically.    Review of Systems   All other systems reviewed and are negative.       Personal History     Past Medical History:   Diagnosis Date    Anticoagulated     PLAVIX    Arthritis     Atrial premature complex     CAD (coronary artery disease)     Cancer     skin    Colon polyp     Depression     GERD (gastroesophageal reflux disease)     Heart attack     Hyperlipidemia     Hypertension     Macular degeneration     New onset atrial fibrillation 01/11/2019    Pre-diabetes     Spinal stenosis      Past Surgical History:   Procedure Laterality Date    ADENOIDECTOMY      CATARACT EXTRACTION      CORONARY ANGIOPLASTY WITH STENT PLACEMENT      EPIDURAL BLOCK      LUMBAR DISCECTOMY FUSION INSTRUMENTATION N/A 12/7/2017    Procedure: L2-3, L3-4, L4-5 laminectomy and fusion with local bone graft;  Surgeon: Andre Layne MD;  Location: [facility];  Service:     TONSILECTOMY, ADENOIDECTOMY, BILATERAL MYRINGOTOMY AND TUBES      TONSILLECTOMY       Family History   Problem Relation Age of Onset    Diabetes Mother     Depression  Mother     Anxiety disorder Mother     Heart disease Father     Hypertension Father     Diabetes Father     Diabetes Brother     No Known Problems Maternal Grandmother     No Known Problems Maternal Grandfather     No Known Problems Paternal Grandmother     No Known Problems Paternal Grandfather     Malig Hyperthermia Neg Hx      Social History     Tobacco Use    Smoking status: Never     Passive exposure: Past (father smoked)    Smokeless tobacco: Never    Tobacco comments:     caffeine use- coffee   Vaping Use    Vaping status: Never Used   Substance Use Topics    Alcohol use: No    Drug use: No     No current facility-administered medications on file prior to encounter.     Current Outpatient Medications on File Prior to Encounter   Medication Sig Dispense Refill    apixaban (Eliquis) 2.5 MG tablet tablet Take 1 tablet by mouth Every 12 (Twelve) Hours. 180 tablet 0    calcium carbonate (TUMS) 500 MG chewable tablet Chew 1,000 mg 2 (Two) Times a Day As Needed for Indigestion or Heartburn.      Diclofenac Sodium (VOLTAREN) 1 % gel gel APPLY 4 G TOPICALLY TO THE APPROPRIATE AREA AS DIRECTED 4 (FOUR) TIMES A DAY AS NEEDED (TO AFFECT AREAS WITH ARTHRITIS PAIN). 100 g 1    DULoxetine (CYMBALTA) 60 MG capsule Take 1 capsule by mouth Daily. 90 capsule 3    ferrous sulfate 325 (65 FE) MG tablet TAKE 1 TABLET BY MOUTH EVERY DAY (Patient taking differently: Take 1 tablet by mouth Daily.) 90 tablet 3    furosemide (LASIX) 20 MG tablet Take 2 tablets by mouth Daily. 180 tablet 1    metoprolol succinate XL (TOPROL-XL) 25 MG 24 hr tablet Take 1 tablet by mouth Daily. 90 tablet 1    omeprazole (priLOSEC) 20 MG capsule Take 1 capsule by mouth Daily With Breakfast. 90 capsule 3    polyethylene glycol (MIRALAX) pack packet Take 17 g by mouth Daily. (Patient taking differently: Take 17 g by mouth Daily As Needed.)      pravastatin (PRAVACHOL) 40 MG tablet Take 1 tablet by mouth Daily. 90 tablet 1    traMADol (ULTRAM) 50 MG tablet  Take 1 tablet by mouth Every 8 (Eight) Hours As Needed for Moderate Pain. for pain (Patient taking differently: Take 1 tablet by mouth Every 8 (Eight) Hours As Needed for Moderate Pain.) 90 tablet 2     Allergies   Allergen Reactions    Ciprofloxacin      UNKNOWN    Metronidazole      UNKWOWN    Prednisone Other (See Comments)     Elevated blood pressure       Objective    Objective     Vital Signs  Temp:  [97.9 °F (36.6 °C)-98.1 °F (36.7 °C)] 98 °F (36.7 °C)  Heart Rate:  [] 110  Resp:  [22-24] 22  BP: ()/(52-81) 124/52  SpO2:  [73 %-100 %] 96 %  on  Flow (L/min) (Oxygen Therapy):  [2-15] 2;   Device (Oxygen Therapy): nasal cannula  Body mass index is 20.12 kg/m².    Physical Exam  Vitals and nursing note reviewed.   Constitutional:       General: She is not in acute distress.     Appearance: She is ill-appearing (chronically). She is not toxic-appearing.   HENT:      Head: Normocephalic and atraumatic.      Nose: Nose normal.      Mouth/Throat:      Mouth: Mucous membranes are moist.      Pharynx: Oropharynx is clear.   Eyes:      Conjunctiva/sclera: Conjunctivae normal.      Pupils: Pupils are equal, round, and reactive to light.   Cardiovascular:      Rate and Rhythm: Normal rate. Rhythm irregular.      Pulses: Normal pulses.   Pulmonary:      Effort: Pulmonary effort is normal. No respiratory distress.      Breath sounds: Rales present. No wheezing or rhonchi.   Abdominal:      General: Bowel sounds are normal. There is no distension.      Palpations: Abdomen is soft.      Tenderness: There is no abdominal tenderness.   Musculoskeletal:         General: Swelling (1-2+ BLE) present. No tenderness.      Cervical back: Neck supple.   Skin:     General: Skin is warm and dry.      Capillary Refill: Capillary refill takes less than 2 seconds.   Neurological:      General: No focal deficit present.      Mental Status: She is alert.      Comments: Hard of hearing   Psychiatric:         Mood and Affect: Mood  normal.         Behavior: Behavior normal.       Results Review:  I reviewed the patient's new clinical results.  I reviewed the patient's new imaging results and agree with the interpretation.  I reviewed the patient's other test results and agree with the interpretation  I personally viewed and interpreted the patient's EKG/Telemetry data    Lab Results (last 24 hours)       Procedure Component Value Units Date/Time    Respiratory Panel PCR w/COVID-19(SARS-CoV-2) NGUYEN/YULY/KALLIE/PAD/COR/LUIS M In-House, NP Swab in UTM/VTM, 2 HR TAT - Swab, Nasopharynx [[id number]]  (Normal) Collected: 02/07/25 1642    Specimen: Swab from Nasopharynx Updated: 02/07/25 1750     ADENOVIRUS, PCR Not Detected     Coronavirus 229E Not Detected     Coronavirus HKU1 Not Detected     Coronavirus NL63 Not Detected     Coronavirus OC43 Not Detected     COVID19 Not Detected     Human Metapneumovirus Not Detected     Human Rhinovirus/Enterovirus Not Detected     Influenza A PCR Not Detected     Influenza B PCR Not Detected     Parainfluenza Virus 1 Not Detected     Parainfluenza Virus 2 Not Detected     Parainfluenza Virus 3 Not Detected     Parainfluenza Virus 4 Not Detected     RSV, PCR Not Detected     Bordetella pertussis pcr Not Detected     Bordetella parapertussis PCR Not Detected     Chlamydophila pneumoniae PCR Not Detected     Mycoplasma pneumo by PCR Not Detected    Narrative:      In the setting of a positive respiratory panel with a viral infection PLUS a negative procalcitonin without other underlying concern for bacterial infection, consider observing off antibiotics or discontinuation of antibiotics and continue supportive care. If the respiratory panel is positive for atypical bacterial infection (Bordetella pertussis, Chlamydophila pneumoniae, or Mycoplasma pneumoniae), consider antibiotic de-escalation to target atypical bacterial infection.    High Sensitivity Troponin T [[id number]]  (Abnormal) Collected: 02/07/25 1643    Specimen:  Blood Updated: 02/07/25 1735     HS Troponin T 28 ng/L      Comment: Specimen hemolyzed.  Results may be falsely decreased.       Narrative:      High Sensitive Troponin T Reference Range:  <14.0 ng/L- Negative Female for AMI  <22.0 ng/L- Negative Male for AMI  >=14 - Abnormal Female indicating possible myocardial injury.  >=22 - Abnormal Male indicating possible myocardial injury.   Clinicians would have to utilize clinical acumen, EKG, Troponin, and serial changes to determine if it is an Acute Myocardial Infarction or myocardial injury due to an underlying chronic condition.         CBC & Differential [[id number]]  (Abnormal) Collected: 02/07/25 1643    Specimen: Blood Updated: 02/07/25 1701    Narrative:      The following orders were created for panel order CBC & Differential.  Procedure                               Abnormality         Status                     ---------                               -----------         ------                     CBC Auto Differential[[id number]]        Abnormal            Final result                 Please view results for these tests on the individual orders.    Comprehensive Metabolic Panel [[id number]]  (Abnormal) Collected: 02/07/25 1643    Specimen: Blood Updated: 02/07/25 1733     Glucose 333 mg/dL      BUN 20 mg/dL      Creatinine 0.88 mg/dL      Sodium 138 mmol/L      Potassium 4.7 mmol/L      Comment: Specimen hemolyzed.  Result may be falsely elevated.        Chloride 101 mmol/L      CO2 19.8 mmol/L      Calcium 9.0 mg/dL      Total Protein 6.9 g/dL      Albumin 3.8 g/dL      ALT (SGPT) 25 U/L      Comment: Specimen hemolyzed.  Result may  be falsely elevated.        AST (SGOT) 50 U/L      Comment: Specimen hemolyzed.  Result may be falsely elevated.        Alkaline Phosphatase 70 U/L      Total Bilirubin 0.7 mg/dL      Globulin 3.1 gm/dL      A/G Ratio 1.2 g/dL      BUN/Creatinine Ratio 22.7     Anion Gap 17.2 mmol/L      eGFR 59.9 mL/min/1.73     Narrative:       GFR Categories in Chronic Kidney Disease (CKD)      GFR Category          GFR (mL/min/1.73)    Interpretation  G1                     90 or greater         Normal or high (1)  G2                      60-89                Mild decrease (1)  G3a                   45-59                Mild to moderate decrease  G3b                   30-44                Moderate to severe decrease  G4                    15-29                Severe decrease  G5                    14 or less           Kidney failure          (1)In the absence of evidence of kidney disease, neither GFR category G1 or G2 fulfill the criteria for CKD.    eGFR calculation 2021 CKD-EPI creatinine equation, which does not include race as a factor    Protime-INR [[id number]]  (Abnormal) Collected: 02/07/25 1643    Specimen: Blood Updated: 02/07/25 1719     Protime 17.3 Seconds      INR 1.41    aPTT [[id number]]  (Normal) Collected: 02/07/25 1643    Specimen: Blood Updated: 02/07/25 1719     PTT 26.3 seconds     BNP [[id number]]  (Abnormal) Collected: 02/07/25 1643    Specimen: Blood Updated: 02/07/25 1734     proBNP 2,971.0 pg/mL     Narrative:      This assay is used as an aid in the diagnosis of individuals suspected of having heart failure. It can be used as an aid in the diagnosis of acute decompensated heart failure (ADHF) in patients presenting with signs and symptoms of ADHF to the emergency department (ED). In addition, NT-proBNP of <300 pg/mL indicates ADHF is not likely.    Age Range Result Interpretation  NT-proBNP Concentration (pg/mL:      <50             Positive            >450                   Gray                 300-450                    Negative             <300    50-75           Positive            >900                  Gray                300-900                  Negative            <300      >75             Positive            >1800                  Gray                300-1800                  Negative            <300    Lactic Acid,  "Plasma [[id number]]  (Abnormal) Collected: 02/07/25 1643    Specimen: Blood Updated: 02/07/25 1724     Lactate 7.1 mmol/L     Procalcitonin [[id number]]  (Normal) Collected: 02/07/25 1643    Specimen: Blood Updated: 02/07/25 1734     Procalcitonin <0.02 ng/mL     Narrative:      As a Marker for Sepsis (Non-Neonates):    1. <0.5 ng/mL represents a low risk of severe sepsis and/or septic shock.  2. >2 ng/mL represents a high risk of severe sepsis and/or septic shock.    As a Marker for Lower Respiratory Tract Infections that require antibiotic therapy:    PCT on Admission    Antibiotic Therapy       6-12 Hrs later    >0.5                Strongly Recommended  >0.25 - <0.5        Recommended   0.1 - 0.25          Discouraged              Remeasure/reassess PCT  <0.1                Strongly Discouraged     Remeasure/reassess PCT    As 28 day mortality risk marker: \"Change in Procalcitonin Result\" (>80% or <=80%) if Day 0 (or Day 1) and Day 4 values are available. Refer to http://www.Proximexs-pct-calculator.com    Change in PCT <=80%  A decrease of PCT levels below or equal to 80% defines a positive change in PCT test result representing a higher risk for 28-day all-cause mortality of patients diagnosed with severe sepsis for septic shock.    Change in PCT >80%  A decrease of PCT levels of more than 80% defines a negative change in PCT result representing a lower risk for 28-day all-cause mortality of patients diagnosed with severe sepsis or septic shock.       CBC Auto Differential [[id number]]  (Abnormal) Collected: 02/07/25 1643    Specimen: Blood Updated: 02/07/25 1701     WBC 17.54 10*3/mm3      RBC 2.95 10*6/mm3      Hemoglobin 11.3 g/dL      Hematocrit 33.3 %      .9 fL      MCH 38.3 pg      MCHC 33.9 g/dL      RDW 14.4 %      RDW-SD 56.3 fl      MPV 11.6 fL      Platelets 362 10*3/mm3      nRBC 1.1 /100 WBC     Manual Differential [[id number]]  (Abnormal) Collected: 02/07/25 1643    Specimen: Blood Updated: " 02/07/25 1750     Neutrophil % 93.0 %      Lymphocyte % 5.0 %      Monocyte % 1.0 %      Eosinophil % 1.0 %      Basophil % 0.0 %      Neutrophils Absolute 16.31 10*3/mm3      Lymphocytes Absolute 0.88 10*3/mm3      Monocytes Absolute 0.18 10*3/mm3      Eosinophils Absolute 0.18 10*3/mm3      Basophils Absolute 0.00 10*3/mm3      nRBC 1.0 /100 WBC      Polychromasia Large/3+     WBC Morphology Normal     Platelet Morphology Normal    Blood Culture - Blood, Arm, Right [[id number]] Collected: 02/07/25 1737    Specimen: Blood from Arm, Right Updated: 02/07/25 1743    STAT Lactic Acid, Reflex [[id number]]  (Abnormal) Collected: 02/07/25 1737    Specimen: Blood from Arm, Right Updated: 02/07/25 1830     Lactate 4.9 mmol/L     Blood Culture - Blood, Arm, Left [[id number]] Collected: 02/07/25 1738    Specimen: Blood from Arm, Left Updated: 02/07/25 1743    High Sensitivity Troponin T 1Hr [[id number]]  (Abnormal) Collected: 02/07/25 1738    Specimen: Blood from Arm, Left Updated: 02/07/25 1830     HS Troponin T 46 ng/L      Troponin T Numeric Delta 18 ng/L      Troponin T % Delta 64    Narrative:      High Sensitive Troponin T Reference Range:  <14.0 ng/L- Negative Female for AMI  <22.0 ng/L- Negative Male for AMI  >=14 - Abnormal Female indicating possible myocardial injury.  >=22 - Abnormal Male indicating possible myocardial injury.   Clinicians would have to utilize clinical acumen, EKG, Troponin, and serial changes to determine if it is an Acute Myocardial Infarction or myocardial injury due to an underlying chronic condition.         Blood Gas, Arterial - [[id number]]  (Abnormal) Collected: 02/07/25 1813    Specimen: Arterial Blood Updated: 02/07/25 1817     Site Right Radial     Juve's Test Positive     pH, Arterial 7.377 pH units      pCO2, Arterial 42.9 mm Hg      pO2, Arterial 93.9 mm Hg      HCO3, Arterial 25.2 mmol/L      Base Excess, Arterial -0.2 mmol/L      Comment: Serial Number: [id number]:  [id number]        O2  Saturation, Arterial 97.1 %      A-a DO2 0.4 mmHg      CO2 Content 26.5 mmol/L      Barometric Pressure for Blood Gas 755.0000 mmHg      Modality BiPap     FIO2 40 %      Set Tidal Volume 643     Set Mech Resp Rate 20     Rate 23 Breaths/minute      PEEP 6     Hemodilution No     Inspiratory Time 1     Device Comment Avaps 20/10 sats 98%     PO2/FIO2 235    Urinalysis With Culture If Indicated - Urine, Clean Catch [[id number]]  (Abnormal) Collected: 02/07/25 2037    Specimen: Urine, Clean Catch Updated: 02/07/25 2055     Color, UA Yellow     Appearance, UA Cloudy     pH, UA 6.5     Specific Gravity, UA 1.008     Glucose,  mg/dL (1+)     Ketones, UA Negative     Bilirubin, UA Negative     Blood, UA Moderate (2+)     Protein, UA Negative     Leuk Esterase, UA Small (1+)     Nitrite, UA Negative     Urobilinogen, UA 0.2 E.U./dL    Narrative:      In absence of clinical symptoms, the presence of pyuria, bacteria, and/or nitrites on the urinalysis result does not correlate with infection.    Urinalysis, Microscopic Only - Urine, Clean Catch [[id number]]  (Abnormal) Collected: 02/07/25 2037    Specimen: Urine, Clean Catch Updated: 02/07/25 2055     RBC, UA 21-50 /HPF      WBC, UA 3-5 /HPF      Comment: Urine culture not indicated.        Bacteria, UA 4+ /HPF      Squamous Epithelial Cells, UA 0-2 /HPF      Hyaline Casts, UA 0-2 /LPF      Methodology Automated Microscopy    STAT Lactic Acid, Reflex [[id number]]  (Abnormal) Collected: 02/07/25 2039    Specimen: Blood Updated: 02/07/25 2112     Lactate 2.4 mmol/L     POC Glucose Once [[id number]]  (Normal) Collected: 02/07/25 2129    Specimen: Blood Updated: 02/07/25 2141     Glucose 126 mg/dL     STAT Lactic Acid, Reflex [[id number]]  (Abnormal) Collected: 02/07/25 2346    Specimen: Blood Updated: 02/08/25 0042     Lactate 2.5 mmol/L     Renal Function Panel [[id number]]  (Abnormal) Collected: 02/08/25 0320    Specimen: Blood Updated: 02/08/25 0435     Glucose 205 mg/dL       BUN 25 mg/dL      Creatinine 0.72 mg/dL      Sodium 141 mmol/L      Potassium 3.5 mmol/L      Chloride 105 mmol/L      CO2 25.1 mmol/L      Calcium 8.6 mg/dL      Albumin 3.6 g/dL      Phosphorus 2.9 mg/dL      Anion Gap 10.9 mmol/L      BUN/Creatinine Ratio 34.7     eGFR 76.2 mL/min/1.73     Narrative:      GFR Categories in Chronic Kidney Disease (CKD)      GFR Category          GFR (mL/min/1.73)    Interpretation  G1                     90 or greater         Normal or high (1)  G2                      60-89                Mild decrease (1)  G3a                   45-59                Mild to moderate decrease  G3b                   30-44                Moderate to severe decrease  G4                    15-29                Severe decrease  G5                    14 or less           Kidney failure          (1)In the absence of evidence of kidney disease, neither GFR category G1 or G2 fulfill the criteria for CKD.    eGFR calculation 2021 CKD-EPI creatinine equation, which does not include race as a factor    STAT Lactic Acid, Reflex [[id number]]  (Abnormal) Collected: 02/08/25 0320    Specimen: Blood Updated: 02/08/25 0427     Lactate 2.7 mmol/L     CBC (No Diff) [[id number]]  (Abnormal) Collected: 02/08/25 0320    Specimen: Blood Updated: 02/08/25 0413     WBC 11.14 10*3/mm3      RBC 2.56 10*6/mm3      Hemoglobin 9.9 g/dL      Hematocrit 28.6 %      .7 fL      MCH 38.7 pg      MCHC 34.6 g/dL      RDW 14.7 %      RDW-SD 57.9 fl      MPV 11.5 fL      Platelets 259 10*3/mm3     D-dimer, Quantitative [[id number]]  (Normal) Collected: 02/08/25 0320    Specimen: Blood from Arm, Right Updated: 02/08/25 0436     D-Dimer, Quantitative 0.83 MCGFEU/mL     Narrative:      According to the assay 's published package insert, a normal (<0.50 MCGFEU/mL) D-dimer result in conjunction with a non-high clinical probability assessment, excludes deep vein thrombosis (DVT) and pulmonary embolism (PE) with high  "sensitivity.    D-dimer values increase with age and this can make VTE exclusion of an older population difficult. To address this, the [employer], based on best available evidence and recent guidelines, recommends that clinicians use age-adjusted D-dimer thresholds in patients greater than 50 years of age with: a) a low probability of PE who do not meet all Pulmonary Embolism Rule Out Criteria, or b) in those with intermediate probability of PE.   The formula for an age-adjusted D-dimer cut-off is \"age/100\".  For example, a 60 year old patient would have an age-adjusted cut-off of 0.60 MCGFEU/mL and an 80 year old 0.80 MCGFEU/mL.    STAT Lactic Acid, Reflex [[id number]] Collected: 02/08/25 1001    Specimen: Blood Updated: 02/08/25 1024            Imaging Results (Last 24 Hours)       Procedure Component Value Units Date/Time    XR Chest 2 View [[id number]] Resulted: 02/08/25 0844     Updated: 02/08/25 0845    XR Chest 1 View [[id number]] Collected: 02/07/25 1728     Updated: 02/07/25 1733    Narrative:      CXR ONE VIEW      HISTORY: short of breath     COMPARISON: 5/4/2022     TECHNIQUE: single portable AP       Impression:         There is mild cardiomegaly.     There is left greater than right fairly extensive interstitial  infiltrate, question superimposed left lower lobe superior segment  alveolar infiltrate.     Probable small bilateral effusions, no pneumothorax.     This report was finalized on 2/7/2025 5:30 PM by Dr. Go Claudio M.D  on Workstation: [id number]               Results for orders placed during the hospital encounter of 07/09/21    Adult Transthoracic Echo Complete w/ Color, Spectral and Contrast if Necessary Per Protocol    Interpretation Summary  · Estimated right ventricular systolic pressure from tricuspid regurgitation is mildly elevated (35-45 mmHg). Calculated right ventricular systolic pressure from tricuspid regurgitation is 41 mmHg.  · Mild pulmonary " hypertension is present.  · Left ventricular wall thickness is consistent with mild to moderate concentric hypertrophy. Sigmoid-shaped ventricular septum is present.  · Calculated left ventricular EF = 58% Estimated left ventricular EF was in agreement with the calculated left ventricular EF. Left ventricular systolic function is normal.  · Left atrial volume is moderately increased.  · The right atrial cavity is mildly dilated.  · Mild dilation of the ascending aorta is present.  · Mild to moderate aortic valve regurgitation is present.  · Mild to moderate mitral valve regurgitation is present with an eccentric jet noted.      ECG 12 Lead Dyspnea   Final Result   HEART PGKE=458  bpm   RR Zrtbqjdv=307  ms   SD Interval=  ms   P Horizontal Axis=  deg   P Front Axis=  deg   QRSD Nknhqrku=872  ms   QT Bhfkvcgh=993  ms   EAzW=838  ms   QRS Axis=-40  deg   T Wave Axis=92  deg   - ABNORMAL ECG -   Atrial fibrillation   LVH with secondary repolarization abnormality   Repolarization abnormalities   Anterior  Q waves, possibly due to LVH   When compared with ECG of 04-May-2022 02:28:03,   Atrial fibrillation has replaced sinus rhythm   Electronically Signed By: Fernando Martins ([facility]) 2025-02-07 18:11:18   Date and Time of Study:2025-02-07 16:50:13           Assessment/Plan     Active Hospital Problems    Diagnosis  POA    **Acute hypoxic respiratory failure [J96.01]  Yes    Acute on chronic diastolic heart failure [I50.33]  Yes    DNR (do not resuscitate) [Z66]  Yes    Pneumonia [J18.9]  Yes    Paroxysmal atrial fibrillation [I48.0]  Yes    Essential hypertension [I10]  Yes    Gastroesophageal reflux disease without esophagitis [K21.9]  Yes    Depression [F32.A]  Yes    Bad memory [R41.3]  Yes      Resolved Hospital Problems   No resolved problems to display.   PAF with RVR/Acute on Chronic Diastolic CHF/Acute Hypoxic Respiratory Failure  - room air oxygen saturation <87% and respiratory distress documented in the ER,  required BiPAP but now back on room air  - HR is acceptable, volume status is improving but she is still hypervolemic-will give another dose of IV lasix and monitor volume status  - continue metoprolol  - on AC with eliquis  - d/w patient the importance of taking her diuretics as prescribed  - I doubt pneumonia, continue abx today and stop tomorrow if procalcitonin is not elevated  - appreciate cardiology recommendations      GERD  - symptoms controlled, continue ppi    Depression  - appears controlled  - continue cymbalta    I discussed the patient's findings and my recommendations with patient, family, and nursing staff.    VTE Prophylaxis - Eliquis (home med).  Code Status - DNR.       Jere Bill MD  [facility]  02/08/25  10:29 EST

## 2025-02-08 NOTE — PROGRESS NOTES
Clinical Pharmacy Services: Medication History    Alexa Peace is a 89 y.o. female presenting to [facility] for   Chief Complaint   Patient presents with    Shortness of Breath       She  has a past medical history of Anticoagulated, Arthritis, Atrial premature complex, CAD (coronary artery disease), Cancer, Colon polyp, Depression, GERD (gastroesophageal reflux disease), Heart attack, Hyperlipidemia, Hypertension, Macular degeneration, New onset atrial fibrillation (01/11/2019), Pre-diabetes, and Spinal stenosis.    Allergies as of 02/07/2025 - Reviewed 02/07/2025   Allergen Reaction Noted    Ciprofloxacin  04/15/2016    Metronidazole  04/15/2016    Prednisone Other (See Comments) 08/09/2017       Medication [facility] was obtained [facility]: [facility]   Pharmacy and Phone Number:     Prior to Admission Medications       Prescriptions Last Dose Informant Patient Reported? Taking?    apixaban (Eliquis) 2.5 MG tablet tablet  Pharmacy No Yes    Take 1 tablet by mouth Every 12 (Twelve) Hours.    calcium carbonate (TUMS) 500 MG chewable tablet  Other No Yes    Chew 1,000 mg 2 (Two) Times a Day As Needed for Indigestion or Heartburn.    Diclofenac Sodium (VOLTAREN) 1 % gel gel  Pharmacy No Yes    APPLY 4 G TOPICALLY TO THE APPROPRIATE AREA AS DIRECTED 4 (FOUR) TIMES A DAY AS NEEDED (TO AFFECT AREAS WITH ARTHRITIS PAIN).    DULoxetine (CYMBALTA) 60 MG capsule  Pharmacy No Yes    Take 1 capsule by mouth Daily.    ferrous sulfate 325 (65 FE) MG tablet  Pharmacy No Yes    TAKE 1 TABLET BY MOUTH EVERY DAY    Patient taking differently:  Take 1 tablet by mouth Daily.    furosemide (LASIX) 20 MG tablet  Pharmacy No Yes    Take 2 tablets by mouth Daily.    metoprolol succinate XL (TOPROL-XL) 25 MG 24 hr tablet  Pharmacy No Yes    Take 1 tablet by mouth Daily.    omeprazole (priLOSEC) 20 MG capsule  Pharmacy No Yes    Take 1 capsule by mouth Daily With Breakfast.    polyethylene glycol (MIRALAX) pack packet  Other No  Yes    Take 17 g by mouth Daily.    Patient taking differently:  Take 17 g by mouth Daily As Needed.    pravastatin (PRAVACHOL) 40 MG tablet  Pharmacy No Yes    Take 1 tablet by mouth Daily.    traMADol (ULTRAM) 50 MG tablet  Pharmacy No Yes    Take 1 tablet by mouth Every 8 (Eight) Hours As Needed for Moderate Pain. for pain    Patient taking differently:  Take 1 tablet by mouth Every 8 (Eight) Hours As Needed for Moderate Pain.              Medication notes:     This medication list is complete to the best of my knowledge as of 2/7/2025    Please call if questions.    Lazaro Yanez  Medication History Technician   [phone]    2/7/2025 19:01 EST

## 2025-02-08 NOTE — PLAN OF CARE
Goal Outcome Evaluation:  Plan of Care Reviewed With: patient        Progress: no change  Outcome Evaluation: Patient admitted overnight. Began showing episodes of confusion, with various disorientation - year or situation or place. MD aware. Blanchable redness on coccyx. Small, unopened scab to the left lower leg. Scattered seborrheic keratoseses. Afib on the monitor. Plan of care ongoing.

## 2025-02-08 NOTE — CONSULTS
Group: [facility]         CONSULT NOTE    Patient Identification:  Alexa Peace  89 y.o.  female  4/13/1927  [id number]            Requesting physician: Dr. Lucas    Reason for Consultation:  BIPAP    CC: Breath, hypoxia    History of Present Illness:  Alexa Peace is a 89-year-old female with a medical history including: GERD, hyperlipidemia, hypertension, atrial fibrillation on low-dose apixaban, coronary artery disease (remote history of PCI to LAD), and chronic diastolic heart failure. Denies history of COPD, asthma or lung disease-lifelong non-smoker.    She presented to the emergency department on 2/7/2025 with complaints of acute onset of shortness of breath.  Patient reports fatigue earlier in the day prior to presentation but no cough, fever, sick contacts.  Day prior, patient was able to run and with her daughter without any distress.  Patient's daughter is a nurse and also reports that she has not had any issues with leg swelling recently.  Patient developed sudden onset of severe shortness of breath with tachypnea and oxygen saturation reading 61% on room air prior to EMS arrival.  Upon EMS arrival, she was in atrial fibrillation with rapid rate, started on CPAP and given Solu-Medrol.   Upon ED presentation, patient's heart rate was 151 (tachycardic irregular) and oxygen saturation was 73% on nasal cannula.  Initiated on BiPAP with improvement oxygenation and received a dose of Lasix and metoprolol with improvement in work of breathing, oxygenation and heart rate.  ED workup revealed: High-sensitivity troponin 28, reflex 46, proBNP 2971, lactate 7.1, reflex 4.9, procalcitonin <0.02, WBC 17.54.  Respiratory viral panel was negative.  Chest x-ray showed evidence of extensive interstitial infiltrate with probable small bilateral pleural effusions.  ABG was within normal limits on BiPAP.    Patient seen and evaluated in the emergency department, she was alert and conversational.  Transition  from BiPAP at 40% FiO2 to 2 L nasal cannula with oxygen saturation maintaining greater than 94%.  Conversation with ED provider-patient seems stable at this time to be admitted to hospitalist.    Review of Systems:  CONSTITUTIONAL:  Denies fevers or chills, +fatigue  EYE:  No new vision changes  EAR:  No change in hearing  CARDIAC:  No chest pain  PULMONARY:   Denies cough, + shortness of breath  GI:  No diarrhea, hematemesis or hematochezia  RENAL:  No dysuria or urinary frequency  MUSCULOSKELETAL:  No musculoskeletal complaints  ENDOCRINE:  No heat or cold intolerance  INTEGUMENTARY: No skin rashes  NEUROLOGICAL:  No dizziness or confusion.  No seizure activity  PSYCHIATRIC:  No new anxiety or depression  12 system review of systems performed and all else negative     Past Medical History:  Past Medical History:   Diagnosis Date    Anticoagulated     PLAVIX    Arthritis     Atrial premature complex     CAD (coronary artery disease)     Cancer     skin    Colon polyp     Depression     GERD (gastroesophageal reflux disease)     Heart attack     Hyperlipidemia     Hypertension     Macular degeneration     New onset atrial fibrillation 01/11/2019    Pre-diabetes     Spinal stenosis        Past Surgical History:  Past Surgical History:   Procedure Laterality Date    ADENOIDECTOMY      CATARACT EXTRACTION      CORONARY ANGIOPLASTY WITH STENT PLACEMENT      EPIDURAL BLOCK      LUMBAR DISCECTOMY FUSION INSTRUMENTATION N/A 12/7/2017    Procedure: L2-3, L3-4, L4-5 laminectomy and fusion with local bone graft;  Surgeon: Andre Layne MD;  Location: [facility];  Service:     TONSILECTOMY, ADENOIDECTOMY, BILATERAL MYRINGOTOMY AND TUBES      TONSILLECTOMY          Home Meds:  (Not in a hospital admission)      Allergies:  Allergies   Allergen Reactions    Ciprofloxacin      UNKNOWN    Metronidazole      UNKWOWN    Prednisone Other (See Comments)     Elevated blood pressure       Social History:   Social History      Socioeconomic History    Marital status:    Tobacco Use    Smoking status: Never     Passive exposure: Past (father smoked)    Smokeless tobacco: Never    Tobacco comments:     caffeine use- coffee   Vaping Use    Vaping status: Never Used   Substance and Sexual Activity    Alcohol use: No    Drug use: No    Sexual activity: Defer       Family History:  Family History   Problem Relation Age of Onset    Diabetes Mother     Depression Mother     Anxiety disorder Mother     Heart disease Father     Hypertension Father     Diabetes Father     Diabetes Brother     No Known Problems Maternal Grandmother     No Known Problems Maternal Grandfather     No Known Problems Paternal Grandmother     No Known Problems Paternal Grandfather     Malig Hyperthermia Neg Hx        Physical Exam:  /66   Pulse 98   Temp 97.9 °F (36.6 °C) (Tympanic)   Resp 24   LMP  (LMP Unknown)   SpO2 92%  There is no height or weight on file to calculate BMI. 92%    Constitutional: Elderly female pt in bed, no respiratory distress, no accessory muscle use, resting comfortably on 2 L  Head: - NCAT  Eyes: No pallor, Anicteric conjunctiva, EOMI.  ENMT:  Mallampati 3, no oral thrush. Dry MM.   NECK: Trachea midline, No thyromegaly, no palpable cervical LNpathy, no JVD  Heart: Tachycardic rate, irregular rhythm, no murmur.  Trace lower extremity edema   Lungs: PAULO +, bibasilar crackles, no wheezes or rhonchi  Abdomen: Soft. No tenderness, guarding or rigidity. No palpable masses  Extremities: Extremities warm and well perfused. No cyanosis/ clubbing  Neuro: Conscious, answers appropriately, no gross focal neuro deficits  Psych: Mood and affect appropriate    PPE recommended per [facility] infectious disease Isolation protocol for the current clinical scenario(as mentioned below) was followed.      LABS:  COVID19   Date Value Ref Range Status   02/07/2025 Not Detected Not Detected - Ref. Range Final       Lab Results   Component  Value Date    CALCIUM 9.0 02/07/2025     Results from last 7 days   Lab Units 02/07/25  1643   SODIUM mmol/L 138   POTASSIUM mmol/L 4.7   CHLORIDE mmol/L 101   CO2 mmol/L 19.8*   BUN mg/dL 20   CREATININE mg/dL 0.88   GLUCOSE mg/dL 333*   CALCIUM mg/dL 9.0   WBC 10*3/mm3 17.54*   HEMOGLOBIN g/dL 11.3*   PLATELETS 10*3/mm3 362   ALT (SGPT) U/L 25   AST (SGOT) U/L 50*   PROBNP pg/mL 2,971.0*   PROCALCITONIN ng/mL <0.02     Lab Results   Component Value Date    TROPONINT 46 (H) 02/07/2025     Results from last 7 days   Lab Units 02/07/25  1738 02/07/25  1643   HSTROP T ng/L 46* 28*         Results from last 7 days   Lab Units 02/07/25  1737 02/07/25  1643   PROCALCITONIN ng/mL  --  <0.02   LACTATE mmol/L 4.9* 7.1*     Results from last 7 days   Lab Units 02/07/25  1813   PH, ARTERIAL pH units 7.377   PCO2, ARTERIAL mm Hg 42.9   PO2 ART mm Hg 93.9   O2 SATURATION ART % 97.1   MODALITY  BiPap     Results from last 7 days   Lab Units 02/07/25  1642   ADENOVIRUS DETECTION BY PCR  Not Detected   CORONAVIRUS 229E  Not Detected   CORONAVIRUS HKU1  Not Detected   CORONAVIRUS NL63  Not Detected   CORONAVIRUS OC43  Not Detected   HUMAN METAPNEUMOVIRUS  Not Detected   HUMAN RHINOVIRUS/ENTEROVIRUS  Not Detected   INFLUENZA B PCR  Not Detected   PARAINFLUENZA 1  Not Detected   PARAINFLUENZA VIRUS 2  Not Detected   PARAINFLUENZA VIRUS 3  Not Detected   PARAINFLUENZA VIRUS 4  Not Detected   BORDETELLA PERTUSSIS PCR  Not Detected   BORDETELLA PARAPERTUSSIS PCR  Not Detected   CHLAMYDOPHILA PNEUMONIAE PCR  Not Detected   MYCOPLAMA PNEUMO PCR  Not Detected   RSV, PCR  Not Detected     Results from last 7 days   Lab Units 02/07/25  1643   INR  1.41*         Lab Results   Component Value Date    TSH 0.755 08/09/2024     CrCl cannot be calculated (Unknown ideal weight.).         Imaging: I personally visualized the images of scans/x-rays performed within last 3 days.      Assessment:  Acute hypoxic respiratory failure  Atrial fibrillation  with RVR  Hypervolemia  Bilateral pleural effusions  Elevated troponin  Elevated lactic acid  GERD  Hypertension  Hyperlipidemia    Recommendations:  Patient evaluated in the emergency department for consideration of admission to ICU.  Patient was initially requiring BiPAP secondary to acute hypoxic respiratory failure-oxygenation improved drastically after receiving IV diuretic.  Patient currently on 2 L nasal cannula with SpO2 94%.  Continue supplemental oxygen to maintain SpO2 greater than 90%.  Patient received a dose of IV metoprolol with improvement in atrial fibrillation with RVR from a rate of 150 down to 110.  Clients presentation, clinical exam, and lab work (elevated proBNP) consistent with flash pulmonary edema with rapid improvement after receiving diuresis.    CXR showing mild cardiomegaly, bilateral interstitial infiltrate, bilateral pleural effusions.   Procalcitonin normal, no recent fevers, chills, or sick contacts. RVP negative.   WBC 17.54, neutrophils 93.0- received a dose of ceftriaxone and azithromycin in the ER.    At this time, patient seems appropriately for telemetry/hospitalist admission    Patient was placed in face mask upon entering room and kept mask on throughout our encounter. I wore full protective equipment throughout this patient encounter including a face mask, gown and gloves. Hand hygiene was performed before donning protective equipment and after removal when leaving the room.    Kerry Nelson, APRN  2/7/2025  20:46 EST      Much of this encounter note is an electronic transcription/translation of spoken language to printed text using Dragon Software.

## 2025-02-08 NOTE — ED NOTES
Nursing report ED to floor  Alexa Peace  89 y.o.  female    HPI :  HPI  Stated Reason for Visit: shortness of breath  History Obtained From: EMS    Chief Complaint  Chief Complaint   Patient presents with    Shortness of Breath       Admitting doctor:   Saad Moreno DO    Admitting diagnosis:   The primary encounter diagnosis was Acute hypoxemic respiratory failure. Diagnoses of Atrial fibrillation with rapid ventricular response, Acute on chronic congestive heart failure, unspecified heart failure type, and Pneumonia of both lungs due to infectious organism, unspecified part of lung were also pertinent to this visit.    Code status:   Current Code Status       Date Active Code Status Order ID Comments User Context       Prior            Allergies:   Ciprofloxacin, Metronidazole, and Prednisone    Isolation:   No active isolations    Intake and Output    Intake/Output Summary (Last 24 hours) at 2/7/2025 2010  Last data filed at 2/7/2025 1819  Gross per 24 hour   Intake 100 ml   Output --   Net 100 ml       Weight:   There were no vitals filed for this visit.    Most recent vitals:   Vitals:    02/07/25 1801 02/07/25 1831 02/07/25 1917 02/07/25 1918   BP: 137/81 117/66     Pulse: 98 112 97 98   Resp:       Temp:       TempSrc:       SpO2: 100% 99% 95% 92%       Active LDAs/IV Access:   Lines, Drains & Airways       Active LDAs       Name Placement date Placement time Site Days    Peripheral IV 02/07/25 1640 Left Antecubital 02/07/25  1640  Antecubital  less than 1    External Urinary Catheter 02/07/25  1703  --  less than 1                    Labs (abnormal labs have a star):   Labs Reviewed   TROPONIN - Abnormal; Notable for the following components:       Result Value    HS Troponin T 28 (*)     All other components within normal limits    Narrative:     High Sensitive Troponin T Reference Range:  <14.0 ng/L- Negative Female for AMI  <22.0 ng/L- Negative Male for AMI  >=14 - Abnormal Female indicating possible  myocardial injury.  >=22 - Abnormal Male indicating possible myocardial injury.   Clinicians would have to utilize clinical acumen, EKG, Troponin, and serial changes to determine if it is an Acute Myocardial Infarction or myocardial injury due to an underlying chronic condition.        COMPREHENSIVE METABOLIC PANEL - Abnormal; Notable for the following components:    Glucose 333 (*)     CO2 19.8 (*)     AST (SGOT) 50 (*)     Anion Gap 17.2 (*)     eGFR 59.9 (*)     All other components within normal limits    Narrative:     GFR Categories in Chronic Kidney Disease (CKD)      GFR Category          GFR (mL/min/1.73)    Interpretation  G1                     90 or greater         Normal or high (1)  G2                      60-89                Mild decrease (1)  G3a                   45-59                Mild to moderate decrease  G3b                   30-44                Moderate to severe decrease  G4                    15-29                Severe decrease  G5                    14 or less           Kidney failure          (1)In the absence of evidence of kidney disease, neither GFR category G1 or G2 fulfill the criteria for CKD.    eGFR calculation 2021 CKD-EPI creatinine equation, which does not include race as a factor   PROTIME-INR - Abnormal; Notable for the following components:    Protime 17.3 (*)     INR 1.41 (*)     All other components within normal limits   BNP (IN-HOUSE) - Abnormal; Notable for the following components:    proBNP 2,971.0 (*)     All other components within normal limits    Narrative:     This assay is used as an aid in the diagnosis of individuals suspected of having heart failure. It can be used as an aid in the diagnosis of acute decompensated heart failure (ADHF) in patients presenting with signs and symptoms of ADHF to the emergency department (ED). In addition, NT-proBNP of <300 pg/mL indicates ADHF is not likely.    Age Range Result Interpretation  NT-proBNP Concentration  (pg/mL:      <50             Positive            >450                   Gray                 300-450                    Negative             <300    50-75           Positive            >900                  Gray                300-900                  Negative            <300      >75             Positive            >1800                  Gray                300-1800                  Negative            <300   LACTIC ACID, PLASMA - Abnormal; Notable for the following components:    Lactate 7.1 (*)     All other components within normal limits   CBC WITH AUTO DIFFERENTIAL - Abnormal; Notable for the following components:    WBC 17.54 (*)     RBC 2.95 (*)     Hemoglobin 11.3 (*)     Hematocrit 33.3 (*)     .9 (*)     MCH 38.3 (*)     RDW-SD 56.3 (*)     nRBC 1.1 (*)     All other components within normal limits   MANUAL DIFFERENTIAL - Abnormal; Notable for the following components:    Neutrophil % 93.0 (*)     Lymphocyte % 5.0 (*)     Monocyte % 1.0 (*)     Neutrophils Absolute 16.31 (*)     nRBC 1.0 (*)     All other components within normal limits   BLOOD GAS, ARTERIAL - Abnormal; Notable for the following components:    Base Excess, Arterial -0.2 (*)     All other components within normal limits   LACTIC ACID, REFLEX - Abnormal; Notable for the following components:    Lactate 4.9 (*)     All other components within normal limits   HIGH SENSITIVITIY TROPONIN T 1HR - Abnormal; Notable for the following components:    HS Troponin T 46 (*)     Troponin T Numeric Delta 18 (*)     Troponin T % Delta 64 (*)     All other components within normal limits    Narrative:     High Sensitive Troponin T Reference Range:  <14.0 ng/L- Negative Female for AMI  <22.0 ng/L- Negative Male for AMI  >=14 - Abnormal Female indicating possible myocardial injury.  >=22 - Abnormal Male indicating possible myocardial injury.   Clinicians would have to utilize clinical acumen, EKG, Troponin, and serial changes to determine if it is an  "Acute Myocardial Infarction or myocardial injury due to an underlying chronic condition.        RESPIRATORY PANEL PCR W/ COVID-19 (SARS-COV-2), NP SWAB IN UTM/VTP, 2 HR TAT - Normal    Narrative:     In the setting of a positive respiratory panel with a viral infection PLUS a negative procalcitonin without other underlying concern for bacterial infection, consider observing off antibiotics or discontinuation of antibiotics and continue supportive care. If the respiratory panel is positive for atypical bacterial infection (Bordetella pertussis, Chlamydophila pneumoniae, or Mycoplasma pneumoniae), consider antibiotic de-escalation to target atypical bacterial infection.   APTT - Normal   PROCALCITONIN - Normal    Narrative:     As a Marker for Sepsis (Non-Neonates):    1. <0.5 ng/mL represents a low risk of severe sepsis and/or septic shock.  2. >2 ng/mL represents a high risk of severe sepsis and/or septic shock.    As a Marker for Lower Respiratory Tract Infections that require antibiotic therapy:    PCT on Admission    Antibiotic Therapy       6-12 Hrs later    >0.5                Strongly Recommended  >0.25 - <0.5        Recommended   0.1 - 0.25          Discouraged              Remeasure/reassess PCT  <0.1                Strongly Discouraged     Remeasure/reassess PCT    As 28 day mortality risk marker: \"Change in Procalcitonin Result\" (>80% or <=80%) if Day 0 (or Day 1) and Day 4 values are available. Refer to http://www.[facility]-pct-calculator.com    Change in PCT <=80%  A decrease of PCT levels below or equal to 80% defines a positive change in PCT test result representing a higher risk for 28-day all-cause mortality of patients diagnosed with severe sepsis for septic shock.    Change in PCT >80%  A decrease of PCT levels of more than 80% defines a negative change in PCT result representing a lower risk for 28-day all-cause mortality of patients diagnosed with severe sepsis or septic shock.      BLOOD CULTURE "   BLOOD CULTURE   URINALYSIS W/ CULTURE IF INDICATED   LACTIC ACID, REFLEX   CBC AND DIFFERENTIAL    Narrative:     The following orders were created for panel order CBC & Differential.  Procedure                               Abnormality         Status                     ---------                               -----------         ------                     CBC Auto Differential[[id number]]        Abnormal            Final result                 Please view results for these tests on the individual orders.       EKG:   ECG 12 Lead Dyspnea   Final Result   HEART IIGY=904  bpm   RR Jpgjgqlz=015  ms   DC Interval=  ms   P Horizontal Axis=  deg   P Front Axis=  deg   QRSD Gnrszwaz=659  ms   QT Heyxrvqp=811  ms   HFkH=847  ms   QRS Axis=-40  deg   T Wave Axis=92  deg   - ABNORMAL ECG -   Atrial fibrillation   LVH with secondary repolarization abnormality   Repolarization abnormalities   Anterior  Q waves, possibly due to LVH   When compared with ECG of 04-May-2022 02:28:03,   Atrial fibrillation has replaced sinus rhythm   Electronically Signed By: Fernando Martins ([facility]) 2025-02-07 18:11:18   Date and Time of Study:2025-02-07 16:50:13          Meds given in ED:   Medications   sodium chloride 0.9 % flush 10 mL (has no administration in time range)   metoprolol tartrate (LOPRESSOR) injection 5 mg (5 mg Intravenous Given 2/7/25 1646)   furosemide (LASIX) injection 40 mg (40 mg Intravenous Given 2/7/25 1648)   cefTRIAXone (ROCEPHIN) 1,000 mg in sodium chloride 0.9 % 100 mL MBP (0 mg Intravenous Stopped 2/7/25 1819)   azithromycin (ZITHROMAX) 500 mg in sodium chloride 0.9 % 250 mL IVPB-VTB (500 mg Intravenous New Bag 2/7/25 1851)       Imaging results:  XR Chest 1 View    Result Date: 2/7/2025   There is mild cardiomegaly.  There is left greater than right fairly extensive interstitial infiltrate, question superimposed left lower lobe superior segment alveolar infiltrate.  Probable small bilateral effusions, no pneumothorax.   This report was finalized on 2/7/2025 5:30 PM by Dr. Go Claudio M.D on Workstation: [id number]       Ambulatory status:   - assist x 1     Social issues:   Social History     Socioeconomic History    Marital status:    Tobacco Use    Smoking status: Never     Passive exposure: Past (father smoked)    Smokeless tobacco: Never    Tobacco comments:     caffeine use- coffee   Vaping Use    Vaping status: Never Used   Substance and Sexual Activity    Alcohol use: No    Drug use: No    Sexual activity: Defer       Peripheral Neurovascular  Peripheral Neurovascular (Adult)  Peripheral Neurovascular WDL: WDL    Neuro Cognitive  Neuro Cognitive (Adult)  Cognitive/Neuro/Behavioral WDL: WDL, all  Level of Consciousness: Alert  Orientation: oriented x 4  Additional Documentation: Hastings Coma Scale (Group)  Thomas Coma Scale  Best Eye Response: 4-->(E4) spontaneous  Best Motor Response: 6-->(M6) obeys commands  Best Verbal Response: 5-->(V5) oriented  Hastings Coma Scale Score: 15    Learning  Learning Assessment  Learning Readiness and Ability: no barriers identified    Respiratory  Respiratory  Airway WDL: WDL  Respiratory WDL  Respiratory WDL: .WDL except, all  Rhythm/Pattern, Respiratory: shortness of breath, tachypneic, shallow  Breath Sounds  All Lung Fields Breath Sounds: All Fields  All Lung Fields Breath Sounds: Anterior:, Lateral:, diminished    Abdominal Pain  Safety Interventions  Safety Precautions/Falls Reduction: assistive device/personal items within reach, fall reduction program maintained, family at bedside, toileting offered    Pain Assessments  Pain (Adult)  (0-10) Pain Rating: Rest: 0  (0-10) Pain Rating: Activity: 0    NIH Stroke Scale       Aria Corcoran RN  02/07/25 20:10 EST

## 2025-02-08 NOTE — CONSULTS
Patient Name: Alexa Peace  :[id number]  89 y.o.    Date of Admission: 2025  Date of Consultation:  25  Encounter Provider: Karel Navarrete III, MD  Place of Service: [facility] CARDIOLOGY  Referring Provider: Hieu Callejas MD  Patient Care Team:  Tomas Nix, ALONSO, APRN as PCP - General (Internal Medicine)      Chief complaint: Shortness of Breath    History of Present Illness:    Alexa Peace is a 89 yo female know to our practice, with a past medical history notable for coronary artery disease s/p PCI to the LAD, hyperlipidemia, PAF (Eliquis), chronic diastolic heart failure and mitral regurgitation.      Patient presented to the emergency department via EMS with sudden worsening shortness of breath.  When EMS arrived her oxygen saturation was 60%.  She was placed on BiPAP and given Solu-Medrol en route.  On arrival to the ED, heart rate was 151, oxygen saturations were 73%.  She received IV Lopressor, IV Lasix with improvement of heart rate improvement in saturation.    Just back from radiology, chest x-ray is pending.    She states she feels better today.  Heart rate is improved but still not at goal.  Less shortness of breath.  No chills or fevers overnight.    Work up reveals HS troponin's 28->46, BNP 2,971, potassium 4.7, creatinine 0.88, glucose 333, lactic acid 7.1, hemoglobin 11.3.  Respiratory panel negative.  Blood cultures are pending.  CXR reveals bilateral interstitial infiltrate and bilateral pleural effusions.  AM CXR  pending.  EKG shows atrial fibrillation, rate of 101, previous EKG on 24 showed sinus rhythm.    She is currently on 2 liters of oxygen with oxygen saturations in the mid 90's.  HR better in the 110's.       ECHO 21  Estimated right ventricular systolic pressure from tricuspid regurgitation is mildly elevated (35-45 mmHg). Calculated right ventricular systolic pressure from tricuspid regurgitation is 41 mmHg.  Mild pulmonary  hypertension is present.  Left ventricular wall thickness is consistent with mild to moderate concentric hypertrophy. Sigmoid-shaped ventricular septum is present.  Calculated left ventricular EF = 58% Estimated left ventricular EF was in agreement with the calculated left ventricular EF. Left ventricular systolic function is normal.  Left atrial volume is moderately increased.  The right atrial cavity is mildly dilated.  Mild dilation of the ascending aorta is present.  Mild to moderate aortic valve regurgitation is present.  Mild to moderate mitral valve regurgitation is present with an eccentric jet noted.  24 HOUR HOLTER MONITOR 3-18-21  A relatively benign monitor study.     Past Medical History:   Diagnosis Date    Anticoagulated     PLAVIX    Arthritis     Atrial premature complex     CAD (coronary artery disease)     Cancer     skin    Colon polyp     Depression     GERD (gastroesophageal reflux disease)     Heart attack     Hyperlipidemia     Hypertension     Macular degeneration     New onset atrial fibrillation 01/11/2019    Pre-diabetes     Spinal stenosis        Past Surgical History:   Procedure Laterality Date    ADENOIDECTOMY      CATARACT EXTRACTION      CORONARY ANGIOPLASTY WITH STENT PLACEMENT      EPIDURAL BLOCK      LUMBAR DISCECTOMY FUSION INSTRUMENTATION N/A 12/7/2017    Procedure: L2-3, L3-4, L4-5 laminectomy and fusion with local bone graft;  Surgeon: Andre Layne MD;  Location: [facility];  Service:     TONSILECTOMY, ADENOIDECTOMY, BILATERAL MYRINGOTOMY AND TUBES      TONSILLECTOMY           Prior to Admission medications    Medication Sig Start Date End Date Taking? Authorizing Provider   apixaban (Eliquis) 2.5 MG tablet tablet Take 1 tablet by mouth Every 12 (Twelve) Hours. 12/5/24  Yes Blake Brown Jr., MD   calcium carbonate (TUMS) 500 MG chewable tablet Chew 1,000 mg 2 (Two) Times a Day As Needed for Indigestion or Heartburn. 4/9/19  Yes Vimal Moe MD   Diclofenac  Sodium (VOLTAREN) 1 % gel gel APPLY 4 G TOPICALLY TO THE APPROPRIATE AREA AS DIRECTED 4 (FOUR) TIMES A DAY AS NEEDED (TO AFFECT AREAS WITH ARTHRITIS PAIN). 6/21/24  Yes Amarilis Ruelas APRN   DULoxetine (CYMBALTA) 60 MG capsule Take 1 capsule by mouth Daily. 12/5/24  Yes Tomas Nix DNP, APRN   ferrous sulfate 325 (65 FE) MG tablet TAKE 1 TABLET BY MOUTH EVERY DAY  Patient taking differently: Take 1 tablet by mouth Daily. 8/18/24  Yes Tomas Nix DNP, APRN   furosemide (LASIX) 20 MG tablet Take 2 tablets by mouth Daily. 12/5/24  Yes Justina Beal APRN   metoprolol succinate XL (TOPROL-XL) 25 MG 24 hr tablet Take 1 tablet by mouth Daily. 12/5/24  Yes Justina Beal APRN   omeprazole (priLOSEC) 20 MG capsule Take 1 capsule by mouth Daily With Breakfast. 12/5/24  Yes Tomas Nix DNP, APRN   polyethylene glycol (MIRALAX) pack packet Take 17 g by mouth Daily.  Patient taking differently: Take 17 g by mouth Daily As Needed. 12/11/17  Yes Vimal Moe MD   pravastatin (PRAVACHOL) 40 MG tablet Take 1 tablet by mouth Daily. 12/5/24  Yes Tomas Nix DNP, APRN   traMADol (ULTRAM) 50 MG tablet Take 1 tablet by mouth Every 8 (Eight) Hours As Needed for Moderate Pain. for pain  Patient taking differently: Take 1 tablet by mouth Every 8 (Eight) Hours As Needed for Moderate Pain. 12/5/24  Yes Tomas Nix DNP, APRN       Allergies   Allergen Reactions    Ciprofloxacin      UNKNOWN    Metronidazole      UNKWOWN    Prednisone Other (See Comments)     Elevated blood pressure       Social History     Socioeconomic History    Marital status:    Tobacco Use    Smoking status: Never     Passive exposure: Past (father smoked)    Smokeless tobacco: Never    Tobacco comments:     caffeine use- coffee   Vaping Use    Vaping status: Never Used   Substance and Sexual Activity    Alcohol use: No    Drug use: No    Sexual activity: Defer       Family History   Problem Relation Age of Onset    Diabetes Mother      "Depression Mother     Anxiety disorder Mother     Heart disease Father     Hypertension Father     Diabetes Father     Diabetes Brother     No Known Problems Maternal Grandmother     No Known Problems Maternal Grandfather     No Known Problems Paternal Grandmother     No Known Problems Paternal Grandfather     Malig Hyperthermia Neg Hx        REVIEW OF SYSTEMS:   All systems reviewed.  Pertinent positives identified in HPI.  All other systems are negative.      Objective:     Vitals:    02/07/25 2125 02/07/25 2327 02/08/25 0546 02/08/25 0731   BP: 92/67 131/64  124/52   BP Location: Right arm Right arm  Right arm   Patient Position: Sitting Lying  Sitting   Pulse: 107 102  110   Resp: 22 22 22   Temp: 98.1 °F (36.7 °C) 98 °F (36.7 °C)  98 °F (36.7 °C)   TempSrc: Oral Oral  Oral   SpO2: 93% 96%     Weight: 50.1 kg (110 lb 7.2 oz)  49.9 kg (110 lb)    Height: 157.5 cm (62\")        Body mass index is 20.12 kg/m².    Physical Exam:  General Appearance:    Alert, frail,in no acute distress   Head:    Normocephalic, without obvious abnormality   Eyes:            Lids and lashes normal, conjunctivae and sclerae normal, no icterus, no pallor, corneas clear   Ears:    Ears appear intact with no abnormalities noted   Throat:   No oral lesions, oral mucosa moist   Neck:   No adenopathy, supple, trachea midline, no thyromegaly, no carotid bruit, no JVD   Back:     No kyphosis present, no erythema or scars, no tenderness to palpation    Lungs:     Coarse breath sounds, respirations regular, even and unlabored    Heart:    irregular rhythm and normal rate, normal S1 and S2, no murmur, no gallop, no rub, no click   Chest Wall:    No abnormalities observed   Abdomen:     Normal bowel sounds, no masses, no organomegaly, soft        non-tender, non-distended, no guarding   Extremities:   Moves all extremities well, no edema, no cyanosis, no redness   Pulses:  Bilateral carotids brisk   Skin:  Psychiatric:   No bleeding or rash    " Alert and oriented, normal mood and affect         Lab Review:     Results from last 7 days   Lab Units 02/08/25  0320 02/07/25  1643   SODIUM mmol/L 141 138   POTASSIUM mmol/L 3.5 4.7   CHLORIDE mmol/L 105 101   CO2 mmol/L 25.1 19.8*   BUN mg/dL 25* 20   CREATININE mg/dL 0.72 0.88   CALCIUM mg/dL 8.6 9.0   BILIRUBIN mg/dL  --  0.7   ALK PHOS U/L  --  70   ALT (SGPT) U/L  --  25   AST (SGOT) U/L  --  50*   GLUCOSE mg/dL 205* 333*     Results from last 7 days   Lab Units 02/07/25  1738 02/07/25  1643   HSTROP T ng/L 46* 28*     Results from last 7 days   Lab Units 02/08/25  0320   WBC 10*3/mm3 11.14*   HEMOGLOBIN g/dL 9.9*   HEMATOCRIT % 28.6*   PLATELETS 10*3/mm3 259     Results from last 7 days   Lab Units 02/07/25  1643   INR  1.41*   APTT seconds 26.3                       2-7-25 1-12-24      I personally viewed and interpreted the patient's EKG/Telemetry data.      Current Facility-Administered Medications:     acetaminophen (TYLENOL) tablet 650 mg, 650 mg, Oral, Q4H PRN, Salome Childers APRN    apixaban (ELIQUIS) tablet 2.5 mg, 2.5 mg, Oral, Q12H, Salome Childers APRN, 2.5 mg at 02/08/25 0811    azithromycin (ZITHROMAX) 500 mg in sodium chloride 0.9 % 250 mL IVPB-VTB, 500 mg, Intravenous, Q24H, Salome Childers APRN    sennosides-docusate (PERICOLACE) 8.6-50 MG per tablet 2 tablet, 2 tablet, Oral, BID PRN **AND** polyethylene glycol (MIRALAX) packet 17 g, 17 g, Oral, Daily PRN **AND** bisacodyl (DULCOLAX) EC tablet 5 mg, 5 mg, Oral, Daily PRN **AND** bisacodyl (DULCOLAX) suppository 10 mg, 10 mg, Rectal, Daily PRN, Salome Childers APRN    calcium carbonate (TUMS) chewable tablet 500 mg (200 mg elemental), 2 tablet, Oral, BID PRN, Salome Childers APRN    cefTRIAXone (ROCEPHIN) 1,000 mg in sodium chloride 0.9 % 100 mL MBP, 1,000 mg, Intravenous, Q24H, Salome Childers APRN    DULoxetine (CYMBALTA) DR capsule 60 mg, 60 mg, Oral, Daily, Salome Childers APRN, 60 mg at 02/08/25 0809     ferrous sulfate tablet 325 mg, 325 mg, Oral, Daily, Manuel Childersa IMANI, APRN, 325 mg at 02/08/25 0809    furosemide (LASIX) tablet 40 mg, 40 mg, Oral, Daily, Manuel Childersa D, APRN, 40 mg at 02/08/25 0809    ipratropium-albuterol (DUO-NEB) nebulizer solution 3 mL, 3 mL, Nebulization, Q6H PRN, Salome Childers, APRN    ipratropium-albuterol (DUO-NEB) nebulizer solution 3 mL, 3 mL, Nebulization, 4x Daily - RT, Salome Childers, APRN    metoprolol succinate XL (TOPROL-XL) 24 hr tablet 25 mg, 25 mg, Oral, Daily, Manuel Childersa IMANI, APRN, 25 mg at 02/08/25 0809    nitroglycerin (NITROSTAT) SL tablet 0.4 mg, 0.4 mg, Sublingual, Q5 Min PRN, Salome Childers, APRN    ondansetron ODT (ZOFRAN-ODT) disintegrating tablet 4 mg, 4 mg, Oral, Q6H PRN **OR** ondansetron (ZOFRAN) injection 4 mg, 4 mg, Intravenous, Q6H PRN, Salome Childers, APRN    pantoprazole (PROTONIX) EC tablet 40 mg, 40 mg, Oral, Q AM, Manuel Childersa IMANI, APRN, 40 mg at 02/08/25 0707    pravastatin (PRAVACHOL) tablet 40 mg, 40 mg, Oral, Daily, Manuel Childersa D, APRN, 40 mg at 02/08/25 0809    [COMPLETED] Insert Peripheral IV, , , Once **AND** sodium chloride 0.9 % flush 10 mL, 10 mL, Intravenous, PRN, Flori Lucas MD    Assessment and Plan:       Active Hospital Problems    Diagnosis  POA    **Acute hypoxic respiratory failure [J96.01]  Yes    Chronic diastolic CHF (congestive heart failure) [I50.32]  Yes    Pneumonia [J18.9]  Yes    Atrial fibrillation [I48.91]  Yes    Essential hypertension [I10]  Yes    Bad memory [R41.3]  Yes      Resolved Hospital Problems   No resolved problems to display.     1.  Acute heart failure, prior history of chronic diastolic heart failure.  Improved with diuretics and rate control.  I will obtain an echocardiogram today to help titrate any adjustment of medical therapy.  Now on oral diuretics, follow response today.  She does not appear to be significantly volume overloaded, I am more suspicious that the  A-fib with RVR was the primary culprit  2.  Paroxysmal atrial fibrillation-in atrial fibrillation, very tachycardic on arrival, still a little tachy.  I will advance the dose of her metoprolol  3.  Acute hypoxic respiratory failure  4.  Hypertension-following current medical therapy    Karel Navarrete III, MD  02/08/25  10:16 EST

## 2025-02-09 PROBLEM — J15.69 PNEUMONIA DUE TO GRAM-NEGATIVE BACTERIA: Status: ACTIVE | Noted: 2025-02-09

## 2025-02-09 LAB
ALBUMIN SERPL-MCNC: 3.4 G/DL (ref 3.5–5.2)
ANION GAP SERPL CALCULATED.3IONS-SCNC: 10 MMOL/L (ref 5–15)
BASOPHILS # BLD AUTO: 0.15 10*3/MM3 (ref 0–0.2)
BASOPHILS NFR BLD AUTO: 1.1 % (ref 0–1.5)
BUN SERPL-MCNC: 26 MG/DL (ref 8–23)
BUN/CREAT SERPL: 38.8 (ref 7–25)
CALCIUM SPEC-SCNC: 8.5 MG/DL (ref 8.2–9.6)
CHLORIDE SERPL-SCNC: 107 MMOL/L (ref 98–107)
CO2 SERPL-SCNC: 25 MMOL/L (ref 22–29)
CREAT SERPL-MCNC: 0.67 MG/DL (ref 0.57–1)
DEPRECATED RDW RBC AUTO: 60.5 FL (ref 37–54)
EGFRCR SERPLBLD CKD-EPI 2021: 79.6 ML/MIN/1.73
EOSINOPHIL # BLD AUTO: 0.37 10*3/MM3 (ref 0–0.4)
EOSINOPHIL NFR BLD AUTO: 2.7 % (ref 0.3–6.2)
ERYTHROCYTE [DISTWIDTH] IN BLOOD BY AUTOMATED COUNT: 15.1 % (ref 12.3–15.4)
GLUCOSE SERPL-MCNC: 121 MG/DL (ref 65–99)
HCT VFR BLD AUTO: 29 % (ref 34–46.6)
HGB BLD-MCNC: 9.7 G/DL (ref 12–15.9)
IMM GRANULOCYTES # BLD AUTO: 0.04 10*3/MM3 (ref 0–0.05)
IMM GRANULOCYTES NFR BLD AUTO: 0.3 % (ref 0–0.5)
LYMPHOCYTES # BLD AUTO: 2.46 10*3/MM3 (ref 0.7–3.1)
LYMPHOCYTES NFR BLD AUTO: 18 % (ref 19.6–45.3)
MCH RBC QN AUTO: 37.7 PG (ref 26.6–33)
MCHC RBC AUTO-ENTMCNC: 33.4 G/DL (ref 31.5–35.7)
MCV RBC AUTO: 112.8 FL (ref 79–97)
MONOCYTES # BLD AUTO: 0.93 10*3/MM3 (ref 0.1–0.9)
MONOCYTES NFR BLD AUTO: 6.8 % (ref 5–12)
NEUTROPHILS NFR BLD AUTO: 71.1 % (ref 42.7–76)
NEUTROPHILS NFR BLD AUTO: 9.71 10*3/MM3 (ref 1.7–7)
PHOSPHATE SERPL-MCNC: 2.8 MG/DL (ref 2.5–4.5)
PLATELET # BLD AUTO: 272 10*3/MM3 (ref 140–450)
PMV BLD AUTO: 11.4 FL (ref 6–12)
POTASSIUM SERPL-SCNC: 3.6 MMOL/L (ref 3.5–5.2)
PROCALCITONIN SERPL-MCNC: 1.56 NG/ML (ref 0–0.25)
RBC # BLD AUTO: 2.57 10*6/MM3 (ref 3.77–5.28)
SODIUM SERPL-SCNC: 142 MMOL/L (ref 136–145)
WBC NRBC COR # BLD AUTO: 13.66 10*3/MM3 (ref 3.4–10.8)

## 2025-02-09 PROCEDURE — 97530 THERAPEUTIC ACTIVITIES: CPT

## 2025-02-09 PROCEDURE — 94799 UNLISTED PULMONARY SVC/PX: CPT

## 2025-02-09 PROCEDURE — 94760 N-INVAS EAR/PLS OXIMETRY 1: CPT

## 2025-02-09 PROCEDURE — 25010000002 CEFTRIAXONE PER 250 MG

## 2025-02-09 PROCEDURE — 25010000002 FUROSEMIDE PER 20 MG: Performed by: INTERNAL MEDICINE

## 2025-02-09 PROCEDURE — 94664 DEMO&/EVAL PT USE INHALER: CPT

## 2025-02-09 PROCEDURE — 85025 COMPLETE CBC W/AUTO DIFF WBC: CPT | Performed by: INTERNAL MEDICINE

## 2025-02-09 PROCEDURE — 99232 SBSQ HOSP IP/OBS MODERATE 35: CPT | Performed by: INTERNAL MEDICINE

## 2025-02-09 PROCEDURE — 84145 PROCALCITONIN (PCT): CPT | Performed by: INTERNAL MEDICINE

## 2025-02-09 PROCEDURE — 80069 RENAL FUNCTION PANEL: CPT | Performed by: INTERNAL MEDICINE

## 2025-02-09 PROCEDURE — 97162 PT EVAL MOD COMPLEX 30 MIN: CPT

## 2025-02-09 PROCEDURE — 94761 N-INVAS EAR/PLS OXIMETRY MLT: CPT

## 2025-02-09 RX ORDER — FUROSEMIDE 10 MG/ML
20 INJECTION INTRAMUSCULAR; INTRAVENOUS ONCE
Status: COMPLETED | OUTPATIENT
Start: 2025-02-09 | End: 2025-02-09

## 2025-02-09 RX ADMIN — APIXABAN 2.5 MG: 2.5 TABLET, FILM COATED ORAL at 21:22

## 2025-02-09 RX ADMIN — FUROSEMIDE 40 MG: 40 TABLET ORAL at 08:48

## 2025-02-09 RX ADMIN — TRAMADOL HYDROCHLORIDE 50 MG: 50 TABLET, COATED ORAL at 21:22

## 2025-02-09 RX ADMIN — IPRATROPIUM BROMIDE AND ALBUTEROL SULFATE 3 ML: .5; 3 SOLUTION RESPIRATORY (INHALATION) at 12:01

## 2025-02-09 RX ADMIN — CEFTRIAXONE SODIUM 1000 MG: 1 INJECTION, POWDER, FOR SOLUTION INTRAMUSCULAR; INTRAVENOUS at 17:28

## 2025-02-09 RX ADMIN — IPRATROPIUM BROMIDE AND ALBUTEROL SULFATE 3 ML: .5; 3 SOLUTION RESPIRATORY (INHALATION) at 16:41

## 2025-02-09 RX ADMIN — FUROSEMIDE 20 MG: 10 INJECTION, SOLUTION INTRAMUSCULAR; INTRAVENOUS at 12:15

## 2025-02-09 RX ADMIN — APIXABAN 2.5 MG: 2.5 TABLET, FILM COATED ORAL at 08:49

## 2025-02-09 RX ADMIN — PANTOPRAZOLE SODIUM 40 MG: 40 TABLET, DELAYED RELEASE ORAL at 06:34

## 2025-02-09 RX ADMIN — FERROUS SULFATE TAB 325 MG (65 MG ELEMENTAL FE) 325 MG: 325 (65 FE) TAB at 08:49

## 2025-02-09 RX ADMIN — METOPROLOL SUCCINATE 50 MG: 50 TABLET, EXTENDED RELEASE ORAL at 08:49

## 2025-02-09 RX ADMIN — DULOXETINE 60 MG: 30 CAPSULE, DELAYED RELEASE ORAL at 08:49

## 2025-02-09 RX ADMIN — IPRATROPIUM BROMIDE AND ALBUTEROL SULFATE 3 ML: .5; 3 SOLUTION RESPIRATORY (INHALATION) at 21:31

## 2025-02-09 RX ADMIN — TRAMADOL HYDROCHLORIDE 50 MG: 50 TABLET, COATED ORAL at 08:49

## 2025-02-09 RX ADMIN — IPRATROPIUM BROMIDE AND ALBUTEROL SULFATE 3 ML: .5; 3 SOLUTION RESPIRATORY (INHALATION) at 07:42

## 2025-02-09 RX ADMIN — PRAVASTATIN SODIUM 40 MG: 40 TABLET ORAL at 08:49

## 2025-02-09 NOTE — PAYOR COMM NOTE
"Lilly Mane (89 y.o. Female)     ATTN: NURSE REVIEWER  RE: INITIAL INPT AUTH CLINICALS   REF# [id number]  PLS REPLY TO EMILY PASCUAL [phone] OR FAX# [phone]      Date of Birth   04/13/1927    Social Security Number       Address   [address] [place] [address]    Home Phone   [phone]    MRN   [id number]       [place]   None    Marital Status                               Admission Date   2/7/25    Admission Type   Emergency    Admitting Provider   Jere Bill MD    Attending Provider   Jere Bill MD    Department, Room/Bed   [facility] CARDIOVASC UNIT, 2205/1       Discharge Date       Discharge Disposition       Discharge Destination                                 Attending Provider: Jere Bill MD    Allergies: Ciprofloxacin, Metronidazole, Prednisone    Isolation: None   Infection: None   Code Status: No CPR    Ht: 157.5 cm (62\")   Wt: 49.9 kg (110 lb)    Admission Cmt: None   Principal Problem: Acute hypoxic respiratory failure [J96.01]                   Active Insurance as of 2/7/2025       Primary Coverage       Payor Plan Insurance Group Employer/Plan Group    HUMANA MEDICARE REPLACEMENT HUMANA MED ADV HMO [id number]       Payor Plan Address Payor Plan Phone Number Payor Plan Fax Number Effective Dates    [address] [address] [phone]  1/1/2023 - None Entered    [facility] [id number]         Subscriber Name Subscriber Birth Date Member ID       LILLY MANE 4/13/1927 [id number]                     Emergency Contacts        (Rel.) Home Phone Work Phone Mobile Phone    Sheridan Gregory (Daughter) [phone] -- [phone]    priscila centeno (Daughter) -- -- [phone]                 History & Physical        Jere Bill MD at 02/08/25 1029              Patient Name:  Lilly Mane  YOB: 1927  MRN:  [id number]  Admit Date:  2/7/2025  Patient Care Team:  Tomas Nix, ALONSO, APRN as PCP - General " (Internal Medicine)      Subjective  History Present Illness     Chief Complaint   Patient presents with    Shortness of Breath       Ms. Peace is a 89 y.o. non-smoker with a history of HTN, HLD, PAF, CAD, and GERD that presents to [facility] complaining of severe shortness of breath. She was found to have afib with RVR and volume overload with hypoxemia. She has responded well to metoprolol and diuretics. She reports feeling much better today, denies significant cough and shortness of breath. She had not been ill prior to yesterday. Her daughters are at bedside, they express concerns that she is not regularly taking her diuretics as she has an automatic pull dispenser at home and they have found loose pills periodically.    Review of Systems   All other systems reviewed and are negative.       Personal History     Past Medical History:   Diagnosis Date    Anticoagulated     PLAVIX    Arthritis     Atrial premature complex     CAD (coronary artery disease)     Cancer     skin    Colon polyp     Depression     GERD (gastroesophageal reflux disease)     Heart attack     Hyperlipidemia     Hypertension     Macular degeneration     New onset atrial fibrillation 01/11/2019    Pre-diabetes     Spinal stenosis      Past Surgical History:   Procedure Laterality Date    ADENOIDECTOMY      CATARACT EXTRACTION      CORONARY ANGIOPLASTY WITH STENT PLACEMENT      EPIDURAL BLOCK      LUMBAR DISCECTOMY FUSION INSTRUMENTATION N/A 12/7/2017    Procedure: L2-3, L3-4, L4-5 laminectomy and fusion with local bone graft;  Surgeon: Andre Layne MD;  Location: [facility];  Service:     TONSILECTOMY, ADENOIDECTOMY, BILATERAL MYRINGOTOMY AND TUBES      TONSILLECTOMY       Family History   Problem Relation Age of Onset    Diabetes Mother     Depression Mother     Anxiety disorder Mother     Heart disease Father     Hypertension Father     Diabetes Father     Diabetes Brother     No Known Problems Maternal Grandmother      No Known Problems Maternal Grandfather     No Known Problems Paternal Grandmother     No Known Problems Paternal Grandfather     Malig Hyperthermia Neg Hx      Social History     Tobacco Use    Smoking status: Never     Passive exposure: Past (father smoked)    Smokeless tobacco: Never    Tobacco comments:     caffeine use- coffee   Vaping Use    Vaping status: Never Used   Substance Use Topics    Alcohol use: No    Drug use: No     No current facility-administered medications on file prior to encounter.     Current Outpatient Medications on File Prior to Encounter   Medication Sig Dispense Refill    apixaban (Eliquis) 2.5 MG tablet tablet Take 1 tablet by mouth Every 12 (Twelve) Hours. 180 tablet 0    calcium carbonate (TUMS) 500 MG chewable tablet Chew 1,000 mg 2 (Two) Times a Day As Needed for Indigestion or Heartburn.      Diclofenac Sodium (VOLTAREN) 1 % gel gel APPLY 4 G TOPICALLY TO THE APPROPRIATE AREA AS DIRECTED 4 (FOUR) TIMES A DAY AS NEEDED (TO AFFECT AREAS WITH ARTHRITIS PAIN). 100 g 1    DULoxetine (CYMBALTA) 60 MG capsule Take 1 capsule by mouth Daily. 90 capsule 3    ferrous sulfate 325 (65 FE) MG tablet TAKE 1 TABLET BY MOUTH EVERY DAY (Patient taking differently: Take 1 tablet by mouth Daily.) 90 tablet 3    furosemide (LASIX) 20 MG tablet Take 2 tablets by mouth Daily. 180 tablet 1    metoprolol succinate XL (TOPROL-XL) 25 MG 24 hr tablet Take 1 tablet by mouth Daily. 90 tablet 1    omeprazole (priLOSEC) 20 MG capsule Take 1 capsule by mouth Daily With Breakfast. 90 capsule 3    polyethylene glycol (MIRALAX) pack packet Take 17 g by mouth Daily. (Patient taking differently: Take 17 g by mouth Daily As Needed.)      pravastatin (PRAVACHOL) 40 MG tablet Take 1 tablet by mouth Daily. 90 tablet 1    traMADol (ULTRAM) 50 MG tablet Take 1 tablet by mouth Every 8 (Eight) Hours As Needed for Moderate Pain. for pain (Patient taking differently: Take 1 tablet by mouth Every 8 (Eight) Hours As Needed for  Moderate Pain.) 90 tablet 2     Allergies   Allergen Reactions    Ciprofloxacin      UNKNOWN    Metronidazole      UNKWOWN    Prednisone Other (See Comments)     Elevated blood pressure       Objective   Objective     Vital Signs  Temp:  [97.9 °F (36.6 °C)-98.1 °F (36.7 °C)] 98 °F (36.7 °C)  Heart Rate:  [] 110  Resp:  [22-24] 22  BP: ()/(52-81) 124/52  SpO2:  [73 %-100 %] 96 %  on  Flow (L/min) (Oxygen Therapy):  [2-15] 2;   Device (Oxygen Therapy): nasal cannula  Body mass index is 20.12 kg/m².    Physical Exam  Vitals and nursing note reviewed.   Constitutional:       General: She is not in acute distress.     Appearance: She is ill-appearing (chronically). She is not toxic-appearing.   HENT:      Head: Normocephalic and atraumatic.      Nose: Nose normal.      Mouth/Throat:      Mouth: Mucous membranes are moist.      Pharynx: Oropharynx is clear.   Eyes:      Conjunctiva/sclera: Conjunctivae normal.      Pupils: Pupils are equal, round, and reactive to light.   Cardiovascular:      Rate and Rhythm: Normal rate. Rhythm irregular.      Pulses: Normal pulses.   Pulmonary:      Effort: Pulmonary effort is normal. No respiratory distress.      Breath sounds: Rales present. No wheezing or rhonchi.   Abdominal:      General: Bowel sounds are normal. There is no distension.      Palpations: Abdomen is soft.      Tenderness: There is no abdominal tenderness.   Musculoskeletal:         General: Swelling (1-2+ BLE) present. No tenderness.      Cervical back: Neck supple.   Skin:     General: Skin is warm and dry.      Capillary Refill: Capillary refill takes less than 2 seconds.   Neurological:      General: No focal deficit present.      Mental Status: She is alert.      Comments: Hard of hearing   Psychiatric:         Mood and Affect: Mood normal.         Behavior: Behavior normal.       Results Review:  I reviewed the patient's new clinical results.  I reviewed the patient's new imaging results and agree  with the interpretation.  I reviewed the patient's other test results and agree with the interpretation  I personally viewed and interpreted the patient's EKG/Telemetry data    Lab Results (last 24 hours)       Procedure Component Value Units Date/Time    Respiratory Panel PCR w/COVID-19(SARS-CoV-2) NGUYEN/YULY/KALLIE/PAD/COR/LUIS M In-House, NP Swab in UTM/VTM, 2 HR TAT - Swab, Nasopharynx [[id number]]  (Normal) Collected: 02/07/25 1642    Specimen: Swab from Nasopharynx Updated: 02/07/25 1750     ADENOVIRUS, PCR Not Detected     Coronavirus 229E Not Detected     Coronavirus HKU1 Not Detected     Coronavirus NL63 Not Detected     Coronavirus OC43 Not Detected     COVID19 Not Detected     Human Metapneumovirus Not Detected     Human Rhinovirus/Enterovirus Not Detected     Influenza A PCR Not Detected     Influenza B PCR Not Detected     Parainfluenza Virus 1 Not Detected     Parainfluenza Virus 2 Not Detected     Parainfluenza Virus 3 Not Detected     Parainfluenza Virus 4 Not Detected     RSV, PCR Not Detected     Bordetella pertussis pcr Not Detected     Bordetella parapertussis PCR Not Detected     Chlamydophila pneumoniae PCR Not Detected     Mycoplasma pneumo by PCR Not Detected    Narrative:      In the setting of a positive respiratory panel with a viral infection PLUS a negative procalcitonin without other underlying concern for bacterial infection, consider observing off antibiotics or discontinuation of antibiotics and continue supportive care. If the respiratory panel is positive for atypical bacterial infection (Bordetella pertussis, Chlamydophila pneumoniae, or Mycoplasma pneumoniae), consider antibiotic de-escalation to target atypical bacterial infection.    High Sensitivity Troponin T [[id number]]  (Abnormal) Collected: 02/07/25 1643    Specimen: Blood Updated: 02/07/25 1735     HS Troponin T 28 ng/L      Comment: Specimen hemolyzed.  Results may be falsely decreased.       Narrative:      High Sensitive  Troponin T Reference Range:  <14.0 ng/L- Negative Female for AMI  <22.0 ng/L- Negative Male for AMI  >=14 - Abnormal Female indicating possible myocardial injury.  >=22 - Abnormal Male indicating possible myocardial injury.   Clinicians would have to utilize clinical acumen, EKG, Troponin, and serial changes to determine if it is an Acute Myocardial Infarction or myocardial injury due to an underlying chronic condition.         CBC & Differential [[id number]]  (Abnormal) Collected: 02/07/25 1643    Specimen: Blood Updated: 02/07/25 1701    Narrative:      The following orders were created for panel order CBC & Differential.  Procedure                               Abnormality         Status                     ---------                               -----------         ------                     CBC Auto Differential[[id number]]        Abnormal            Final result                 Please view results for these tests on the individual orders.    Comprehensive Metabolic Panel [[id number]]  (Abnormal) Collected: 02/07/25 1643    Specimen: Blood Updated: 02/07/25 9603     Glucose 333 mg/dL      BUN 20 mg/dL      Creatinine 0.88 mg/dL      Sodium 138 mmol/L      Potassium 4.7 mmol/L      Comment: Specimen hemolyzed.  Result may be falsely elevated.        Chloride 101 mmol/L      CO2 19.8 mmol/L      Calcium 9.0 mg/dL      Total Protein 6.9 g/dL      Albumin 3.8 g/dL      ALT (SGPT) 25 U/L      Comment: Specimen hemolyzed.  Result may  be falsely elevated.        AST (SGOT) 50 U/L      Comment: Specimen hemolyzed.  Result may be falsely elevated.        Alkaline Phosphatase 70 U/L      Total Bilirubin 0.7 mg/dL      Globulin 3.1 gm/dL      A/G Ratio 1.2 g/dL      BUN/Creatinine Ratio 22.7     Anion Gap 17.2 mmol/L      eGFR 59.9 mL/min/1.73     Narrative:      GFR Categories in Chronic Kidney Disease (CKD)      GFR Category          GFR (mL/min/1.73)    Interpretation  G1                     90 or greater         Normal  or high (1)  G2                      60-89                Mild decrease (1)  G3a                   45-59                Mild to moderate decrease  G3b                   30-44                Moderate to severe decrease  G4                    15-29                Severe decrease  G5                    14 or less           Kidney failure          (1)In the absence of evidence of kidney disease, neither GFR category G1 or G2 fulfill the criteria for CKD.    eGFR calculation 2021 CKD-EPI creatinine equation, which does not include race as a factor    Protime-INR [[id number]]  (Abnormal) Collected: 02/07/25 1643    Specimen: Blood Updated: 02/07/25 1719     Protime 17.3 Seconds      INR 1.41    aPTT [[id number]]  (Normal) Collected: 02/07/25 1643    Specimen: Blood Updated: 02/07/25 1719     PTT 26.3 seconds     BNP [[id number]]  (Abnormal) Collected: 02/07/25 1643    Specimen: Blood Updated: 02/07/25 1734     proBNP 2,971.0 pg/mL     Narrative:      This assay is used as an aid in the diagnosis of individuals suspected of having heart failure. It can be used as an aid in the diagnosis of acute decompensated heart failure (ADHF) in patients presenting with signs and symptoms of ADHF to the emergency department (ED). In addition, NT-proBNP of <300 pg/mL indicates ADHF is not likely.    Age Range Result Interpretation  NT-proBNP Concentration (pg/mL:      <50             Positive            >450                   Gray                 300-450                    Negative             <300    50-75           Positive            >900                  Gray                300-900                  Negative            <300      >75             Positive            >1800                  Gray                300-1800                  Negative            <300    Lactic Acid, Plasma [[id number]]  (Abnormal) Collected: 02/07/25 1643    Specimen: Blood Updated: 02/07/25 1724     Lactate 7.1 mmol/L     Procalcitonin [[id number]]  (Normal)  "Collected: 02/07/25 1643    Specimen: Blood Updated: 02/07/25 1734     Procalcitonin <0.02 ng/mL     Narrative:      As a Marker for Sepsis (Non-Neonates):    1. <0.5 ng/mL represents a low risk of severe sepsis and/or septic shock.  2. >2 ng/mL represents a high risk of severe sepsis and/or septic shock.    As a Marker for Lower Respiratory Tract Infections that require antibiotic therapy:    PCT on Admission    Antibiotic Therapy       6-12 Hrs later    >0.5                Strongly Recommended  >0.25 - <0.5        Recommended   0.1 - 0.25          Discouraged              Remeasure/reassess PCT  <0.1                Strongly Discouraged     Remeasure/reassess PCT    As 28 day mortality risk marker: \"Change in Procalcitonin Result\" (>80% or <=80%) if Day 0 (or Day 1) and Day 4 values are available. Refer to [email or web address]    Change in PCT <=80%  A decrease of PCT levels below or equal to 80% defines a positive change in PCT test result representing a higher risk for 28-day all-cause mortality of patients diagnosed with severe sepsis for septic shock.    Change in PCT >80%  A decrease of PCT levels of more than 80% defines a negative change in PCT result representing a lower risk for 28-day all-cause mortality of patients diagnosed with severe sepsis or septic shock.       CBC Auto Differential [[id number]]  (Abnormal) Collected: 02/07/25 1643    Specimen: Blood Updated: 02/07/25 1701     WBC 17.54 10*3/mm3      RBC 2.95 10*6/mm3      Hemoglobin 11.3 g/dL      Hematocrit 33.3 %      .9 fL      MCH 38.3 pg      MCHC 33.9 g/dL      RDW 14.4 %      RDW-SD 56.3 fl      MPV 11.6 fL      Platelets 362 10*3/mm3      nRBC 1.1 /100 WBC     Manual Differential [[id number]]  (Abnormal) Collected: 02/07/25 1643    Specimen: Blood Updated: 02/07/25 1750     Neutrophil % 93.0 %      Lymphocyte % 5.0 %      Monocyte % 1.0 %      Eosinophil % 1.0 %      Basophil % 0.0 %      Neutrophils Absolute 16.31 " 10*3/mm3      Lymphocytes Absolute 0.88 10*3/mm3      Monocytes Absolute 0.18 10*3/mm3      Eosinophils Absolute 0.18 10*3/mm3      Basophils Absolute 0.00 10*3/mm3      nRBC 1.0 /100 WBC      Polychromasia Large/3+     WBC Morphology Normal     Platelet Morphology Normal    Blood Culture - Blood, Arm, Right [[id number]] Collected: 02/07/25 1737    Specimen: Blood from Arm, Right Updated: 02/07/25 1743    STAT Lactic Acid, Reflex [[id number]]  (Abnormal) Collected: 02/07/25 1737    Specimen: Blood from Arm, Right Updated: 02/07/25 1830     Lactate 4.9 mmol/L     Blood Culture - Blood, Arm, Left [[id number]] Collected: 02/07/25 1738    Specimen: Blood from Arm, Left Updated: 02/07/25 1743    High Sensitivity Troponin T 1Hr [[id number]]  (Abnormal) Collected: 02/07/25 1738    Specimen: Blood from Arm, Left Updated: 02/07/25 1830     HS Troponin T 46 ng/L      Troponin T Numeric Delta 18 ng/L      Troponin T % Delta 64    Narrative:      High Sensitive Troponin T Reference Range:  <14.0 ng/L- Negative Female for AMI  <22.0 ng/L- Negative Male for AMI  >=14 - Abnormal Female indicating possible myocardial injury.  >=22 - Abnormal Male indicating possible myocardial injury.   Clinicians would have to utilize clinical acumen, EKG, Troponin, and serial changes to determine if it is an Acute Myocardial Infarction or myocardial injury due to an underlying chronic condition.         Blood Gas, Arterial - [[id number]]  (Abnormal) Collected: 02/07/25 1813    Specimen: Arterial Blood Updated: 02/07/25 1817     Site Right Radial     Juve's Test Positive     pH, Arterial 7.377 pH units      pCO2, Arterial 42.9 mm Hg      pO2, Arterial 93.9 mm Hg      HCO3, Arterial 25.2 mmol/L      Base Excess, Arterial -0.2 mmol/L      Comment: Serial Number: [id number]:  [id number]        O2 Saturation, Arterial 97.1 %      A-a DO2 0.4 mmHg      CO2 Content 26.5 mmol/L      Barometric Pressure for Blood Gas 755.0000 mmHg      Modality BiPap     FIO2  40 %      Set Tidal Volume 643     Set [facility] Resp Rate 20     Rate 23 Breaths/minute      PEEP 6     Hemodilution No     Inspiratory Time 1     Device Comment Avaps 20/10 sats 98%     PO2/FIO2 235    Urinalysis With Culture If Indicated - Urine, Clean Catch [[id number]]  (Abnormal) Collected: 02/07/25 2037    Specimen: Urine, Clean Catch Updated: 02/07/25 2055     Color, UA Yellow     Appearance, UA Cloudy     pH, UA 6.5     Specific Gravity, UA 1.008     Glucose,  mg/dL (1+)     Ketones, UA Negative     Bilirubin, UA Negative     Blood, UA Moderate (2+)     Protein, UA Negative     Leuk Esterase, UA Small (1+)     Nitrite, UA Negative     Urobilinogen, UA 0.2 E.U./dL    Narrative:      In absence of clinical symptoms, the presence of pyuria, bacteria, and/or nitrites on the urinalysis result does not correlate with infection.    Urinalysis, Microscopic Only - Urine, Clean Catch [[id number]]  (Abnormal) Collected: 02/07/25 2037    Specimen: Urine, Clean Catch Updated: 02/07/25 2055     RBC, UA 21-50 /HPF      WBC, UA 3-5 /HPF      Comment: Urine culture not indicated.        Bacteria, UA 4+ /HPF      Squamous Epithelial Cells, UA 0-2 /HPF      Hyaline Casts, UA 0-2 /LPF      Methodology Automated Microscopy    STAT Lactic Acid, Reflex [[id number]]  (Abnormal) Collected: 02/07/25 2039    Specimen: Blood Updated: 02/07/25 2112     Lactate 2.4 mmol/L     POC Glucose Once [[id number]]  (Normal) Collected: 02/07/25 2129    Specimen: Blood Updated: 02/07/25 2141     Glucose 126 mg/dL     STAT Lactic Acid, Reflex [[id number]]  (Abnormal) Collected: 02/07/25 2346    Specimen: Blood Updated: 02/08/25 0042     Lactate 2.5 mmol/L     Renal Function Panel [[id number]]  (Abnormal) Collected: 02/08/25 0320    Specimen: Blood Updated: 02/08/25 0435     Glucose 205 mg/dL      BUN 25 mg/dL      Creatinine 0.72 mg/dL      Sodium 141 mmol/L      Potassium 3.5 mmol/L      Chloride 105 mmol/L      CO2 25.1 mmol/L      Calcium 8.6 mg/dL       Albumin 3.6 g/dL      Phosphorus 2.9 mg/dL      Anion Gap 10.9 mmol/L      BUN/Creatinine Ratio 34.7     eGFR 76.2 mL/min/1.73     Narrative:      GFR Categories in Chronic Kidney Disease (CKD)      GFR Category          GFR (mL/min/1.73)    Interpretation  G1                     90 or greater         Normal or high (1)  G2                      60-89                Mild decrease (1)  G3a                   45-59                Mild to moderate decrease  G3b                   30-44                Moderate to severe decrease  G4                    15-29                Severe decrease  G5                    14 or less           Kidney failure          (1)In the absence of evidence of kidney disease, neither GFR category G1 or G2 fulfill the criteria for CKD.    eGFR calculation 2021 CKD-EPI creatinine equation, which does not include race as a factor    STAT Lactic Acid, Reflex [[id number]]  (Abnormal) Collected: 02/08/25 0320    Specimen: Blood Updated: 02/08/25 0427     Lactate 2.7 mmol/L     CBC (No Diff) [[id number]]  (Abnormal) Collected: 02/08/25 0320    Specimen: Blood Updated: 02/08/25 0413     WBC 11.14 10*3/mm3      RBC 2.56 10*6/mm3      Hemoglobin 9.9 g/dL      Hematocrit 28.6 %      .7 fL      MCH 38.7 pg      MCHC 34.6 g/dL      RDW 14.7 %      RDW-SD 57.9 fl      MPV 11.5 fL      Platelets 259 10*3/mm3     D-dimer, Quantitative [[id number]]  (Normal) Collected: 02/08/25 0320    Specimen: Blood from Arm, Right Updated: 02/08/25 0436     D-Dimer, Quantitative 0.83 MCGFEU/mL     Narrative:      According to the assay 's published package insert, a normal (<0.50 MCGFEU/mL) D-dimer result in conjunction with a non-high clinical probability assessment, excludes deep vein thrombosis (DVT) and pulmonary embolism (PE) with high sensitivity.    D-dimer values increase with age and this can make VTE exclusion of an older population difficult. To address this, the [employer] College [employer],  "based on best available evidence and recent guidelines, recommends that clinicians use age-adjusted D-dimer thresholds in patients greater than 50 years of age with: a) a low probability of PE who do not meet all Pulmonary Embolism Rule Out Criteria, or b) in those with intermediate probability of PE.   The formula for an age-adjusted D-dimer cut-off is \"age/100\".  For example, a 60 year old patient would have an age-adjusted cut-off of 0.60 MCGFEU/mL and an 80 year old 0.80 MCGFEU/mL.    STAT Lactic Acid, Reflex [[id number]] Collected: 02/08/25 1001    Specimen: Blood Updated: 02/08/25 1024            Imaging Results (Last 24 Hours)       Procedure Component Value Units Date/Time    XR Chest 2 View [[id number]] Resulted: 02/08/25 0844     Updated: 02/08/25 0845    XR Chest 1 View [[id number]] Collected: 02/07/25 1728     Updated: 02/07/25 1733    Narrative:      CXR ONE VIEW      HISTORY: short of breath     COMPARISON: 5/4/2022     TECHNIQUE: single portable AP       Impression:         There is mild cardiomegaly.     There is left greater than right fairly extensive interstitial  infiltrate, question superimposed left lower lobe superior segment  alveolar infiltrate.     Probable small bilateral effusions, no pneumothorax.     This report was finalized on 2/7/2025 5:30 PM by Dr. Go Claudio M.D  on Workstation: [id number]               Results for orders placed during the hospital encounter of 07/09/21    Adult Transthoracic Echo Complete w/ Color, Spectral and Contrast if Necessary Per Protocol    Interpretation Summary  · Estimated right ventricular systolic pressure from tricuspid regurgitation is mildly elevated (35-45 mmHg). Calculated right ventricular systolic pressure from tricuspid regurgitation is 41 mmHg.  · Mild pulmonary hypertension is present.  · Left ventricular wall thickness is consistent with mild to moderate concentric hypertrophy. Sigmoid-shaped ventricular septum is present.  · " Calculated left ventricular EF = 58% Estimated left ventricular EF was in agreement with the calculated left ventricular EF. Left ventricular systolic function is normal.  · Left atrial volume is moderately increased.  · The right atrial cavity is mildly dilated.  · Mild dilation of the ascending aorta is present.  · Mild to moderate aortic valve regurgitation is present.  · Mild to moderate mitral valve regurgitation is present with an eccentric jet noted.      ECG 12 Lead Dyspnea   Final Result   HEART MDPE=258  bpm   RR Lnidzcxh=931  ms   VT Interval=  ms   P Horizontal Axis=  deg   P Front Axis=  deg   QRSD Dvsdqffs=026  ms   QT Ructjcux=398  ms   MIxG=428  ms   QRS Axis=-40  deg   T Wave Axis=92  deg   - ABNORMAL ECG -   Atrial fibrillation   LVH with secondary repolarization abnormality   Repolarization abnormalities   Anterior  Q waves, possibly due to LVH   When compared with ECG of 04-May-2022 02:28:03,   Atrial fibrillation has replaced sinus rhythm   Electronically Signed By: Fernando Martins ([facility]) 2025-02-07 18:11:18   Date and Time of Study:2025-02-07 16:50:13           Assessment/Plan     Active Hospital Problems    Diagnosis  POA    **Acute hypoxic respiratory failure [J96.01]  Yes    Acute on chronic diastolic heart failure [I50.33]  Yes    DNR (do not resuscitate) [Z66]  Yes    Pneumonia [J18.9]  Yes    Paroxysmal atrial fibrillation [I48.0]  Yes    Essential hypertension [I10]  Yes    Gastroesophageal reflux disease without esophagitis [K21.9]  Yes    Depression [F32.A]  Yes    Bad memory [R41.3]  Yes      Resolved Hospital Problems   No resolved problems to display.   PAF with RVR/Acute on Chronic Diastolic CHF/Acute Hypoxic Respiratory Failure  - room air oxygen saturation <87% and respiratory distress documented in the ER, required BiPAP but now back on room air  - HR is acceptable, volume status is improving but she is still hypervolemic-will give another dose of IV lasix and monitor volume  status  - continue metoprolol  - on AC with eliquis  - d/w patient the importance of taking her diuretics as prescribed  - I doubt pneumonia, continue abx today and stop tomorrow if procalcitonin is not elevated  - appreciate cardiology recommendations      GERD  - symptoms controlled, continue ppi    Depression  - appears controlled  - continue cymbalta    I discussed the patient's findings and my recommendations with patient, family, and nursing staff.    VTE Prophylaxis - Eliquis (home med).  Code Status - DNR.       Jere Bill MD  [facility]  02/08/25  10:29 EST    Electronically signed by Jere Bill MD at 02/08/25 1341       Facility-Administered Medications as of 2/8/2025   Medication Dose Route Frequency Provider Last Rate Last Admin    acetaminophen (TYLENOL) tablet 650 mg  650 mg Oral Q4H PRN Salome Childers APRN   650 mg at 02/08/25 1319    apixaban (ELIQUIS) tablet 2.5 mg  2.5 mg Oral Q12H Salome Childers APRN   2.5 mg at 02/08/25 2046    [COMPLETED] azithromycin (ZITHROMAX) 500 mg in sodium chloride 0.9 % 250 mL IVPB-VTB  500 mg Intravenous Once Flori Lucas MD   Stopped at 02/07/25 2026    azithromycin (ZITHROMAX) 500 mg in sodium chloride 0.9 % 250 mL IVPB-VTB  500 mg Intravenous Q24H Salome Childers APRN   500 mg at 02/08/25 1710    sennosides-docusate (PERICOLACE) 8.6-50 MG per tablet 2 tablet  2 tablet Oral BID PRN Salome Childers APRN        And    polyethylene glycol (MIRALAX) packet 17 g  17 g Oral Daily PRN Salome Childers APRFILOMENA        And    bisacodyl (DULCOLAX) EC tablet 5 mg  5 mg Oral Daily PRN Salome Childers APRN        And    bisacodyl (DULCOLAX) suppository 10 mg  10 mg Rectal Daily PRN Salome Childers APRFILOMENA        calcium carbonate (TUMS) chewable tablet 500 mg (200 mg elemental)  2 tablet Oral BID PRN Salome Childers APRN        [COMPLETED] cefTRIAXone (ROCEPHIN) 1,000 mg in sodium chloride 0.9 % 100 mL MBP   1,000 mg Intravenous Once Flori Lucas MD   Stopped at 02/07/25 1819    cefTRIAXone (ROCEPHIN) 1,000 mg in sodium chloride 0.9 % 100 mL MBP  1,000 mg Intravenous Q24H Salome Childers APRN 200 mL/hr at 02/08/25 1632 1,000 mg at 02/08/25 1632    DULoxetine (CYMBALTA) DR capsule 60 mg  60 mg Oral Daily Salome Childers APRN   60 mg at 02/08/25 0809    ferrous sulfate tablet 325 mg  325 mg Oral Daily Salome Childers APRN   325 mg at 02/08/25 0809    [COMPLETED] furosemide (LASIX) injection 40 mg  40 mg Intravenous Once Flori Lucas MD   40 mg at 02/07/25 1648    [COMPLETED] furosemide (LASIX) injection 40 mg  40 mg Intravenous Once Jere Bill MD   40 mg at 02/08/25 1319    furosemide (LASIX) tablet 40 mg  40 mg Oral Daily Salome Childers APRN   40 mg at 02/08/25 0809    ipratropium-albuterol (DUO-NEB) nebulizer solution 3 mL  3 mL Nebulization Q6H PRN Salome Childers APRN        ipratropium-albuterol (DUO-NEB) nebulizer solution 3 mL  3 mL Nebulization 4x Daily - RT Salome Childers APRN   3 mL at 02/08/25 1258    [COMPLETED] metoprolol succinate XL (TOPROL-XL) 24 hr tablet 25 mg  25 mg Oral Once Karel Navarrete III, MD   25 mg at 02/08/25 1210    [START ON 2/9/2025] metoprolol succinate XL (TOPROL-XL) 24 hr tablet 50 mg  50 mg Oral Daily Karel Navarrete III, MD        [COMPLETED] metoprolol tartrate (LOPRESSOR) injection 5 mg  5 mg Intravenous Once Flori Lucas MD   5 mg at 02/07/25 1646    nitroglycerin (NITROSTAT) SL tablet 0.4 mg  0.4 mg Sublingual Q5 Min PRN Salome Childers APRN        nitroglycerin (NITROSTAT) SL tablet 0.4 mg  0.4 mg Sublingual Q5 Min PRN Jere Bill MD        ondansetron ODT (ZOFRAN-ODT) disintegrating tablet 4 mg  4 mg Oral Q6H PRN Salome Childers APRN        Or    ondansetron (ZOFRAN) injection 4 mg  4 mg Intravenous Q6H PRN Salome Childers APRN        pantoprazole (PROTONIX) EC tablet 40 mg  40 mg Oral Q AM Liseth,  Salome DIALLO APRN   40 mg at 02/08/25 0707    pravastatin (PRAVACHOL) tablet 40 mg  40 mg Oral Daily FriSalome jean APRN   40 mg at 02/08/25 0809    sodium chloride 0.9 % flush 10 mL  10 mL Intravenous PRN Flori Lucas MD        traMADol (ULTRAM) tablet 50 mg  50 mg Oral Q8H PRN Jere Bill MD   50 mg at 02/08/25 2046     Lab Results (last 24 hours)       Procedure Component Value Units Date/Time    Blood Culture - Blood, Arm, Left [[id number]]  (Normal) Collected: 02/07/25 1738    Specimen: Blood from Arm, Left Updated: 02/08/25 1745     Blood Culture No growth at 24 hours    Narrative:      Less than seven (7) mL's of blood was collected.  Insufficient quantity may yield false negative results.    Blood Culture - Blood, Arm, Right [[id number]]  (Normal) Collected: 02/07/25 1737    Specimen: Blood from Arm, Right Updated: 02/08/25 1745     Blood Culture No growth at 24 hours    STAT Lactic Acid, Reflex [[id number]]  (Abnormal) Collected: 02/08/25 1001    Specimen: Blood Updated: 02/08/25 1047     Lactate 3.3 mmol/L     D-dimer, Quantitative [[id number]]  (Normal) Collected: 02/08/25 0320    Specimen: Blood from Arm, Right Updated: 02/08/25 0436     D-Dimer, Quantitative 0.83 MCGFEU/mL     Narrative:      According to the assay 's published package insert, a normal (<0.50 MCGFEU/mL) D-dimer result in conjunction with a non-high clinical probability assessment, excludes deep vein thrombosis (DVT) and pulmonary embolism (PE) with high sensitivity.    D-dimer values increase with age and this can make VTE exclusion of an older population difficult. To address this, the [employer], based on best available evidence and recent guidelines, recommends that clinicians use age-adjusted D-dimer thresholds in patients greater than 50 years of age with: a) a low probability of PE who do not meet all Pulmonary Embolism Rule Out Criteria, or b) in those with intermediate probability of  "PE.   The formula for an age-adjusted D-dimer cut-off is \"age/100\".  For example, a 60 year old patient would have an age-adjusted cut-off of 0.60 MCGFEU/mL and an 80 year old 0.80 MCGFEU/mL.    Renal Function Panel [[id number]]  (Abnormal) Collected: 02/08/25 0320    Specimen: Blood Updated: 02/08/25 0435     Glucose 205 mg/dL      BUN 25 mg/dL      Creatinine 0.72 mg/dL      Sodium 141 mmol/L      Potassium 3.5 mmol/L      Chloride 105 mmol/L      CO2 25.1 mmol/L      Calcium 8.6 mg/dL      Albumin 3.6 g/dL      Phosphorus 2.9 mg/dL      Anion Gap 10.9 mmol/L      BUN/Creatinine Ratio 34.7     eGFR 76.2 mL/min/1.73     Narrative:      GFR Categories in Chronic Kidney Disease (CKD)      GFR Category          GFR (mL/min/1.73)    Interpretation  G1                     90 or greater         Normal or high (1)  G2                      60-89                Mild decrease (1)  G3a                   45-59                Mild to moderate decrease  G3b                   30-44                Moderate to severe decrease  G4                    15-29                Severe decrease  G5                    14 or less           Kidney failure          (1)In the absence of evidence of kidney disease, neither GFR category G1 or G2 fulfill the criteria for CKD.    eGFR calculation 2021 CKD-EPI creatinine equation, which does not include race as a factor    STAT Lactic Acid, Reflex [[id number]]  (Abnormal) Collected: 02/08/25 0320    Specimen: Blood Updated: 02/08/25 0427     Lactate 2.7 mmol/L     CBC (No Diff) [[id number]]  (Abnormal) Collected: 02/08/25 0320    Specimen: Blood Updated: 02/08/25 0413     WBC 11.14 10*3/mm3      RBC 2.56 10*6/mm3      Hemoglobin 9.9 g/dL      Hematocrit 28.6 %      .7 fL      MCH 38.7 pg      MCHC 34.6 g/dL      RDW 14.7 %      RDW-SD 57.9 fl      MPV 11.5 fL      Platelets 259 10*3/mm3     STAT Lactic Acid, Reflex [[id number]]  (Abnormal) Collected: 02/07/25 2346    Specimen: Blood Updated: " 02/08/25 0042     Lactate 2.5 mmol/L     POC Glucose Once [[id number]]  (Normal) Collected: 02/07/25 2129    Specimen: Blood Updated: 02/07/25 2141     Glucose 126 mg/dL     STAT Lactic Acid, Reflex [[id number]]  (Abnormal) Collected: 02/07/25 2039    Specimen: Blood Updated: 02/07/25 2112     Lactate 2.4 mmol/L     Urinalysis With Culture If Indicated - Urine, Clean Catch [[id number]]  (Abnormal) Collected: 02/07/25 2037    Specimen: Urine, Clean Catch Updated: 02/07/25 2055     Color, UA Yellow     Appearance, UA Cloudy     pH, UA 6.5     Specific Gravity, UA 1.008     Glucose,  mg/dL (1+)     Ketones, UA Negative     Bilirubin, UA Negative     Blood, UA Moderate (2+)     Protein, UA Negative     Leuk Esterase, UA Small (1+)     Nitrite, UA Negative     Urobilinogen, UA 0.2 E.U./dL    Narrative:      In absence of clinical symptoms, the presence of pyuria, bacteria, and/or nitrites on the urinalysis result does not correlate with infection.    Urinalysis, Microscopic Only - Urine, Clean Catch [[id number]]  (Abnormal) Collected: 02/07/25 2037    Specimen: Urine, Clean Catch Updated: 02/07/25 2055     RBC, UA 21-50 /HPF      WBC, UA 3-5 /HPF      Comment: Urine culture not indicated.        Bacteria, UA 4+ /HPF      Squamous Epithelial Cells, UA 0-2 /HPF      Hyaline Casts, UA 0-2 /LPF      Methodology Automated Microscopy          Imaging Results (Last 24 Hours)       Procedure Component Value Units Date/Time    XR Chest 2 View [[id number]] Collected: 02/08/25 1434     Updated: 02/08/25 1440    Narrative:      XR CHEST 2 VW-     HISTORY: Female who is 89 years-old, congestive heart failure, follow-up     TECHNIQUE: Frontal view of the chest     COMPARISON: 2/7/2025     FINDINGS: The heart size is borderline. Pulmonary vasculature appears  mildly congested. The aorta is calcified. Mild atelectasis or infiltrate  in the lower lungs, continued follow-up suggested. Small pleural  effusion is apparent on the lateral  view. No pneumothorax. No acute  osseous process.       Impression:      As described.     This report was finalized on 2/8/2025 2:37 PM by Dr. Darren Brar M.D on Workstation: [id number]             ECG/EMG Results (last 24 hours)       Procedure Component Value Units Date/Time    Adult Transthoracic Echo Complete W/ Cont if Necessary Per Protocol [[id number]] Resulted: 02/08/25 1609     Updated: 02/08/25 1609     EF(MOD-bp) 61.8 %      LVIDd 4.7 cm      LVIDs 3.4 cm      IVSd 0.82 cm      LVPWd 0.76 cm      FS 27.4 %      IVS/LVPW 1.08 cm      ESV(cubed) 40.9 ml      EDV(cubed) 107.0 ml      LV mass(C)d 122.7 grams      LVOT area 3.5 cm2      LVOT diam 2.10 cm      EDV(MOD-sp2) 59.0 ml      EDV(MOD-sp4) 55.0 ml      ESV(MOD-sp2) 19.0 ml      ESV(MOD-sp4) 25.0 ml      SV(MOD-sp2) 40.0 ml      SV(MOD-sp4) 30.0 ml      EF(MOD-sp2) 67.8 %      EF(MOD-sp4) 54.5 %      MV E max prem 144.0 cm/sec      MV dec time 0.15 sec      LA ESV Index (BP) 45.1 ml/m2      Med Peak E' Prem 7.8 cm/sec      Lat Peak E' Prem 12.1 cm/sec      TR max prem 344.8 cm/sec      Avg E/e' ratio 14.47     SV(LVOT) 73.8 ml      SV(RVOT) 34.9 ml      Qp/Qs 0.47     RV Base 3.3 cm      RV Mid 2.25 cm      RV Length 6.1 cm      RV S' 12.5 cm/sec      Pulm Sys Prem 38.8 cm/sec      Pulm Shen Prem 61.1 cm/sec      Pulm S/D 0.64     LV V1 max 97.3 cm/sec      LV V1 max PG 3.8 mmHg      LV V1 mean PG 1.84 mmHg      LV V1 VTI 21.2 cm      Ao pk prem 174.9 cm/sec      Ao max PG 12.2 mmHg      Ao mean PG 5.8 mmHg      Ao V2 VTI 30.9 cm      SHRADDHA(I,D) 2.39 cm2      AI P1/2t 378.5 msec      MV max PG 12.5 mmHg      MV mean PG 3.0 mmHg      MV V2 VTI 32.5 cm      MV P1/2t 46.7 msec      MVA(P1/2t) 4.7 cm2      MVA(VTI) 2.28 cm2      MV dec slope 1,090 cm/sec2      MR max prem 434.4 cm/sec      MR max PG 75.5 mmHg      TR max PG 47.5 mmHg      RVSP(TR) 55.5 mmHg      RAP systole 8.0 mmHg      RVOT diam 1.71 cm      RV V1 max PG 2.6 mmHg      RV V1 max 81.0 cm/sec       RV V1 VTI 15.2 cm      PA V2 max 141.2 cm/sec      PA acc time 0.09 sec      PI end-d katie 100.4 cm/sec      Sinus 3.0 cm      TAPSE (>1.6) 1.40 cm      LA ESV (BP) 67.0 ml      Dimensionless Index 0.70 (DI)     Narrative:        Left ventricular systolic function is normal. Calculated left   ventricular EF = 61.8%    The left atrial cavity is moderately dilated.    The right atrial cavity is mildly  dilated.    Calculated right ventricular systolic pressure from tricuspid   regurgitation is 56 mmHg.    Mild aortic valve regurgitation is present. Mild aortic valve stenosis   is present.    Mild mitral valve regurgitation is present.            Orders (last 24 hrs)        Start     Ordered    02/09/25 0900  metoprolol succinate XL (TOPROL-XL) 24 hr tablet 50 mg  Daily         02/08/25 1018    02/09/25 0600  Procalcitonin  Morning Draw         02/08/25 1030    02/09/25 0600  CBC & Differential  Daily       02/08/25 1323    02/08/25 1800  azithromycin (ZITHROMAX) 500 mg in sodium chloride 0.9 % 250 mL IVPB-VTB  Every 24 Hours         02/08/25 0106    02/08/25 1700  cefTRIAXone (ROCEPHIN) 1,000 mg in sodium chloride 0.9 % 100 mL MBP  Every 24 Hours         02/08/25 0106    02/08/25 1600  Strict Intake & Output  Every 4 Hours       02/08/25 1320    02/08/25 1335  Code Status and Medical Interventions: No CPR (Do Not Attempt to Resuscitate); Limited Support; No intubation (DNI), No cardioversion  Continuous         02/08/25 1335    02/08/25 1321  Daily Weights  Daily       02/08/25 1320    02/08/25 1320  Appropriate VTE Prophylaxis Orders in Place  Once         02/08/25 1320    02/08/25 1320  Vital Signs  Per Hospital Policy/Protocol         02/08/25 1320    02/08/25 1320  Continuous Cardiac Monitoring  Continuous        Comments: Follow Standing Orders As Outlined in Process Instructions (Open Order Report to View Full Instructions)    02/08/25 1320    02/08/25 1320  Maintain IV Access  Continuous         02/08/25  1320    02/08/25 1320  Telemetry - Place Orders & Notify Provider of Results When Patient Experiences Acute Chest Pain, Dysrhythmia or Respiratory Distress  Continuous        Comments: Open Order Report to View Parameters Requiring Provider Notification    02/08/25 1320    02/08/25 1320  May Be Off Telemetry for Tests  Continuous         02/08/25 1320    02/08/25 1319  nitroglycerin (NITROSTAT) SL tablet 0.4 mg  Every 5 Minutes PRN         02/08/25 1320    02/08/25 1319  traMADol (ULTRAM) tablet 50 mg  Every 8 Hours PRN         02/08/25 1320    02/08/25 1300  furosemide (LASIX) injection 40 mg  Once         02/08/25 1204    02/08/25 1115  metoprolol succinate XL (TOPROL-XL) 24 hr tablet 25 mg  Once         02/08/25 1018    02/08/25 1109  MRSA Screen, PCR (Inpatient) - Swab, Nares  Once,   Status:  Canceled         02/08/25 1109    02/08/25 1109  Urinalysis With Culture If Indicated -  Once,   Status:  Canceled         02/08/25 1109    02/08/25 1109  S. Pneumo Ag Urine or CSF - Urine, Urine, Clean Catch  Once,   Status:  Canceled         02/08/25 1109    02/08/25 1109  Legionella Antigen, Urine - Urine, Urine, Clean Catch  Once,   Status:  Canceled         02/08/25 1109    02/08/25 1031  PT Consult: Eval & Treat Functional Mobility Below Baseline  Once         02/08/25 1030    02/08/25 1021  Adult Transthoracic Echo Complete W/ Cont if Necessary Per Protocol  Once         02/08/25 1020    02/08/25 0920  STAT Lactic Acid, Reflex  PROCEDURE ONCE         02/08/25 0427    02/08/25 0900  DULoxetine (CYMBALTA) DR capsule 60 mg  Daily         02/08/25 0110    02/08/25 0900  ferrous sulfate tablet 325 mg  Daily         02/08/25 0110    02/08/25 0900  metoprolol succinate XL (TOPROL-XL) 24 hr tablet 25 mg  Daily,   Status:  Discontinued         02/08/25 0110    02/08/25 0900  pravastatin (PRAVACHOL) tablet 40 mg  Daily         02/08/25 0110    02/08/25 0900  furosemide (LASIX) tablet 40 mg  Daily         02/08/25 0110     02/08/25 0830  ipratropium-albuterol (DUO-NEB) nebulizer solution 3 mL  4 Times Daily - RT         02/08/25 0106    02/08/25 0800  Oral Care  2 Times Daily       02/08/25 0104    02/08/25 0702  Inpatient Cardiology Consult  IN AM        Specialty:  Cardiology  Provider:  Blake Brown Jr., MD    02/08/25 0104 02/08/25 0600  XR Chest 2 View  1 Time Imaging         02/07/25 2259 02/08/25 0600  Renal Function Panel  Daily       02/07/25 2259 02/08/25 0600  Basic Metabolic Panel  Morning Draw,   Status:  Canceled         02/08/25 0104 02/08/25 0600  CBC (No Diff)  Morning Draw         02/08/25 0104 02/08/25 0600  Incentive Spirometry  Every 4 Hours While Awake       02/08/25 0104 02/08/25 0600  pantoprazole (PROTONIX) EC tablet 40 mg  Every Early Morning         02/08/25 0110    02/08/25 0546  STAT Lactic Acid, Reflex  PROCEDURE ONCE         02/08/25 0042    02/08/25 0400  Vital Signs  Every 4 Hours      Comments: Per per hospital policy    02/08/25 0104    02/08/25 0235  Inpatient Case Management  Consult  Once        Provider:  (Not yet assigned)    02/08/25 0235    02/08/25 0200  Strict Intake & Output  Every Hour       02/08/25 0104    02/08/25 0200  apixaban (ELIQUIS) tablet 2.5 mg  Every 12 Hours Scheduled         02/08/25 0110    02/08/25 0109  calcium carbonate (TUMS) chewable tablet 500 mg (200 mg elemental)  2 Times Daily PRN         02/08/25 0110    02/08/25 0108  D-dimer, Quantitative  Once         02/08/25 0107    02/08/25 0105  ipratropium-albuterol (DUO-NEB) nebulizer solution 3 mL  Every 6 Hours PRN         02/08/25 0106    02/08/25 0105  Daily Weights  Daily       02/08/25 0104 02/08/25 0103  Code Status and Medical Interventions: CPR (Attempt to Resuscitate); Full  Continuous,   Status:  Canceled         02/08/25 0104 02/08/25 0103  Continuous Cardiac Monitoring  Continuous,   Status:  Canceled        Comments: Follow Standing Orders As Outlined in Process  "Instructions (Open Order Report to View Full Instructions)    02/08/25 0104 02/08/25 0103  Maintain IV Access  Continuous,   Status:  Canceled         02/08/25 0104 02/08/25 0103  Telemetry - Place Orders & Notify Provider of Results When Patient Experiences Acute Chest Pain, Dysrhythmia or Respiratory Distress  Continuous        Comments: Open Order Report to View Parameters Requiring Provider Notification    02/08/25 0104 02/08/25 0103  May Be Off Telemetry for Tests  Continuous         02/08/25 0104 02/08/25 0103  Diet: Cardiac; Healthy Heart (2-3 Na+); Fluid Consistency: Thin (IDDSI 0)  Diet Effective Now         02/08/25 0104 02/08/25 0103  Place Sequential Compression Device  Once         02/08/25 0104 02/08/25 0103  Maintain Sequential Compression Device  Continuous         02/08/25 0104 02/08/25 0103  Continuous Pulse Oximetry  Continuous         02/08/25 0104 02/08/25 0102  nitroglycerin (NITROSTAT) SL tablet 0.4 mg  Every 5 Minutes PRN         02/08/25 0104 02/08/25 0102  acetaminophen (TYLENOL) tablet 650 mg  Every 4 Hours PRN         02/08/25 0104 02/08/25 0102  sennosides-docusate (PERICOLACE) 8.6-50 MG per tablet 2 tablet  2 Times Daily PRN        Placed in \"And\" Linked Group    02/08/25 0104 02/08/25 0102  polyethylene glycol (MIRALAX) packet 17 g  Daily PRN        Placed in \"And\" Linked Group    02/08/25 0104 02/08/25 0102  bisacodyl (DULCOLAX) EC tablet 5 mg  Daily PRN        Placed in \"And\" Linked Group    02/08/25 0104 02/08/25 0102  bisacodyl (DULCOLAX) suppository 10 mg  Daily PRN        Placed in \"And\" Linked Group    02/08/25 0104 02/08/25 0102  ondansetron ODT (ZOFRAN-ODT) disintegrating tablet 4 mg  Every 6 Hours PRN        Placed in \"Or\" Linked Group    02/08/25 0104 02/08/25 0102  ondansetron (ZOFRAN) injection 4 mg  Every 6 Hours PRN        Placed in \"Or\" Linked Group    02/08/25 0104    02/07/25 2339  STAT Lactic Acid, Reflex  PROCEDURE ONCE   " "      25  POC Glucose Once  PROCEDURE ONCE        Comments: Complete no more than 45 minutes prior to patient eating      25  Diet: Cardiac; Healthy Heart (2-3 Na+); Fluid Consistency: Thin (IDDSI 0)  Diet Effective Now,   Status:  Canceled         25  Urinalysis, Microscopic Only - Urine, Clean Catch  Once         25  sodium chloride 0.9 % flush 10 mL  As Needed        Placed in \"And\" Linked Group    25    Unscheduled  Up with assistance  As Needed       25    Unscheduled  Oxygen Therapy- Nasal Cannula; Titrate 1-6 LPM Per SpO2; 90 - 95%  Continuous PRN       25                  Operative/Procedure Notes (last 48 hours)  Notes from 25 through 25   No notes of this type exist for this encounter.       Physician Progress Notes (last 24 hours)  Notes from 25 through 25   No notes of this type exist for this encounter.          Consult Notes (last 24 hours)        Karel Navarrete III, MD at 25 0822        Consult Orders    1. Inpatient Cardiology Consult [[id number]] ordered by Salome Childers APRN at 25 010                     Patient Name: Alexa Peace  :[id number]  89 y.o.    Date of Admission: 2025  Date of Consultation:  25  Encounter Provider: Karel Navarrete III, MD  Place of Service: [facility] CARDIOLOGY  Referring Provider: Hieu Callejas MD  Patient Care Team:  Tomas Nix DNP, APRN as PCP - General (Internal Medicine)      Chief complaint: Shortness of Breath    History of Present Illness:    Alexa Peace is a 89 yo female know to our practice, with a past medical history notable for coronary artery disease s/p PCI to the LAD, hyperlipidemia, PAF (Eliquis), chronic diastolic heart failure and mitral regurgitation.      Patient presented to the emergency department via " EMS with sudden worsening shortness of breath.  When EMS arrived her oxygen saturation was 60%.  She was placed on BiPAP and given Solu-Medrol en route.  On arrival to the ED, heart rate was 151, oxygen saturations were 73%.  She received IV Lopressor, IV Lasix with improvement of heart rate improvement in saturation.    Just back from radiology, chest x-ray is pending.    She states she feels better today.  Heart rate is improved but still not at goal.  Less shortness of breath.  No chills or fevers overnight.    Work up reveals HS troponin's 28->46, BNP 2,971, potassium 4.7, creatinine 0.88, glucose 333, lactic acid 7.1, hemoglobin 11.3.  Respiratory panel negative.  Blood cultures are pending.  CXR reveals bilateral interstitial infiltrate and bilateral pleural effusions.  AM CXR  pending.  EKG shows atrial fibrillation, rate of 101, previous EKG on 1-12-24 showed sinus rhythm.    She is currently on 2 liters of oxygen with oxygen saturations in the mid 90's.  HR better in the 110's.       ECHO 7-9-21  Estimated right ventricular systolic pressure from tricuspid regurgitation is mildly elevated (35-45 mmHg). Calculated right ventricular systolic pressure from tricuspid regurgitation is 41 mmHg.  Mild pulmonary hypertension is present.  Left ventricular wall thickness is consistent with mild to moderate concentric hypertrophy. Sigmoid-shaped ventricular septum is present.  Calculated left ventricular EF = 58% Estimated left ventricular EF was in agreement with the calculated left ventricular EF. Left ventricular systolic function is normal.  Left atrial volume is moderately increased.  The right atrial cavity is mildly dilated.  Mild dilation of the ascending aorta is present.  Mild to moderate aortic valve regurgitation is present.  Mild to moderate mitral valve regurgitation is present with an eccentric jet noted.  24 HOUR HOLTER MONITOR 3-18-21  A relatively benign monitor study.     Past Medical History:    Diagnosis Date    Anticoagulated     PLAVIX    Arthritis     Atrial premature complex     CAD (coronary artery disease)     Cancer     skin    Colon polyp     Depression     GERD (gastroesophageal reflux disease)     Heart attack     Hyperlipidemia     Hypertension     Macular degeneration     New onset atrial fibrillation 01/11/2019    Pre-diabetes     Spinal stenosis        Past Surgical History:   Procedure Laterality Date    ADENOIDECTOMY      CATARACT EXTRACTION      CORONARY ANGIOPLASTY WITH STENT PLACEMENT      EPIDURAL BLOCK      LUMBAR DISCECTOMY FUSION INSTRUMENTATION N/A 12/7/2017    Procedure: L2-3, L3-4, L4-5 laminectomy and fusion with local bone graft;  Surgeon: Andre Layne MD;  Location: [facility];  Service:     TONSILECTOMY, ADENOIDECTOMY, BILATERAL MYRINGOTOMY AND TUBES      TONSILLECTOMY           Prior to Admission medications    Medication Sig Start Date End Date Taking? Authorizing Provider   apixaban (Eliquis) 2.5 MG tablet tablet Take 1 tablet by mouth Every 12 (Twelve) Hours. 12/5/24  Yes Blake Brown Jr., MD   calcium carbonate (TUMS) 500 MG chewable tablet Chew 1,000 mg 2 (Two) Times a Day As Needed for Indigestion or Heartburn. 4/9/19  Yes Vimal Moe MD   Diclofenac Sodium (VOLTAREN) 1 % gel gel APPLY 4 G TOPICALLY TO THE APPROPRIATE AREA AS DIRECTED 4 (FOUR) TIMES A DAY AS NEEDED (TO AFFECT AREAS WITH ARTHRITIS PAIN). 6/21/24  Yes Amarilis Ruelas APRN   DULoxetine (CYMBALTA) 60 MG capsule Take 1 capsule by mouth Daily. 12/5/24  Yes Tomas Nix DNP, APRN   ferrous sulfate 325 (65 FE) MG tablet TAKE 1 TABLET BY MOUTH EVERY DAY  Patient taking differently: Take 1 tablet by mouth Daily. 8/18/24  Yes Tomas Nix DNP, APRN   furosemide (LASIX) 20 MG tablet Take 2 tablets by mouth Daily. 12/5/24  Yes Justina Beal APRN   metoprolol succinate XL (TOPROL-XL) 25 MG 24 hr tablet Take 1 tablet by mouth Daily. 12/5/24  Yes Justina Beal APRN   omeprazole (priLOSEC) 20  MG capsule Take 1 capsule by mouth Daily With Breakfast. 12/5/24  Yes Tomas Nix DNP, APRN   polyethylene glycol (MIRALAX) pack packet Take 17 g by mouth Daily.  Patient taking differently: Take 17 g by mouth Daily As Needed. 12/11/17  Yes Vimal Moe MD   pravastatin (PRAVACHOL) 40 MG tablet Take 1 tablet by mouth Daily. 12/5/24  Yes Tomas Nix DNP, APRN   traMADol (ULTRAM) 50 MG tablet Take 1 tablet by mouth Every 8 (Eight) Hours As Needed for Moderate Pain. for pain  Patient taking differently: Take 1 tablet by mouth Every 8 (Eight) Hours As Needed for Moderate Pain. 12/5/24  Yes Tomas Nix DNP, APRN       Allergies   Allergen Reactions    Ciprofloxacin      UNKNOWN    Metronidazole      UNKWOWN    Prednisone Other (See Comments)     Elevated blood pressure       Social History     Socioeconomic History    Marital status:    Tobacco Use    Smoking status: Never     Passive exposure: Past (father smoked)    Smokeless tobacco: Never    Tobacco comments:     caffeine use- coffee   Vaping Use    Vaping status: Never Used   Substance and Sexual Activity    Alcohol use: No    Drug use: No    Sexual activity: Defer       Family History   Problem Relation Age of Onset    Diabetes Mother     Depression Mother     Anxiety disorder Mother     Heart disease Father     Hypertension Father     Diabetes Father     Diabetes Brother     No Known Problems Maternal Grandmother     No Known Problems Maternal Grandfather     No Known Problems Paternal Grandmother     No Known Problems Paternal Grandfather     Malig Hyperthermia Neg Hx        REVIEW OF SYSTEMS:   All systems reviewed.  Pertinent positives identified in HPI.  All other systems are negative.      Objective:     Vitals:    02/07/25 2125 02/07/25 2327 02/08/25 0546 02/08/25 0731   BP: 92/67 131/64  124/52   BP Location: Right arm Right arm  Right arm   Patient Position: Sitting Lying  Sitting   Pulse: 107 102  110   Resp: 22 22  22   Temp: 98.1 °F  "(36.7 °C) 98 °F (36.7 °C)  98 °F (36.7 °C)   TempSrc: Oral Oral  Oral   SpO2: 93% 96%     Weight: 50.1 kg (110 lb 7.2 oz)  49.9 kg (110 lb)    Height: 157.5 cm (62\")        Body mass index is 20.12 kg/m².    Physical Exam:  General Appearance:    Alert, frail,in no acute distress   Head:    Normocephalic, without obvious abnormality   Eyes:            Lids and lashes normal, conjunctivae and sclerae normal, no icterus, no pallor, corneas clear   Ears:    Ears appear intact with no abnormalities noted   Throat:   No oral lesions, oral mucosa moist   Neck:   No adenopathy, supple, trachea midline, no thyromegaly, no carotid bruit, no JVD   Back:     No kyphosis present, no erythema or scars, no tenderness to palpation    Lungs:     Coarse breath sounds, respirations regular, even and unlabored    Heart:    irregular rhythm and normal rate, normal S1 and S2, no murmur, no gallop, no rub, no click   Chest Wall:    No abnormalities observed   Abdomen:     Normal bowel sounds, no masses, no organomegaly, soft        non-tender, non-distended, no guarding   Extremities:   Moves all extremities well, no edema, no cyanosis, no redness   Pulses:  Bilateral carotids brisk   Skin:  Psychiatric:   No bleeding or rash    Alert and oriented, normal mood and affect         Lab Review:     Results from last 7 days   Lab Units 02/08/25  0320 02/07/25  1643   SODIUM mmol/L 141 138   POTASSIUM mmol/L 3.5 4.7   CHLORIDE mmol/L 105 101   CO2 mmol/L 25.1 19.8*   BUN mg/dL 25* 20   CREATININE mg/dL 0.72 0.88   CALCIUM mg/dL 8.6 9.0   BILIRUBIN mg/dL  --  0.7   ALK PHOS U/L  --  70   ALT (SGPT) U/L  --  25   AST (SGOT) U/L  --  50*   GLUCOSE mg/dL 205* 333*     Results from last 7 days   Lab Units 02/07/25  1738 02/07/25  1643   HSTROP T ng/L 46* 28*     Results from last 7 days   Lab Units 02/08/25  0320   WBC 10*3/mm3 11.14*   HEMOGLOBIN g/dL 9.9*   HEMATOCRIT % 28.6*   PLATELETS 10*3/mm3 259     Results from last 7 days   Lab Units " 02/07/25  1643   INR  1.41*   APTT seconds 26.3                       2-7-25 1-12-24      I personally viewed and interpreted the patient's EKG/Telemetry data.      Current Facility-Administered Medications:     acetaminophen (TYLENOL) tablet 650 mg, 650 mg, Oral, Q4H PRN, Salome Childers APRN    apixaban (ELIQUIS) tablet 2.5 mg, 2.5 mg, Oral, Q12H, Salome Childers APRN, 2.5 mg at 02/08/25 0811    azithromycin (ZITHROMAX) 500 mg in sodium chloride 0.9 % 250 mL IVPB-VTB, 500 mg, Intravenous, Q24H, Salome Childers APRN    sennosides-docusate (PERICOLACE) 8.6-50 MG per tablet 2 tablet, 2 tablet, Oral, BID PRN **AND** polyethylene glycol (MIRALAX) packet 17 g, 17 g, Oral, Daily PRN **AND** bisacodyl (DULCOLAX) EC tablet 5 mg, 5 mg, Oral, Daily PRN **AND** bisacodyl (DULCOLAX) suppository 10 mg, 10 mg, Rectal, Daily PRN, Salome Childers APRN    calcium carbonate (TUMS) chewable tablet 500 mg (200 mg elemental), 2 tablet, Oral, BID PRN, Salome Childers APRFILOMENA    cefTRIAXone (ROCEPHIN) 1,000 mg in sodium chloride 0.9 % 100 mL MBP, 1,000 mg, Intravenous, Q24H, Salome Childers, APRN    DULoxetine (CYMBALTA) DR capsule 60 mg, 60 mg, Oral, Daily, Salome Childers, APRN, 60 mg at 02/08/25 0809    ferrous sulfate tablet 325 mg, 325 mg, Oral, Daily, Salome Childers, APRN, 325 mg at 02/08/25 0809    furosemide (LASIX) tablet 40 mg, 40 mg, Oral, Daily, Salome Childers, APRN, 40 mg at 02/08/25 0809    ipratropium-albuterol (DUO-NEB) nebulizer solution 3 mL, 3 mL, Nebulization, Q6H PRN, Salome Childers APRN    ipratropium-albuterol (DUO-NEB) nebulizer solution 3 mL, 3 mL, Nebulization, 4x Daily - RT, Salome Childers APRN    metoprolol succinate XL (TOPROL-XL) 24 hr tablet 25 mg, 25 mg, Oral, Daily, Salome Childers APRN, 25 mg at 02/08/25 0809    nitroglycerin (NITROSTAT) SL tablet 0.4 mg, 0.4 mg, Sublingual, Q5 Min PRN, Salome Childers APRN    ondansetron ODT (ZOFRAN-ODT)  disintegrating tablet 4 mg, 4 mg, Oral, Q6H PRN **OR** ondansetron (ZOFRAN) injection 4 mg, 4 mg, Intravenous, Q6H PRN, Salome Childers, APRN    pantoprazole (PROTONIX) EC tablet 40 mg, 40 mg, Oral, Q AM, Salome Childers, APRN, 40 mg at 02/08/25 0707    pravastatin (PRAVACHOL) tablet 40 mg, 40 mg, Oral, Daily, Salome Childers, APRN, 40 mg at 02/08/25 0809    [COMPLETED] Insert Peripheral IV, , , Once **AND** sodium chloride 0.9 % flush 10 mL, 10 mL, Intravenous, PRN, Flori Lucas MD    Assessment and Plan:       Active Hospital Problems    Diagnosis  POA    **Acute hypoxic respiratory failure [J96.01]  Yes    Chronic diastolic CHF (congestive heart failure) [I50.32]  Yes    Pneumonia [J18.9]  Yes    Atrial fibrillation [I48.91]  Yes    Essential hypertension [I10]  Yes    Bad memory [R41.3]  Yes      Resolved Hospital Problems   No resolved problems to display.     1.  Acute heart failure, prior history of chronic diastolic heart failure.  Improved with diuretics and rate control.  I will obtain an echocardiogram today to help titrate any adjustment of medical therapy.  Now on oral diuretics, follow response today.  She does not appear to be significantly volume overloaded, I am more suspicious that the A-fib with RVR was the primary culprit  2.  Paroxysmal atrial fibrillation-in atrial fibrillation, very tachycardic on arrival, still a little tachy.  I will advance the dose of her metoprolol  3.  Acute hypoxic respiratory failure  4.  Hypertension-following current medical therapy    Karel Navarrete III, MD  02/08/25  10:16 EST         Electronically signed by Karel Navarrete III, MD at 02/08/25 1020

## 2025-02-09 NOTE — PLAN OF CARE
Goal Outcome Evaluation:  Plan of Care Reviewed With: patient        Progress: no change  Outcome Evaluation: A&O, complains of neck/back pain, medicated per MAR with relief, up with assist X1 with walker, afib on the monitor, VSS, monitoring labs, continue plan of care.

## 2025-02-09 NOTE — PROGRESS NOTES
Name: Alexa Peace ADMIT: 2025   : 1927  PCP: Tomas Nix DNP, BIENVENIDO    MRN: [id number] LOS: 2 days   AGE/SEX: 89 y.o. female  ROOM: 5/     Subjective   Subjective   No acute events. Patient denies new complaints. She overall feels better. No family at bedside.    Objective   Objective   Vital Signs  Temp:  [97.9 °F (36.6 °C)-98.9 °F (37.2 °C)] 98.9 °F (37.2 °C)  Heart Rate:  [] 82  Resp:  [16-18] 18  BP: (110-144)/(44-88) 144/58  SpO2:  [93 %-98 %] 96 %  on  Flow (L/min) (Oxygen Therapy):  [2] 2;   Device (Oxygen Therapy): nasal cannula  Body mass index is 20.56 kg/m².  Physical Exam  Vitals and nursing note reviewed.   Constitutional:       General: She is not in acute distress.     Appearance: She is ill-appearing (chronically). She is not toxic-appearing or diaphoretic.   HENT:      Head: Normocephalic and atraumatic.      Nose: Nose normal.      Mouth/Throat:      Mouth: Mucous membranes are moist.      Pharynx: Oropharynx is clear.   Eyes:      Conjunctiva/sclera: Conjunctivae normal.      Pupils: Pupils are equal, round, and reactive to light.   Cardiovascular:      Rate and Rhythm: Normal rate and regular rhythm.      Pulses: Normal pulses.   Pulmonary:      Effort: Pulmonary effort is normal.      Breath sounds: Rales present.   Abdominal:      General: Bowel sounds are normal. There is no distension.      Palpations: Abdomen is soft.      Tenderness: There is no abdominal tenderness.   Musculoskeletal:         General: Swelling (trace BLE) present. No tenderness.      Cervical back: Neck supple.   Skin:     General: Skin is warm and dry.      Capillary Refill: Capillary refill takes less than 2 seconds.   Neurological:      General: No focal deficit present.      Mental Status: She is alert.   Psychiatric:         Mood and Affect: Mood normal.         Behavior: Behavior normal.       Results Review     I reviewed the patient's new clinical results.  Results from last 7 days    Lab Units 02/09/25 0243 02/08/25  0320 02/07/25  1643   WBC 10*3/mm3 13.66* 11.14* 17.54*   HEMOGLOBIN g/dL 9.7* 9.9* 11.3*   PLATELETS 10*3/mm3 272 259 362     Results from last 7 days   Lab Units 02/09/25 0243 02/08/25  0320 02/07/25  1643   SODIUM mmol/L 142 141 138   POTASSIUM mmol/L 3.6 3.5 4.7   CHLORIDE mmol/L 107 105 101   CO2 mmol/L 25.0 25.1 19.8*   BUN mg/dL 26* 25* 20   CREATININE mg/dL 0.67 0.72 0.88   GLUCOSE mg/dL 121* 205* 333*   EGFR mL/min/1.73 79.6 76.2 59.9*     Results from last 7 days   Lab Units 02/09/25 0243 02/08/25  0320 02/07/25  1643   ALBUMIN g/dL 3.4* 3.6 3.8   BILIRUBIN mg/dL  --   --  0.7   ALK PHOS U/L  --   --  70   AST (SGOT) U/L  --   --  50*   ALT (SGPT) U/L  --   --  25     Results from last 7 days   Lab Units 02/09/25 0243 02/08/25  0320 02/07/25  1643   CALCIUM mg/dL 8.5 8.6 9.0   ALBUMIN g/dL 3.4* 3.6 3.8   PHOSPHORUS mg/dL 2.8 2.9  --      Results from last 7 days   Lab Units 02/09/25 0243 02/08/25  1001 02/08/25  0320 02/07/25  2346 02/07/25  2039 02/07/25  1737 02/07/25  1643   PROCALCITONIN ng/mL 1.56*  --   --   --   --   --  <0.02   LACTATE mmol/L  --  3.3* 2.7* 2.5* 2.4*   < > 7.1*    < > = values in this interval not displayed.     Glucose   Date/Time Value Ref Range Status   02/07/2025 2129 126 70 - 130 mg/dL Final       XR Chest 2 View    Result Date: 2/8/2025  As described.  This report was finalized on 2/8/2025 2:37 PM by Dr. Darren Brar M.D on Workstation: [id number]      XR Chest 1 View    Result Date: 2/7/2025   There is mild cardiomegaly.  There is left greater than right fairly extensive interstitial infiltrate, question superimposed left lower lobe superior segment alveolar infiltrate.  Probable small bilateral effusions, no pneumothorax.  This report was finalized on 2/7/2025 5:30 PM by Dr. Go Claudio M.D on Workstation: [id number]       I have personally reviewed all medications:  Scheduled Medications  apixaban, 2.5 mg, Oral,  Q12H  azithromycin, 500 mg, Intravenous, Q24H  cefTRIAXone, 1,000 mg, Intravenous, Q24H  DULoxetine, 60 mg, Oral, Daily  ferrous sulfate, 325 mg, Oral, Daily  furosemide, 40 mg, Oral, Daily  ipratropium-albuterol, 3 mL, Nebulization, 4x Daily - RT  melatonin, 2.5 mg, Oral, Nightly  metoprolol succinate XL, 50 mg, Oral, Daily  pantoprazole, 40 mg, Oral, Q AM  pravastatin, 40 mg, Oral, Daily    Infusions   Diet  Diet: Cardiac; Healthy Heart (2-3 Na+); Fluid Consistency: Thin (IDDSI 0)    I have personally reviewed:  [x]  Laboratory   [x]  Microbiology   []  Radiology   [x]  EKG/Telemetry  [x]  Cardiology/Vascular   []  Pathology    []  Records       Assessment/Plan     Active Hospital Problems    Diagnosis  POA    **Acute hypoxic respiratory failure [J96.01]  Yes    Pneumonia due to gram-negative bacteria [J15.69]  Yes    Acute on chronic diastolic heart failure [I50.33]  Yes    DNR (do not resuscitate) [Z66]  Yes    Pneumonia [J18.9]  Yes    Paroxysmal atrial fibrillation [I48.0]  Yes    Essential hypertension [I10]  Yes    Gastroesophageal reflux disease without esophagitis [K21.9]  Yes    Depression [F32.A]  Yes    Bad memory [R41.3]  Yes      Resolved Hospital Problems   No resolved problems to display.   PAF with RVR/Acute on Chronic Diastolic CHF/Acute Hypoxic Respiratory Failure  - room air oxygen saturation <87% and respiratory distress documented in the ER, required BiPAP but now between room air and 2L NC  - HR is acceptable, volume status is improving but she is still hypervolemic-will give another dose of IV lasix and monitor volume status  - continue metoprolol  - on AC with eliquis  - d/w patient the importance of taking her diuretics as prescribed  - appreciate cardiology recommendations    Pneumonia  - had diffuse infiltrates on CXR likely mostly due to pulmonary edema but there was question of superimposed left lower lobe pneumonia and procalcitonin is elevated  - continue on ceftriaxone with plan for  cefdinir at discharge to complete 5d  - stop zithromax given negative RVP        GERD  - symptoms controlled, continue ppi     Depression  - appears controlled  - continue cymbalta      Eliquis (home med) for DVT prophylaxis.  DNR.  Discussed with patient and nursing staff.  Anticipate discharge home tomorrow.  Expected discharge date/ time has not been documented.      Jere Bill MD  [facility]  02/09/25  13:32 EST    Portions of this text have been copied and I have reviewed them. They are accurate as of 2/9/2025

## 2025-02-09 NOTE — THERAPY EVALUATION
Patient Name: lAexa Peace  : 1927    MRN: [id number]                              Today's Date: 2025       Admit Date: 2025    Visit Dx:     ICD-10-CM ICD-9-CM   1. Acute hypoxemic respiratory failure  J96.01 518.81   2. Atrial fibrillation with rapid ventricular response  I48.91 427.31   3. Acute on chronic congestive heart failure, unspecified heart failure type  I50.9 428.0   4. Pneumonia of both lungs due to infectious organism, unspecified part of lung  J18.9 483.8     Patient Active Problem List   Diagnosis    Bad memory    Depression    Diverticulosis of intestine    Hyperlipidemia    Macular degeneration    Neuropathy, peripheral    Osteopenia    Atopic rhinitis    Urge incontinence of urine    Atherosclerosis of native coronary artery    Essential hypertension    Gastroesophageal reflux disease without esophagitis    Impaired glucose tolerance    History of coronary artery stent placement    Chronic low back pain    Acquired scoliosis    Spinal stenosis of lumbar region with neurogenic claudication    PAC (premature atrial contraction)    Slow transit constipation    Paroxysmal atrial fibrillation    Pleural effusion on left    Valvular heart disease    Non-rheumatic mitral regurgitation    Pneumonia of right lower lobe due to infectious organism    Localized edema    Pneumonia    Chronic anticoagulation    DNR (do not resuscitate)    Acute on chronic diastolic heart failure    Annual physical exam    Confusion    Urinary frequency    Anemia    Acute hypoxic respiratory failure     Past Medical History:   Diagnosis Date    Anticoagulated     PLAVIX    Arthritis     Atrial premature complex     CAD (coronary artery disease)     Cancer     skin    Colon polyp     Depression     GERD (gastroesophageal reflux disease)     Heart attack     Hyperlipidemia     Hypertension     Macular degeneration     New onset atrial fibrillation 2019    Pre-diabetes     Spinal stenosis      Past Surgical  History:   Procedure Laterality Date    ADENOIDECTOMY      CATARACT EXTRACTION      CORONARY ANGIOPLASTY WITH STENT PLACEMENT      EPIDURAL BLOCK      LUMBAR DISCECTOMY FUSION INSTRUMENTATION N/A 12/7/2017    Procedure: L2-3, L3-4, L4-5 laminectomy and fusion with local bone graft;  Surgeon: Andre Layne MD;  Location: [facility] OR;  Service:     TONSILECTOMY, ADENOIDECTOMY, BILATERAL MYRINGOTOMY AND TUBES      TONSILLECTOMY        General Information       Row Name 02/09/25 1127          Physical Therapy Time and Intention    Document Type evaluation  -DB     Mode of Treatment individual therapy;physical therapy  -DB       Row Name 02/09/25 1127          General Information    Patient Profile Reviewed yes  -DB     Prior Level of Function independent:;ADL's  -DB     Existing Precautions/Restrictions fall  -DB     Barriers to Rehab none identified  -DB       Row Name 02/09/25 1127          Living Environment    People in Home alone  3 daughters that live close by that are helpful  -DB       Row Name 02/09/25 1127          Home Main Entrance    Number of Stairs, Main Entrance three  -DB     Stair Railings, Main Entrance railings safe and in good condition  -DB       Row Name 02/09/25 1127          Stairs Within Home, Primary    Number of Stairs, Within Home, Primary none  -DB       Row Name 02/09/25 1127          Cognition    Orientation Status (Cognition) oriented x 4  -DB       Row Name 02/09/25 1127          Safety Issues/Impairments Affecting Functional Mobility    Impairments Affecting Function (Mobility) balance;endurance/activity tolerance;strength  -DB               User Key  (r) = Recorded By, (t) = Taken By, (c) = Cosigned By      Initials Name Provider Type    DB Donita Nguyen PT Physical Therapist                   Mobility       Row Name 02/09/25 1128          Bed Mobility    Comment, (Bed Mobility) NT, pt UIC  -DB       Row Name 02/09/25 1128          Sit-Stand Transfer    Sit-Stand  Laura (Transfers) contact guard  -DB     Assistive Device (Sit-Stand Transfers) walker, front-wheeled  -DB       Row Name 02/09/25 1128          Gait/Stairs (Locomotion)    Laura Level (Gait) contact guard  -DB     Assistive Device (Gait) walker, front-wheeled  -DB     Distance in Feet (Gait) 80  -DB     Deviations/Abnormal Patterns (Gait) gait speed decreased;fito decreased;stride length decreased  -DB     Bilateral Gait Deviations forward flexed posture;heel strike decreased;decreased arm swing  -DB               User Key  (r) = Recorded By, (t) = Taken By, (c) = Cosigned By      Initials Name Provider Type    DB Donita Nguyen, PT Physical Therapist                   Obj/Interventions       Row Name 02/09/25 1129          Range of Motion Comprehensive    General Range of Motion no range of motion deficits identified  -DB       Row Name 02/09/25 1129          Strength Comprehensive (MMT)    Comment, General Manual Muscle Testing (MMT) Assessment generalized weakness  -DB       Row Name 02/09/25 1129          Balance    Balance Assessment sitting static balance;sitting dynamic balance;standing static balance;standing dynamic balance  -DB     Static Sitting Balance standby assist  -DB     Dynamic Sitting Balance standby assist  -DB     Position, Sitting Balance unsupported;sitting edge of bed  -DB     Static Standing Balance contact guard  -DB     Dynamic Standing Balance contact guard  -DB     Position/Device Used, Standing Balance supported;walker, front-wheeled  -DB     Balance Interventions sitting;standing;sit to stand  -DB               User Key  (r) = Recorded By, (t) = Taken By, (c) = Cosigned By      Initials Name Provider Type    DB Donita Nguyen PT Physical Therapist                   Goals/Plan       Row Name 02/09/25 1147          Bed Mobility Goal 1 (PT)    Activity/Assistive Device (Bed Mobility Goal 1, PT) bed mobility activities, all  -DB     Laura Level/Cues Needed  (Bed Mobility Goal 1, PT) supervision required  -DB     Time Frame (Bed Mobility Goal 1, PT) 1 week  -DB       Row Name 02/09/25 1147          Transfer Goal 1 (PT)    Activity/Assistive Device (Transfer Goal 1, PT) transfers, all  -DB     Appling Level/Cues Needed (Transfer Goal 1, PT) supervision required  -DB     Time Frame (Transfer Goal 1, PT) 1 week  -DB       Row Name 02/09/25 1147          Gait Training Goal 1 (PT)    Activity/Assistive Device (Gait Training Goal 1, PT) gait (walking locomotion)  -DB     Appling Level (Gait Training Goal 1, PT) supervision required  -DB     Time Frame (Gait Training Goal 1, PT) 1 week  -DB       Row Name 02/09/25 1147          Therapy Assessment/Plan (PT)    Planned Therapy Interventions (PT) balance training;bed mobility training;gait training;home exercise program;neuromuscular re-education;patient/family education;strengthening;ROM (range of motion);stair training;postural re-education;transfer training  -DB               User Key  (r) = Recorded By, (t) = Taken By, (c) = Cosigned By      Initials Name Provider Type    DB Donita Nguyen, PT Physical Therapist                   Clinical Impression       Row Name 02/09/25 1138          Plan of Care Review    Plan of Care Reviewed With patient  -DB     Outcome Evaluation Patient is a 89 y.o. female admitted to [facility] on 2/7/2025 for a fib with RVR and ARF. PMHx includes CAD, HTN, HLD, and spinal stenosis. Patient is (I) at baseline and lives alone in  home with 3 DORIS. Pt has 3 daughters that live close by and are able to help as needed.. Today, patient was seated UIC at start of the session and required CGA for transfer. She ambulated 80' with RW and CGA on 2 L O2. Endurance and strength deficits noted. Patient may benefit from skilled PT services acutely to address functional deficits as well as improve level of independence prior to discharge. Anticipate home with 24/7 care and HHPT upon DC.  -DB       Row Name  02/09/25 1138          Therapy Assessment/Plan (PT)    Rehab Potential (PT) good  -DB     Criteria for Skilled Interventions Met (PT) yes  -DB     Therapy Frequency (PT) 6 times/wk  -DB       Row Name 02/09/25 1138          Vital Signs    O2 Delivery Pre Treatment room air  -DB     O2 Delivery Intra Treatment room air  -DB     O2 Delivery Post Treatment room air  -DB     Pre Patient Position Sitting  -DB     Intra Patient Position Standing  -DB     Post Patient Position Sitting  -DB       Row Name 02/09/25 1138          Positioning and Restraints    Pre-Treatment Position sitting in chair/recliner  -DB     Post Treatment Position chair  -DB     In Chair reclined;call light within reach;encouraged to call for assist;exit alarm on  -DB               User Key  (r) = Recorded By, (t) = Taken By, (c) = Cosigned By      Initials Name Provider Type    Donita Haji PT Physical Therapist                   Outcome Measures       Row Name 02/09/25 1147          How much help from another person do you currently need...    Turning from your back to your side while in flat bed without using bedrails? 3  -DB     Moving from lying on back to sitting on the side of a flat bed without bedrails? 3  -DB     Moving to and from a bed to a chair (including a wheelchair)? 3  -DB     Standing up from a chair using your arms (e.g., wheelchair, bedside chair)? 3  -DB     Climbing 3-5 steps with a railing? 3  -DB     To walk in hospital room? 3  -DB     AM-PAC 6 Clicks Score (PT) 18  -DB     Highest Level of Mobility Goal 6 --> Walk 10 steps or more  -DB       Row Name 02/09/25 1147          Functional Assessment    Outcome Measure Options AM-PAC 6 Clicks Basic Mobility (PT)  -DB               User Key  (r) = Recorded By, (t) = Taken By, (c) = Cosigned By      Initials Name Provider Type    Donita Haji PT Physical Therapist                                 Physical Therapy Education       Title: PT OT SLP Therapies (Done)        Topic: Physical Therapy (Done)       Point: Mobility training (Done)       Learning Progress Summary            Patient Acceptance, E, VU by DB at 2/9/2025 1148                      Point: Home exercise program (Done)       Learning Progress Summary            Patient Acceptance, E, VU by DB at 2/9/2025 1148                      Point: Body mechanics (Done)       Learning Progress Summary            Patient Acceptance, E, VU by DB at 2/9/2025 1148                      Point: Precautions (Done)       Learning Progress Summary            Patient Acceptance, E, VU by DB at 2/9/2025 1148                                      User Key       Initials Effective Dates Name Provider Type Discipline    DB 06/16/21 -  Donita Nguyen, LIANNA Physical Therapist PT                  PT Recommendation and Plan  Planned Therapy Interventions (PT): balance training, bed mobility training, gait training, home exercise program, neuromuscular re-education, patient/family education, strengthening, ROM (range of motion), stair training, postural re-education, transfer training  Outcome Evaluation: Patient is a 89 y.o. female admitted to [facility] on 2/7/2025 for a fib with RVR and ARF. PMHx includes CAD, HTN, HLD, and spinal stenosis. Patient is (I) at baseline and lives alone in  home with 3 DORIS. Pt has 3 daughters that live close by and are able to help as needed.. Today, patient was seated UIC at start of the session and required CGA for transfer. She ambulated 80' with RW and CGA on 2 L O2. Endurance and strength deficits noted. Patient may benefit from skilled PT services acutely to address functional deficits as well as improve level of independence prior to discharge. Anticipate home with 24/7 care and HHPT upon DC.     Time Calculation:         PT Charges       Row Name 02/09/25 1156             Time Calculation    Start Time 1002  -DB      Stop Time 1020  -DB      Time Calculation (min) 18 min  -DB      PT Received On 02/09/25  -DB       PT - Next Appointment 02/10/25  -DB      PT Goal Re-Cert Due Date 02/16/25  -DB         Time Calculation- PT    Total Timed Code Minutes- PT 8 minute(s)  -DB                User Key  (r) = Recorded By, (t) = Taken By, (c) = Cosigned By      Initials Name Provider Type    DB Donita Nguyen, PT Physical Therapist                  Therapy Charges for Today       Code Description Service Date Service Provider Modifiers Qty    20308623177  PT EVAL MOD COMPLEXITY 2 2/9/2025 Donita Nguyen, PT GP 1    [id number]  PT THERAPEUTIC ACT EA 15 MIN 2/9/2025 Donita Nguyen, PT GP 1            PT G-Codes  Outcome Measure Options: AM-PAC 6 Clicks Basic Mobility (PT)  AM-PAC 6 Clicks Score (PT): 18  PT Discharge Summary  Anticipated Discharge Disposition (PT): home with assist, home with home health    Donita Nguyen, PT  2/9/2025

## 2025-02-09 NOTE — PROGRESS NOTES
"    Patient Name: Alexa Peace  :1927  89 y.o.      Patient Care Team:  Tomas Nix, ALONSO, APRN as PCP - General (Internal Medicine)    Chief Complaint: SOB    Interval History: Sats dropped some this morning, back on 2 L nasal cannula.   Echo yesterday:  Results for orders placed during the hospital encounter of 25    Adult Transthoracic Echo Complete W/ Cont if Necessary Per Protocol    Interpretation Summary    Left ventricular systolic function is normal. Calculated left ventricular EF = 61.8%    The left atrial cavity is moderately dilated.    The right atrial cavity is mildly  dilated.    Calculated right ventricular systolic pressure from tricuspid regurgitation is 56 mmHg.    Mild aortic valve regurgitation is present. Mild aortic valve stenosis is present.    Mild mitral valve regurgitation is present.        Objective   Vital Signs  Temp:  [97.9 °F (36.6 °C)-98.9 °F (37.2 °C)] 98.9 °F (37.2 °C)  Heart Rate:  [] 104  Resp:  [16-18] 18  BP: (110-144)/(44-88) 144/58    Intake/Output Summary (Last 24 hours) at 2025 0952  Last data filed at 2025 0629  Gross per 24 hour   Intake 600 ml   Output 200 ml   Net 400 ml     Flowsheet Rows      Flowsheet Row First Filed Value   Admission Height 157.5 cm (62\") Documented at 2025   Admission Weight 50.1 kg (110 lb 7.2 oz) Documented at 2025            Physical Exam:   General Appearance:    Alert, cooperative, in no acute distress   Lungs:   Slight rales left base.  Normal respiratory effort and rate.      Heart:    Regular rhythm and normal rate, normal S1 and S2, no murmurs, gallops or rubs.     Chest Wall:    No abnormalities observed   Abdomen:     Soft, nontender, positive bowel sounds.     Extremities:   no cyanosis, clubbing or edema.  No marked joint deformities.  Adequate musculoskeletal strength.       Results Review:    Results from last 7 days   Lab Units 25  0243   SODIUM mmol/L 142   POTASSIUM mmol/L " 3.6   CHLORIDE mmol/L 107   CO2 mmol/L 25.0   BUN mg/dL 26*   CREATININE mg/dL 0.67   GLUCOSE mg/dL 121*   CALCIUM mg/dL 8.5     Results from last 7 days   Lab Units 02/07/25  1738 02/07/25  1643   HSTROP T ng/L 46* 28*     Results from last 7 days   Lab Units 02/09/25  0243   WBC 10*3/mm3 13.66*   HEMOGLOBIN g/dL 9.7*   HEMATOCRIT % 29.0*   PLATELETS 10*3/mm3 272     Results from last 7 days   Lab Units 02/07/25  1643   INR  1.41*   APTT seconds 26.3                       Medication Review:   apixaban, 2.5 mg, Oral, Q12H  azithromycin, 500 mg, Intravenous, Q24H  cefTRIAXone, 1,000 mg, Intravenous, Q24H  DULoxetine, 60 mg, Oral, Daily  ferrous sulfate, 325 mg, Oral, Daily  furosemide, 40 mg, Oral, Daily  ipratropium-albuterol, 3 mL, Nebulization, 4x Daily - RT  melatonin, 2.5 mg, Oral, Nightly  metoprolol succinate XL, 50 mg, Oral, Daily  pantoprazole, 40 mg, Oral, Q AM  pravastatin, 40 mg, Oral, Daily              Assessment & Plan   Active Hospital Problems    Diagnosis  POA    **Acute hypoxic respiratory failure [J96.01]  Yes    Acute on chronic diastolic heart failure [I50.33]  Yes    DNR (do not resuscitate) [Z66]  Yes    Pneumonia [J18.9]  Yes    Paroxysmal atrial fibrillation [I48.0]  Yes    Essential hypertension [I10]  Yes    Gastroesophageal reflux disease without esophagitis [K21.9]  Yes    Depression [F32.A]  Yes    Bad memory [R41.3]  Yes      Resolved Hospital Problems   No resolved problems to display.     1.  Acute heart failure, prior history of chronic diastolic heart failure.  Improved with diuretics and rate control.  Echo shows normal LV function, no other acute change.  I think the A-fib with the RVR was the predominant issue that brought her in and flash pulmonary edema, does have a bit of volume overload this morning and back on oxygen, she has been on oral diuretics, I will give her an additional dose of IV Lasix this morning.  Heart rate is well-controlled now, heart rate was 80 while I am  talking to her in the chair.  Will keep the metoprolol at the higher dose of 50 mg daily that we switched yesterday, follow-up.  2.  Paroxysmal atrial fibrillation-in atrial fibrillation, very tachycardic on arrival, we increased her metoprolol from 25 mg daily to 50 mg daily, heart rate now 80, a little faster earlier this morning but she was getting a bath, we will continue to follow.  3.  Acute hypoxic respiratory failure  4.  Hypertension-following current medical therapy    Karel Navarrete III, MD  [facility]  02/09/25  09:52 EST

## 2025-02-09 NOTE — PROGRESS NOTES
"                                              LOS: 1 day   Patient Care Team:  Tomas Nix, ALONSO, APRN as PCP - General (Internal Medicine)    Chief Complaint:  F/up cough and dyspnea    Subjective   Interval History  I reviewed the admission note, progress notes, PMH, PSH, Family hx, social history, imagings and prior records on this admission, summarized the finding in my note and formulated a transition of care plan.      Mild dry cough. No dyspnea at rest    REVIEW OF SYSTEMS:   CARDIOVASCULAR: No chest pain, chest pressure or chest discomfort. No palpitations or edema.   GASTROINTESTINAL: No anorexia, nausea, vomiting or diarrhea. No abdominal pain.  CONSTITUTIONAL: No fever or chills.     Ventilator/Non-Invasive Ventilation Settings (From admission, onward)      None                  Physical Exam:     Vital Signs  Temp:  [98 °F (36.7 °C)-98.4 °F (36.9 °C)] 98.4 °F (36.9 °C)  Heart Rate:  [] 90  Resp:  [16-22] 16  BP: (110-124)/(52-88) 110/88    Intake/Output Summary (Last 24 hours) at 2/8/2025 2333  Last data filed at 2/8/2025 1819  Gross per 24 hour   Intake 720 ml   Output 225 ml   Net 495 ml     Flowsheet Rows      Flowsheet Row First Filed Value   Admission Height 157.5 cm (62\") Documented at 02/07/2025 2125   Admission Weight 50.1 kg (110 lb 7.2 oz) Documented at 02/07/2025 2125            PPE used per hospital policy    General Appearance:   Alert, cooperative, in no acute distress   ENMT:  Mallampati score 2, moist mucous membrane   Eyes:  Pupils equal and reactive to light. EOMI   Neck:   Large. Trachea midline. No thyromegaly.   Lungs:    Bibasilar crackles and coarse breath sounds.     Heart:   Regular rhythm and normal rate, normal S1 and S2, no         murmur   Skin:   No rash or ecchymosis   Abdomen:    Obese. Soft. No tenderness. No HSM.   Neuro/psych:  Conscious, alert, oriented x3. Strength 5/5 in upper and lower  ext.  Appropriate mood and affect   Extremities:  No cyanosis, clubbing " but trace edema.  Warm extremities and well-perfused          Results Review:        Results from last 7 days   Lab Units 02/08/25  0320 02/07/25  1643   SODIUM mmol/L 141 138   POTASSIUM mmol/L 3.5 4.7   CHLORIDE mmol/L 105 101   CO2 mmol/L 25.1 19.8*   BUN mg/dL 25* 20   CREATININE mg/dL 0.72 0.88   GLUCOSE mg/dL 205* 333*   CALCIUM mg/dL 8.6 9.0     Results from last 7 days   Lab Units 02/07/25  1738 02/07/25  1643   HSTROP T ng/L 46* 28*     Results from last 7 days   Lab Units 02/08/25  0320 02/07/25  1643   WBC 10*3/mm3 11.14* 17.54*   HEMOGLOBIN g/dL 9.9* 11.3*   HEMATOCRIT % 28.6* 33.3*   PLATELETS 10*3/mm3 259 362     Results from last 7 days   Lab Units 02/07/25  1643   INR  1.41*   APTT seconds 26.3     Results from last 7 days   Lab Units 02/07/25  1643   PROBNP pg/mL 2,971.0*       Results from last 7 days   Lab Units 02/08/25  0320   D DIMER QUANT MCGFEU/mL 0.83             Results from last 7 days   Lab Units 02/07/25  1813   PH, ARTERIAL pH units 7.377   PCO2, ARTERIAL mm Hg 42.9   PO2 ART mm Hg 93.9   O2 SATURATION ART % 97.1   MODALITY  BiPap         I reviewed the patient's new clinical results.        Medication Review:   apixaban, 2.5 mg, Oral, Q12H  azithromycin, 500 mg, Intravenous, Q24H  cefTRIAXone, 1,000 mg, Intravenous, Q24H  DULoxetine, 60 mg, Oral, Daily  ferrous sulfate, 325 mg, Oral, Daily  furosemide, 40 mg, Oral, Daily  ipratropium-albuterol, 3 mL, Nebulization, 4x Daily - RT  [START ON 2/9/2025] melatonin, 2.5 mg, Oral, Nightly  [START ON 2/9/2025] metoprolol succinate XL, 50 mg, Oral, Daily  pantoprazole, 40 mg, Oral, Q AM  pravastatin, 40 mg, Oral, Daily             Diagnostic imaging:  I personally and independently reviewed the following images:  CXR 2/8/25: Pulmonary vascular congestion.     Assessment     Acute hypoxic respiratory failure  Atrial fibrillation with RVR  Pulmonary vascular congestion.   Bilateral pleural effusions  Acute diastolic CHF  Atelectasis  Elevated  lactic acid, due to hypoxia    GERD  Hypertension    All problems new to me    Plan       O2 by NC if needed to keep SpO2>90%  Lasix 40 mg daily  Ok to discontinue antibiotics  Eliquis.  Heart rate control  IS      May Vang MD  02/08/25  23:33 EST        This note was dictated utilizing Dragon dictation

## 2025-02-09 NOTE — PROGRESS NOTES
"                                              LOS: 2 days   Patient Care Team:  Tomas Nix, ALONSO, APRN as PCP - General (Internal Medicine)    Chief Complaint:  F/up cough and dyspnea    Subjective   Interval History      On 2 L oxygen.  Reported mild cough.  No dyspnea.    REVIEW OF SYSTEMS:     GASTROINTESTINAL: No anorexia, nausea, vomiting or diarrhea. No abdominal pain.  CONSTITUTIONAL: No fever or chills.     Ventilator/Non-Invasive Ventilation Settings (From admission, onward)      None                  Physical Exam:     Vital Signs  Temp:  [97.9 °F (36.6 °C)-98.9 °F (37.2 °C)] 98.1 °F (36.7 °C)  Heart Rate:  [] 87  Resp:  [16-18] 16  BP: (110-144)/(44-88) 114/49    Intake/Output Summary (Last 24 hours) at 2/9/2025 1601  Last data filed at 2/9/2025 1250  Gross per 24 hour   Intake 360 ml   Output 1100 ml   Net -740 ml     Flowsheet Rows      Flowsheet Row First Filed Value   Admission Height 157.5 cm (62\") Documented at 02/07/2025 2125   Admission Weight 50.1 kg (110 lb 7.2 oz) Documented at 02/07/2025 2125            PPE used per hospital policy    General Appearance:   Alert, cooperative, in no acute distress   ENMT:  Mallampati score 2, moist mucous membrane   Eyes:  Pupils equal and reactive to light. EOMI   Neck:   Large. Trachea midline. No thyromegaly.   Lungs:    Bibasilar crackles and coarse breath sounds.     Heart:   Regular rhythm and normal rate, normal S1 and S2, no         murmur   Skin:   No rash or ecchymosis   Abdomen:    Obese. Soft. No tenderness. No HSM.   Neuro/psych:  Conscious, alert, oriented x3. Strength 5/5 in upper and lower  ext.  Appropriate mood and affect   Extremities:  No cyanosis, clubbing but trace edema.  Warm extremities and well-perfused          Results Review:        Results from last 7 days   Lab Units 02/09/25  0243 02/08/25  0320 02/07/25  1643   SODIUM mmol/L 142 141 138   POTASSIUM mmol/L 3.6 3.5 4.7   CHLORIDE mmol/L 107 105 101   CO2 mmol/L 25.0 25.1 " 19.8*   BUN mg/dL 26* 25* 20   CREATININE mg/dL 0.67 0.72 0.88   GLUCOSE mg/dL 121* 205* 333*   CALCIUM mg/dL 8.5 8.6 9.0     Results from last 7 days   Lab Units 02/07/25  1738 02/07/25  1643   HSTROP T ng/L 46* 28*     Results from last 7 days   Lab Units 02/09/25  0243 02/08/25  0320 02/07/25  1643   WBC 10*3/mm3 13.66* 11.14* 17.54*   HEMOGLOBIN g/dL 9.7* 9.9* 11.3*   HEMATOCRIT % 29.0* 28.6* 33.3*   PLATELETS 10*3/mm3 272 259 362     Results from last 7 days   Lab Units 02/07/25  1643   INR  1.41*   APTT seconds 26.3     Results from last 7 days   Lab Units 02/07/25  1643   PROBNP pg/mL 2,971.0*       Results from last 7 days   Lab Units 02/08/25  0320   D DIMER QUANT MCGFEU/mL 0.83             Results from last 7 days   Lab Units 02/07/25  1813   PH, ARTERIAL pH units 7.377   PCO2, ARTERIAL mm Hg 42.9   PO2 ART mm Hg 93.9   O2 SATURATION ART % 97.1   MODALITY  BiPap         I reviewed the patient's new clinical results.        Medication Review:   apixaban, 2.5 mg, Oral, Q12H  cefTRIAXone, 1,000 mg, Intravenous, Q24H  DULoxetine, 60 mg, Oral, Daily  ferrous sulfate, 325 mg, Oral, Daily  furosemide, 40 mg, Oral, Daily  ipratropium-albuterol, 3 mL, Nebulization, 4x Daily - RT  melatonin, 2.5 mg, Oral, Nightly  metoprolol succinate XL, 50 mg, Oral, Daily  pantoprazole, 40 mg, Oral, Q AM  pravastatin, 40 mg, Oral, Daily             Diagnostic imaging:  I personally and independently reviewed the following images:  CXR 2/8/25: Pulmonary vascular congestion.     Assessment     Acute hypoxic respiratory failure  Atrial fibrillation with RVR  Pulmonary vascular congestion.   Bilateral pleural effusions  Acute diastolic CHF  Atelectasis  Elevated lactic acid, due to hypoxia    GERD  Hypertension        Plan       O2 by NC if needed to keep SpO2>90%  Lasix 40 mg daily  Pantoprazole 40 mg daily   Eliquis 2.5 mg twice daily.  Heart rate control  IS    No much else to add from pulmonary perspective.  Therefore we will sign  off.  Please call back if needed.    May Vang MD  02/09/25  16:01 EST        This note was dictated utilizing [employer] dictation

## 2025-02-09 NOTE — PLAN OF CARE
Goal Outcome Evaluation: repeat chest x-ray, notified attending of repeat lactic of 3.3.  Metoprolol increased from 25mg to 50mg.  Lasix 40mg IV once dose given.  Tramadol added PRN, Tylenol given per MAR.  PT eval ordered.  Daughter thinks patient's glasses are at home, she will check and bring them to bedside.  Pt was fitted for new lower partial denture but hasn't received it yet.  Code status updated to DNR.  Pt A&O3, RA, A-fib 80's-90's on monitor.  New IV placed in R FA by IV team, L AC IV removed d/t infiltration.

## 2025-02-09 NOTE — PLAN OF CARE
Problem: Adult Inpatient Plan of Care  Goal: Plan of Care Review  Outcome: Progressing  Flowsheets (Taken 2/9/2025 8820)  Progress: improving  Plan of Care Reviewed With: patient   Goal Outcome Evaluation:  Plan of Care Reviewed With: patient        Progress: improving     Pt A&Ox 2 disoriented to time and situation. Pt remains in Afib controlled in 70-80s. All other vss. Pain treated per mar. Up in chair with staff assist. IV lasix given per order. Well diuresed. Plan to d/c home tomorrow w/ hh. Will continue with plan of care.

## 2025-02-09 NOTE — PLAN OF CARE
Goal Outcome Evaluation:  Plan of Care Reviewed With: patient      Patient is a 89 y.o. female admitted to [facility] on 2/7/2025 for a fib with RVR and ARF. PMHx includes CAD, HTN, HLD, and spinal stenosis. Patient is (I) at baseline and lives alone in  home with 3 DORIS. Pt has 3 daughters that live close by and are able to help as needed.. Today, patient was seated UIC at start of the session and required CGA for transfer. She ambulated 80' with RW and CGA on 2 L O2. Endurance and strength deficits noted. Patient may benefit from skilled PT services acutely to address functional deficits as well as improve level of independence prior to discharge. Anticipate home with 24/7 care and HHPT upon DC.        Anticipated Discharge Disposition (PT): home with assist, home with home health

## 2025-02-10 LAB
ALBUMIN SERPL-MCNC: 3.4 G/DL (ref 3.5–5.2)
ANION GAP SERPL CALCULATED.3IONS-SCNC: 8.6 MMOL/L (ref 5–15)
BASOPHILS # BLD AUTO: 0.16 10*3/MM3 (ref 0–0.2)
BASOPHILS NFR BLD AUTO: 1.8 % (ref 0–1.5)
BUN SERPL-MCNC: 20 MG/DL (ref 8–23)
BUN/CREAT SERPL: 30.8 (ref 7–25)
CALCIUM SPEC-SCNC: 8.8 MG/DL (ref 8.2–9.6)
CHLORIDE SERPL-SCNC: 102 MMOL/L (ref 98–107)
CO2 SERPL-SCNC: 29.4 MMOL/L (ref 22–29)
CREAT SERPL-MCNC: 0.65 MG/DL (ref 0.57–1)
DEPRECATED RDW RBC AUTO: 56.6 FL (ref 37–54)
EGFRCR SERPLBLD CKD-EPI 2021: 80.2 ML/MIN/1.73
EOSINOPHIL # BLD AUTO: 0.62 10*3/MM3 (ref 0–0.4)
EOSINOPHIL NFR BLD AUTO: 6.9 % (ref 0.3–6.2)
ERYTHROCYTE [DISTWIDTH] IN BLOOD BY AUTOMATED COUNT: 14.5 % (ref 12.3–15.4)
GLUCOSE SERPL-MCNC: 131 MG/DL (ref 65–99)
HCT VFR BLD AUTO: 29 % (ref 34–46.6)
HGB BLD-MCNC: 9.7 G/DL (ref 12–15.9)
IMM GRANULOCYTES # BLD AUTO: 0.06 10*3/MM3 (ref 0–0.05)
IMM GRANULOCYTES NFR BLD AUTO: 0.7 % (ref 0–0.5)
LYMPHOCYTES # BLD AUTO: 1.8 10*3/MM3 (ref 0.7–3.1)
LYMPHOCYTES NFR BLD AUTO: 20.1 % (ref 19.6–45.3)
MCH RBC QN AUTO: 37.5 PG (ref 26.6–33)
MCHC RBC AUTO-ENTMCNC: 33.4 G/DL (ref 31.5–35.7)
MCV RBC AUTO: 112 FL (ref 79–97)
MONOCYTES # BLD AUTO: 0.77 10*3/MM3 (ref 0.1–0.9)
MONOCYTES NFR BLD AUTO: 8.6 % (ref 5–12)
NEUTROPHILS NFR BLD AUTO: 5.53 10*3/MM3 (ref 1.7–7)
NEUTROPHILS NFR BLD AUTO: 61.9 % (ref 42.7–76)
PHOSPHATE SERPL-MCNC: 3.2 MG/DL (ref 2.5–4.5)
PLATELET # BLD AUTO: 272 10*3/MM3 (ref 140–450)
PMV BLD AUTO: 11.3 FL (ref 6–12)
POTASSIUM SERPL-SCNC: 4.2 MMOL/L (ref 3.5–5.2)
RBC # BLD AUTO: 2.59 10*6/MM3 (ref 3.77–5.28)
SODIUM SERPL-SCNC: 140 MMOL/L (ref 136–145)
WBC NRBC COR # BLD AUTO: 8.94 10*3/MM3 (ref 3.4–10.8)

## 2025-02-10 PROCEDURE — 94799 UNLISTED PULMONARY SVC/PX: CPT

## 2025-02-10 PROCEDURE — 85025 COMPLETE CBC W/AUTO DIFF WBC: CPT | Performed by: INTERNAL MEDICINE

## 2025-02-10 PROCEDURE — 97530 THERAPEUTIC ACTIVITIES: CPT

## 2025-02-10 PROCEDURE — 99232 SBSQ HOSP IP/OBS MODERATE 35: CPT | Performed by: NURSE PRACTITIONER

## 2025-02-10 PROCEDURE — 94760 N-INVAS EAR/PLS OXIMETRY 1: CPT

## 2025-02-10 PROCEDURE — 25010000002 CEFTRIAXONE PER 250 MG

## 2025-02-10 PROCEDURE — 94761 N-INVAS EAR/PLS OXIMETRY MLT: CPT

## 2025-02-10 PROCEDURE — 94664 DEMO&/EVAL PT USE INHALER: CPT

## 2025-02-10 PROCEDURE — 80069 RENAL FUNCTION PANEL: CPT | Performed by: INTERNAL MEDICINE

## 2025-02-10 RX ADMIN — METOPROLOL SUCCINATE 50 MG: 50 TABLET, EXTENDED RELEASE ORAL at 08:17

## 2025-02-10 RX ADMIN — APIXABAN 2.5 MG: 2.5 TABLET, FILM COATED ORAL at 08:17

## 2025-02-10 RX ADMIN — IPRATROPIUM BROMIDE AND ALBUTEROL SULFATE 3 ML: .5; 3 SOLUTION RESPIRATORY (INHALATION) at 12:02

## 2025-02-10 RX ADMIN — DULOXETINE 60 MG: 30 CAPSULE, DELAYED RELEASE ORAL at 08:17

## 2025-02-10 RX ADMIN — CEFTRIAXONE SODIUM 1000 MG: 1 INJECTION, POWDER, FOR SOLUTION INTRAMUSCULAR; INTRAVENOUS at 17:04

## 2025-02-10 RX ADMIN — IPRATROPIUM BROMIDE AND ALBUTEROL SULFATE 3 ML: .5; 3 SOLUTION RESPIRATORY (INHALATION) at 07:10

## 2025-02-10 RX ADMIN — FUROSEMIDE 40 MG: 40 TABLET ORAL at 08:17

## 2025-02-10 RX ADMIN — APIXABAN 2.5 MG: 2.5 TABLET, FILM COATED ORAL at 20:16

## 2025-02-10 RX ADMIN — PRAVASTATIN SODIUM 40 MG: 40 TABLET ORAL at 08:17

## 2025-02-10 RX ADMIN — PANTOPRAZOLE SODIUM 40 MG: 40 TABLET, DELAYED RELEASE ORAL at 06:38

## 2025-02-10 RX ADMIN — FERROUS SULFATE TAB 325 MG (65 MG ELEMENTAL FE) 325 MG: 325 (65 FE) TAB at 08:17

## 2025-02-10 RX ADMIN — IPRATROPIUM BROMIDE AND ALBUTEROL SULFATE 3 ML: .5; 3 SOLUTION RESPIRATORY (INHALATION) at 21:33

## 2025-02-10 NOTE — PROGRESS NOTES
"    Patient Name: Alexa Peace  :1927  89 y.o.      Patient Care Team:  Tomas Nix, ALONSO, APRN as PCP - General (Internal Medicine)    Chief Complaint: follow up HFpEF , AF     Interval History: she is sitting up in the chair. Feels well. HR 80s.       Objective   Vital Signs  Temp:  [98.1 °F (36.7 °C)-99.2 °F (37.3 °C)] 98.2 °F (36.8 °C)  Heart Rate:  [] 77  Resp:  [16-18] 18  BP: (114-144)/(47-57) 125/47    Intake/Output Summary (Last 24 hours) at 2/10/2025 1156  Last data filed at 2/10/2025 0856  Gross per 24 hour   Intake 720 ml   Output 1800 ml   Net -1080 ml     Flowsheet Rows      Flowsheet Row First Filed Value   Admission Height 157.5 cm (62\") Documented at 2025   Admission Weight 50.1 kg (110 lb 7.2 oz) Documented at 2025            Physical Exam:   General Appearance:    Alert, cooperative, in no acute distress   Lungs:     Clear to auscultation.  Normal respiratory effort and rate.      Heart:    irregular rhythm and normal rate, normal S1 and S2, no murmurs, gallops or rubs.     Chest Wall:    No abnormalities observed   Abdomen:     Soft, nontender, positive bowel sounds.     Extremities:   no cyanosis, clubbing or edema.  No marked joint deformities.  Adequate musculoskeletal strength.       Results Review:    Results from last 7 days   Lab Units 02/10/25  0438   SODIUM mmol/L 140   POTASSIUM mmol/L 4.2   CHLORIDE mmol/L 102   CO2 mmol/L 29.4*   BUN mg/dL 20   CREATININE mg/dL 0.65   GLUCOSE mg/dL 131*   CALCIUM mg/dL 8.8     Results from last 7 days   Lab Units 25  1738 25  1643   HSTROP T ng/L 46* 28*     Results from last 7 days   Lab Units 02/10/25  0438   WBC 10*3/mm3 8.94   HEMOGLOBIN g/dL 9.7*   HEMATOCRIT % 29.0*   PLATELETS 10*3/mm3 272     Results from last 7 days   Lab Units 25  1643   INR  1.41*   APTT seconds 26.3                       Medication Review:   apixaban, 2.5 mg, Oral, Q12H  cefTRIAXone, 1,000 mg, Intravenous, " Q24H  DULoxetine, 60 mg, Oral, Daily  ferrous sulfate, 325 mg, Oral, Daily  furosemide, 40 mg, Oral, Daily  ipratropium-albuterol, 3 mL, Nebulization, 4x Daily - RT  metoprolol succinate XL, 50 mg, Oral, Daily  pantoprazole, 40 mg, Oral, Q AM  pravastatin, 40 mg, Oral, Daily              Assessment & Plan   Acute heart failure with preserved LVEF due to AF  PAF, presented with RVr. Remains in AF. HR improved with titration of beta blocker. She is on apixaban 2.5 mg BID.   Acute hypoxic respiratory failure, flash pulmonary edema   Hypertension   Hyperlipidemia     She responded well to IV dose of lasix yesterday and is on room air.   Hr well controlled on increased dose of metoprolol succinate.   No objection to discharge. Follow up in office one week with NP.     BIENVENIDO Liao  [facility]  02/10/25  11:56 EST

## 2025-02-10 NOTE — PLAN OF CARE
Goal Outcome Evaluation:  Plan of Care Reviewed With: patient        Progress: no change  Outcome Evaluation: Pt was seen for PT tx this AM. Pt was in bed and sat up to EOB w/ SBA. Pt stood and amb to BSC in BR req CGA and use of fww. Pt performed toileting hygiene IND. Pt stood at sink for ADLs where HR incr to 160 at highest noted. Pt amb to EOB then returned to bed for a few min to rest w/ HR decr into low 100's range. Pt sat up again then stood and amb into hallway w/ CGA and use of fww. Pt was slow w/mild unsteadiness at times although no overt LOB. Pt performed B LE ther ex and was UIC at end of session.    Anticipated Discharge Disposition (PT): home with home health, home with assist

## 2025-02-10 NOTE — PLAN OF CARE
Goal Outcome Evaluation:           Progress: improving  Outcome Evaluation: VSS, Ao to self and situation, unsure of dates or place. Using BSC at times, purewick in place at this time. Ambulating with staff and walker. No pain complaints at this time. Room air during shift today. Will continue with POC.

## 2025-02-10 NOTE — PLAN OF CARE
Goal Outcome Evaluation:  Plan of Care Reviewed With: patient        Progress: no change  Outcome Evaluation: Alert to self, and place, complains of pain to upper back, medicated per MAR with relief, SBA with walker, afib on the monitor, possible dc to home with HH, VSS, continue plan of care.

## 2025-02-10 NOTE — THERAPY TREATMENT NOTE
Patient Name: Alexa Peace  : 1927    MRN: [id number]                              Today's Date: 2/10/2025       Admit Date: 2025    Visit Dx:     ICD-10-CM ICD-9-CM   1. Acute hypoxemic respiratory failure  J96.01 518.81   2. Atrial fibrillation with rapid ventricular response  I48.91 427.31   3. Acute on chronic congestive heart failure, unspecified heart failure type  I50.9 428.0   4. Pneumonia of both lungs due to infectious organism, unspecified part of lung  J18.9 483.8     Patient Active Problem List   Diagnosis    Bad memory    Depression    Diverticulosis of intestine    Hyperlipidemia    Macular degeneration    Neuropathy, peripheral    Osteopenia    Atopic rhinitis    Urge incontinence of urine    Atherosclerosis of native coronary artery    Essential hypertension    Gastroesophageal reflux disease without esophagitis    Impaired glucose tolerance    History of coronary artery stent placement    Chronic low back pain    Acquired scoliosis    Spinal stenosis of lumbar region with neurogenic claudication    PAC (premature atrial contraction)    Slow transit constipation    Paroxysmal atrial fibrillation    Pleural effusion on left    Valvular heart disease    Non-rheumatic mitral regurgitation    Pneumonia of right lower lobe due to infectious organism    Localized edema    Pneumonia    Chronic anticoagulation    DNR (do not resuscitate)    Acute on chronic diastolic heart failure    Annual physical exam    Confusion    Urinary frequency    Anemia    Acute hypoxic respiratory failure    Pneumonia due to gram-negative bacteria     Past Medical History:   Diagnosis Date    Anticoagulated     PLAVIX    Arthritis     Atrial premature complex     CAD (coronary artery disease)     Cancer     skin    Colon polyp     Depression     GERD (gastroesophageal reflux disease)     Heart attack     Hyperlipidemia     Hypertension     Macular degeneration     New onset atrial fibrillation 2019     Pre-diabetes     Spinal stenosis      Past Surgical History:   Procedure Laterality Date    ADENOIDECTOMY      CATARACT EXTRACTION      CORONARY ANGIOPLASTY WITH STENT PLACEMENT      EPIDURAL BLOCK      LUMBAR DISCECTOMY FUSION INSTRUMENTATION N/A 12/7/2017    Procedure: L2-3, L3-4, L4-5 laminectomy and fusion with local bone graft;  Surgeon: Andre Layne MD;  Location: [facility] OR;  Service:     TONSILECTOMY, ADENOIDECTOMY, BILATERAL MYRINGOTOMY AND TUBES      TONSILLECTOMY        General Information       Row Name 02/10/25 1000          Physical Therapy Time and Intention    Document Type therapy note (daily note)  -     Mode of Treatment physical therapy  -       Row Name 02/10/25 1000          General Information    Existing Precautions/Restrictions fall  -       Row Name 02/10/25 1000          Cognition    Orientation Status (Cognition) oriented x 4  -       Row Name 02/10/25 1000          Safety Issues/Impairments Affecting Functional Mobility    Impairments Affecting Function (Mobility) balance;endurance/activity tolerance;strength;postural/trunk control  -     Comment, Safety Issues/Impairments (Mobility) gt belt and non skid socks donned  -               User Key  (r) = Recorded By, (t) = Taken By, (c) = Cosigned By      Initials Name Provider Type    PH Akua Valenzuela PTA Physical Therapist Assistant                   Mobility       Row Name 02/10/25 1000          Bed Mobility    Bed Mobility supine-sit;sit-supine  -PH     Supine-Sit [place] (Bed Mobility) standby assist  -     Sit-Supine [place] (Bed Mobility) supervision  -     Assistive Device (Bed Mobility) bed rails;head of bed elevated  -PH     Comment, (Bed Mobility) incr time to perform; kyphotic posture noted at bedside - likely BL; pt req short rest in bed after ADLs in BR w/ HR having incr as high as 160  -       Row Name 02/10/25 1000          Sit-Stand Transfer    Sit-Stand [place] (Transfers)  contact guard  -PH     Assistive Device (Sit-Stand Transfers) walker, front-wheeled  -PH       Row Name 02/10/25 1000          Gait/Stairs (Locomotion)    [place] Level (Gait) contact guard  -PH     Assistive Device (Gait) walker, front-wheeled  -PH     Distance in Feet (Gait) 70  15' to BSC in BR then to sink; amb of approx 60' into hallway  -PH     Deviations/Abnormal Patterns (Gait) fito decreased;gait speed decreased;right sided deviations  -PH     Bilateral Gait Deviations forward flexed posture;heel strike decreased  -PH     [place] Level (Stairs) unable to assess  -PH     Comment, (Gait/Stairs) cues for upright posture and to keep B hands on fww  w/ pt frequently removing to reach for furniture/fixtures in room; mildly unsteady at times w/ no overt LOB  -PH               User Key  (r) = Recorded By, (t) = Taken By, (c) = Cosigned By      Initials Name Provider Type     Akua Valenzuela PTA Physical Therapist Assistant                   Obj/Interventions       Row Name 02/10/25 1003          Balance    Balance Assessment sitting static balance;standing static balance;sitting dynamic balance  -PH     Static Sitting Balance standby assist  -PH     Dynamic Sitting Balance standby assist  -PH     Static Standing Balance contact guard  -PH     Position/Device Used, Standing Balance walker, front-wheeled  -PH               User Key  (r) = Recorded By, (t) = Taken By, (c) = Cosigned By      Initials Name Provider Type     Akua Valenzuela PTA Physical Therapist Assistant                   Goals/Plan    No documentation.                  Clinical Impression       Row Name 02/10/25 1004          Pain    Pretreatment Pain Rating 0/10 - no pain  -PH     Posttreatment Pain Rating 0/10 - no pain  -PH       Row Name 02/10/25 1004          Plan of Care Review    Plan of Care Reviewed With patient  -PH     Progress no change  -PH     Outcome Evaluation Pt was seen for PT tx this AM. Pt was  in bed and sat up to EOB w/ SBA. Pt stood and amb to BSC in BR req CGA and use of fww. Pt performed toileting hygiene IND. Pt stood at sink for ADLs where HR incr to 160 at highest noted. Pt amb to EOB then returned to bed for a few min to rest w/ HR decr into low 100's range. Pt sat up again then stood and amb into hallway w/ CGA and use of fww. Pt was slow w/mild unsteadiness at times although no overt LOB. Pt performed B LE ther ex and was UIC at end of session.  -PH       Row Name 02/10/25 1004          Vital Signs    Posttreatment Heart Rate (beats/min) 160  -PH       Row Name 02/10/25 1004          Positioning and Restraints    Pre-Treatment Position in bed  -PH     Post Treatment Position chair  -PH     In Chair notified nsg;reclined;call light within reach;encouraged to call for assist;exit alarm on;legs elevated  -PH               User Key  (r) = Recorded By, (t) = Taken By, (c) = Cosigned By      Initials Name Provider Type    PH Akua Valenzuela PTA Physical Therapist Assistant                   Outcome Measures       Row Name 02/10/25 1007 02/10/25 0820       How much help from another person do you currently need...    Turning from your back to your side while in flat bed without using bedrails? 3  -PH 3  -AH    Moving from lying on back to sitting on the side of a flat bed without bedrails? 3  -PH 3  -AH    Moving to and from a bed to a chair (including a wheelchair)? 3  -PH 3  -AH    Standing up from a chair using your arms (e.g., wheelchair, bedside chair)? 3  -PH 3  -AH    Climbing 3-5 steps with a railing? 3  -PH 3  -AH    To walk in hospital room? 3  -PH 3  -AH    AM-PAC 6 Clicks Score (PT) 18  -PH 18  -AH    Highest Level of Mobility Goal 6 --> Walk 10 steps or more  -PH 6 --> Walk 10 steps or more  -AH      Row Name 02/10/25 1007          Functional Assessment    Outcome Measure Options AM-PAC 6 Clicks Basic Mobility (PT)  -PH               User Key  (r) = Recorded By, (t) = Taken By, (c)  = Cosigned By      Initials Name Provider Type     Akua Valenzuela PTA Physical Therapist Assistant    Nilsa Messina RN Registered Nurse                                 Physical Therapy Education       Title: PT OT SLP Therapies (Done)       Topic: Physical Therapy (Done)       Point: Mobility training (Done)       Learning Progress Summary            Patient Acceptance, E,TB,D, VU by  at 2/10/2025 1007    Acceptance, E, VU by DB at 2/9/2025 1148                      Point: Home exercise program (Done)       Learning Progress Summary            Patient Acceptance, E,TB,D, VU by  at 2/10/2025 1007    Acceptance, E, VU by DB at 2/9/2025 1148                      Point: Body mechanics (Done)       Learning Progress Summary            Patient Acceptance, E,TB,D, VU by  at 2/10/2025 1007    Acceptance, E, VU by DB at 2/9/2025 1148                      Point: Precautions (Done)       Learning Progress Summary            Patient Acceptance, E,TB,D, VU by  at 2/10/2025 1007    Acceptance, E, VU by DB at 2/9/2025 1148                                      User Key       Initials Effective Dates Name Provider Type Discipline    DB 06/16/21 -  Donita Nguyen, PT Physical Therapist PT     06/16/21 -  Akua Valenzuela PTA Physical Therapist Assistant PT                  PT Recommendation and Plan     Progress: no change  Outcome Evaluation: Pt was seen for PT tx this AM. Pt was in bed and sat up to EOB w/ SBA. Pt stood and amb to BSC in BR req CGA and use of fww. Pt performed toileting hygiene IND. Pt stood at sink for ADLs where HR incr to 160 at highest noted. Pt amb to EOB then returned to bed for a few min to rest w/ HR decr into low 100's range. Pt sat up again then stood and amb into hallway w/ CGA and use of fww. Pt was slow w/mild unsteadiness at times although no overt LOB. Pt performed B LE ther ex and was UIC at end of session.     Time Calculation:         PT Charges       Row Name  02/10/25 1008             Time Calculation    Start Time 0910  -PH      Stop Time 0936  -PH      Time Calculation (min) 26 min  -PH      PT Received On 02/10/25  -PH      PT - Next Appointment 02/11/25  -PH         Timed Charges    03213 - PT Therapeutic Exercise Minutes 3  -PH      62671 - PT Therapeutic Activity Minutes 23  -PH         Total Minutes    Timed Charges Total Minutes 26  -PH       Total Minutes 26  -PH                User Key  (r) = Recorded By, (t) = Taken By, (c) = Cosigned By      Initials Name Provider Type    PH Akua Valenzuela PTA Physical Therapist Assistant                  Therapy Charges for Today       Code Description Service Date Service Provider Modifiers Qty    [id number] HC PT THERAPEUTIC ACT EA 15 MIN 2/10/2025 Akua Valenzuela PTA GP 2            PT G-Codes  Outcome Measure Options: AM-PAC 6 Clicks Basic Mobility (PT)  AM-PAC 6 Clicks Score (PT): 18  PT Discharge Summary  Anticipated Discharge Disposition (PT): home with home health, home with assist    Akua Valenzuela PTA  2/10/2025

## 2025-02-10 NOTE — PROGRESS NOTES
Name: Alexa Peace ADMIT: 2025   : 1927  PCP: Tomas Nix DNP, BIENVENIDO    MRN: [id number] LOS: 3 days   AGE/SEX: 89 y.o. female  ROOM: 220/     Subjective   Subjective   Patient seen and examined this morning.  Hospital day 3.  She is awake and resting comfortably in the chair next to bedside.  She still has some shortness of breath, however, states her breathing is improved.  She remains pleasantly confused       Objective   Objective   Vital Signs  Temp:  [98.1 °F (36.7 °C)-99.2 °F (37.3 °C)] 98.2 °F (36.8 °C)  Heart Rate:  [] 98  Resp:  [16-18] 16  BP: (114-144)/(47-57) 125/47  SpO2:  [94 %-100 %] 95 %  on  Flow (L/min) (Oxygen Therapy):  [2] 2;   Device (Oxygen Therapy): room air  Body mass index is 20.14 kg/m².  Physical Exam  Vitals and nursing note reviewed.   Constitutional:       General: She is not in acute distress.  Cardiovascular:      Rate and Rhythm: Normal rate.      Pulses: Normal pulses.      Heart sounds: Normal heart sounds.   Pulmonary:      Effort: Pulmonary effort is normal. No respiratory distress.      Breath sounds: Decreased breath sounds present. No wheezing.      Comments: On room air  Abdominal:      Palpations: Abdomen is soft.      Tenderness: There is no abdominal tenderness.   Skin:     General: Skin is warm and dry.   Neurological:      Mental Status: She is alert. She is disoriented.       Results Review     I reviewed the patient's new clinical results.  Results from last 7 days   Lab Units 02/10/25  0438 02/09/25  0243 02/08/25  0320 02/07/25  1643   WBC 10*3/mm3 8.94 13.66* 11.14* 17.54*   HEMOGLOBIN g/dL 9.7* 9.7* 9.9* 11.3*   PLATELETS 10*3/mm3 272 272 259 362     Results from last 7 days   Lab Units 02/10/25  0438 25  0243 25  0320 25  1643   SODIUM mmol/L 140 142 141 138   POTASSIUM mmol/L 4.2 3.6 3.5 4.7   CHLORIDE mmol/L 102 107 105 101   CO2 mmol/L 29.4* 25.0 25.1 19.8*   BUN mg/dL 20 26* 25* 20   CREATININE mg/dL 0.65 0.67 0.72  0.88   GLUCOSE mg/dL 131* 121* 205* 333*   EGFR mL/min/1.73 80.2 79.6 76.2 59.9*     Results from last 7 days   Lab Units 02/10/25  0438 02/09/25  0243 02/08/25  0320 02/07/25  1643   ALBUMIN g/dL 3.4* 3.4* 3.6 3.8   BILIRUBIN mg/dL  --   --   --  0.7   ALK PHOS U/L  --   --   --  70   AST (SGOT) U/L  --   --   --  50*   ALT (SGPT) U/L  --   --   --  25     Results from last 7 days   Lab Units 02/10/25  0438 02/09/25  0243 02/08/25  0320 02/07/25  1643   CALCIUM mg/dL 8.8 8.5 8.6 9.0   ALBUMIN g/dL 3.4* 3.4* 3.6 3.8   PHOSPHORUS mg/dL 3.2 2.8 2.9  --      Results from last 7 days   Lab Units 02/09/25  0243 02/08/25  1001 02/08/25  0320 02/07/25  2346 02/07/25  2039 02/07/25  1737 02/07/25  1643   PROCALCITONIN ng/mL 1.56*  --   --   --   --   --  <0.02   LACTATE mmol/L  --  3.3* 2.7* 2.5* 2.4*   < > 7.1*    < > = values in this interval not displayed.     Glucose   Date/Time Value Ref Range Status   02/07/2025 2129 126 70 - 130 mg/dL Final       No radiology results for the last day    I have personally reviewed all medications:  Scheduled Medications  apixaban, 2.5 mg, Oral, Q12H  cefTRIAXone, 1,000 mg, Intravenous, Q24H  DULoxetine, 60 mg, Oral, Daily  ferrous sulfate, 325 mg, Oral, Daily  furosemide, 40 mg, Oral, Daily  ipratropium-albuterol, 3 mL, Nebulization, 4x Daily - RT  metoprolol succinate XL, 50 mg, Oral, Daily  pantoprazole, 40 mg, Oral, Q AM  pravastatin, 40 mg, Oral, Daily    Infusions   Diet  Diet: Cardiac; Healthy Heart (2-3 Na+); Fluid Consistency: Thin (IDDSI 0)    I have personally reviewed:  [x]  Laboratory   [x]  Microbiology   [x]  Radiology   [x]  EKG/Telemetry  []  Cardiology/Vascular   []  Pathology    []  Records       Assessment/Plan     Active Hospital Problems    Diagnosis  POA    **Acute hypoxic respiratory failure [J96.01]  Yes    Pneumonia due to gram-negative bacteria [J15.69]  Yes    Acute on chronic diastolic heart failure [I50.33]  Yes    DNR (do not resuscitate) [Z66]  Yes     Pneumonia [J18.9]  Yes    Paroxysmal atrial fibrillation [I48.0]  Yes    Essential hypertension [I10]  Yes    Gastroesophageal reflux disease without esophagitis [K21.9]  Yes    Depression [F32.A]  Yes    Bad memory [R41.3]  Yes      Resolved Hospital Problems   No resolved problems to display.       89 y.o. female admitted with Acute hypoxic respiratory failure.    PAF with RVR/Acute on Chronic Diastolic CHF/Acute Hypoxic Respiratory Failure  - room air oxygen saturation <87% and respiratory distress documented in the ER, required BiPAP but now between room air.  - HR is acceptable, volume status is improving, she is now on PO diuretics. Cardiology notes reviewed, continue treatments as prescribed, greatly appreciate their help.      Pneumonia  Leukocytosis  - had diffuse infiltrates on CXR likely mostly due to pulmonary edema but there was question of superimposed left lower lobe pneumonia and procalcitonin is elevated.   - WBC was elevated, but has since improved. Continue Ceftriaxone, transition to Cefdinir at discharge.  - Closely monitor respiratory status.    Anemia  - Noted on labs, no evidence of bleeding, repeat CBC in AM, transfuse for hemoglobin <7.     GERD  - symptoms controlled, continue ppi     Depression  - appears controlled  - continue cymbalta        All workup, problems and plans new to me today. First day taking care of patient.      Eliquis (home med) for DVT prophylaxis.  Limited code (no CPR, no intubation).  Discussed with patient, family, and nursing staff.  Anticipate discharge home with family tomorrow.  Expected Discharge Date: 2/10/2025; Expected Discharge Time:       Saad Moreno DO  [facility]  02/10/25

## 2025-02-10 NOTE — DISCHARGE PLACEMENT REQUEST
"Lilly Mane (89 y.o. Female)       Date of Birth   04/13/1927    Social Security Number       Address   [address] [place] [address]    Home Phone   [phone]    MRN   [id number]       Episcopalian   None    Marital Status                               Admission Date   2/7/25    Admission Type   Emergency    Admitting Provider   Jere Bill MD    Attending Provider   Saad Moreno DO    Department, Room/Bed   [facility] CARDIOVASC UNIT, 2205/1       Discharge Date       Discharge Disposition       Discharge Destination                                 Attending Provider: Saad Moreno DO    Allergies: Ciprofloxacin, Metronidazole, Prednisone    Isolation: None   Infection: None   Code Status: No CPR    Ht: 157.5 cm (62\")   Wt: 49.9 kg (110 lb 1.6 oz)    Admission Cmt: None   Principal Problem: Acute hypoxic respiratory failure [J96.01]                   Active Insurance as of 2/7/2025       Primary Coverage       Payor Plan Insurance Group Employer/Plan Group    HUMANA MEDICARE REPLACEMENT HUMANA MED ADV HMO [id number]       Payor Plan Address Payor Plan Phone Number Payor Plan Fax Number Effective Dates    [address] [address] [phone]  1/1/2023 - None Entered    [facility] [id number]         Subscriber Name Subscriber Birth Date Member ID       LILLY MANE 4/13/1927 [id number]                     Emergency Contacts        (Rel.) Home Phone Work Phone Mobile Phone    Sheridan Gregory (Daughter) [phone] -- [phone]    Corine Baird Pat (Daughter) -- -- [phone]    Sandy Cordero (Daughter) -- -- --                "

## 2025-02-10 NOTE — CASE MANAGEMENT/SOCIAL WORK
Discharge Planning Assessment  [facility]     Patient Name: Alexa Peace  MRN: [id number]  Today's Date: 2/10/2025    Admit Date: 2/7/2025    Plan: Home w/ daughters assisting and  [facility]   Discharge Needs Assessment       Row Name 02/10/25 1508       Living Environment    People in Home alone    Unique Family Situation Three daughters alternate visiting and assisting daily.  Private caregiver 3days week/4hrs day    Current Living Arrangements home    Potentially Unsafe Housing Conditions none    In the past 12 months has the electric, gas, oil, or water company threatened to shut off services in your home? No    Primary Care Provided by self    Provides Primary Care For no one, unable/limited ability to care for self    Family Caregiver if Needed child(kishore), adult    Family Caregiver Names Daughters- Sheridan Gregory, Sandy Consuelo and Corine Martinez Brie    Quality of Family Relationships helpful;involved;supportive    Able to Return to Prior Arrangements yes       Resource/Environmental Concerns    Resource/Environmental Concerns none    Transportation Concerns none       Transportation Needs    In the past 12 months, has lack of transportation kept you from medical appointments or from getting medications? no    In the past 12 months, has lack of transportation kept you from meetings, work, or from getting things needed for daily living? No       Food Insecurity    Within the past 12 months, you worried that your food would run out before you got the money to buy more. Never true       Transition Planning    Patient/Family Anticipates Transition to home with family    Patient/Family Anticipated Services at Transition home health care    Transportation Anticipated family or friend will provide       Discharge Needs Assessment    Readmission Within the Last 30 Days no previous admission in last 30 days    Equipment Currently Used at Home wheelchair;walker, rolling;power chair,(recliner lift);bp cuff;scales;grab  bar;shower chair    Concerns to be Addressed discharge planning    Anticipated Changes Related to Illness none    Equipment Needed After Discharge none    Discharge Facility/Level of Care Needs home with home health    Provided Post Acute Provider Quality & Resource List? Yes    Post Acute Provider Quality and Resource List Home Health    Delivered To Support Person    Method of Delivery Telephone    Patient's Choice of Community Agency(s) [facility]                    Discharge Plan       Row Name 02/10/25 1512       Plan    Plan Home w/ daughters assisting and  [facility]     Plan Comments Spoke with patient, daughter/Sandy Cordero and daughter/Sheridan Gregory at bedside.  Introduced self and role.  Patient lives alone in a sotry 1/2 home with one of her three daughters always visiting. Patient is mostly w/c dependent but is very capable of transport to and from w/c [facility].  Patient has standard wheelchair, electric recliner, bp cuff, shr chr, grab bars and a scale.  Patient has had home health in the past but cannot recall the agency name.  Pt has been to [facility] in the past (2019).  Daughters asked [facility] if patient would need rehab at d/.  [facility] explained to them how Eileen sargent work.  [facility] informed them that patient ambulated over 60 feet with physical therapy today and they are recommending home with assist and home health services.  Daughters are in agreement with [facility] arranging home health for Pt at d/.  Daughters also informed [facility] that they recently hired a in-home private caregiver last week to come 3/d wk for 4hrs/day.  Referral sent to [facility] with [facility] and she ACCEPTED. [facility] continues to follow............SRS/RNCM                  Continued Care and Services - Admitted Since 2/7/2025       Destination       Service Provider Request Status Services Address Phone Fax Patient Preferred    [facility]  Bed not available -- [address] [address] [phone] --               Home Medical Care Coordination complete.      Service Provider Request Status Services Address Phone Fax Patient Preferred    [facility] [address], [address], [place] [id number] [phone] --                  Expected Discharge Date and Time       Expected Discharge Date Expected Discharge Time    Feb 10, 2025            Demographic Summary       Row Name 02/10/25 1506       General Information    Admission Type inpatient    Arrived From emergency department    Required Notices Provided Important Message from Medicare    Referral Source admission list    Reason for Consult discharge planning    Preferred Language English       Contact Information    Permission Granted to Share Info With                    Functional Status       Row Name 02/10/25 1506       Functional Status    Usual Activity Tolerance fair    Current Activity Tolerance fair       Physical Activity    On average, how many days per week do you engage in moderate to strenuous exercise (like a brisk walk)? 0 days    On average, how many minutes do you engage in exercise at this level? 0 min    Number of minutes of exercise per week 0       Assessment of Health Literacy    How often do you have someone help you read hospital materials? Sometimes    Health Literacy Moderate       Functional Status, IADL    Medications assistive person  [facility] medication dispenser.    Meal Preparation assistive equipment and person    Housekeeping assistive equipment and person    Laundry assistive equipment and person    Shopping assistive equipment and person       Mental Status    General Appearance WDL WDL       Mental Status Summary    Recent Changes in Mental Status/Cognitive Functioning no changes       Employment/    Employment Status retired                   Psychosocial    No documentation.                  Abuse/Neglect    No documentation.                   Legal    No documentation.                  Substance Abuse    No documentation.                  Patient Forms    No documentation.                     Jocy Nicole RN

## 2025-02-11 ENCOUNTER — READMISSION MANAGEMENT (OUTPATIENT)
Dept: CALL CENTER | Facility: HOSPITAL | Age: OVER 89
End: 2025-02-11
Payer: MEDICARE

## 2025-02-11 VITALS
SYSTOLIC BLOOD PRESSURE: 127 MMHG | TEMPERATURE: 98.2 F | HEART RATE: 98 BPM | RESPIRATION RATE: 18 BRPM | BODY MASS INDEX: 20.16 KG/M2 | HEIGHT: 62 IN | DIASTOLIC BLOOD PRESSURE: 67 MMHG | WEIGHT: 109.57 LBS | OXYGEN SATURATION: 96 %

## 2025-02-11 LAB
ALBUMIN SERPL-MCNC: 3.3 G/DL (ref 3.5–5.2)
ANION GAP SERPL CALCULATED.3IONS-SCNC: 9.8 MMOL/L (ref 5–15)
BASOPHILS # BLD AUTO: 0.13 10*3/MM3 (ref 0–0.2)
BASOPHILS NFR BLD AUTO: 1.5 % (ref 0–1.5)
BUN SERPL-MCNC: 16 MG/DL (ref 8–23)
BUN/CREAT SERPL: 28.6 (ref 7–25)
CALCIUM SPEC-SCNC: 8.4 MG/DL (ref 8.2–9.6)
CHLORIDE SERPL-SCNC: 103 MMOL/L (ref 98–107)
CO2 SERPL-SCNC: 27.2 MMOL/L (ref 22–29)
CREAT SERPL-MCNC: 0.56 MG/DL (ref 0.57–1)
DEPRECATED RDW RBC AUTO: 58.8 FL (ref 37–54)
EGFRCR SERPLBLD CKD-EPI 2021: 83.1 ML/MIN/1.73
EOSINOPHIL # BLD AUTO: 0.81 10*3/MM3 (ref 0–0.4)
EOSINOPHIL NFR BLD AUTO: 9.4 % (ref 0.3–6.2)
ERYTHROCYTE [DISTWIDTH] IN BLOOD BY AUTOMATED COUNT: 14.8 % (ref 12.3–15.4)
GLUCOSE SERPL-MCNC: 122 MG/DL (ref 65–99)
HCT VFR BLD AUTO: 29.7 % (ref 34–46.6)
HGB BLD-MCNC: 10 G/DL (ref 12–15.9)
IMM GRANULOCYTES # BLD AUTO: 0.03 10*3/MM3 (ref 0–0.05)
IMM GRANULOCYTES NFR BLD AUTO: 0.3 % (ref 0–0.5)
LYMPHOCYTES # BLD AUTO: 1.73 10*3/MM3 (ref 0.7–3.1)
LYMPHOCYTES NFR BLD AUTO: 20.1 % (ref 19.6–45.3)
MAGNESIUM SERPL-MCNC: 2 MG/DL (ref 1.7–2.3)
MCH RBC QN AUTO: 38.3 PG (ref 26.6–33)
MCHC RBC AUTO-ENTMCNC: 33.7 G/DL (ref 31.5–35.7)
MCV RBC AUTO: 113.8 FL (ref 79–97)
MONOCYTES # BLD AUTO: 0.73 10*3/MM3 (ref 0.1–0.9)
MONOCYTES NFR BLD AUTO: 8.5 % (ref 5–12)
NEUTROPHILS NFR BLD AUTO: 5.17 10*3/MM3 (ref 1.7–7)
NEUTROPHILS NFR BLD AUTO: 60.2 % (ref 42.7–76)
PHOSPHATE SERPL-MCNC: 2.8 MG/DL (ref 2.5–4.5)
PLATELET # BLD AUTO: 275 10*3/MM3 (ref 140–450)
PMV BLD AUTO: 10.9 FL (ref 6–12)
POTASSIUM SERPL-SCNC: 2.9 MMOL/L (ref 3.5–5.2)
POTASSIUM SERPL-SCNC: 3.7 MMOL/L (ref 3.5–5.2)
RBC # BLD AUTO: 2.61 10*6/MM3 (ref 3.77–5.28)
SODIUM SERPL-SCNC: 140 MMOL/L (ref 136–145)
WBC NRBC COR # BLD AUTO: 8.6 10*3/MM3 (ref 3.4–10.8)

## 2025-02-11 PROCEDURE — 94664 DEMO&/EVAL PT USE INHALER: CPT

## 2025-02-11 PROCEDURE — 94799 UNLISTED PULMONARY SVC/PX: CPT

## 2025-02-11 PROCEDURE — 83735 ASSAY OF MAGNESIUM: CPT | Performed by: STUDENT IN AN ORGANIZED HEALTH CARE EDUCATION/TRAINING PROGRAM

## 2025-02-11 PROCEDURE — 80069 RENAL FUNCTION PANEL: CPT | Performed by: INTERNAL MEDICINE

## 2025-02-11 PROCEDURE — 85025 COMPLETE CBC W/AUTO DIFF WBC: CPT | Performed by: INTERNAL MEDICINE

## 2025-02-11 PROCEDURE — 84132 ASSAY OF SERUM POTASSIUM: CPT | Performed by: STUDENT IN AN ORGANIZED HEALTH CARE EDUCATION/TRAINING PROGRAM

## 2025-02-11 PROCEDURE — 99232 SBSQ HOSP IP/OBS MODERATE 35: CPT | Performed by: NURSE PRACTITIONER

## 2025-02-11 PROCEDURE — 94761 N-INVAS EAR/PLS OXIMETRY MLT: CPT

## 2025-02-11 RX ORDER — METOPROLOL SUCCINATE 50 MG/1
50 TABLET, EXTENDED RELEASE ORAL DAILY
Qty: 30 TABLET | Refills: 0 | Status: SHIPPED | OUTPATIENT
Start: 2025-02-12

## 2025-02-11 RX ORDER — POTASSIUM CHLORIDE 1.5 G/1.58G
40 POWDER, FOR SOLUTION ORAL EVERY 4 HOURS
Status: DISCONTINUED | OUTPATIENT
Start: 2025-02-11 | End: 2025-02-11 | Stop reason: HOSPADM

## 2025-02-11 RX ORDER — CEFDINIR 300 MG/1
300 CAPSULE ORAL 2 TIMES DAILY
Qty: 2 CAPSULE | Refills: 0 | Status: SHIPPED | OUTPATIENT
Start: 2025-02-11 | End: 2025-02-12

## 2025-02-11 RX ADMIN — PANTOPRAZOLE SODIUM 40 MG: 40 TABLET, DELAYED RELEASE ORAL at 06:37

## 2025-02-11 RX ADMIN — POTASSIUM CHLORIDE 40 MEQ: 1.5 POWDER, FOR SOLUTION ORAL at 11:32

## 2025-02-11 RX ADMIN — METOPROLOL SUCCINATE 50 MG: 50 TABLET, EXTENDED RELEASE ORAL at 08:33

## 2025-02-11 RX ADMIN — IPRATROPIUM BROMIDE AND ALBUTEROL SULFATE 3 ML: .5; 3 SOLUTION RESPIRATORY (INHALATION) at 11:08

## 2025-02-11 RX ADMIN — FERROUS SULFATE TAB 325 MG (65 MG ELEMENTAL FE) 325 MG: 325 (65 FE) TAB at 08:33

## 2025-02-11 RX ADMIN — PRAVASTATIN SODIUM 40 MG: 40 TABLET ORAL at 08:33

## 2025-02-11 RX ADMIN — FUROSEMIDE 40 MG: 40 TABLET ORAL at 08:33

## 2025-02-11 RX ADMIN — DULOXETINE 60 MG: 30 CAPSULE, DELAYED RELEASE ORAL at 08:33

## 2025-02-11 RX ADMIN — APIXABAN 2.5 MG: 2.5 TABLET, FILM COATED ORAL at 08:33

## 2025-02-11 RX ADMIN — POTASSIUM CHLORIDE 40 MEQ: 1.5 POWDER, FOR SOLUTION ORAL at 08:33

## 2025-02-11 RX ADMIN — IPRATROPIUM BROMIDE AND ALBUTEROL SULFATE 3 ML: .5; 3 SOLUTION RESPIRATORY (INHALATION) at 06:43

## 2025-02-11 NOTE — CASE MANAGEMENT/SOCIAL WORK
Case Management Discharge Note      Final Note: Pt discharged home, 2/11/25 with [facility].  Notified [facility] that Pt was discharging today…....Jocy S/JUNI CM    Provided Post Acute Provider Quality & Resource List?: Yes  Post Acute Provider Quality and Resource List: Home Health  Delivered To: Support Person  Method of Delivery: Telephone    Selected Continued Care - Discharged on 2/11/2025 Admission date: 2/7/2025 - Discharge disposition: Home or Self Care      Destination    No services have been selected for the patient.                Durable Medical Equipment    No services have been selected for the patient.                Dialysis/Infusion    No services have been selected for the patient.                Home Medical Care Coordination complete.      Service Provider Services Address Phone Fax Patient Preferred    [facility] [address] [place] [address], [facility] [address] [id number] [phone] --              Therapy    No services have been selected for the patient.                Community Resources    No services have been selected for the patient.                Community & DME    No services have been selected for the patient.                         Final Discharge Disposition Code: 06 - home with home health care

## 2025-02-11 NOTE — PROGRESS NOTES
"    Patient Name: Alexa Peace  :1927  89 y.o.      Patient Care Team:  Tomas Nix, ALONSO, APRN as PCP - General (Internal Medicine)    Chief Complaint: follow up HFpEF , AF     Interval History: she is sitting up in the chair. Feels well. HR 80s.       Objective   Vital Signs  Temp:  [97.8 °F (36.6 °C)-98.4 °F (36.9 °C)] 98 °F (36.7 °C)  Heart Rate:  [76-98] 92  Resp:  [16-20] 18  BP: (117-140)/(43-59) 133/43    Intake/Output Summary (Last 24 hours) at 2025 1152  Last data filed at 2025 1133  Gross per 24 hour   Intake 720 ml   Output 2350 ml   Net -1630 ml     Flowsheet Rows      Flowsheet Row First Filed Value   Admission Height 157.5 cm (62\") Documented at 2025   Admission Weight 50.1 kg (110 lb 7.2 oz) Documented at 2025            Physical Exam:   General Appearance:    Alert, cooperative, in no acute distress   Lungs:     Clear to auscultation.  Normal respiratory effort and rate.      Heart:    irregular rhythm and normal rate, normal S1 and S2, no murmurs, gallops or rubs.     Chest Wall:    No abnormalities observed   Abdomen:     Soft, nontender, positive bowel sounds.     Extremities:   no cyanosis, clubbing or edema.  No marked joint deformities.  Adequate musculoskeletal strength.       Results Review:    Results from last 7 days   Lab Units 25  0313   SODIUM mmol/L 140   POTASSIUM mmol/L 2.9*   CHLORIDE mmol/L 103   CO2 mmol/L 27.2   BUN mg/dL 16   CREATININE mg/dL 0.56*   GLUCOSE mg/dL 122*   CALCIUM mg/dL 8.4     Results from last 7 days   Lab Units 25  1738 25  1643   HSTROP T ng/L 46* 28*     Results from last 7 days   Lab Units 25  0313   WBC 10*3/mm3 8.60   HEMOGLOBIN g/dL 10.0*   HEMATOCRIT % 29.7*   PLATELETS 10*3/mm3 275     Results from last 7 days   Lab Units 25  1643   INR  1.41*   APTT seconds 26.3     Results from last 7 days   Lab Units 25  0313   MAGNESIUM mg/dL 2.0                   Medication Review: "   apixaban, 2.5 mg, Oral, Q12H  cefTRIAXone, 1,000 mg, Intravenous, Q24H  DULoxetine, 60 mg, Oral, Daily  ferrous sulfate, 325 mg, Oral, Daily  furosemide, 40 mg, Oral, Daily  ipratropium-albuterol, 3 mL, Nebulization, 4x Daily - RT  metoprolol succinate XL, 50 mg, Oral, Daily  pantoprazole, 40 mg, Oral, Q AM  potassium chloride, 40 mEq, Oral, Q4H  pravastatin, 40 mg, Oral, Daily              Assessment & Plan   Acute heart failure with preserved LVEF due to AF  PAF, presented with RVr. Remains in AF. HR improved with titration of beta blocker. She is on apixaban 2.5 mg BID.   Acute hypoxic respiratory failure, flash pulmonary edema   Hypertension   Hyperlipidemia     She responded well to IV dose of lasix yesterday and is on room air. Doing well on oral diuretics.     Hr well controlled on increased dose of metoprolol succinate.     Anticoagulated with apixaban.     Nothing further to add. Will see as needed. Has appt in our office 2/19 2 pm.    BIENVENIDO Liao  [facility]  02/11/25  11:52 EST

## 2025-02-11 NOTE — PLAN OF CARE
Goal Outcome Evaluation:  Plan of Care Reviewed With: patient        Progress: improving  Outcome Evaluation: VSS, A+Ox2-3, situational seems to be the hardest. Pt walked unit during first half of shift, tolerated well with walker. Pt has no complaints of pain. Pt on RA/2LNC PRN for sleep. Will continue with POC.

## 2025-02-11 NOTE — DISCHARGE SUMMARY
Patient Name: Alexa Peace  : 1927  MRN: [id number]    Date of Admission: 2025  Date of Discharge:  2025  Primary Care Physician: Tomas Nix, ALONSO, APRN      Chief Complaint:   Shortness of Breath      Discharge Diagnoses     Active Hospital Problems    Diagnosis  POA    **Acute hypoxic respiratory failure [J96.01]  Yes    Pneumonia due to gram-negative bacteria [J15.69]  Yes    Acute on chronic diastolic heart failure [I50.33]  Yes    DNR (do not resuscitate) [Z66]  Yes    Pneumonia [J18.9]  Yes    Paroxysmal atrial fibrillation [I48.0]  Yes    Essential hypertension [I10]  Yes    Gastroesophageal reflux disease without esophagitis [K21.9]  Yes    Depression [F32.A]  Yes    Bad memory [R41.3]  Yes      Resolved Hospital Problems   No resolved problems to display.        Hospital Course     Ms. Peace is a 89 y.o. female who presented to [facility] initially complaining of shortness of breath.  Please see the admitting history and physical for further details.  She was admitted to the hospital for further evaluation and treatment.      PAF with RVR/Acute heart failure with preserved EF due to AF/Acute Hypoxic Respiratory Failure  - She was noted to have documented O2 sat >90% on room air prior to arrival, she initially required BIPAP, but was later weaned to room air. HR noted to be elevated, and she developed flash pulmonary edema due to elevated heart rate. Cardiology consulted and followed, she was treated with diuresis and had dose of beta blocker increased. She tolerated treatments well. BY time of discharge, she was on room air and breathing comfortably, HR had improved. She was evaluated by cardiology and cleared for discharge, recommending continuation of medications as prescribed (see discharge medication reconciliation) and they are going to plan for close outpatient follow up. Patient provided discharge instructions on weight monitoring and when to return to  hospital. Recommend low sodium diet.  Follow up with Cardiology as scheduled. Also, recommend follow up with PCP within 1 week for reassessment. 2D Echo showed EF 61.8%, normal LV systolic function. Blood pressure on lower end of normal range at times, no indication for ACE/ARB, want to avoid risk of hypotension given her age. Can readdress with Cardiology at follow up appointment.    Pneumonia  Leukocytosis  - She was noted to have evidence suggestive of pneumonia on imaging, coupled with elevated procalcitonin and elevated WBC count. She was treated with antibiotics and responded well to therapy. WBC improved and normalized prior to discharge. Continue with oral antibiotics after discharge, to be taken as prescribed to complete antibiotic course.    Hypokalemia  - Noted on labs prior to discharge, this was repleted per electrolyte protocol, values subsequently improved and normalized on labs prior to discharge. Recommend repeat BMP with PCP within 1 week for reassessment.    Anemia  - Noted on labs, no evidence of bleeding. Recommend repeat CBC with PCP within 1 week for reassessment.    GERD  - Continue medications as previously prescribed.     Depression  - Continue medications as previously prescribed.    Day of Discharge     Subjective:  Patient awake and resting comfortably, doing okay at present, feeling better overall.    Physical Exam:  Temp:  [97.8 °F (36.6 °C)-98.4 °F (36.9 °C)] 97.8 °F (36.6 °C)  Heart Rate:  [76-98] 82  Resp:  [16-20] 18  BP: (117-140)/(47-59) 140/59  Body mass index is 20.04 kg/m².  Physical Exam  Vitals and nursing note reviewed.   Constitutional:       General: She is not in acute distress.  Cardiovascular:      Rate and Rhythm: Normal rate.      Pulses: Normal pulses.      Heart sounds: Normal heart sounds.   Pulmonary:      Effort: Pulmonary effort is normal. No respiratory distress.      Breath sounds: No wheezing.      Comments: On room air  Abdominal:      Palpations: Abdomen  is soft.      Tenderness: There is no abdominal tenderness.   Skin:     General: Skin is warm and dry.   Neurological:      Mental Status: She is alert. Mental status is at baseline.         Consultants     Consult Orders (all) (From admission, onward)       Start     Ordered    02/08/25 0702  Inpatient Cardiology Consult  IN AM        Specialty:  Cardiology  Provider:  Blake Brown Jr., MD    02/08/25 0104    02/08/25 0235  Inpatient Case Management  Consult  Once        Provider:  (Not yet assigned)    02/08/25 0235    02/07/25 1845  [facility] (on-call MD unless specified) Details  Once        Specialty:  Hospitalist  Provider:  Saad Moreno DO    02/07/25 1844    02/07/25 1811  Pulmonology (on-call MD unless specified)  Once        Specialty:  Pulmonary Disease  Provider:  Kerry Nelson APRN    02/07/25 1810                  Procedures     * Surgery not found *    Imaging Results (All)       Procedure Component Value Units Date/Time    XR Chest 2 View [[id number]] Collected: 02/08/25 1434     Updated: 02/08/25 1440    Narrative:      XR CHEST 2 VW-     HISTORY: Female who is 89 years-old, congestive heart failure, follow-up     TECHNIQUE: Frontal view of the chest     COMPARISON: 2/7/2025     FINDINGS: The heart size is borderline. Pulmonary vasculature appears  mildly congested. The aorta is calcified. Mild atelectasis or infiltrate  in the lower lungs, continued follow-up suggested. Small pleural  effusion is apparent on the lateral view. No pneumothorax. No acute  osseous process.       Impression:      As described.     This report was finalized on 2/8/2025 2:37 PM by Dr. Darren Brar M.D on Workstation: [id number]       XR Chest 1 View [[id number]] Collected: 02/07/25 1728     Updated: 02/07/25 1733    Narrative:      CXR ONE VIEW      HISTORY: short of breath     COMPARISON: 5/4/2022     TECHNIQUE: single portable AP       Impression:         There is mild cardiomegaly.     There is left  greater than right fairly extensive interstitial  infiltrate, question superimposed left lower lobe superior segment  alveolar infiltrate.     Probable small bilateral effusions, no pneumothorax.     This report was finalized on 2/7/2025 5:30 PM by Dr. Go Claudio M.D  on Workstation: [id number]             Results for orders placed during the hospital encounter of 04/10/21    Duplex Venous Lower Extremity - Right    Interpretation Summary  · Normal right lower extremity venous duplex scan.    Results for orders placed during the hospital encounter of 02/07/25    Adult Transthoracic Echo Complete W/ Cont if Necessary Per Protocol    Interpretation Summary    Left ventricular systolic function is normal. Calculated left ventricular EF = 61.8%    The left atrial cavity is moderately dilated.    The right atrial cavity is mildly  dilated.    Calculated right ventricular systolic pressure from tricuspid regurgitation is 56 mmHg.    Mild aortic valve regurgitation is present. Mild aortic valve stenosis is present.    Mild mitral valve regurgitation is present.    Pertinent Labs     Results from last 7 days   Lab Units 02/11/25  0313 02/10/25  0438 02/09/25 0243 02/08/25  0320   WBC 10*3/mm3 8.60 8.94 13.66* 11.14*   HEMOGLOBIN g/dL 10.0* 9.7* 9.7* 9.9*   PLATELETS 10*3/mm3 275 272 272 259     Results from last 7 days   Lab Units 02/11/25  1452 02/11/25  0313 02/10/25  0438 02/09/25  0243 02/08/25  0320   SODIUM mmol/L  --  140 140 142 141   POTASSIUM mmol/L 3.7 2.9* 4.2 3.6 3.5   CHLORIDE mmol/L  --  103 102 107 105   CO2 mmol/L  --  27.2 29.4* 25.0 25.1   BUN mg/dL  --  16 20 26* 25*   CREATININE mg/dL  --  0.56* 0.65 0.67 0.72   GLUCOSE mg/dL  --  122* 131* 121* 205*   EGFR mL/min/1.73  --  83.1 80.2 79.6 76.2     Results from last 7 days   Lab Units 02/11/25  0313 02/10/25  0438 02/09/25  0243 02/08/25  0320 02/07/25  1643   ALBUMIN g/dL 3.3* 3.4* 3.4* 3.6 3.8   BILIRUBIN mg/dL  --   --   --   --  0.7   ALK  "PHOS U/L  --   --   --   --  70   AST (SGOT) U/L  --   --   --   --  50*   ALT (SGPT) U/L  --   --   --   --  25     Results from last 7 days   Lab Units 02/11/25  0313 02/10/25  0438 02/09/25  0243 02/08/25  0320   CALCIUM mg/dL 8.4 8.8 8.5 8.6   ALBUMIN g/dL 3.3* 3.4* 3.4* 3.6   MAGNESIUM mg/dL 2.0  --   --   --    PHOSPHORUS mg/dL 2.8 3.2 2.8 2.9       Results from last 7 days   Lab Units 02/08/25  0320 02/07/25  1738 02/07/25  1643   HSTROP T ng/L  --  46* 28*   PROBNP pg/mL  --   --  2,971.0*   D DIMER QUANT MCGFEU/mL 0.83  --   --            Invalid input(s): \"LDLCALC\"  Results from last 7 days   Lab Units 02/07/25  1738 02/07/25  1737   BLOODCX  No growth at 3 days No growth at 3 days     Results from last 7 days   Lab Units 02/07/25  1642   COVID19  Not Detected       Test Results Pending at Discharge     Pending Results       None              Discharge Details        Discharge Medications        New Medications        Instructions Start Date   cefdinir 300 MG capsule  Commonly known as: OMNICEF   300 mg, Oral, 2 Times Daily             Changes to Medications        Instructions Start Date   ferrous sulfate 325 (65 FE) MG tablet  What changed:   how much to take  how to take this  when to take this  additional instructions   TAKE 1 TABLET BY MOUTH EVERY DAY      metoprolol succinate XL 50 MG 24 hr tablet  Commonly known as: TOPROL-XL  What changed:   medication strength  how much to take   50 mg, Oral, Daily   Start Date: February 12, 2025            Continue These Medications        Instructions Start Date   apixaban 2.5 MG tablet tablet  Commonly known as: Eliquis   2.5 mg, Oral, Every 12 Hours      calcium carbonate 500 MG chewable tablet  Commonly known as: TUMS   2 tablets, Oral, 2 Times Daily PRN      Diclofenac Sodium 1 % gel gel  Commonly known as: VOLTAREN   4 g, Topical, 4 Times Daily PRN      DULoxetine 60 MG capsule  Commonly known as: CYMBALTA   60 mg, Oral, Daily      furosemide 20 MG " tablet  Commonly known as: LASIX   40 mg, Oral, Daily      omeprazole 20 MG capsule  Commonly known as: priLOSEC   20 mg, Oral, Daily With Breakfast      polyethylene glycol pack packet  Commonly known as: MIRALAX   17 g, Oral, Daily      pravastatin 40 MG tablet  Commonly known as: PRAVACHOL   40 mg, Oral, Daily      traMADol 50 MG tablet  Commonly known as: ULTRAM   50 mg, Oral, Every 8 Hours PRN, for pain               Allergies   Allergen Reactions    Ciprofloxacin      UNKNOWN    Metronidazole      UNKWOWN    Prednisone Other (See Comments)     Elevated blood pressure       Discharge Disposition:  Home or Self Care      Discharge Diet:  Diet Order   Procedures    Diet: Cardiac; Healthy Heart (2-3 Na+); Fluid Consistency: Thin (IDDSI 0)       Discharge Activity:   Activity Instructions       Gradually Increase Activity Until at Pre-Hospitalization Level              CODE STATUS:    Code Status and Medical Interventions: No CPR (Do Not Attempt to Resuscitate); Limited Support; No intubation (DNI), No cardioversion   Ordered at: 02/08/25 1335     Medical Intervention Limits:    No intubation (DNI)    No cardioversion     Code Status (Patient has no pulse and is not breathing):    No CPR (Do Not Attempt to Resuscitate)     Medical Interventions (Patient has pulse or is breathing):    Limited Support       Future Appointments   Date Time Provider Department Center   2/19/2025  2:00 PM Justina Beal APRN [facility]   2/21/2025  2:45 PM Tomas Nix DNP, APRN [facility]  [facility]   7/17/2025  1:30 PM Justina Beal APRN [facility]     Additional Instructions for the Follow-ups that You Need to Schedule       Discharge Follow-up with PCP   As directed       Currently Documented PCP:    Tomas Nix DNP, APRN    PCP Phone Number:    [phone]     Follow Up Details: within 1 week for reassessment, medication and symptom review, blood pressure check, weight check, BMP and CBC        Discharge Follow-up with  Specialty: Cardiology   As directed      Specialty: Cardiology   Follow Up Details: 1 week               Contact information for follow-up providers       Tomas Nix DNP, [facility] .    Specialty: Internal Medicine  Why: within 1 week for reassessment, medication and symptom review, blood pressure check, weight check, BMP and CBC  Contact information:  [address]  [address]  [facility] [id number] [phone]                       Contact information for after-discharge care       Home Medical Care       [facility] .    Service: Home Nursing  Contact information:  [address] [facility] [address]  [facility] [address] [phone]                                   Additional Instructions for the Follow-ups that You Need to Schedule       Discharge Follow-up with PCP   As directed       Currently Documented PCP:    Tomas Nix DNP, [facility]    PCP Phone Number:    [phone]     Follow Up Details: within 1 week for reassessment, medication and symptom review, blood pressure check, weight check, BMP and CBC        Discharge Follow-up with Specialty: Cardiology   As directed      Specialty: Cardiology   Follow Up Details: 1 week            Time Spent on Discharge:  Greater than 30 minutes      Saad Moreno DO  [facility]  02/11/25  08:51 EST

## 2025-02-11 NOTE — OUTREACH NOTE
Prep Survey      Flowsheet Row Responses   [facility] patient discharged from? [place]   Is LACE score < 7 ? No   Eligibility [facility]   Date of Admission 02/07/25   Date of Discharge 02/11/25   Discharge Disposition Home-Health Care Sv   Discharge diagnosis Pneumonia   Does the patient have one of the following disease processes/diagnoses(primary or secondary)? Pneumonia   Does the patient have Home health ordered? Yes   [facility]   Prep survey completed? Yes            Tisha DIALLO - Registered Nurse

## 2025-02-12 ENCOUNTER — TRANSITIONAL CARE MANAGEMENT TELEPHONE ENCOUNTER (OUTPATIENT)
Dept: CALL CENTER | Facility: HOSPITAL | Age: OVER 89
End: 2025-02-12
Payer: MEDICARE

## 2025-02-12 LAB
BACTERIA SPEC AEROBE CULT: NORMAL
BACTERIA SPEC AEROBE CULT: NORMAL

## 2025-02-12 NOTE — OUTREACH NOTE
Call Center TCM Note      Flowsheet Row Responses   [facility] patient discharged from? [place]   Does the patient have one of the following disease processes/diagnoses(primary or secondary)? Pneumonia   TCM attempt successful? No   Unsuccessful attempts Attempt 1            Corine Martinez MA    2/12/2025, 09:49 EST

## 2025-02-12 NOTE — OUTREACH NOTE
D/C DX: [facility]NA; acute hypoxic resp failure - Per dtr Corine pt doing fair. Still gets very winded with even short walks room to room in home.    **OF NOTE FOR CASE MGMT [facility] to follow. Family has had no contact from [facility] at time of this call**  Pt has [facility] follow up 02/19/2025, TCM APPT with PCP [facility] Nix 02/21/2025. Dtr offered sooner date for PCP, for now will keep 02/21/2025 appt.

## 2025-02-12 NOTE — OUTREACH NOTE
Call Center TCM Note      Flowsheet Row Responses   [facility] patient discharged from? [place]   Does the patient have one of the following disease processes/diagnoses(primary or secondary)? Pneumonia   TCM attempt successful? Yes   Call start time 1534   Call end time 1539   Discharge diagnosis Pneumonia   Is patient permission given to speak with other caregiver? Yes   Person spoke with today (if not patient) and relationship dtr Corine Arnold reviewed with patient/caregiver? Yes   Is the patient having any side effects they believe may be caused by any medication additions or changes? No   Does the patient have all medications ordered at discharge? Yes   Prescription comments New rx Cefdinir, updated Metoprolol succinate XL in place   Is the patient taking all medications as directed (includes completed medication regime)? Yes   Does the patient have an appointment with their PCP within 7-14 days of discharge? Yes   What is the Home health agency?  [facility]   Home health comments Family has not heard from [facility]  as yet, I am notifiying CM   Has all DME been delivered? No   Pulse Ox monitoring None  [Family encouraged to obtain Pulse Ox]   Did the patient receive a copy of their discharge instructions? Yes   Nursing interventions Reviewed instructions with patient   What is the patient's perception of their health status since discharge? Improving   If the patient is a current smoker, are they able to teach back resources for cessation? Not a smoker   Is the patient/caregiver able to teach back the hierarchy of who to call/visit for symptoms/problems? PCP, Specialist, Home health nurse, Urgent Care, ED, 911 Yes   Is the patient/caregiver able to teach back signs and symptoms of worsening condition: Fever/chills, Shortness of breath, Chest pain   Is the patient/caregiver able to teach back importance of completing antibiotic course of treatment? Yes   TCM call completed? Yes   Wrap up  additional comments D/C DX: PNA,  acute hypoxic resp failure - Per dtr judith Pool doing fair. Still gets very winded with even short walks room to room in home. [facility] to follow. Pt has [facility] follow up 02/19/2025, TCM APPT with PCP [facility] Nix 02/21/2025. Dtr offered sooner date for PCP, for now will keep 02/21/2025 appt.   Call end time [id number]            Corine Martinez MA    2/12/2025, 15:45 EST

## 2025-02-13 ENCOUNTER — TELEPHONE (OUTPATIENT)
Dept: INTERNAL MEDICINE | Age: OVER 89
End: 2025-02-13
Payer: MEDICARE

## 2025-02-13 NOTE — OUTREACH NOTE
Case Management Call Center Follow-up      Flowsheet Row Responses   [facility] Call Center Tracking Reason? Other (specify in comments)   Other Tracking Comments No contact yet from    Has the Call Center Case Management Follow-up issue been resolved? No   Follow-up Comments [facility] placed to [facility] and spoke with the triage nurse.  She states that they have Ms. Peace as pending but they have not made contact.  I asked that contact be expedited as she was discharged 2/11/2025.  The nurse sent the request to the manager. CCP will follow up in .  Giovanna Prieto RN    2/13/2025, 17:27 EST

## 2025-02-14 NOTE — OUTREACH NOTE
Case Management Call Center Follow-up      Flowsheet Row Responses   [facility] Call Center Tracking Reason? Other (specify in comments)   Other Tracking Comments No contact yet from    Has the Call Center Case Management Follow-up issue been resolved? Yes   Follow-up Comments Spoke again with [facility] intake.  They have now made contact and will start services.  Giovanna Prieto RN    2/14/2025, 12:01 EST

## 2025-02-19 ENCOUNTER — TELEPHONE (OUTPATIENT)
Dept: INTERNAL MEDICINE | Age: OVER 89
End: 2025-02-19

## 2025-02-19 NOTE — TELEPHONE ENCOUNTER
Caller: [facility]    Relationship: Home Health    Best call back number: [phone]    What orders are you requesting (i.e. lab or imaging): VERBAL ORDERS ADD OCCUPATIONAL THERAPY AND SOCIAL WORK    In what timeframe would the patient need to come in: ASAP    Where will you receive your lab/imaging services: HOME    Additional notes: PATIENT WAS SEEN YESTERDAY FOR PHYSICAL THERAPY AND THEY ARE WANTING TO ADD THE ADDITIONAL MEDICAL SERVICES.

## 2025-02-19 NOTE — TELEPHONE ENCOUNTER
Called and LVM to being acceptable to verbal and to call office with any questions or concerns , will call back to check for confirmation

## 2025-02-21 ENCOUNTER — READMISSION MANAGEMENT (OUTPATIENT)
Dept: CALL CENTER | Facility: HOSPITAL | Age: OVER 89
End: 2025-02-21
Payer: MEDICARE

## 2025-02-21 NOTE — OUTREACH NOTE
COPD/PN Week 2 Survey      Flowsheet Row Responses   [facility] patient discharged from? [place]   Does the patient have one of the following disease processes/diagnoses(primary or secondary)? Pneumonia   Week 2 attempt successful? Yes   Call start time 1358   Call end time 1409   Discharge diagnosis Pneumonia   Is patient permission given to speak with other caregiver? Yes   List who call center can speak with dtr   Person spoke with today (if not patient) and relationship dtr Corine   Medication alerts for this patient Family organizes the medications in pill dispenser, pt administers the meds herself, per Corine's report.   Meds reviewed with patient/caregiver? Yes   Is the patient having any side effects they believe may be caused by any medication additions or changes? No   Does the patient have all medications ordered at discharge? Yes   Is the patient taking all medications as directed (includes completed medication regime)? Yes   Does the patient have a primary care provider?  Yes   Does the patient have an appointment with their PCP or specialist within 7 days of discharge? Yes   Has the patient kept scheduled appointments due by today? No   Nursing Interventions Advised to reschedule appointment, Educated on importance of keeping appointment  [Weather caused cancellation, per dtr]   What [facility] the [facility] health agency[facility]   Has home health visited the patient within 72 hours of discharge? Yes   Home health comments .   Pulse Ox monitoring None   Comments Patient is out to lunch today, improving per Corine's report. The pt has no cough at this time and dtr reports SOA is at patient's baseline. Patient has no c/o dysuria per Corine. Patient remains fatigued/weak since DC but slowly improving her dtr reports. RN educated dtr to have the pt perform cough/deep breathing and ambulate q2hr while awake.   What is the patient's perception of their health status since discharge? Improving    Nursing Interventions Nurse provided patient education   Is the patient/caregiver able to teach back the hierarchy of who to call/visit for symptoms/problems? PCP, Specialist, Home health nurse, Urgent Care, ED, 911 Yes   Week 2 call completed? Yes   Revoked No further contact(revokes)-requires comment   Wrap up additional comments .   Call end time 2690            Olesya KELLOGG - Registered Nurse

## 2025-02-26 ENCOUNTER — TELEPHONE (OUTPATIENT)
Dept: INTERNAL MEDICINE | Age: OVER 89
End: 2025-02-26
Payer: MEDICARE

## 2025-02-26 NOTE — TELEPHONE ENCOUNTER
Elizabeth from [facility] called to report that the pt's daughter delayed the OT eval to 3/6 due to the pt having other appts this week.

## 2025-03-14 ENCOUNTER — PATIENT MESSAGE (OUTPATIENT)
Dept: CARDIOLOGY | Facility: CLINIC | Age: OVER 89
End: 2025-03-14
Payer: MEDICARE

## 2025-03-14 RX ORDER — METOPROLOL SUCCINATE 50 MG/1
50 TABLET, EXTENDED RELEASE ORAL DAILY
Qty: 90 TABLET | Refills: 1 | Status: SHIPPED | OUTPATIENT
Start: 2025-03-14

## 2025-03-25 DIAGNOSIS — G89.29 CHRONIC LOW BACK PAIN, UNSPECIFIED BACK PAIN LATERALITY, UNSPECIFIED WHETHER SCIATICA PRESENT: ICD-10-CM

## 2025-03-25 DIAGNOSIS — M54.50 CHRONIC LOW BACK PAIN, UNSPECIFIED BACK PAIN LATERALITY, UNSPECIFIED WHETHER SCIATICA PRESENT: ICD-10-CM

## 2025-03-25 RX ORDER — TRAMADOL HYDROCHLORIDE 50 MG/1
50 TABLET ORAL EVERY 8 HOURS PRN
Qty: 90 TABLET | Refills: 2 | OUTPATIENT
Start: 2025-03-25

## 2025-03-26 NOTE — TELEPHONE ENCOUNTER
Spoke with patient's daughter.    They have moved the patient to palliative care. They were under the impression that this medication would fall under the palliative care.    Does this medication now fall under palliative care?

## (undated) DEVICE — DRSNG WND GZ PAD BORDERED 4X8IN STRL

## (undated) DEVICE — SUT VIC 1 OS8 27IN J699H

## (undated) DEVICE — ANTIBACTERIAL UNDYED BRAIDED (POLYGLACTIN 910), SYNTHETIC ABSORBABLE SUTURE: Brand: COATED VICRYL

## (undated) DEVICE — ENCORE® LATEX ORTHO SIZE 8, STERILE LATEX POWDER-FREE SURGICAL GLOVE: Brand: ENCORE

## (undated) DEVICE — HANDPIECE SET WITH COAXIAL HIGH FLOW TIP AND SUCTION TUBE: Brand: INTERPULSE

## (undated) DEVICE — GLV SURG BIOGEL LTX PF 7

## (undated) DEVICE — PK NEURO SPINE 40

## (undated) DEVICE — 6.0MM PRECISION ROUND

## (undated) DEVICE — OCCLUSIVE GAUZE STRIP,3% BISMUTH TRIBROMOPHENATE IN PETROLATUM BLEND: Brand: XEROFORM

## (undated) DEVICE — GLV SURG BIOGEL LTX PF 7 1/2

## (undated) DEVICE — PAD,ABDOMINAL,8"X10",ST,LF: Brand: MEDLINE

## (undated) DEVICE — APPL DURAPREP IODOPHOR APL 26ML

## (undated) DEVICE — DIFFUSER: Brand: CORE, MAESTRO

## (undated) DEVICE — SOL NACL 0.9PCT 1000ML

## (undated) DEVICE — OIL CARTRIDGE: Brand: CORE, MAESTRO

## (undated) DEVICE — ADHS SKIN DERMABOND TOP ADVANCED

## (undated) DEVICE — SPONGE,LAP,12"X12",XR,ST,5/PK,40PK/CS: Brand: MEDLINE

## (undated) DEVICE — GOWN,ECLIPSE,FABRIC-REINFORCED,X-LARGE: Brand: MEDLINE

## (undated) DEVICE — PK ATS CUST W CARDIOTOMY RESEVOIR

## (undated) DEVICE — MEDI-VAC YANKAUER SUCTION HANDLE W/BULBOUS TIP: Brand: CARDINAL HEALTH

## (undated) DEVICE — DISPOSABLE BIPOLAR CABLE 12FT. (3.6M): Brand: KIRWAN

## (undated) DEVICE — BONE MARROW ASPIRATION NEEDLES ASPIRATOR KIT 3-HOLE 4 INCHES NDLE

## (undated) DEVICE — SUT MNCRYL PLS ANTIB UD 4/0 PS2 18IN

## (undated) DEVICE — TOTAL TRAY, 16FR 10ML SIL FOLEY, URN: Brand: MEDLINE